# Patient Record
Sex: FEMALE | Race: WHITE | HISPANIC OR LATINO | Employment: OTHER | URBAN - METROPOLITAN AREA
[De-identification: names, ages, dates, MRNs, and addresses within clinical notes are randomized per-mention and may not be internally consistent; named-entity substitution may affect disease eponyms.]

---

## 2017-07-24 ENCOUNTER — CONVERSION ENCOUNTER (OUTPATIENT)
Dept: MAMMOGRAPHY | Facility: CLINIC | Age: 57
End: 2017-07-24

## 2018-04-20 ENCOUNTER — TRANSCRIBE ORDERS (OUTPATIENT)
Dept: ADMINISTRATIVE | Facility: HOSPITAL | Age: 58
End: 2018-04-20

## 2018-04-20 DIAGNOSIS — R06.81 WITNESSED EPISODE OF APNEA: ICD-10-CM

## 2018-04-20 DIAGNOSIS — R06.83 SNORING: Primary | ICD-10-CM

## 2018-04-20 DIAGNOSIS — R40.0 DAYTIME SLEEPINESS: ICD-10-CM

## 2018-05-10 ENCOUNTER — HOSPITAL ENCOUNTER (OUTPATIENT)
Dept: SLEEP CENTER | Facility: CLINIC | Age: 58
Discharge: HOME | End: 2018-05-10
Payer: COMMERCIAL

## 2018-05-10 DIAGNOSIS — R06.81 WITNESSED EPISODE OF APNEA: ICD-10-CM

## 2018-05-10 DIAGNOSIS — R06.83 SNORING: ICD-10-CM

## 2018-05-10 DIAGNOSIS — R40.0 DAYTIME SLEEPINESS: ICD-10-CM

## 2018-05-10 PROCEDURE — 95810 POLYSOM 6/> YRS 4/> PARAM: CPT | Performed by: INTERNAL MEDICINE

## 2018-05-10 PROCEDURE — 95810 POLYSOM 6/> YRS 4/> PARAM: CPT

## 2018-05-11 NOTE — PROGRESS NOTES
Sleep Study Documentation    Pre-Sleep Study       Sleep testing procedure explained to patient:YES    Patient napped prior to study:NO    Caffeine:Dayshift worker after 12PM   Caffeine use:NO    Alcohol:Dayshift workers after 5PM: Alcohol use:NO    Typical day for patient:YES       Study Documentation  Diagnostic   Snore: Moderate  Supplemental O2: no    Minimum SaO2 88  Baseline SaO2 94    EKG abnormalities: no     EEG abnormalities: no    Study Terminated:no      Post-Sleep Study    Medication used at bedtime or during sleep study:YES other prescription medications    Patient reports time it took to fall asleep:30 to 60 minutes    Patient reports waking up during study:1 to 2 times  Patient reports returning to sleep with no difficulty  Patient reports sleeping less than 2 hours without dreaming      Patient reports sleep during study:typical    Patient rated sleepiness: somewhat sleepy or tired

## 2018-05-17 ENCOUNTER — TELEPHONE (OUTPATIENT)
Dept: SLEEP CENTER | Facility: CLINIC | Age: 58
End: 2018-05-17

## 2018-05-17 NOTE — TELEPHONE ENCOUNTER
Spoke with pt she has a folow up sched  offered different appointments, pt declined will keep current appt

## 2018-05-22 LAB — CERULOPLASMIN (HISTORICAL): 37

## 2018-05-23 LAB — COPPER (HISTORICAL): 164 UG/DL

## 2018-05-24 LAB
ABSOL LYMPHOCYTES (HISTORICAL): 1.5 K/UL (ref 0.5–4)
ALBUMIN SERPL BCP-MCNC: 3.8 G/DL (ref 3–5.2)
ALP SERPL-CCNC: 110 U/L (ref 43–122)
ALT SERPL W P-5'-P-CCNC: 47 U/L (ref 9–52)
AMORPHOUS MATERIAL (HISTORICAL): ABNORMAL
ANION GAP SERPL CALCULATED.3IONS-SCNC: 8 MMOL/L (ref 5–14)
AST SERPL W P-5'-P-CCNC: 60 U/L (ref 14–36)
BACTERIA UR QL AUTO: ABNORMAL
BASOPHILS # BLD AUTO: 0.3 K/UL (ref 0–0.1)
BASOPHILS # BLD AUTO: 3 % (ref 0–1)
BILIRUB SERPL-MCNC: 0.4 MG/DL
BILIRUB UR QL STRIP: NEGATIVE MG/DL
BUN SERPL-MCNC: 22 MG/DL (ref 5–25)
CALCIUM SERPL-MCNC: 9.1 MG/DL (ref 8.4–10.2)
CASTS/CASTS TYPE (HISTORICAL): ABNORMAL /LPF
CHLORIDE SERPL-SCNC: 106 MEQ/L (ref 97–108)
CHOLEST SERPL-MCNC: 174 MG/DL
CHOLEST/HDLC SERPL: 2.9 {RATIO}
CLARITY UR: CLEAR
CO2 SERPL-SCNC: 26 MMOL/L (ref 22–30)
COLOR UR: ABNORMAL
COMMENT (HISTORICAL): ABNORMAL
CREATINE, SERUM (HISTORICAL): 0.69 MG/DL (ref 0.6–1.2)
CREATININE, RANDOM URINE (HISTORICAL): 122.3 MG/DL (ref 50–200)
CRYSTAL TYPE (HISTORICAL): ABNORMAL /HPF
DEPRECATED RDW RBC AUTO: 17.4 %
EGFR (HISTORICAL): >60 ML/MIN/1.73 M2
EOSINOPHIL # BLD AUTO: 0.5 K/UL (ref 0–0.4)
EOSINOPHIL NFR BLD AUTO: 5 % (ref 0–6)
EST. AVERAGE GLUCOSE BLD GHB EST-MCNC: 123 MG/DL
GLUCOSE SERPL-MCNC: 105 MG/DL (ref 70–99)
GLUCOSE UR STRIP-MCNC: NEGATIVE MG/DL
HBA1C MFR BLD HPLC: 5.9 %
HCT VFR BLD AUTO: 39.3 % (ref 36–46)
HDLC SERPL-MCNC: 59 MG/DL
HGB BLD-MCNC: 11.8 G/DL (ref 12–16)
HGB UR QL STRIP.AUTO: NEGATIVE
KETONES UR STRIP-MCNC: NEGATIVE MG/DL
LDL/HDL RATIO (HISTORICAL): 1.6
LDLC SERPL CALC-MCNC: 97 MG/DL
LEUKOCYTE ESTERASE UR QL STRIP: ABNORMAL
LYMPHOCYTES NFR BLD AUTO: 16 % (ref 25–45)
MCH RBC QN AUTO: 23.6 PG (ref 26–34)
MCHC RBC AUTO-ENTMCNC: 30.1 % (ref 31–36)
MCV RBC AUTO: 79 FL (ref 80–100)
MICROALBUM.,U,RANDOM (HISTORICAL): <0.6 MG/DL
MICROALBUMIN/CREATININE RATIO (HISTORICAL): NORMAL
MONOCYTES # BLD AUTO: 0.5 K/UL (ref 0.2–0.9)
MONOCYTES NFR BLD AUTO: 6 % (ref 1–10)
MUCOUS THREADS URNS QL MICRO: ABNORMAL
NEUTROPHILS ABS COUNT (HISTORICAL): 6.3 K/UL (ref 1.8–7.8)
NEUTS SEG NFR BLD AUTO: 70 % (ref 45–65)
NITRITE UR QL STRIP: NEGATIVE
NON-SQ EPI CELLS URNS QL MICRO: ABNORMAL
OTHER STN SPEC: ABNORMAL
PH UR STRIP.AUTO: 6 [PH] (ref 4.5–8)
PLATELET # BLD AUTO: 296 K/MCL (ref 150–450)
POTASSIUM SERPL-SCNC: 5.3 MEQ/L (ref 3.6–5)
PROT UR STRIP-MCNC: NEGATIVE MG/DL
RBC # BLD AUTO: 5.01 M/MCL (ref 4–5.2)
RBC #/AREA URNS AUTO: ABNORMAL /HPF
RBC MORPHOLOGY (HISTORICAL): ABNORMAL
SODIUM SERPL-SCNC: 139 MEQ/L (ref 137–147)
SP GR UR STRIP.AUTO: 1.02 (ref 1–1.04)
TOTAL PROTEIN (HISTORICAL): 6.4 G/DL (ref 5.9–8.4)
TRIGL SERPL-MCNC: 90 MG/DL
UROBILINOGEN UR QL STRIP.AUTO: NEGATIVE MG/DL (ref 0–1)
VLDLC SERPL CALC-MCNC: 18 MG/DL (ref 0–40)
WBC # BLD AUTO: 9.1 K/MCL (ref 4.5–11)
WBC #/AREA URNS AUTO: 1 /HPF

## 2018-06-05 ENCOUNTER — OFFICE VISIT (OUTPATIENT)
Dept: SLEEP CENTER | Facility: CLINIC | Age: 58
End: 2018-06-05
Payer: COMMERCIAL

## 2018-06-05 VITALS
HEIGHT: 62 IN | HEART RATE: 60 BPM | SYSTOLIC BLOOD PRESSURE: 124 MMHG | WEIGHT: 231.4 LBS | BODY MASS INDEX: 42.58 KG/M2 | DIASTOLIC BLOOD PRESSURE: 78 MMHG

## 2018-06-05 DIAGNOSIS — G47.33 OSA (OBSTRUCTIVE SLEEP APNEA): Primary | ICD-10-CM

## 2018-06-05 DIAGNOSIS — R06.81 WITNESSED EPISODE OF APNEA: ICD-10-CM

## 2018-06-05 DIAGNOSIS — G47.21 CIRCADIAN RHYTHM SLEEP DISORDER, DELAYED SLEEP PHASE TYPE: ICD-10-CM

## 2018-06-05 DIAGNOSIS — R06.83 SNORING: ICD-10-CM

## 2018-06-05 DIAGNOSIS — R40.0 DAYTIME SLEEPINESS: ICD-10-CM

## 2018-06-05 DIAGNOSIS — G25.81 RESTLESS LEG SYNDROME: ICD-10-CM

## 2018-06-05 PROCEDURE — 99204 OFFICE O/P NEW MOD 45 MIN: CPT | Performed by: NURSE PRACTITIONER

## 2018-06-05 RX ORDER — ESCITALOPRAM OXALATE 20 MG/1
TABLET ORAL
COMMUNITY
End: 2019-02-25

## 2018-06-05 RX ORDER — BUPROPION HYDROCHLORIDE 150 MG/1
150 TABLET ORAL EVERY MORNING
Refills: 0 | COMMUNITY
Start: 2018-05-19 | End: 2019-02-25

## 2018-06-05 RX ORDER — ESCITALOPRAM OXALATE 10 MG/1
TABLET ORAL
COMMUNITY
End: 2018-11-30

## 2018-06-05 RX ORDER — ASPIRIN 81 MG
81 TABLET, DELAYED RELEASE (ENTERIC COATED) ORAL DAILY
Refills: 0 | COMMUNITY
Start: 2018-05-14 | End: 2019-02-25 | Stop reason: SDUPTHER

## 2018-06-05 RX ORDER — PROPRANOLOL HYDROCHLORIDE 40 MG/1
TABLET ORAL
COMMUNITY
End: 2019-02-27 | Stop reason: SDUPTHER

## 2018-06-05 RX ORDER — PANTOPRAZOLE SODIUM 40 MG/1
TABLET, DELAYED RELEASE ORAL
COMMUNITY
End: 2019-02-25 | Stop reason: SDUPTHER

## 2018-06-05 RX ORDER — FLUTICASONE PROPIONATE 50 MCG
SPRAY, SUSPENSION (ML) NASAL
COMMUNITY
End: 2019-02-25

## 2018-06-05 RX ORDER — LANOLIN ALCOHOL/MO/W.PET/CERES
325 CREAM (GRAM) TOPICAL EVERY OTHER DAY
Refills: 0 | COMMUNITY
Start: 2018-05-28 | End: 2019-03-26 | Stop reason: SDUPTHER

## 2018-06-05 RX ORDER — CETIRIZINE HYDROCHLORIDE 10 MG/1
TABLET ORAL
COMMUNITY
End: 2019-02-25

## 2018-06-05 RX ORDER — SENNOSIDES 8.6 MG
CAPSULE ORAL
COMMUNITY
End: 2018-10-11 | Stop reason: SDUPTHER

## 2018-06-05 RX ORDER — ATORVASTATIN CALCIUM 40 MG/1
TABLET, FILM COATED ORAL
Refills: 0 | COMMUNITY
Start: 2018-04-07 | End: 2019-02-25 | Stop reason: SDUPTHER

## 2018-06-05 RX ORDER — PROPRANOLOL HYDROCHLORIDE 60 MG/1
TABLET ORAL
COMMUNITY
End: 2018-10-29 | Stop reason: HOSPADM

## 2018-06-05 RX ORDER — MELATONIN
1000 DAILY
Refills: 0 | COMMUNITY
Start: 2018-05-23 | End: 2018-10-11 | Stop reason: SDUPTHER

## 2018-06-05 NOTE — PATIENT INSTRUCTIONS
1   Complete CPAP titration study   2  Complete lab testing as ordered  3  Schedule appointment for CPAP set up with [facility]  4  Schedule follow-up appointment in 2-3 months after receiving CPAP equipment and beginning treatment  5  Contact the [facility] with any questions or concerns prior to your next visit

## 2018-06-05 NOTE — PROGRESS NOTES
Consultation - [address], 1960, MRN: [id number]    6/5/2018        Reason for Consult / Principal Problem: Follow-up from recent sleep study     Subjective:     HPI: Marcio Chin is a 59y o  year old female  She presents today as referred by her neurologist   She has been seeing Neurology regarding hand tremors  She also has daytime sleepiness, which she attributes to medications that she takes for anxiety and depression  A diagnostic sleep study was completed and noted below  Review of Systems      Genitourinary need to urinate more than twice a night   Cardiology ankle/leg swelling   Gastrointestinal none   Neurology need to move extremities and balance problems   Constitutional none   Integumentary none   Psychiatry anxiety and depression   Musculoskeletal joint pain, muscle aches, back pain, legs twitching/jerking, sciatica and leg cramps   Pulmonary snoring   ENT ringing in ears   Endocrine excessive thirst and frequent urination   Hematological blood donor     Employment:  Homemaker and disabled for depression    Sleep Schedule:       Bedtime:  10:00 p m , however she admits that sometimes she is not tired and she will get up and do things such as watching television or reading a book and will then go to bed around 2:00 a m  Latency:  30 min, if she is unable to fall asleep she will get up and do other things until she is tired      Wakeup time:  8:00 a m  Awakenings:       Frequency:  1-2 times per night      Causes: For urination      Duration:  She will generally fall back to sleep relatively easily    Daytime Sleepiness / Inappropriate Sleep:       Most severe:  1-2 p m  Naps :  She will occasionally take a nap at that time if she has had a busy morning      Time:  1-2 p m        Duration:  60-90 minutes       Inappropriate drowsiness / sleep:  She will fall asleep when sedentary in the afternoon such as while watching TV or reading    Snoring:  Her  tells her she snores loudly    Apnea:  She denies any report of witnessed apnea    Change in Weight:  30 lb weight gain over the past year    Restless Leg Syndrome:  She does exhibit intermittent clinical symptoms consistent with this diagnosis including having a difficult time with her legs feeling like they can rest, which is worsened in the evening and as she lays down to sleep, especially if she is busy throughout the day    Other Complaints:  She denies any sleep walking, sleep talking, sleep paralysis or hallucinations surrounding sleep     Social History:      Caffeine:  Occasional caffeinated soda approximately 2 times per week      Tobacco:   reports that she has been smoking  She has never used smokeless tobacco        Alcohol:   reports that she drinks alcohol  Rare use of alcohol such as holidays      Drugs:   reports that she does not use drugs  The review of systems and following portions of the patient's history were reviewed and updated as appropriate: allergies, current medications, past family history, past medical history, past social history, past surgical history and problem list       Objective:       Vitals:    06/05/18 0900   BP: 124/78   Pulse: 60   Weight: 105 kg (231 lb 6 4 oz)   Height: 5' 2" (1 575 m)     Body mass index is 42 32 kg/m²  Neck Circumference: 33 (cm)  [place] Sleepiness Scale:  Total score: 5      Current Outpatient Prescriptions:     acetaminophen (MAPAP ARTHRITIS PAIN) 650 mg CR tablet, Mapap Arthritis Pain 650 mg tablet,extended release, Disp: , Rfl:     ASPIR-LOW 81 MG EC tablet, Take 81 mg by mouth daily, Disp: , Rfl: 0    atorvastatin (LIPITOR) 40 mg tablet, , Disp: , Rfl: 0    buPROPion (WELLBUTRIN XL) 150 mg 24 hr tablet, Take 150 mg by mouth every morning, Disp: , Rfl: 0    cetirizine (ZyrTEC) 10 mg tablet, cetirizine 10 mg tablet, Disp: , Rfl:     cholecalciferol (VITAMIN D3) 1,000 units tablet, Take 1,000 Units by mouth daily, Disp: , Rfl: 0    escitalopram (LEXAPRO) 10 mg tablet, escitalopram 10 mg tablet, Disp: , Rfl:     escitalopram (LEXAPRO) 20 mg tablet, escitalopram 20 mg tablet, Disp: , Rfl:     ferrous sulfate 325 (65 FE) MG EC tablet, , Disp: , Rfl: 0    fluticasone (FLONASE) 50 mcg/act nasal spray, fluticasone 50 mcg/actuation nasal spray,suspension, Disp: , Rfl:     hydrocortisone 2 5 % cream, , Disp: , Rfl:     pantoprazole (PROTONIX) 40 mg tablet, pantoprazole 40 mg tablet,delayed release, Disp: , Rfl:     propranolol (INDERAL) 40 mg tablet, In morning, Disp: , Rfl:     propranolol (INDERAL) 60 mg tablet, At night, Disp: , Rfl:     RA VITAMIN C 500 MG tablet, , Disp: , Rfl: 0    Physical Exam  General Appearance:   Alert, cooperative, no distress, appears stated age, morbidly obese     Head:   Normocephalic, without obvious abnormality, atraumatic     Eyes:   PERRL, conjunctiva/corneas clear, EOM's intact          Nose:  Nares normal, septum midline, mucosa normal, no drainage or sinus tenderness           Throat:  Lips, teeth and gums normal; tongue normal size and  shape and midline in position; mucosa moist and redundant bilaterally, uvula minimally visible, tonsils not visualized, Mallampati class 4       Neck:  Supple, symmetrical, trachea midline, no adenopathy; Thyroid: No enlargement, tenderness or nodules; no carotid bruit or JVD     Lungs:      Clear to auscultation bilaterally, respirations unlabored     Heart:   Regular rate and rhythm, S1 and S2 normal, no murmur, rub or gallop       Extremities:  Extremities normal, atraumatic, no cyanosis or edema     Pulses:  2+ and symmetric all extremities     Skin:  Skin color, texture, turgor normal, no rashes or lesions       Neurologic:  CNII-XII intact  Normal strength, sensation throughout,mild tremor of hands noted bilaterally  Sleep Study Results: This allnight polysomnogram demonstrated moderate obstructive sleep apnea   Recommend CPAP titration study  Patient will follow-up for review of the sleep study results  AHI was 13 8 with periodic limb index of 17 0      ASSESSMENT / PLAN     1  YUE (obstructive sleep apnea)  CPAP Study   2  Snoring  Sleep Consult - N/P Only   3  Witnessed episode of apnea  Sleep Consult - N/P Only   4  Daytime sleepiness  Sleep Consult - N/P Only   5  Circadian rhythm sleep disorder, delayed sleep phase type     6  Restless leg syndrome  Magnesium    Ferritin    Calcium    Sedimentation rate, automated    TIBC    Transferrin    CBC and differential         Counseling / Coordination of Care  Total clinic time spent today 45 minutes  Greater than 50% of total time was spent with the patient and / or family counseling and / or coordination of care  A description of the counseling / coordination of care:     diagnostic results, instructions for management, risk factor reductions, prognosis, patient and family education, impressions and risks and benefits of treatment options    I reviewed the recent test results with the patient  We talked about the abnormal findings, including those consistent with Obstructive Sleep Apnea  We then discussed how the these are categorized as mild, moderate or severe  We also covered the medical comorbidities associated with untreated Obstructive Sleep Apnea including, hypertension, cardiac arrythmia, myocardial infarction and stroke  I explained how the risk of these conditions increases with the severity of the test results  I also spoke in some detail about the most common treatment options including weight loss, nasal PAP, mandibular advancement devices and uvulopalatopharyngoplasty (UPPP)  I answered all questions posed to me and gave my opinion regarding the best options for treatment based on the patient and their level of disease  In this case she chose to begin treatment using PAP treatment      The patient will complete a CPAP titration study and schedule an appointment with [address] for CPAP set up  She will return in 4 to 12 weeks for a review of compliance data collected since they began to use their equipment  We will discuss this data and talk about any problems that may prevent successful treatment of Obstructive Sleep Apnea  Changes will be made in treatment parameters or interface devices in order to improve her ability to continue using PAP to maintain upper airway patency during sleep  Lab testing has been ordered regarding her symptoms of restless legs syndrome  We will follow up regarding these symptoms when she returns for her compliance visit in 2-3 months  The following instructions have been given to the patient today:    Patient Instructions   1  Complete CPAP titration study   2  Complete lab testing as ordered  3  Schedule appointment for CPAP set up with [facility]  4  Schedule follow-up appointment in 2-3 months after receiving CPAP equipment and beginning treatment  5  Contact the [facility] with any questions or concerns prior to your next visit              Danielle Zhu, [address] [facility]

## 2018-06-13 ENCOUNTER — HOSPITAL ENCOUNTER (OUTPATIENT)
Dept: SLEEP CENTER | Facility: CLINIC | Age: 58
Discharge: HOME | End: 2018-06-13
Payer: COMMERCIAL

## 2018-06-13 DIAGNOSIS — G47.33 OSA (OBSTRUCTIVE SLEEP APNEA): ICD-10-CM

## 2018-06-13 PROCEDURE — 95811 POLYSOM 6/>YRS CPAP 4/> PARM: CPT

## 2018-06-13 PROCEDURE — 95811 POLYSOM 6/>YRS CPAP 4/> PARM: CPT | Performed by: PSYCHIATRY & NEUROLOGY

## 2018-06-14 DIAGNOSIS — G47.33 OBSTRUCTIVE SLEEP APNEA ON CPAP: Primary | ICD-10-CM

## 2018-06-14 DIAGNOSIS — Z99.89 OBSTRUCTIVE SLEEP APNEA ON CPAP: Primary | ICD-10-CM

## 2018-06-14 NOTE — PROGRESS NOTES
Sleep Study Documentation    Pre-Sleep Study       Sleep testing procedure explained to patient:YES    Patient napped prior to study:YES- less than 30 minutes  Napped after 2PM: yes    Caffeine:Dayshift worker after 12PM   Caffeine use:NO    Alcohol:Dayshift workers after 5PM: Alcohol use:NO    Typical day for patient:YES       Study Documentation  Treatment   Optimal PAP pressure: 7cm  Leak:None  Snore:Eliminated  REM Obtained:yes  Supplemental O2: no    Minimum SaO2 89  Baseline SaO2 95  PAP mask choice medium ResMed Air Fit N20  PAP mask type:nasal  PAP pressure at which snoring was eliminated 7cm  Mode of Therapy:CPAP  ETCO2:No  CPAP changed to BiPAP:No    EKG abnormalities: no     EEG abnormalities: no    Study Terminated:no        Post-Sleep Study    Medication used at bedtime or during sleep study:NO    Patient reports time it took to fall asleep:less than 20 minutes    Patient reports waking up during study:1 to 2 times  Patient reports returning to sleep without difficulty  Patient reports sleeping 6 to 8 hours without dreaming  Patient reports sleep during study:better than usual    Patient rated sleepiness: Not sleepy or tired    PAP treatment:yes: Post PAP treatment patient reports feeling better and  would wear PAP mask at home

## 2018-06-18 ENCOUNTER — TELEPHONE (OUTPATIENT)
Dept: SLEEP CENTER | Facility: CLINIC | Age: 58
End: 2018-06-18

## 2018-06-18 NOTE — TELEPHONE ENCOUNTER
Spoke with patient and rescheduled CPAP set up to 7/5  Rx and data to [address]   She will need to reschedule compliance follow up at set up

## 2018-06-22 LAB
ABSOL LYMPHOCYTES (HISTORICAL): 1.2 K/UL (ref 0.5–4)
CALCIUM SERPL-MCNC: 9 MG/DL (ref 8.4–10.2)
COMMENT (HISTORICAL): ABNORMAL
DEPRECATED RDW RBC AUTO: 17.2 %
EOSINOPHIL # BLD AUTO: 0.5 K/UL (ref 0–0.4)
EOSINOPHIL NFR BLD AUTO: 6 % (ref 0–6)
ERYTHROCYTE SEDIMENTATION RATE (HISTORICAL): 96 MM (ref 1–20)
FERRITIN SERPL-MCNC: 9 NG/ML (ref 11–264)
HCT VFR BLD AUTO: 37.3 % (ref 36–46)
HGB BLD-MCNC: 11.6 G/DL (ref 12–16)
IRON SERPL-MCNC: 29 UG/DL (ref 37–170)
LYMPHOCYTES NFR BLD AUTO: 15 % (ref 25–45)
MAGNESIUM SERPL-MCNC: 2.2 MG/DL (ref 1.6–2.3)
MCH RBC QN AUTO: 24.4 PG (ref 26–34)
MCHC RBC AUTO-ENTMCNC: 31 % (ref 31–36)
MCV RBC AUTO: 79 FL (ref 80–100)
MONOCYTES # BLD AUTO: 0.3 K/UL (ref 0.2–0.9)
MONOCYTES NFR BLD AUTO: 4 % (ref 1–10)
NEUTROPHILS ABS COUNT (HISTORICAL): 5.9 K/UL (ref 1.8–7.8)
NEUTS SEG NFR BLD AUTO: 75 % (ref 45–65)
PLATELET # BLD AUTO: 288 K/MCL (ref 150–450)
RBC # BLD AUTO: 4.73 M/MCL (ref 4–5.2)
RBC MORPHOLOGY (HISTORICAL): ABNORMAL
WBC # BLD AUTO: 7.9 K/MCL (ref 4.5–11)

## 2018-06-23 LAB — TRANSFERRIN (HISTORICAL): 318

## 2018-07-05 ENCOUNTER — TELEPHONE (OUTPATIENT)
Dept: SLEEP CENTER | Facility: CLINIC | Age: 58
End: 2018-07-05

## 2018-08-08 PROBLEM — E66.9 OBESITY: Status: ACTIVE | Noted: 2018-08-08

## 2018-08-08 PROBLEM — E55.9 VITAMIN D DEFICIENCY: Status: ACTIVE | Noted: 2018-08-08

## 2018-08-08 PROBLEM — I10 HYPERTENSION: Status: ACTIVE | Noted: 2018-08-08

## 2018-08-08 PROBLEM — R25.1 TREMOR OF BOTH HANDS: Status: ACTIVE | Noted: 2018-08-08

## 2018-08-08 PROBLEM — F41.9 ANXIETY: Status: ACTIVE | Noted: 2018-08-08

## 2018-08-08 PROBLEM — E78.5 HYPERLIPIDEMIA: Status: ACTIVE | Noted: 2018-08-08

## 2018-08-08 NOTE — ASSESSMENT & PLAN NOTE
No homicidal or suicidal ideations    Continue medications  She follows up with [facility]

## 2018-08-08 NOTE — ASSESSMENT & PLAN NOTE
Counseled patient on the importance of working to achieve weight reduction goal   Discussed benefits of weight loss including prevention or control of comorbidities  Discussed role that balanced diet and daily activity play in weight reduction  Set up small attainable weight loss goals  Involve family, friends, and co-workers for support

## 2018-08-09 ENCOUNTER — OFFICE VISIT (OUTPATIENT)
Dept: FAMILY MEDICINE CLINIC | Facility: CLINIC | Age: 58
End: 2018-08-09
Payer: COMMERCIAL

## 2018-08-09 VITALS
TEMPERATURE: 97 F | OXYGEN SATURATION: 96 % | WEIGHT: 226 LBS | SYSTOLIC BLOOD PRESSURE: 102 MMHG | DIASTOLIC BLOOD PRESSURE: 70 MMHG | HEART RATE: 98 BPM | HEIGHT: 61 IN | BODY MASS INDEX: 42.67 KG/M2 | RESPIRATION RATE: 16 BRPM

## 2018-08-09 DIAGNOSIS — Z12.2 ENCOUNTER FOR SCREENING FOR LUNG CANCER: ICD-10-CM

## 2018-08-09 DIAGNOSIS — Z12.39 SCREENING FOR BREAST CANCER: ICD-10-CM

## 2018-08-09 DIAGNOSIS — L30.4 INTERTRIGO: Primary | ICD-10-CM

## 2018-08-09 PROBLEM — Z72.0 TOBACCO ABUSE: Status: ACTIVE | Noted: 2018-08-09

## 2018-08-09 PROBLEM — D50.9 IRON DEFICIENCY ANEMIA: Status: ACTIVE | Noted: 2018-08-09

## 2018-08-09 PROCEDURE — 99213 OFFICE O/P EST LOW 20 MIN: CPT | Performed by: FAMILY MEDICINE

## 2018-08-09 RX ORDER — NYSTATIN 100000 [USP'U]/G
POWDER TOPICAL 2 TIMES DAILY
Qty: 15 G | Refills: 2 | Status: SHIPPED | OUTPATIENT
Start: 2018-08-09 | End: 2018-10-11 | Stop reason: SDUPTHER

## 2018-08-09 NOTE — ASSESSMENT & PLAN NOTE
She follows up with Neurology  States that she was told it is not Parkinson's disease  Probably essential tremors    It is much improved after she started propranolol

## 2018-08-09 NOTE — ASSESSMENT & PLAN NOTE
Discussed that due to age and risk factors patient is in need of mammogram to screen for breast cancer  Patient verbalized understanding and is willing    Order placed today for mammogram

## 2018-08-09 NOTE — ASSESSMENT & PLAN NOTE
Patient has history of more than 30 years of tobacco abuse    Will obtain CT chest with low dose contrast   She is agreeable

## 2018-08-09 NOTE — ASSESSMENT & PLAN NOTE
Discussed tobacco cessation  Discussed the effects of smoking on cardiovascular system, skin and lungs  Patient verbalized understanding and is ready to quit  Discussed tips for smoking cessation:  Set a quit date, Change environment (avoid tobacco exposure, avoid situations where you previously smoked), dispose of all cigarettes and ashtrays  Involve family, friends, and co-workers for support  Patient verbalized understanding
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
## 2018-08-09 NOTE — ASSESSMENT & PLAN NOTE
Consult about the importance of iron supplement however encouraged to take every other day instead of every day to avoid side effects  Explained that these are the new recommendations    Per her colonoscopy last year was normal

## 2018-08-22 ENCOUNTER — HOSPITAL ENCOUNTER (OUTPATIENT)
Dept: MAMMOGRAPHY | Facility: CLINIC | Age: 58
Discharge: HOME | End: 2018-08-22
Payer: COMMERCIAL

## 2018-08-22 DIAGNOSIS — Z12.39 SCREENING FOR BREAST CANCER: ICD-10-CM

## 2018-08-22 PROCEDURE — 77067 SCR MAMMO BI INCL CAD: CPT

## 2018-08-31 ENCOUNTER — OFFICE VISIT (OUTPATIENT)
Dept: NEUROLOGY | Facility: CLINIC | Age: 58
End: 2018-08-31
Payer: COMMERCIAL

## 2018-08-31 VITALS
HEART RATE: 87 BPM | RESPIRATION RATE: 16 BRPM | DIASTOLIC BLOOD PRESSURE: 83 MMHG | SYSTOLIC BLOOD PRESSURE: 121 MMHG | HEIGHT: 62 IN | WEIGHT: 232.4 LBS | BODY MASS INDEX: 42.76 KG/M2

## 2018-08-31 DIAGNOSIS — Z99.89 OBSTRUCTIVE SLEEP APNEA ON CPAP: Primary | ICD-10-CM

## 2018-08-31 DIAGNOSIS — G25.0 BENIGN ESSENTIAL TREMOR: ICD-10-CM

## 2018-08-31 DIAGNOSIS — G47.33 OBSTRUCTIVE SLEEP APNEA ON CPAP: Primary | ICD-10-CM

## 2018-08-31 PROCEDURE — 99213 OFFICE O/P EST LOW 20 MIN: CPT | Performed by: PHYSICIAN ASSISTANT

## 2018-08-31 NOTE — ASSESSMENT & PLAN NOTE
Doing very well on propranolol 60 mg q a m  and 40 mg q p m  Allyssa Albright She has found much improvement in her tremor since treating her obstructive sleep apnea with CPAP  She desires no changes to her medication regimen  --continue medication as outlined above

## 2018-08-31 NOTE — LETTER
August 31, 2018     MD Katy Villagran [address]  [place] [address] [facility]    Patient: Jenni Barbour   YOB: 1960   Date of Visit: 8/31/2018       Dear Dr Román Fernandez: Thank you for referring Jenni Barbour to me for evaluation  Below are my notes for this consultation  If you have questions, please do not hesitate to call me  I look forward to following your patient along with you  Sincerely,        Richy Ontiveros PA-C        CC: No Recipients  Richy Ontiveros PA-C  8/31/2018  9:37 AM  Sign at close encounter  Patient is here today for a follow up for her tremors    Patient ID: Jenni Barbour is a 59 y o  female  Assessment/Plan:    Benign essential tremor  Doing very well on propranolol 60 mg q a m  and 40 mg q p m  Radha Aguilera She has found much improvement in her tremor since treating her obstructive sleep apnea with CPAP  She desires no changes to her medication regimen  --continue medication as outlined above  Obstructive sleep apnea on CPAP  Overall feels very well since starting her CPAP  Tremor has even improved  She will follow up in 6 months or p r n     Subjective:    HPI  The patient is a right-handed 78-year-old female who presents to the office for ongoing care regarding her benign essential tremor  She is currently taking propranolol 60 mg in the morning and 40 mg in the evening  Previously, she was having difficulty with the medication wearing off in the evening with return of her tremor  We tried her on an extended-release preparation of the propranolol 120 mg  Unfortunately, she developed symptoms of orthostasis on the advanced dose and returned back to her current dosing schedule  Since last visit, she has started CPAP treatment for obstructive sleep apnea  She no longer has wearing off of her propranolol and feels quite controlled  She describes no untoward effects on the medication such as orthostasis      At times, she notes that her legs will "jump", particularly when she is fatigued  She describes no abnormal sensory symptoms in the lower extremities  The twitching is intermittent and not particularly bothersome to her      Past Medical History:   Diagnosis Date    Anemia     Anxiety     Arthritis     Back pain     Cancer ([address] )     Depression     Dyslipidemia     Essential tremor     Excessive daytime sleepiness     GERD (gastroesophageal reflux disease)     History of uterine cancer     Hyperglycemia     Hypertension     Hypovitaminosis D     Iron deficiency anemia     Joint pain     Mood disorder (HCC)     Obesity     Obstructive sleep apnea     Ringing in ears     Snoring     Witnessed episode of apnea      Past Surgical History:   Procedure Laterality Date    APPENDECTOMY      HYSTERECTOMY      KNEE SURGERY      Meniscus tear    TONSILLECTOMY      TUBAL LIGATION       Social History     Social History    Marital status: Single     Spouse name: N/A    Number of children: N/A    Years of education: N/A     Social History Main Topics    Smoking status: Current Every Day Smoker    Smokeless tobacco: Never Used    Alcohol use Yes      Comment: very seldom    Drug use: No    Sexual activity: Not Asked     Other Topics Concern    None     Social History Narrative    None     Family History   Problem Relation Age of Onset    Diabetes Mother     Asthma Mother     Hypertension Mother     Heart disease Mother      No Known Allergies    Current Outpatient Prescriptions:     acetaminophen (MAPAP ARTHRITIS PAIN) 650 mg CR tablet, Mapap Arthritis Pain 650 mg tablet,extended release, Disp: , Rfl:     ASPIR-LOW 81 MG EC tablet, Take 81 mg by mouth daily, Disp: , Rfl: 0    atorvastatin (LIPITOR) 40 mg tablet, , Disp: , Rfl: 0    buPROPion (WELLBUTRIN XL) 150 mg 24 hr tablet, Take 150 mg by mouth every morning, Disp: , Rfl: 0    cetirizine (ZyrTEC) 10 mg tablet, cetirizine 10 mg tablet, Disp: , Rfl:    cholecalciferol (VITAMIN D3) 1,000 units tablet, Take 1,000 Units by mouth daily, Disp: , Rfl: 0    escitalopram (LEXAPRO) 10 mg tablet, escitalopram 10 mg tablet, Disp: , Rfl:     escitalopram (LEXAPRO) 20 mg tablet, escitalopram 20 mg tablet, Disp: , Rfl:     ferrous sulfate 325 (65 FE) MG EC tablet, Take 325 mg by mouth every other day  , Disp: , Rfl: 0    fluticasone (FLONASE) 50 mcg/act nasal spray, fluticasone 50 mcg/actuation nasal spray,suspension, Disp: , Rfl:     nystatin (MYCOSTATIN) powder, Apply topically 2 (two) times a day, Disp: 15 g, Rfl: 2    pantoprazole (PROTONIX) 40 mg tablet, pantoprazole 40 mg tablet,delayed release, Disp: , Rfl:     propranolol (INDERAL) 40 mg tablet, In morning, Disp: , Rfl:     propranolol (INDERAL) 60 mg tablet, At night, Disp: , Rfl:     RA VITAMIN C 500 MG tablet, Take 500 mg by mouth daily  , Disp: , Rfl: 0    Objective:    Blood pressure 121/83, pulse 87, resp  rate 16, height 5' 2" (1 575 m), weight 105 kg (232 lb 6 4 oz)  Physical Exam  General:  Patient is well-developed, obese, and in no acute distress  HEENT:  Head normocephalic  Eyes anicteric  Cardiovascular:  With regular rhythm  Lungs:  Clear to auscultation  Abdomen:  Soft, nontender, with bowel sounds present  Extremities:  With no significant edema  Neurological Exam  The patient is alert and very pleasantly interactive  No obvious symbolic language difficulty or dysarthria and fully oriented  Orthopedic appearing gait but otherwise unremarkable  Cranial Nerves:   I: Olfactory sense intact bilaterally  II: Visual fields full to confrontation  Pupils equal, round, reactive to light with normal accomodation  III,IV,VI: Extraocular muscles EOMI, no nystagmus  V: Masseter and pterygoid strength  Sensation in the V1 through V3 distributions intact to pinprick and light touch bilaterally  VII:   Subtle left lower facial asymmetry noted     VIII: Audition intact to finger rub bilaterally  IX/X: Uvula midline  Soft palate elevation symmetric  XI: Trapezius and SCM strength 5/5 bilaterally  XII: Tongue midline with no atrophy or fasciculations with appropriate movement  Romberg was negative in-patient was accurate with finger-to-nose maneuvers bilaterally  She had excellent general and facial animation  There was no rest tremor evident  There was a very, very mild high-frequency, low-amplitude tremor of the hands when in sustention, appearing much improved since last visit  Tone was normal in the bilateral wrists  No obvious lateralized extremity weakness  Muscle stretch reflexes: 1 throughout the upper extremities bilaterally, 2 at the bilateral knees and ankles  Toe responses were downgoing bilaterally  ROS:    Review of Systems   Constitutional: Negative  Negative for appetite change and fever  HENT: Positive for tinnitus  Negative for hearing loss, trouble swallowing and voice change  Eyes: Negative  Negative for photophobia and pain  Respiratory: Negative  Negative for shortness of breath  Cardiovascular: Negative  Negative for palpitations  Gastrointestinal: Positive for constipation  Negative for nausea and vomiting  Endocrine: Negative  Negative for cold intolerance and heat intolerance  Genitourinary: Positive for frequency and urgency  Negative for dysuria  Musculoskeletal: Positive for back pain, gait problem and joint swelling (shoulder and knees)  Negative for myalgias and neck pain  Skin: Negative  Negative for rash  Allergic/Immunologic: Negative  Neurological: Positive for tremors  Negative for dizziness, seizures, syncope, facial asymmetry, speech difficulty, weakness, light-headedness, numbness and headaches  Hematological: Bruises/bleeds easily  Psychiatric/Behavioral: Positive for sleep disturbance  Negative for confusion and hallucinations  The patient is nervous/anxious          I personally reviewed the ROS that was entered by the medical assistant

## 2018-08-31 NOTE — PROGRESS NOTES
Patient is here today for a follow up for her tremors    Patient ID: Pito Cancer is a 59 y o  female  Assessment/Plan:    Benign essential tremor  Doing very well on propranolol 60 mg q a m  and 40 mg q p m  Seamus Navarro She has found much improvement in her tremor since treating her obstructive sleep apnea with CPAP  She desires no changes to her medication regimen  --continue medication as outlined above  Obstructive sleep apnea on CPAP  Overall feels very well since starting her CPAP  Tremor has even improved  She will follow up in 6 months or p r n     Subjective:    HPI  The patient is a right-handed 53-year-old female who presents to the office for ongoing care regarding her benign essential tremor  She is currently taking propranolol 60 mg in the morning and 40 mg in the evening  Previously, she was having difficulty with the medication wearing off in the evening with return of her tremor  We tried her on an extended-release preparation of the propranolol 120 mg  Unfortunately, she developed symptoms of orthostasis on the advanced dose and returned back to her current dosing schedule  Since last visit, she has started CPAP treatment for obstructive sleep apnea  She no longer has wearing off of her propranolol and feels quite controlled  She describes no untoward effects on the medication such as orthostasis  At times, she notes that her legs will "jump", particularly when she is fatigued  She describes no abnormal sensory symptoms in the lower extremities  The twitching is intermittent and not particularly bothersome to her      Past Medical History:   Diagnosis Date    Anemia     Anxiety     Arthritis     Back pain     [address] ([facility] [address]     Depression     Dyslipidemia     Essential tremor     Excessive daytime sleepiness     GERD (gastroesophageal reflux disease)     History of uterine cancer     Hyperglycemia     Hypertension     Hypovitaminosis D     Iron deficiency anemia  Joint pain     Mood disorder [address] [address]     Obesity     Obstructive sleep apnea     Ringing in ears     Snoring     Witnessed episode of apnea      Past Surgical History:   Procedure Laterality Date    APPENDECTOMY      HYSTERECTOMY      KNEE SURGERY      Meniscus tear    TONSILLECTOMY      TUBAL LIGATION       Social History     Social History    Marital status: Single     Spouse name: N/A    Number of children: N/A    Years of education: N/A     Social History Main Topics    Smoking status: Current Every Day Smoker    Smokeless tobacco: Never Used    Alcohol use Yes      Comment: very seldom    Drug use: No    Sexual activity: Not Asked     Other Topics Concern    None     Social History Narrative    None     Family History   Problem Relation Age of Onset    Diabetes Mother     Asthma Mother     Hypertension Mother     Heart disease Mother      No Known Allergies    Current Outpatient Prescriptions:     acetaminophen (MAPAP ARTHRITIS PAIN) 650 mg CR tablet, Mapap Arthritis Pain 650 mg tablet,extended release, Disp: , Rfl:     ASPIR-LOW 81 MG EC tablet, Take 81 mg by mouth daily, Disp: , Rfl: 0    atorvastatin (LIPITOR) 40 mg tablet, , Disp: , Rfl: 0    buPROPion (WELLBUTRIN XL) 150 mg 24 hr tablet, Take 150 mg by mouth every morning, Disp: , Rfl: 0    cetirizine (ZyrTEC) 10 mg tablet, cetirizine 10 mg tablet, Disp: , Rfl:     cholecalciferol (VITAMIN D3) 1,000 units tablet, Take 1,000 Units by mouth daily, Disp: , Rfl: 0    escitalopram (LEXAPRO) 10 mg tablet, escitalopram 10 mg tablet, Disp: , Rfl:     escitalopram (LEXAPRO) 20 mg tablet, escitalopram 20 mg tablet, Disp: , Rfl:     ferrous sulfate 325 (65 FE) MG EC tablet, Take 325 mg by mouth every other day  , Disp: , Rfl: 0    fluticasone (FLONASE) 50 mcg/act nasal spray, fluticasone 50 mcg/actuation nasal spray,suspension, Disp: , Rfl:     nystatin (MYCOSTATIN) powder, Apply topically 2 (two) times a day, Disp: 15 g, Rfl: 2    pantoprazole (PROTONIX) 40 mg tablet, pantoprazole 40 mg tablet,delayed release, Disp: , Rfl:     propranolol (INDERAL) 40 mg tablet, In morning, Disp: , Rfl:     propranolol (INDERAL) 60 mg tablet, At night, Disp: , Rfl:     RA VITAMIN C 500 MG tablet, Take 500 mg by mouth daily  , Disp: , Rfl: 0    Objective:    Blood pressure 121/83, pulse 87, resp  rate 16, height 5' 2" (1 575 m), weight 105 kg (232 lb 6 4 oz)  Physical Exam  General:  Patient is well-developed, obese, and in no acute distress  HEENT:  Head normocephalic  Eyes anicteric  Cardiovascular:  With regular rhythm  Lungs:  Clear to auscultation  Abdomen:  Soft, nontender, with bowel sounds present  Extremities:  With no significant edema  Neurological Exam  The patient is alert and very pleasantly interactive  No obvious symbolic language difficulty or dysarthria and fully oriented  Orthopedic appearing gait but otherwise unremarkable  Cranial Nerves:   I: Olfactory sense intact bilaterally  II: Visual fields full to confrontation  Pupils equal, round, reactive to light with normal accomodation  III,IV,VI: Extraocular muscles EOMI, no nystagmus  V: Masseter and pterygoid strength  Sensation in the V1 through V3 distributions intact to pinprick and light touch bilaterally  VII:   Subtle left lower facial asymmetry noted  VIII: Audition intact to finger rub bilaterally  IX/X: Uvula midline  Soft palate elevation symmetric  XI: Trapezius and SCM strength 5/5 bilaterally  XII: Tongue midline with no atrophy or fasciculations with appropriate movement  Romberg was negative in-patient was accurate with finger-to-nose maneuvers bilaterally  She had excellent general and facial animation  There was no rest tremor evident  There was a very, very mild high-frequency, low-amplitude tremor of the hands when in sustention, appearing much improved since last visit  Tone was normal in the bilateral wrists      No obvious lateralized extremity weakness  Muscle stretch reflexes: 1 throughout the upper extremities bilaterally, 2 at the bilateral knees and ankles  Toe responses were downgoing bilaterally  ROS:    Review of Systems   Constitutional: Negative  Negative for appetite change and fever  HENT: Positive for tinnitus  Negative for hearing loss, trouble swallowing and voice change  Eyes: Negative  Negative for photophobia and pain  Respiratory: Negative  Negative for shortness of breath  Cardiovascular: Negative  Negative for palpitations  Gastrointestinal: Positive for constipation  Negative for nausea and vomiting  Endocrine: Negative  Negative for cold intolerance and heat intolerance  Genitourinary: Positive for frequency and urgency  Negative for dysuria  Musculoskeletal: Positive for back pain, gait problem and joint swelling (shoulder and knees)  Negative for myalgias and neck pain  Skin: Negative  Negative for rash  Allergic/Immunologic: Negative  Neurological: Positive for tremors  Negative for dizziness, seizures, syncope, facial asymmetry, speech difficulty, weakness, light-headedness, numbness and headaches  Hematological: Bruises/bleeds easily  Psychiatric/Behavioral: Positive for sleep disturbance  Negative for confusion and hallucinations  The patient is nervous/anxious  I personally reviewed the ROS that was entered by the medical assistant

## 2018-09-10 ENCOUNTER — OFFICE VISIT (OUTPATIENT)
Dept: SLEEP CENTER | Facility: CLINIC | Age: 58
End: 2018-09-10
Payer: COMMERCIAL

## 2018-09-10 VITALS
SYSTOLIC BLOOD PRESSURE: 122 MMHG | HEIGHT: 62 IN | HEART RATE: 70 BPM | DIASTOLIC BLOOD PRESSURE: 68 MMHG | WEIGHT: 234.6 LBS | BODY MASS INDEX: 43.17 KG/M2

## 2018-09-10 DIAGNOSIS — G47.21 CIRCADIAN RHYTHM SLEEP DISORDER, DELAYED SLEEP PHASE TYPE: ICD-10-CM

## 2018-09-10 DIAGNOSIS — Z99.89 OBSTRUCTIVE SLEEP APNEA ON CPAP: Primary | ICD-10-CM

## 2018-09-10 DIAGNOSIS — G47.33 OBSTRUCTIVE SLEEP APNEA ON CPAP: Primary | ICD-10-CM

## 2018-09-10 DIAGNOSIS — R79.0 LOW SERUM FERRITIN LEVEL: ICD-10-CM

## 2018-09-10 DIAGNOSIS — G25.81 RESTLESS LEG SYNDROME: ICD-10-CM

## 2018-09-10 PROCEDURE — 99213 OFFICE O/P EST LOW 20 MIN: CPT | Performed by: NURSE PRACTITIONER

## 2018-09-10 NOTE — PROGRESS NOTES
Progress Note - [address] [facility] 59 y o  female   RZQ:[id number], MRN: [id number]  9/10/2018          Follow Up Evaluation / Problem:     Obstructive Sleep Apnea    HPI: Lynnda Fabry is a 59y o  year old female  She presents for follow up of obstructive sleep apnea  She completed a diagnostic sleep study in May for complaints of snoring and daytime sleepiness, which indicated moderate obstructive sleep apnea and periodic limb movement disorder  She was titrated with COAO in June and has been using CPAP set at 7cm of water pressure  She reports that she has been using it almost every night and feels more refreshed upon awakening since using it  Her restless legs bother her on occasion, but the feeling only lasts for a brief time and is infrequent      Review of Systems      Genitourinary none   Cardiology none   Gastrointestinal none   Neurology awaken with headache   Constitutional none   Integumentary none   Psychiatry anxiety and depression   Musculoskeletal back pain and legs twitching/jerking   Pulmonary none   ENT none   Endocrine none   Hematological none       Current Outpatient Prescriptions:     acetaminophen (MAPAP ARTHRITIS PAIN) 650 mg CR tablet, Mapap Arthritis Pain 650 mg tablet,extended release, Disp: , Rfl:     ASPIR-LOW 81 MG EC tablet, Take 81 mg by mouth daily, Disp: , Rfl: 0    atorvastatin (LIPITOR) 40 mg tablet, , Disp: , Rfl: 0    buPROPion (WELLBUTRIN XL) 150 mg 24 hr tablet, Take 150 mg by mouth every morning, Disp: , Rfl: 0    cetirizine (ZyrTEC) 10 mg tablet, cetirizine 10 mg tablet, Disp: , Rfl:     cholecalciferol (VITAMIN D3) 1,000 units tablet, Take 1,000 Units by mouth daily, Disp: , Rfl: 0    escitalopram (LEXAPRO) 10 mg tablet, escitalopram 10 mg tablet, Disp: , Rfl:     escitalopram (LEXAPRO) 20 mg tablet, escitalopram 20 mg tablet, Disp: , Rfl:     ferrous sulfate 325 (65 FE) MG EC tablet, Take 325 mg by mouth every other day  , Disp: , Rfl: 0    fluticasone (FLONASE) 50 mcg/act nasal spray, fluticasone 50 mcg/actuation nasal spray,suspension, Disp: , Rfl:     nystatin (MYCOSTATIN) powder, Apply topically 2 (two) times a day, Disp: 15 g, Rfl: 2    pantoprazole (PROTONIX) 40 mg tablet, pantoprazole 40 mg tablet,delayed release, Disp: , Rfl:     propranolol (INDERAL) 40 mg tablet, In morning, Disp: , Rfl:     propranolol (INDERAL) 60 mg tablet, At night, Disp: , Rfl:     RA VITAMIN C 500 MG tablet, Take 500 mg by mouth daily  , Disp: , Rfl: 0    [place] Sleepiness Scale  Sitting and reading: Slight chance of dozing  Watching TV: Moderate chance of dozing  Sitting, inactive in a public place (e g  a theatre or a meeting): Slight chance of dozing  As a passenger in a car for an hour without a break: Would never doze  Lying down to rest in the afternoon when circumstances permit: High chance of dozing  Sitting and talking to someone: Would never doze  Sitting quietly after a lunch without alcohol: Would never doze  In a car, while stopped for a few minutes in traffic: Would never doze  Total score: 7              Vitals:    09/10/18 1400   BP: 122/68   Pulse: 70   Weight: 106 kg (234 lb 9 6 oz)   Height: 5' 2" (1 575 m)       Body mass index is 42 91 kg/m²  EPWORTH SLEEPINESS SCORE  Total score: 7      Past History Since Last Sleep Center Visit:   Since her last visit, she has started to use CPAP  She reports that she does not always sleep for 4 hours during the night  On some days, she awakens at 4am and does not feel like she can continue to sleep  She gets up and goes into the living room to avoid waking her   On these days, she does not get a full 4 hours of sleep  She goes to bed between midnight and 1am due to a desire to watch movies  She relates a feeling like her whole body is getting an electric shock, which lasts seconds and is intermittent    This shock sensation occurs randomly during the day and can occur up to 3 times in one day  She can't correlate it with any activity  She does not experience any symptoms while sleeping or when lying down to go to sleep  She also experiences restless leg symptoms, which she feels have improved since using CPAP  She is taking iron and vitamin C every other day for the past 4-5 months for iron deficiency anemia  Her ferritin level was obtained on 6/22/18 and is only 9  The review of systems and following portions of the patient's history were reviewed and updated as appropriate: allergies, current medications, past family history, past medical history, past social history, past surgical history, and problem list         OBJECTIVE    PAP Pressure: Nasal CPAP set to deliver 7 cm of water pressure  DME Provider: [facility] [employer]    Physical Exam:     General Appearance:   Alert, cooperative, no distress, appears stated age, morbidly obese     Head:   Normocephalic, without obvious abnormality, atraumatic     Eyes:   PERRL, conjunctiva/corneas clear, EOM's intact          Nose:  Nares normal, septum midline, no drainage or sinus tenderness           Throat:  Lips and gums normal; tongue normal size and  shape and midline mucosa moist and redundant bilaterally, uvula minimally visible, tonsils not visualized, Mallampati class 4       Neck:  Supple, symmetrical, trachea midline, no adenopathy; Thyroid: No enlargement, tenderness or nodules; no carotid bruit or JVD     Lungs:      Clear to auscultation bilaterally, respirations unlabored     Heart:   Regular rate and rhythm, S1 and S2 normal, no murmur, rub or gallop       Extremities:  Extremities normal, atraumatic, no cyanosis or edema     Pulses:  2+ and symmetric all extremities     Skin:  Skin color, texture, turgor normal, no rashes or lesions       Neurologic:  CNII-XII intact  Normal strength, sensation throughout       ASSESSMENT / PLAN    1  Obstructive sleep apnea on CPAP  PAP DME Resupply/Reorder   2  Low serum ferritin level  Ferritin   3  Restless leg syndrome     4  Circadian rhythm sleep disorder, delayed sleep phase type             Counseling / Coordination of Care  Total clinic time spent today 20 minutes  Greater than 50% of total time was spent with the patient and / or family counseling and / or coordination of care  A description of the counseling / coordination of care:     Impressions, Diagnostic results, Prognosis, Instructions for management, Risks and benefits of treatment, Patient and family education, Risk factor reductions and Importance of compliance with treatment    Today I reviewed the patient's compliance data  she has been able to use the equipment 80% of all days recorded  Average usage was 4 or more hours 66 7% of all days recorded  The estimated AHI is 3 3 abnormal breathing events per hour  Response to treatment has been good  She was advised that she needs to use her CPAP equipment every day and for at least 4 hours per day  Over the past month, she has not met the required compliance requirements  We discussed trying to go to bed earlier to maximize use  She may also consider staying in bed in the morning to continue using it, which may allow her to return to sleep  She will work on compliance  Supplies have been ordered for the next 12 months  She will continue to clean her equipment daily  We discussed her restless leg syndrome  She will increase her iron and vitamin C intake to daily  She will repeat her ferritin level in 3 months  If still remains low, iron infusions or referral to hematology should be considered  We discussed the sensation of electric shock and while it does not occur related to sleep it was recommended that she follow up with her PCP or neurology  She will continue using this equipment at the settings noted above for the next 6 months  At that timeshe will then return for a routine follow-up evaluation   I have asked him to contact the [facility] if he encounters any difficulties prior to that time  The following instructions have been given to the patient today:    Patient Instructions   1  Continue use of CPAP equipment nightly - ALL NIGHT when you are sleeping  2  Continue to clean your equipment, as discussed, change supplies  3 Take iron tablets with vitamin C daily  May add docusate sodium (Colace) as a stool softener for constipation  This is over the counter  4   Repeat ferritin level in 3 months  5   Contact the [facility] with any questions or concerns prior to your next visit, as needed  6    Schedule visit for follow-up in 6 months        Tristan Justin, [address] [facility]

## 2018-09-10 NOTE — PATIENT INSTRUCTIONS
1   Continue use of CPAP equipment nightly - ALL NIGHT when you are sleeping  2  Continue to clean your equipment, as discussed, change supplies  3 Take iron tablets with vitamin C daily  May add docusate sodium (Colace) as a stool softener for constipation  This is over the counter  4   Repeat ferritin level in 3 months  5   Contact the [facility] with any questions or concerns prior to your next visit, as needed  6    Schedule visit for follow-up in 6 months

## 2018-09-11 ENCOUNTER — TRANSCRIBE ORDERS (OUTPATIENT)
Dept: ADMINISTRATIVE | Facility: HOSPITAL | Age: 58
End: 2018-09-11

## 2018-09-11 ENCOUNTER — APPOINTMENT (OUTPATIENT)
Dept: LAB | Facility: HOSPITAL | Age: 58
End: 2018-09-11
Payer: COMMERCIAL

## 2018-09-11 DIAGNOSIS — R79.0 LOW SERUM FERRITIN LEVEL: ICD-10-CM

## 2018-09-11 LAB — FERRITIN SERPL-MCNC: 9 NG/ML (ref 8–388)

## 2018-09-11 PROCEDURE — 82728 ASSAY OF FERRITIN: CPT

## 2018-09-11 PROCEDURE — 36415 COLL VENOUS BLD VENIPUNCTURE: CPT

## 2018-10-11 DIAGNOSIS — L30.4 INTERTRIGO: ICD-10-CM

## 2018-10-11 DIAGNOSIS — R52 PAIN: ICD-10-CM

## 2018-10-11 DIAGNOSIS — E55.9 VITAMIN D DEFICIENCY: Primary | ICD-10-CM

## 2018-10-12 RX ORDER — SENNOSIDES 8.6 MG
650 CAPSULE ORAL EVERY 8 HOURS PRN
Qty: 30 TABLET | Refills: 0 | Status: SHIPPED | OUTPATIENT
Start: 2018-10-12 | End: 2019-03-25 | Stop reason: ALTCHOICE

## 2018-10-12 RX ORDER — MELATONIN
1000 DAILY
Qty: 30 TABLET | Refills: 1 | Status: SHIPPED | OUTPATIENT
Start: 2018-10-12 | End: 2018-12-07 | Stop reason: SDUPTHER

## 2018-10-12 RX ORDER — NYSTATIN 100000 [USP'U]/G
POWDER TOPICAL 2 TIMES DAILY
Qty: 15 G | Refills: 0 | Status: SHIPPED | OUTPATIENT
Start: 2018-10-12 | End: 2019-02-25 | Stop reason: SDUPTHER

## 2018-10-18 ENCOUNTER — ANNUAL EXAM (OUTPATIENT)
Dept: FAMILY MEDICINE CLINIC | Facility: CLINIC | Age: 58
End: 2018-10-18
Payer: COMMERCIAL

## 2018-10-18 VITALS
BODY MASS INDEX: 41.96 KG/M2 | HEART RATE: 92 BPM | DIASTOLIC BLOOD PRESSURE: 92 MMHG | TEMPERATURE: 97.5 F | SYSTOLIC BLOOD PRESSURE: 140 MMHG | RESPIRATION RATE: 18 BRPM | WEIGHT: 228 LBS | HEIGHT: 62 IN | OXYGEN SATURATION: 95 %

## 2018-10-18 DIAGNOSIS — N89.8 VAGINAL DISCHARGE: ICD-10-CM

## 2018-10-18 DIAGNOSIS — Z23 ENCOUNTER FOR IMMUNIZATION: Primary | ICD-10-CM

## 2018-10-18 DIAGNOSIS — Z01.419 ENCOUNTER FOR ANNUAL ROUTINE GYNECOLOGICAL EXAMINATION: ICD-10-CM

## 2018-10-18 PROCEDURE — G0145 SCR C/V CYTO,THINLAYER,RESCR: HCPCS | Performed by: FAMILY MEDICINE

## 2018-10-18 PROCEDURE — 90471 IMMUNIZATION ADMIN: CPT | Performed by: FAMILY MEDICINE

## 2018-10-18 PROCEDURE — 90682 RIV4 VACC RECOMBINANT DNA IM: CPT | Performed by: FAMILY MEDICINE

## 2018-10-18 PROCEDURE — 87480 CANDIDA DNA DIR PROBE: CPT | Performed by: FAMILY MEDICINE

## 2018-10-18 PROCEDURE — 87660 TRICHOMONAS VAGIN DIR PROBE: CPT | Performed by: FAMILY MEDICINE

## 2018-10-18 PROCEDURE — 99396 PREV VISIT EST AGE 40-64: CPT | Performed by: FAMILY MEDICINE

## 2018-10-18 PROCEDURE — 87510 GARDNER VAG DNA DIR PROBE: CPT | Performed by: FAMILY MEDICINE

## 2018-10-18 NOTE — PROGRESS NOTES
Rachel Medeiros 1960 female MRN: [id number]    Annual GYN/PAP Visit      ASSESSMENT & PLAN: Rachel Medeiros is a 59 y o  No obstetric history on file  with abnormal gynecologic exam ( due to surgically absent uterus)     1  Routine well woman exam done today  2  Pap and HPV:  The patient's last pap was 4-5 years ago  Vaginal cuff was obtained today as patient didn't have Cervix (had hysterectomy at age of 20) STI testing was not done today  Current ASCCP Guidelines reviewed  3   The following were reviewed in today's visit: breast self exam, menopause, adequate intake of calcium and vitamin D, exercise and healthy diet  CC:  Annual Gynecologic Examination    HPI: Rachel Medeiros is a 59 y o  No obstetric history on file  who presents for annual gynecologic examination  She has the following concerns:  Malodorous vaginal discharge      Past Medical/Surgical History:     Past Medical History:   Diagnosis Date    Anemia     Anxiety     Arthritis     Back pain     Cancer ([address] )     Depression     Dyslipidemia     Essential tremor     Excessive daytime sleepiness     GERD (gastroesophageal reflux disease)     History of uterine cancer     Hyperglycemia     Hypertension     Hypovitaminosis D     Iron deficiency anemia     Joint pain     Mood disorder (HCC)     Obesity     Obstructive sleep apnea     Ringing in ears     Snoring     Witnessed episode of apnea        Past Surgical History:   Procedure Laterality Date    APPENDECTOMY      HYSTERECTOMY      KNEE SURGERY      Meniscus tear    TONSILLECTOMY      TUBAL LIGATION         Past OB/Gyn History:  OB History     No data available           No LMP recorded  Pt does not have menstrual issues  History of sexually transmitted infection: No   History of abnormal pap smears: Yes No Patient Care Coordination Note on file  Patient had hysterectomy at age of 20 per her 2/2 to endometrial cancer       Patient is currently sexually active  heterosexual     Family History   Problem Relation Age of Onset    Diabetes Mother     Asthma Mother     Hypertension Mother     Heart disease Mother        Social History:  Social History     Social History    Marital status: Single     Spouse name: N/A    Number of children: N/A    Years of education: N/A     Occupational History    Not on file  Social History Main Topics    Smoking status: Current Every Day Smoker    Smokeless tobacco: Never Used    Alcohol use Yes      Comment: very seldom    Drug use: No    Sexual activity: Not on file     Other Topics Concern    Not on file     Social History Narrative    No narrative on file       Presently lives with    Patient is   DV Screening: The patient does feel safe at home       No Known Allergies      Current Outpatient Prescriptions:     acetaminophen (MAPAP ARTHRITIS PAIN) 650 mg CR tablet, Take 1 tablet (650 mg total) by mouth every 8 (eight) hours as needed for mild pain, Disp: 30 tablet, Rfl: 0    ASPIR-LOW 81 MG EC tablet, Take 81 mg by mouth daily, Disp: , Rfl: 0    atorvastatin (LIPITOR) 40 mg tablet, , Disp: , Rfl: 0    buPROPion (WELLBUTRIN XL) 150 mg 24 hr tablet, Take 150 mg by mouth every morning, Disp: , Rfl: 0    cetirizine (ZyrTEC) 10 mg tablet, cetirizine 10 mg tablet, Disp: , Rfl:     cholecalciferol (VITAMIN D3) 1,000 units tablet, Take 1 tablet (1,000 Units total) by mouth daily, Disp: 30 tablet, Rfl: 1    escitalopram (LEXAPRO) 10 mg tablet, escitalopram 10 mg tablet, Disp: , Rfl:     escitalopram (LEXAPRO) 20 mg tablet, escitalopram 20 mg tablet, Disp: , Rfl:     ferrous sulfate 325 (65 FE) MG EC tablet, Take 325 mg by mouth every other day  , Disp: , Rfl: 0    fluticasone (FLONASE) 50 mcg/act nasal spray, fluticasone 50 mcg/actuation nasal spray,suspension, Disp: , Rfl:     nystatin (MYCOSTATIN) powder, Apply topically 2 (two) times a day, Disp: 15 g, Rfl: 0    pantoprazole (PROTONIX) 40 mg tablet, pantoprazole 40 mg tablet,delayed release, Disp: , Rfl:     propranolol (INDERAL) 40 mg tablet, In morning, Disp: , Rfl:     propranolol (INDERAL) 60 mg tablet, At night, Disp: , Rfl:     RA VITAMIN C 500 MG tablet, Take 500 mg by mouth daily  , Disp: , Rfl: 0    Review of Systems  Constitutional: feels well, no tiredness, no recent unintended weight gain or loss  ENT: no nosebleeds or hoarseness  Cardiovascular: no chest pain, no palpitations, no lower extremity edema  Respiratory: no complaints of shortness of shortness of breath  Breasts:no complaints of breast pain, breast lump, or nipple discharge  Gastrointestinal: no complaints of abdominal pain, constipation, nausea, vomiting, or diarrhea or bloody stools  Genitourinary : no complaints of dysuria, incontinence, pelvic pain, no dysmenorrhea, vaginal discharge or abnormal vaginal bleeding and as noted in HPI  Musculoskeletal: no complaints of arthralgia, no myalgia, no joint swelling or stiffness, no limb pain or swelling  Integumentary: no complaints of skin rash or lesion, itching or dry skin  Neurological: no complaints of headache, no dizziness or fainting      Objective          /92 (BP Location: Right arm, Patient Position: Sitting, Cuff Size: Large)   Pulse 92   Temp 97 5 °F (36 4 °C) (Temporal)   Resp 18   Ht 5' 2" (1 575 m)   Wt 103 kg (228 lb)   SpO2 95%   BMI 41 70 kg/m²     General:   appears stated age, cooperative, alert   Neck: normal, supple, no masses   Heart: regular rate and rhythm, no murmur   Lungs: clear to auscultation bilaterally   Breasts: normal appearance, no masses or tenderness   Abdomen: soft, non-tender, without masses or organomegaly   Vulva: normal   Vagina: normal vagina, no discharge, exudate, lesion, or erythema   Urethra: normal   Cervix: Absent     Uterus: uterus absent   Adnexa: surgically absent   Lymphatic palpation of lymph nodes in neck, axilla, groin and/or other locations: no lymphadenopathy or masses noted   Skin normal skin turgor and no rashes     Psychiatric orientation to person, place, and time: normal  mood and affect: normal       Cassie Syed MD  PGY-3, Family Medicine  10/18/18

## 2018-10-20 LAB
CANDIDA RRNA VAG QL PROBE: NEGATIVE
G VAGINALIS RRNA GENITAL QL PROBE: POSITIVE
T VAGINALIS RRNA GENITAL QL PROBE: NEGATIVE

## 2018-10-23 LAB
LAB AP GYN PRIMARY INTERPRETATION: NORMAL
Lab: NORMAL

## 2018-10-28 ENCOUNTER — APPOINTMENT (OUTPATIENT)
Dept: CT IMAGING | Facility: HOSPITAL | Age: 58
End: 2018-10-28
Payer: COMMERCIAL

## 2018-10-28 ENCOUNTER — APPOINTMENT (EMERGENCY)
Dept: RADIOLOGY | Facility: HOSPITAL | Age: 58
End: 2018-10-28
Payer: COMMERCIAL

## 2018-10-28 ENCOUNTER — HOSPITAL ENCOUNTER (OUTPATIENT)
Facility: HOSPITAL | Age: 58
Setting detail: OBSERVATION
Discharge: HOME | End: 2018-10-29
Attending: EMERGENCY MEDICINE | Admitting: FAMILY MEDICINE
Payer: COMMERCIAL

## 2018-10-28 DIAGNOSIS — R07.9 CHEST PAIN: Primary | ICD-10-CM

## 2018-10-28 DIAGNOSIS — G25.0 BENIGN ESSENTIAL TREMOR: ICD-10-CM

## 2018-10-28 DIAGNOSIS — I10 HTN (HYPERTENSION): ICD-10-CM

## 2018-10-28 DIAGNOSIS — R91.1 LUNG NODULE: ICD-10-CM

## 2018-10-28 DIAGNOSIS — E78.5 HYPERLIPIDEMIA: ICD-10-CM

## 2018-10-28 PROBLEM — I47.20 V-TACH: Status: ACTIVE | Noted: 2018-10-28

## 2018-10-28 PROBLEM — I47.2 V-TACH (HCC): Status: ACTIVE | Noted: 2018-10-28

## 2018-10-28 PROBLEM — I47.29 NSVT (NONSUSTAINED VENTRICULAR TACHYCARDIA): Status: ACTIVE | Noted: 2018-10-28

## 2018-10-28 LAB
ALBUMIN SERPL BCP-MCNC: 3.6 G/DL (ref 3–5.2)
ALP SERPL-CCNC: 132 U/L (ref 43–122)
ALT SERPL W P-5'-P-CCNC: 44 U/L (ref 9–52)
ANION GAP SERPL CALCULATED.3IONS-SCNC: 4 MMOL/L (ref 5–14)
ANION GAP SERPL CALCULATED.3IONS-SCNC: 8 MMOL/L (ref 5–14)
AST SERPL W P-5'-P-CCNC: 32 U/L (ref 14–36)
BASOPHILS # BLD AUTO: 0 THOUSANDS/ΜL (ref 0–0.1)
BASOPHILS # BLD AUTO: 0.1 THOUSANDS/ΜL (ref 0–0.1)
BASOPHILS NFR BLD AUTO: 1 % (ref 0–1)
BASOPHILS NFR BLD AUTO: 1 % (ref 0–1)
BILIRUB SERPL-MCNC: 0.3 MG/DL
BILIRUB UR QL STRIP: NEGATIVE
BUN SERPL-MCNC: 16 MG/DL (ref 5–25)
BUN SERPL-MCNC: 17 MG/DL (ref 5–25)
CALCIUM SERPL-MCNC: 8.1 MG/DL (ref 8.4–10.2)
CALCIUM SERPL-MCNC: 8.6 MG/DL (ref 8.4–10.2)
CHLORIDE SERPL-SCNC: 107 MMOL/L (ref 97–108)
CHLORIDE SERPL-SCNC: 110 MMOL/L (ref 97–108)
CLARITY UR: CLEAR
CO2 SERPL-SCNC: 24 MMOL/L (ref 22–30)
CO2 SERPL-SCNC: 25 MMOL/L (ref 22–30)
COLOR UR: YELLOW
CREAT SERPL-MCNC: 0.66 MG/DL (ref 0.6–1.2)
CREAT SERPL-MCNC: 0.76 MG/DL (ref 0.6–1.2)
D-DIMER: 0.7 MG/L
EOSINOPHIL # BLD AUTO: 0.3 THOUSAND/ΜL (ref 0–0.4)
EOSINOPHIL # BLD AUTO: 0.4 THOUSAND/ΜL (ref 0–0.4)
EOSINOPHIL NFR BLD AUTO: 4 % (ref 0–6)
EOSINOPHIL NFR BLD AUTO: 4 % (ref 0–6)
ERYTHROCYTE [DISTWIDTH] IN BLOOD BY AUTOMATED COUNT: 18.3 %
ERYTHROCYTE [DISTWIDTH] IN BLOOD BY AUTOMATED COUNT: 18.5 %
EST. AVERAGE GLUCOSE BLD GHB EST-MCNC: 128 MG/DL
GFR SERPL CREATININE-BSD FRML MDRD: 86 ML/MIN/1.73SQ M
GFR SERPL CREATININE-BSD FRML MDRD: 96 ML/MIN/1.73SQ M
GLUCOSE SERPL-MCNC: 105 MG/DL (ref 70–99)
GLUCOSE SERPL-MCNC: 143 MG/DL (ref 70–99)
GLUCOSE UR STRIP-MCNC: NEGATIVE MG/DL
HBA1C MFR BLD: 6.1 % (ref 4.2–6.3)
HCT VFR BLD AUTO: 32.2 % (ref 36–46)
HCT VFR BLD AUTO: 35.8 % (ref 36–46)
HGB BLD-MCNC: 10.2 G/DL (ref 12–16)
HGB BLD-MCNC: 11.5 G/DL (ref 12–16)
HGB UR QL STRIP.AUTO: NEGATIVE
KETONES UR STRIP-MCNC: NEGATIVE MG/DL
LEUKOCYTE ESTERASE UR QL STRIP: NEGATIVE
LYMPHOCYTES # BLD AUTO: 1.7 THOUSANDS/ΜL (ref 0.5–4)
LYMPHOCYTES # BLD AUTO: 1.7 THOUSANDS/ΜL (ref 0.5–4)
LYMPHOCYTES NFR BLD AUTO: 20 % (ref 20–50)
LYMPHOCYTES NFR BLD AUTO: 22 % (ref 20–50)
MAGNESIUM SERPL-MCNC: 2.1 MG/DL (ref 1.6–2.3)
MAGNESIUM SERPL-MCNC: 2.1 MG/DL (ref 1.6–2.3)
MCH RBC QN AUTO: 25.9 PG (ref 26–34)
MCH RBC QN AUTO: 26.1 PG (ref 26–34)
MCHC RBC AUTO-ENTMCNC: 31.7 G/DL (ref 31–36)
MCHC RBC AUTO-ENTMCNC: 32 G/DL (ref 31–36)
MCV RBC AUTO: 82 FL (ref 80–100)
MCV RBC AUTO: 82 FL (ref 80–100)
MONOCYTES # BLD AUTO: 0.6 THOUSAND/ΜL (ref 0.2–0.9)
MONOCYTES # BLD AUTO: 0.6 THOUSAND/ΜL (ref 0.2–0.9)
MONOCYTES NFR BLD AUTO: 7 % (ref 1–10)
MONOCYTES NFR BLD AUTO: 8 % (ref 1–10)
NEUTROPHILS # BLD AUTO: 5.2 THOUSANDS/ΜL (ref 1.8–7.8)
NEUTROPHILS # BLD AUTO: 5.9 THOUSANDS/ΜL (ref 1.8–7.8)
NEUTS SEG NFR BLD AUTO: 66 % (ref 45–65)
NEUTS SEG NFR BLD AUTO: 68 % (ref 45–65)
NITRITE UR QL STRIP: NEGATIVE
PH UR STRIP.AUTO: 6 [PH] (ref 4.5–8)
PLATELET # BLD AUTO: 228 THOUSANDS/UL (ref 150–450)
PLATELET # BLD AUTO: 231 THOUSANDS/UL (ref 150–450)
PMV BLD AUTO: 9.1 FL (ref 8.9–12.7)
PMV BLD AUTO: 9.3 FL (ref 8.9–12.7)
POTASSIUM SERPL-SCNC: 3.9 MMOL/L (ref 3.6–5)
POTASSIUM SERPL-SCNC: 4 MMOL/L (ref 3.6–5)
PROT SERPL-MCNC: 6.3 G/DL (ref 5.9–8.4)
PROT UR STRIP-MCNC: NEGATIVE MG/DL
RBC # BLD AUTO: 3.95 MILLION/UL (ref 4–5.2)
RBC # BLD AUTO: 4.38 MILLION/UL (ref 4–5.2)
SODIUM SERPL-SCNC: 138 MMOL/L (ref 137–147)
SODIUM SERPL-SCNC: 140 MMOL/L (ref 137–147)
SP GR UR STRIP.AUTO: 1.01 (ref 1–1.04)
TROPONIN I SERPL-MCNC: <0.01 NG/ML (ref 0–0.03)
TSH SERPL DL<=0.05 MIU/L-ACNC: 3.08 UIU/ML (ref 0.47–4.68)
UROBILINOGEN UA: NEGATIVE MG/DL
WBC # BLD AUTO: 7.8 THOUSAND/UL (ref 4.5–11)
WBC # BLD AUTO: 8.7 THOUSAND/UL (ref 4.5–11)

## 2018-10-28 PROCEDURE — 83735 ASSAY OF MAGNESIUM: CPT | Performed by: EMERGENCY MEDICINE

## 2018-10-28 PROCEDURE — 84484 ASSAY OF TROPONIN QUANT: CPT | Performed by: EMERGENCY MEDICINE

## 2018-10-28 PROCEDURE — 83036 HEMOGLOBIN GLYCOSYLATED A1C: CPT | Performed by: FAMILY MEDICINE

## 2018-10-28 PROCEDURE — 71046 X-RAY EXAM CHEST 2 VIEWS: CPT

## 2018-10-28 PROCEDURE — 99236 HOSP IP/OBS SAME DATE HI 85: CPT | Performed by: FAMILY MEDICINE

## 2018-10-28 PROCEDURE — 71275 CT ANGIOGRAPHY CHEST: CPT

## 2018-10-28 PROCEDURE — 83735 ASSAY OF MAGNESIUM: CPT | Performed by: FAMILY MEDICINE

## 2018-10-28 PROCEDURE — 36415 COLL VENOUS BLD VENIPUNCTURE: CPT | Performed by: EMERGENCY MEDICINE

## 2018-10-28 PROCEDURE — 80053 COMPREHEN METABOLIC PANEL: CPT | Performed by: EMERGENCY MEDICINE

## 2018-10-28 PROCEDURE — 85379 FIBRIN DEGRADATION QUANT: CPT | Performed by: EMERGENCY MEDICINE

## 2018-10-28 PROCEDURE — 99285 EMERGENCY DEPT VISIT HI MDM: CPT

## 2018-10-28 PROCEDURE — 80048 BASIC METABOLIC PNL TOTAL CA: CPT | Performed by: FAMILY MEDICINE

## 2018-10-28 PROCEDURE — 86803 HEPATITIS C AB TEST: CPT | Performed by: FAMILY MEDICINE

## 2018-10-28 PROCEDURE — 85025 COMPLETE CBC W/AUTO DIFF WBC: CPT | Performed by: FAMILY MEDICINE

## 2018-10-28 PROCEDURE — 74177 CT ABD & PELVIS W/CONTRAST: CPT

## 2018-10-28 PROCEDURE — 85025 COMPLETE CBC W/AUTO DIFF WBC: CPT | Performed by: EMERGENCY MEDICINE

## 2018-10-28 PROCEDURE — 84484 ASSAY OF TROPONIN QUANT: CPT | Performed by: FAMILY MEDICINE

## 2018-10-28 PROCEDURE — 96374 THER/PROPH/DIAG INJ IV PUSH: CPT

## 2018-10-28 PROCEDURE — 96361 HYDRATE IV INFUSION ADD-ON: CPT

## 2018-10-28 PROCEDURE — 93005 ELECTROCARDIOGRAM TRACING: CPT

## 2018-10-28 PROCEDURE — 84443 ASSAY THYROID STIM HORMONE: CPT | Performed by: FAMILY MEDICINE

## 2018-10-28 RX ORDER — FERROUS SULFATE 325(65) MG
325 TABLET ORAL DAILY
Status: DISCONTINUED | OUTPATIENT
Start: 2018-10-28 | End: 2018-10-29 | Stop reason: HOSPADM

## 2018-10-28 RX ORDER — ACETAMINOPHEN 325 MG/1
650 TABLET ORAL 2 TIMES DAILY
Status: DISCONTINUED | OUTPATIENT
Start: 2018-10-28 | End: 2018-10-29 | Stop reason: HOSPADM

## 2018-10-28 RX ORDER — ASPIRIN 81 MG/1
324 TABLET, CHEWABLE ORAL ONCE
Status: COMPLETED | OUTPATIENT
Start: 2018-10-28 | End: 2018-10-28

## 2018-10-28 RX ORDER — BUPROPION HYDROCHLORIDE 150 MG/1
150 TABLET ORAL EVERY MORNING
Status: DISCONTINUED | OUTPATIENT
Start: 2018-10-28 | End: 2018-10-29 | Stop reason: HOSPADM

## 2018-10-28 RX ORDER — ASPIRIN 81 MG/1
TABLET, CHEWABLE ORAL
Status: COMPLETED
Start: 2018-10-28 | End: 2018-10-28

## 2018-10-28 RX ORDER — ASPIRIN 81 MG/1
81 TABLET ORAL DAILY
Status: DISCONTINUED | OUTPATIENT
Start: 2018-10-28 | End: 2018-10-29 | Stop reason: HOSPADM

## 2018-10-28 RX ORDER — PROPRANOLOL HYDROCHLORIDE 20 MG/1
40 TABLET ORAL
Status: DISCONTINUED | OUTPATIENT
Start: 2018-10-28 | End: 2018-10-29 | Stop reason: HOSPADM

## 2018-10-28 RX ORDER — FERROUS SULFATE 325(65) MG
325 TABLET ORAL 3 TIMES WEEKLY
Status: DISCONTINUED | OUTPATIENT
Start: 2018-10-29 | End: 2018-10-28

## 2018-10-28 RX ORDER — ATORVASTATIN CALCIUM 40 MG/1
40 TABLET, FILM COATED ORAL
Status: DISCONTINUED | OUTPATIENT
Start: 2018-10-28 | End: 2018-10-29 | Stop reason: HOSPADM

## 2018-10-28 RX ORDER — ESCITALOPRAM OXALATE 10 MG/1
10 TABLET ORAL DAILY
Status: DISCONTINUED | OUTPATIENT
Start: 2018-10-28 | End: 2018-10-29 | Stop reason: HOSPADM

## 2018-10-28 RX ORDER — CEPHALEXIN 500 MG/1
500 CAPSULE ORAL EVERY 6 HOURS SCHEDULED
COMMUNITY
End: 2018-11-30

## 2018-10-28 RX ORDER — IBUPROFEN 600 MG/1
TABLET ORAL
Status: COMPLETED
Start: 2018-10-28 | End: 2018-10-28

## 2018-10-28 RX ORDER — LORATADINE 10 MG/1
10 TABLET ORAL DAILY
Status: DISCONTINUED | OUTPATIENT
Start: 2018-10-28 | End: 2018-10-29 | Stop reason: HOSPADM

## 2018-10-28 RX ORDER — MELATONIN
1000 DAILY
Status: DISCONTINUED | OUTPATIENT
Start: 2018-10-28 | End: 2018-10-29 | Stop reason: HOSPADM

## 2018-10-28 RX ORDER — FENTANYL CITRATE 50 UG/ML
50 INJECTION, SOLUTION INTRAMUSCULAR; INTRAVENOUS ONCE
Status: COMPLETED | OUTPATIENT
Start: 2018-10-28 | End: 2018-10-28

## 2018-10-28 RX ORDER — CEPHALEXIN 500 MG/1
500 CAPSULE ORAL EVERY 6 HOURS SCHEDULED
Status: DISCONTINUED | OUTPATIENT
Start: 2018-10-28 | End: 2018-10-29 | Stop reason: HOSPADM

## 2018-10-28 RX ORDER — ASCORBIC ACID 500 MG
500 TABLET ORAL DAILY
Status: DISCONTINUED | OUTPATIENT
Start: 2018-10-28 | End: 2018-10-29 | Stop reason: HOSPADM

## 2018-10-28 RX ORDER — PROPRANOLOL HYDROCHLORIDE 20 MG/1
60 TABLET ORAL EVERY MORNING
Status: DISCONTINUED | OUTPATIENT
Start: 2018-10-28 | End: 2018-10-29 | Stop reason: HOSPADM

## 2018-10-28 RX ORDER — PANTOPRAZOLE SODIUM 40 MG/1
40 TABLET, DELAYED RELEASE ORAL
Status: DISCONTINUED | OUTPATIENT
Start: 2018-10-28 | End: 2018-10-29 | Stop reason: HOSPADM

## 2018-10-28 RX ORDER — IBUPROFEN 600 MG/1
600 TABLET ORAL EVERY 6 HOURS PRN
Status: DISCONTINUED | OUTPATIENT
Start: 2018-10-28 | End: 2018-10-29 | Stop reason: HOSPADM

## 2018-10-28 RX ORDER — FENTANYL CITRATE 50 UG/ML
INJECTION, SOLUTION INTRAMUSCULAR; INTRAVENOUS
Status: COMPLETED
Start: 2018-10-28 | End: 2018-10-28

## 2018-10-28 RX ADMIN — SODIUM CHLORIDE 1000 ML: 9 INJECTION, SOLUTION INTRAVENOUS at 00:58

## 2018-10-28 RX ADMIN — ASPIRIN 324 MG: 81 TABLET, CHEWABLE ORAL at 00:54

## 2018-10-28 RX ADMIN — ASPIRIN 81 MG: 81 TABLET, COATED ORAL at 08:57

## 2018-10-28 RX ADMIN — ATORVASTATIN CALCIUM 40 MG: 40 TABLET, FILM COATED ORAL at 17:30

## 2018-10-28 RX ADMIN — IOHEXOL 85 ML: 350 INJECTION, SOLUTION INTRAVENOUS at 03:03

## 2018-10-28 RX ADMIN — ESCITALOPRAM OXALATE 10 MG: 10 TABLET ORAL at 08:58

## 2018-10-28 RX ADMIN — IBUPROFEN 600 MG: 600 TABLET ORAL at 03:03

## 2018-10-28 RX ADMIN — ESCITALOPRAM OXALATE 10 MG: 10 TABLET ORAL at 08:57

## 2018-10-28 RX ADMIN — LORATADINE 10 MG: 10 TABLET ORAL at 08:57

## 2018-10-28 RX ADMIN — FENTANYL CITRATE 50 MCG: 50 INJECTION INTRAMUSCULAR; INTRAVENOUS at 00:55

## 2018-10-28 RX ADMIN — FENTANYL CITRATE 50 MCG: 50 INJECTION, SOLUTION INTRAMUSCULAR; INTRAVENOUS at 00:55

## 2018-10-28 RX ADMIN — ASPIRIN 81 MG 324 MG: 81 TABLET ORAL at 00:54

## 2018-10-28 RX ADMIN — CEPHALEXIN 500 MG: 500 CAPSULE ORAL at 11:47

## 2018-10-28 RX ADMIN — CEPHALEXIN 500 MG: 500 CAPSULE ORAL at 23:53

## 2018-10-28 RX ADMIN — ACETAMINOPHEN 650 MG: 325 TABLET ORAL at 17:30

## 2018-10-28 RX ADMIN — PROPRANOLOL HYDROCHLORIDE 60 MG: 20 TABLET ORAL at 08:58

## 2018-10-28 RX ADMIN — CEPHALEXIN 500 MG: 500 CAPSULE ORAL at 06:07

## 2018-10-28 RX ADMIN — CEPHALEXIN 500 MG: 500 CAPSULE ORAL at 17:30

## 2018-10-28 RX ADMIN — PANTOPRAZOLE SODIUM 40 MG: 40 TABLET, DELAYED RELEASE ORAL at 06:08

## 2018-10-28 RX ADMIN — ACETAMINOPHEN 650 MG: 325 TABLET ORAL at 08:57

## 2018-10-28 RX ADMIN — FERROUS SULFATE TAB 325 MG (65 MG ELEMENTAL FE) 325 MG: 325 (65 FE) TAB at 11:47

## 2018-10-28 RX ADMIN — PROPRANOLOL HYDROCHLORIDE 40 MG: 20 TABLET ORAL at 21:13

## 2018-10-28 RX ADMIN — OXYCODONE HYDROCHLORIDE AND ACETAMINOPHEN 500 MG: 500 TABLET ORAL at 08:57

## 2018-10-28 RX ADMIN — BUPROPION HYDROCHLORIDE 150 MG: 150 TABLET, EXTENDED RELEASE ORAL at 08:58

## 2018-10-28 RX ADMIN — VITAMIN D, TAB 1000IU (100/BT) 1000 UNITS: 25 TAB at 08:57

## 2018-10-28 NOTE — NURSING NOTE
Pt resting comfortable in bed  Offers no complaints  SR on monitor  Will continue to monitor  Call bell in reach

## 2018-10-28 NOTE — PLAN OF CARE
Problem: PAIN - ADULT  Goal: Verbalizes/displays adequate comfort level or baseline comfort level  Interventions:  - Encourage patient to monitor pain and request assistance  - Assess pain using appropriate pain scale  - Administer analgesics based on type and severity of pain and evaluate response  - Implement non-pharmacological measures as appropriate and evaluate response  - Consider cultural and social influences on pain and pain management  - Notify physician/advanced practitioner if interventions unsuccessful or patient reports new pain  Outcome: Progressing      Problem: SAFETY ADULT  Goal: Maintain or return to baseline ADL function  INTERVENTIONS:  -  Assess patient's ability to carry out ADLs; assess patient's baseline for ADL function and identify physical deficits which impact ability to perform ADLs (bathing, care of mouth/teeth, toileting, grooming, dressing, etc )  - Assess/evaluate cause of self-care deficits   - Assess range of motion  - Assess patient's mobility; develop plan if impaired  - Assess patient's need for assistive devices and provide as appropriate  - Encourage maximum independence but intervene and supervise when necessary  ¯ Involve family in performance of ADLs  ¯ Assess for home care needs following discharge   ¯ Request OT consult to assist with ADL evaluation and planning for discharge  ¯ Provide patient education as appropriate  Outcome: Progressing    Goal: Maintain or return mobility status to optimal level  INTERVENTIONS:  - Assess patient's baseline mobility status (ambulation, transfers, stairs, etc )    - Identify cognitive and physical deficits and behaviors that affect mobility  - Identify mobility aids required to assist with transfers and/or ambulation (gait belt, sit-to-stand, lift, walker, cane, etc )  - [place] fall precautions as indicated by assessment  - Record patient progress and toleration of activity level on Mobility SBAR; progress patient to next Phase/Stage  - Instruct patient to call for assistance with activity based on assessment  - Request Rehabilitation consult to assist with strengthening/weightbearing, etc   Outcome: Progressing      Problem: DISCHARGE PLANNING  Goal: Discharge to home or other facility with appropriate resources  INTERVENTIONS:  - Identify barriers to discharge w/patient and caregiver  - Arrange for needed discharge resources and transportation as appropriate  - Identify discharge learning needs (meds, wound care, etc )  - Arrange for interpretive services to assist at discharge as needed  - Refer to Case Management Department for coordinating discharge planning if the patient needs post-hospital services based on physician/advanced practitioner order or complex needs related to functional status, cognitive ability, or social support system  Outcome: Progressing      Problem: Knowledge Deficit  Goal: Patient/family/caregiver demonstrates understanding of disease process, treatment plan, medications, and discharge instructions  Complete learning assessment and assess knowledge base    Interventions:  - Provide teaching at level of understanding  - Provide teaching via preferred learning methods  Outcome: Progressing

## 2018-10-28 NOTE — NURSING NOTE
Patient ambulating in the best  Denies chest pain or shortness of breath  No changes to note from prior assessment  Remains SR on the monitor

## 2018-10-28 NOTE — ED PROVIDER NOTES
History  Chief Complaint   Patient presents with    Chest Pain     Chest pain starting about 30 minutes ago, while just laying down  Patient is 49-year-old female with a history of hypertension, hyperlipidemia, anemia, obstructive sleep apnea coming in today with chest pain  She states she was going to bed when she is self a sudden felt short of breath and chest discomfort  She has never had pain like this before  She felt well all day  She describes as a pressure heaviness in the central upper chest associated with shortness of breath  She had no palpitations, syncope, diaphoresis  She had no radiation of pain into her jaw, back, neck or upper extremity  She called EMS and did not take anything for it  Patient has no recent travel and no sick contacts          History provided by:  Patient and EMS personnel   used: No    Chest Pain   Pain location:  Substernal area  Pain quality: aching, dull and pressure    Pain radiates to:  Does not radiate  Pain radiates to the back: no    Pain severity:  Moderate  Onset quality:  Sudden  Timing:  Constant  Chronicity:  New  Context: at rest    Relieved by:  None tried  Worsened by:  Nothing tried  Ineffective treatments:  None tried  Associated symptoms: shortness of breath    Associated symptoms: no abdominal pain, no AICD problem, no altered mental status, no anorexia, no anxiety, no back pain, no claudication, no cough, no diaphoresis, no dizziness, no dysphagia, no fatigue, no fever, no headache, no heartburn, no lower extremity edema, no nausea, no near-syncope, no numbness, no orthopnea, no palpitations, no PND, no syncope, not vomiting and no weakness    Risk factors: high cholesterol, hypertension and obesity    Risk factors: no aortic disease, no birth control, no coronary artery disease, no diabetes mellitus, no Chepe-Danlos syndrome, no immobilization, no Marfan's syndrome, no prior DVT/PE, no smoking and no surgery        Prior to Admission Medications   Prescriptions Last Dose Informant Patient Reported? Taking?    ASPIR-LOW 81 MG EC tablet 10/27/2018 at 6:30  Yes Yes   Sig: Take 81 mg by mouth daily   RA VITAMIN C 500 MG tablet 10/27/2018 at 6:30  Yes Yes   Sig: Take 500 mg by mouth daily     acetaminophen (MAPAP ARTHRITIS PAIN) 650 mg CR tablet 10/27/2018 at 6:30  No Yes   Sig: Take 1 tablet (650 mg total) by mouth every 8 (eight) hours as needed for mild pain   atorvastatin (LIPITOR) 40 mg tablet 10/27/2018 at 6:30  Yes Yes   buPROPion (WELLBUTRIN XL) 150 mg 24 hr tablet 10/27/2018 at 6:30  Yes Yes   Sig: Take 150 mg by mouth every morning   cephalexin (KEFLEX) 500 mg capsule 10/27/2018 at 14:30  Yes Yes   Sig: Take 500 mg by mouth every 6 (six) hours   cetirizine (ZyrTEC) 10 mg tablet 10/27/2018 at 6:30  Yes Yes   Sig: cetirizine 10 mg tablet   cholecalciferol (VITAMIN D3) 1,000 units tablet 10/27/2018 at 6:30  No Yes   Sig: Take 1 tablet (1,000 Units total) by mouth daily   escitalopram (LEXAPRO) 10 mg tablet 10/27/2018 at 6:30  Yes Yes   Sig: escitalopram 10 mg tablet   escitalopram (LEXAPRO) 20 mg tablet Past Week at Unknown time  Yes Yes   Sig: escitalopram 20 mg tablet   ferrous sulfate 325 (65 FE) MG EC tablet 10/27/2018 at 6:30  Yes Yes   Sig: Take 325 mg by mouth every other day     fluticasone (FLONASE) 50 mcg/act nasal spray Unknown at Unknown time  Yes No   Sig: fluticasone 50 mcg/actuation nasal spray,suspension   nystatin (MYCOSTATIN) powder Unknown at Unknown time  No No   Sig: Apply topically 2 (two) times a day   pantoprazole (PROTONIX) 40 mg tablet 10/27/2018 at 6:30  Yes Yes   Sig: pantoprazole 40 mg tablet,delayed release   propranolol (INDERAL) 40 mg tablet Past Week at Unknown time  Yes Yes   Sig: In morning   propranolol (INDERAL) 60 mg tablet 10/27/2018 at 6:30  Yes Yes   Sig: At night      Facility-Administered Medications: None       Past Medical History:   Diagnosis Date    Anemia     Anxiety     Arthritis  Back pain     Cancer (HCC)     Depression     Diabetes mellitus (HCC)     Pre- Diabetic    Dyslipidemia     Essential tremor     Excessive daytime sleepiness     GERD (gastroesophageal reflux disease)     History of uterine cancer     Hyperglycemia     Hypertension     Hypovitaminosis D     Iron deficiency anemia     Joint pain     Mood disorder (HCC)     Obesity     Obstructive sleep apnea     Ringing in ears     Snoring     Witnessed episode of apnea        Past Surgical History:   Procedure Laterality Date    APPENDECTOMY      HYSTERECTOMY      KNEE SURGERY      Meniscus tear    TONSILLECTOMY      TUBAL LIGATION         Family History   Problem Relation Age of Onset    Diabetes Mother     Asthma Mother     Hypertension Mother     Heart disease Mother      I have reviewed and agree with the history as documented  Social History   Substance Use Topics    Smoking status: Current Every Day Smoker     Packs/day: 0 50    Smokeless tobacco: Never Used    Alcohol use Yes      Comment: very seldom        Review of Systems   Constitutional: Negative for diaphoresis, fatigue and fever  HENT: Negative for ear pain, sore throat and trouble swallowing  Eyes: Negative for visual disturbance  Respiratory: Positive for shortness of breath  Negative for cough and chest tightness  Cardiovascular: Positive for chest pain  Negative for palpitations, orthopnea, claudication, syncope, PND and near-syncope  Gastrointestinal: Negative for abdominal pain, anorexia, heartburn, nausea and vomiting  Genitourinary: Negative for difficulty urinating and dysuria  Musculoskeletal: Negative for back pain and neck pain  Skin: Negative for rash  Neurological: Negative for dizziness, weakness, numbness and headaches  Psychiatric/Behavioral: Negative for confusion  All other systems reviewed and are negative        Physical Exam  Physical Exam   Constitutional: She is oriented to person, place, and time  She appears well-developed and well-nourished  No distress  HENT:   Head: Normocephalic and atraumatic  Mouth/Throat: Oropharynx is clear and moist    Eyes: Pupils are equal, round, and reactive to light  Conjunctivae and EOM are normal    Neck: Normal range of motion  Neck supple  Cardiovascular: Normal rate, regular rhythm, normal heart sounds and intact distal pulses  No murmur heard  Pulmonary/Chest: Effort normal and breath sounds normal  No stridor  No respiratory distress  Abdominal: Soft  Bowel sounds are normal  She exhibits no distension  There is no tenderness  Musculoskeletal: Normal range of motion  She exhibits no edema  Neurological: She is alert and oriented to person, place, and time  No cranial nerve deficit  Skin: Skin is warm  Capillary refill takes less than 2 seconds  She is not diaphoretic  Nursing note and vitals reviewed        Vital Signs  ED Triage Vitals [10/28/18 0039]   Temperature Pulse Respirations Blood Pressure SpO2   (!) 97 °F (36 1 °C) 63 18 153/66 100 %      Temp Source Heart Rate Source Patient Position - Orthostatic VS BP Location FiO2 (%)   Tympanic Monitor Lying Left arm --      Pain Score       5           Vitals:    10/28/18 0039 10/28/18 0301   BP: 153/66 131/77   Pulse: 63 74   Patient Position - Orthostatic VS: Lying Lying       Visual Acuity      ED Medications  Medications   acetaminophen (TYLENOL) tablet 650 mg (not administered)   aspirin (ECOTRIN LOW STRENGTH) EC tablet 81 mg (not administered)   atorvastatin (LIPITOR) tablet 40 mg (not administered)   buPROPion (WELLBUTRIN XL) 24 hr tablet 150 mg (not administered)   loratadine (CLARITIN) tablet 10 mg (not administered)   cholecalciferol (VITAMIN D3) tablet 1,000 Units (not administered)   escitalopram (LEXAPRO) tablet 10 mg (not administered)   escitalopram (LEXAPRO) tablet 10 mg (not administered)   ferrous sulfate tablet 325 mg (not administered)   pantoprazole (PROTONIX) EC tablet 40 mg (not administered)   propranolol (INDERAL) tablet 40 mg (not administered)   propranolol (INDERAL) tablet 60 mg (not administered)   ascorbic acid (VITAMIN C) tablet 500 mg (not administered)   enoxaparin (LOVENOX) subcutaneous injection 40 mg (not administered)   ibuprofen (MOTRIN) tablet 600 mg (600 mg Oral Given 10/28/18 0303)   cephalexin (KEFLEX) capsule 500 mg (not administered)   sodium chloride 0 9 % bolus 1,000 mL (0 mL Intravenous Stopped 10/28/18 0301)   aspirin chewable tablet 324 mg (324 mg Oral Given 10/28/18 0054)   fentanyl citrate (PF) 100 MCG/2ML 50 mcg (50 mcg Intravenous Given 10/28/18 0055)   iohexol (OMNIPAQUE) 350 MG/ML injection (MULTI-DOSE) 100 mL (85 mL Intravenous Given 10/28/18 0303)       Diagnostic Studies  Results Reviewed     Procedure Component Value Units Date/Time    Troponin I [[id number]]     Lab Status:  No result Specimen:  Blood     UA w Reflex to Microscopic [[id number]]  (Normal) Collected:  10/28/18 0155    Lab Status:  Final result Specimen:  Urine from Urine, Clean Catch Updated:  10/28/18 0202     Color, UA Yellow     Clarity, UA Clear     Specific Gravity, UA 1 015     pH, UA 6 0     Leukocytes, UA Negative     Nitrite, UA Negative     Protein, UA Negative mg/dl      Glucose, UA Negative mg/dl      Ketones, UA Negative mg/dl      Bilirubin, UA Negative     Blood, UA Negative     UROBILINOGEN UA Negative mg/dL     D-Dimer [[id number]]  (Abnormal) Collected:  10/28/18 0052    Lab Status:  Final result Specimen:  Blood from Arm, Right Updated:  10/28/18 0141     D-DIMER 0 70 (H) mg/L     Narrative:       D-DIMER:      Troponin I [[id number]]  (Normal) Collected:  10/28/18 0053    Lab Status:  Final result Specimen:  Blood from Arm, Right Updated:  10/28/18 0126     Troponin I <0 01 ng/mL     Magnesium [[id number]]  (Normal) Collected:  10/28/18 0052    Lab Status:  Final result Specimen:  Blood from Arm, Right Updated:  10/28/18 0115     Magnesium 2 1 mg/dL Comprehensive metabolic panel [[id number]]  (Abnormal) Collected:  10/28/18 0052    Lab Status:  Final result Specimen:  Blood from Arm, Right Updated:  10/28/18 0115     Sodium 140 mmol/L      Potassium 3 9 mmol/L      Chloride 107 mmol/L      CO2 25 mmol/L      ANION GAP 8 mmol/L      BUN 17 mg/dL      Creatinine 0 76 mg/dL      Glucose 143 (H) mg/dL      Calcium 8 6 mg/dL      AST 32 U/L      ALT 44 U/L      Alkaline Phosphatase 132 (H) U/L      Total Protein 6 3 g/dL      Albumin 3 6 g/dL      Total Bilirubin 0 30 mg/dL      eGFR 86 ml/min/1 73sq m     Narrative:         National Kidney Disease Education Program recommendations are as follows:  GFR calculation is accurate only with a steady state creatinine  Chronic Kidney disease less than 60 ml/min/1 73 sq  meters  Kidney failure less than 15 ml/min/1 73 sq  meters  CBC and differential [[id number]]  (Abnormal) Collected:  10/28/18 0053    Lab Status:  Final result Specimen:  Blood from Arm, Right Updated:  10/28/18 0105     WBC 8 70 Thousand/uL      RBC 4 38 Million/uL      Hemoglobin 11 5 (L) g/dL      Hematocrit 35 8 (L) %      MCV 82 fL      MCH 26 1 pg      MCHC 32 0 g/dL      RDW 18 3 (H) %      MPV 9 1 fL      Platelets 697 Thousands/uL      Neutrophils Relative 68 (H) %      Lymphocytes Relative 20 %      Monocytes Relative 7 %      Eosinophils Relative 4 %      Basophils Relative 1 %      Neutrophils Absolute 5 90 Thousands/µL      Lymphocytes Absolute 1 70 Thousands/µL      Monocytes Absolute 0 60 Thousand/µL      Eosinophils Absolute 0 40 Thousand/µL      Basophils Absolute 0 10 Thousands/µL                  CT pe study w abdomen pelvis w contrast   Final Result by Roma Barber MD (10/28 0338)      6 mm left upper lobe nodule similar in appearance to the prior exam   Lungs are otherwise clear  No pulmonary embolism or aortic dissection  No effusion, airspace disease, or pneumothorax         No acute pathology visualized on CT of the abdomen and pelvis with IV without oral contrast                Workstation performed: [id number]         XR chest 2 views    (Results Pending)              Procedures  Procedures       Phone Contacts  ED Phone Contact    ED Course         HEART Risk Score      Most Recent Value   History  1 Filed at: 10/28/2018 0133   ECG  1 Filed at: 10/28/2018 0133   Age  1 Filed at: 10/28/2018 0133   Risk Factors  2 Filed at: 10/28/2018 0133   Troponin  0 Filed at: 10/28/2018 0133   Heart Score Risk Calculator   History  1 Filed at: 10/28/2018 0133   ECG  1 Filed at: 10/28/2018 0133   Age  1 Filed at: 10/28/2018 0133   Risk Factors  2 Filed at: 10/28/2018 0133   Troponin  0 Filed at: 10/28/2018 0133   HEART Score  5 Filed at: 10/28/2018 0133   HEART Score  5 Filed at: 10/28/2018 0133            PERC Rule for PE      Most Recent Value   PERC Rule for PE   Age >=50  1 Filed at: 10/28/2018 0049   HR >=100  0 Filed at: 10/28/2018 0049   O2 Sat on room air < 95%  0 Filed at: 10/28/2018 0049   History of PE or DVT  0 Filed at: 10/28/2018 0049   Recent trauma or surgery  0 Filed at: 10/28/2018 0049   Hemoptysis  0 Filed at: 10/28/2018 0049   Exogenous estrogen  0 Filed at: 10/28/2018 0049   Unilateral leg swelling  0 Filed at: 10/28/2018 0049   PERC Rule for PE Results  1 Filed at: 10/28/2018 8413                Wells' Criteria for PE      Most Recent Value   Wells' Criteria for PE   Clinical signs and symptoms of DVT  0 Filed at: 10/28/2018 3374   PE is primary diagnosis or equally likely  0 Filed at: 10/28/2018 0049   HR >100  0 Filed at: 10/28/2018 0049   Immobilization at least 3 days or Surgery in the previous 4 weeks  0 Filed at: 10/28/2018 0049   Previous, objectively diagnosed PE or DVT  0 Filed at: 10/28/2018 0049   Hemoptysis  0 Filed at: 10/28/2018 0049   Malignancy with treatment within 6 months or palliative  0 Filed at: 10/28/2018 0049   Wells' Criteria Total  0 Filed at: 10/28/2018 8101            [facility]  Number of Diagnoses or Management Options  Chest pain:   HTN (hypertension):   Hyperlipidemia:   Diagnosis management comments: Patient 26-year-old female coming in today with an  On exam patient is in no apparent distress  Will obtain cardiac workup including a D-dimer  EKG INTERPRETATION at 12:49 a m  RHYTHM:  Normal sinus rhythm at 67 beats per minute  AXIS:   Normal axis  INTERVALS:   W[place] interval measured at 160 milliseconds  QRS COMPLEX:   QRS measured at 70 millisecond  ST SEGMENT:   Nonspecific ST segment changes  Artifact present  QT INTERVAL:   QTC measured at 405 milliseconds  COMPARED WITH PRIOR   Nora Hardin Interpretation by Krupa Byrne DO    Different diagnosis : ACS, NSTEMI, pleurisry, pneumothorax, costochondritis, pneumonia, GERD, anxiety, hepatitis, pancreatitis, aortic dissection, pericarditis, myocarditis, pericardial effusion, asthma exacerbation, COPD exacerbation, herpes zoster, peritoneal rupture, esophageal spasm  1:43 AM  Patient with positive D-dimer  She is agreeable for CT  Heart score is 5 in discussed with her regarding heart score and need for admission/observation  She is agreeable  She does follow with Family Practice  Family Practice resident page    Discussed with [address] [facility] resident  We had a detailed discussion of the patient's condition and case,  including need for admission  Accepts to his/her service  Bed request/bridging orders placed  Portions of the record may have been created with voice recognition software  Occasional wrong word or "sound a like" substitutions may have occurred due to the inherent limitations of voice recognition software  Read the chart carefully and recognize, using context, where substitutions have occurred             Amount and/or Complexity of Data Reviewed  Clinical lab tests: ordered and reviewed  Tests in the radiology section of CPT®: reviewed and ordered  Tests in the medicine section of CPT®: ordered and reviewed  Independent visualization of images, tracings, or specimens: yes (On my read cardiac silhouette stable  No pneumothoraces  No focal consolidation)      CritCare Time    Disposition  Final diagnoses:   Chest pain   HTN (hypertension)   Hyperlipidemia   Lung nodule     Time reflects when diagnosis was documented in both MDM as applicable and the Disposition within this note     Time User Action Codes Description Comment    10/28/2018  1:44 AM BendMatt brionesman L Add [R07 9] Chest pain     10/28/2018  1:44 AM BendLucy brioneser Dustman L Add [I10] HTN (hypertension)     10/28/2018  1:44 AM Bendock, Gutierrez Dustman L Add [E78 5] Hyperlipidemia     10/28/2018  3:47 AM Bendock, Gutierrez Dustman L Add [R91 1] Lung nodule       ED Disposition     ED Disposition Condition Comment    Admit  Case was discussed with FP and the patient's admission status was agreed to be Admission Status: observation status to the service of Dr Flash Pelaez           Follow-up Information    None         Current Discharge Medication List      CONTINUE these medications which have NOT CHANGED    Details   acetaminophen (MAPAP ARTHRITIS PAIN) 650 mg CR tablet Take 1 tablet (650 mg total) by mouth every 8 (eight) hours as needed for mild pain  Qty: 30 tablet, Refills: 0    Associated Diagnoses: Pain      ASPIR-LOW 81 MG EC tablet Take 81 mg by mouth daily  Refills: 0      atorvastatin (LIPITOR) 40 mg tablet Refills: 0      buPROPion (WELLBUTRIN XL) 150 mg 24 hr tablet Take 150 mg by mouth every morning  Refills: 0      cephalexin (KEFLEX) 500 mg capsule Take 500 mg by mouth every 6 (six) hours      cetirizine (ZyrTEC) 10 mg tablet cetirizine 10 mg tablet      cholecalciferol (VITAMIN D3) 1,000 units tablet Take 1 tablet (1,000 Units total) by mouth daily  Qty: 30 tablet, Refills: 1    Associated Diagnoses: Vitamin D deficiency      !! escitalopram (LEXAPRO) 10 mg tablet escitalopram 10 mg tablet      !! escitalopram (LEXAPRO) 20 mg tablet escitalopram 20 mg tablet      ferrous sulfate 325 (65 FE) MG EC tablet Take 325 mg by mouth every other day    Refills: 0      pantoprazole (PROTONIX) 40 mg tablet pantoprazole 40 mg tablet,delayed release      !! propranolol (INDERAL) 40 mg tablet In morning      !! propranolol (INDERAL) 60 mg tablet At night      RA VITAMIN C 500 MG tablet Take 500 mg by mouth daily    Refills: 0      fluticasone (FLONASE) 50 mcg/act nasal spray fluticasone 50 mcg/actuation nasal spray,suspension      nystatin (MYCOSTATIN) powder Apply topically 2 (two) times a day  Qty: 15 g, Refills: 0    Associated Diagnoses: Intertrigo       !! - Potential duplicate medications found  Please discuss with provider  No discharge procedures on file      ED Provider  Electronically Signed by           Dara Man DO  10/28/18 4603

## 2018-10-28 NOTE — NURSING NOTE
Pt arrived on the floor at 0320  AAO x 4  Very pleasant and cooperative  Pt ambulated to room with steady gait  Connected to tele, SR  Continues to c/o 2/10 CP  Denies N/V  No acute distress at this time  Pt instructed to call for assistance, verbalizes understanding

## 2018-10-28 NOTE — ASSESSMENT & PLAN NOTE
Visualized in ED monitor and patient asymptomatic during the episodes  Not present on initial EKG  Placed on 24 hour telemetry

## 2018-10-28 NOTE — H&P
History and Physical - David 6    Patient Information: Lisy Maddox 59 y o  female MRN: [id number]  Unit/Bed#: 5T -01 Encounter: [id number]  Admitting Physician: Shakir Daigle MD  PCP: Deven Tavares MD  Date of Admission:  10/28/18    Assessment and Plan    * Chest pain   Assessment & Plan    Greatly improved  Most likely  musculosckeletal origin due to its reproducible nature  R/O ACS  HEART Score of 4; moderate risk 12-16 6% for ACS: age 59, HTN, Hypercholesterolemia, smoking, nonspecific repolarization change  Initial Troponin <0 01  EKG: Normal sinus rhythm with pulse of 66  Will admit to observation  Obtain two more Troponins [id number] apart  CBC, CMP, Chest X-ray,  TSH  2L NS administered while in ED  No need for further IV hydration  Placed on Cardiac diet  Pain control with Motrin 600mg PRN  NSVT (nonsustained ventricular tachycardia) (HCC)   Assessment & Plan    Visualized in ED monitor and patient asymptomatic during the episodes  Not present on initial EKG  Placed on 24 hour telemetry  Iron deficiency anemia   Assessment & Plan    Most recent Hg of 11 5   Will continue PO Iron supplementation and Vit C  Tobacco abuse   Assessment & Plan    Counseled on smoking cessation and offered nicotine patch  Currently not interested in quitting  Refused nicotine patch including the high dose  Hyperlipidemia   Assessment & Plan    Continue Atorvastatin  Obesity   Assessment & Plan    Counseled on diet and exercise  Patient already making dietery changes  Will be placed on cardiac diet while at hospital      [address]    Continue Escitalopram           VTE Prophylaxis: Enoxaparin (Lovenox)  Code Status: Level 1 - Full Code  Anticipated Length of Stay:  Patient will be admitted on an Observation basis with an anticipated length of stay of  Less than 2 midnights       Justification for Hospital Stay: Rule out Acute Coronary syndrome  Total Time for Visit, including Counseling / Coordination of Care: 30 mins  Greater than 50% of this total time spent on direct patient counseling and coordination of care  Chief Complaint:     Chief Complaint   Patient presents with    Chest Pain     Chest pain starting about 30 minutes ago, while just laying down  History of Present Illness:    Neel Ugarte is a 59 y o  female who presents with chest pain  She first experienced shortness of breath while laying in bed  Dyspnea persisted and she decided to go the the [facility]  SOB turned into sharp chest pain, called EMS who brought her here  Onset was two hours ago, pain is midline and substernal with some left side radiation  Described it like a squeezing/ pressure in her chest  Initially 6/10 intensity  Denied pain radiation to her arms or back  Denied abdominal pain  She took her normal Aspirin 81 mg and Propranolol 60 mg that morning, but did not take her evening Propranolol 40 mg and there was specific reason for this  She reports a similar episode of chest pain about 30 years ago  No specific diagnosis was rendered at that time  She received Aspirin 81 mg and Fentanyl in the ED  This greatly improved her pain to 2/10  Review of Systems:  Review of Systems   Constitutional: Negative  HENT: Negative  Eyes: Negative  Respiratory: Negative  Cardiovascular: Positive for chest pain and leg swelling  Negative for palpitations  Gastrointestinal: Positive for constipation  Negative for anal bleeding and blood in stool  Endocrine: Negative  Genitourinary: Negative  Musculoskeletal: Positive for arthralgias  Negative for neck pain and neck stiffness  Skin: Negative  Allergic/Immunologic: Negative  Neurological: Negative  Hematological: Negative for adenopathy  Psychiatric/Behavioral: Negative          Past Medical and Surgical History:   Past Medical History: Diagnosis Date    Anemia     Anxiety     Arthritis     Back pain     Cancer ([facility] )     Depression     Diabetes mellitus ([facility] )     Pre- Diabetic    Dyslipidemia     Essential tremor     Excessive daytime sleepiness     GERD (gastroesophageal reflux disease)     History of uterine cancer     Hyperglycemia     Hypertension     Hypovitaminosis D     Iron deficiency anemia     Joint pain     Mood disorder (HCC)     Obesity     Obstructive sleep apnea     Ringing in ears     Snoring     Witnessed episode of apnea      Past Surgical History:   Procedure Laterality Date    APPENDECTOMY      HYSTERECTOMY      KNEE SURGERY      Meniscus tear    TONSILLECTOMY      TUBAL LIGATION       Meds/Allergies: Allergies: No Known Allergies  Prior to Admission Medications   Prescriptions Last Dose Informant Patient Reported? Taking?    ASPIR-LOW 81 MG EC tablet 10/27/2018 at 6:30  Yes Yes   Sig: Take 81 mg by mouth daily   RA VITAMIN C 500 MG tablet 10/27/2018 at 6:30  Yes Yes   Sig: Take 500 mg by mouth daily     acetaminophen (MAPAP ARTHRITIS PAIN) 650 mg CR tablet 10/27/2018 at 6:30  No Yes   Sig: Take 1 tablet (650 mg total) by mouth every 8 (eight) hours as needed for mild pain   atorvastatin (LIPITOR) 40 mg tablet 10/27/2018 at 6:30  Yes Yes   buPROPion (WELLBUTRIN XL) 150 mg 24 hr tablet 10/27/2018 at 6:30  Yes Yes   Sig: Take 150 mg by mouth every morning   cephalexin (KEFLEX) 500 mg capsule 10/27/2018 at 14:30  Yes Yes   Sig: Take 500 mg by mouth every 6 (six) hours   cetirizine (ZyrTEC) 10 mg tablet 10/27/2018 at 6:30  Yes Yes   Sig: cetirizine 10 mg tablet   cholecalciferol (VITAMIN D3) 1,000 units tablet 10/27/2018 at 6:30  No Yes   Sig: Take 1 tablet (1,000 Units total) by mouth daily   escitalopram (LEXAPRO) 10 mg tablet 10/27/2018 at 6:30  Yes Yes   Sig: escitalopram 10 mg tablet   escitalopram (LEXAPRO) 20 mg tablet Past Week at Unknown time  Yes Yes   Sig: escitalopram 20 mg tablet   ferrous sulfate 325 (65 FE) MG EC tablet 10/27/2018 at 6:30  Yes Yes   Sig: Take 325 mg by mouth every other day     fluticasone (FLONASE) 50 mcg/act nasal spray Unknown at Unknown time  Yes No   Sig: fluticasone 50 mcg/actuation nasal spray,suspension   nystatin (MYCOSTATIN) powder Unknown at Unknown time  No No   Sig: Apply topically 2 (two) times a day   pantoprazole (PROTONIX) 40 mg tablet 10/27/2018 at 6:30  Yes Yes   Sig: pantoprazole 40 mg tablet,delayed release   propranolol (INDERAL) 40 mg tablet Past Week at Unknown time  Yes Yes   Sig: In morning   propranolol (INDERAL) 60 mg tablet 10/27/2018 at 6:30  Yes Yes   Sig: At night      Facility-Administered Medications: None     Social History:     Social History     Social History    Marital status: Single     Spouse name: N/A    Number of children: N/A    Years of education: N/A     Occupational History    Not on file  Social History Main Topics    Smoking status: Current Every Day Smoker     Packs/day: 0 50    Smokeless tobacco: Never Used    Alcohol use Yes      Comment: very seldom    Drug use: No    Sexual activity: Not on file     Other Topics Concern    Not on file     Social History Narrative    No narrative on file     Patient Pre-hospital Living Situation: Home  Patient Pre-hospital Level of Mobility: ambulates without assistance  Patient Pre-hospital Diet Restrictions: none    Family History:  Family History   Problem Relation Age of Onset    Diabetes Mother     Asthma Mother     Hypertension Mother     Heart disease Mother      Physical Exam:   Vitals:   Blood Pressure: 131/77 (10/28/18 0301)  Pulse: 74 (10/28/18 0301)  Temperature: (!) 97 °F (36 1 °C) (10/28/18 0039)  Temp Source: Tympanic (10/28/18 0039)  Respirations: 18 (10/28/18 0301)  Weight - Scale: 110 kg (241 lb 10 oz) (10/28/18 0039)  SpO2: 100 % (10/28/18 0301)    Physical Exam   Constitutional: She is oriented to person, place, and time   She appears well-developed and well-nourished  No distress  Morbidly obese   HENT:   Head: Normocephalic and atraumatic  Nose: Nose normal    Mouth/Throat: Oropharynx is clear and moist  No oropharyngeal exudate  Eyes: Pupils are equal, round, and reactive to light  Conjunctivae are normal  Right eye exhibits no discharge  Left eye exhibits no discharge  No scleral icterus  Neck: Normal range of motion  Neck supple  No JVD present  No tracheal deviation present  No thyromegaly present  Cardiovascular: Normal rate, regular rhythm and normal heart sounds  Exam reveals no gallop and no friction rub  No murmur heard  Pulmonary/Chest: Effort normal  No stridor  No respiratory distress  She has wheezes (mild expiratory wheezes)  She has no rales  She exhibits no tenderness  Abdominal: Soft  Bowel sounds are normal  She exhibits no distension and no mass  There is no tenderness  There is no rebound and no guarding  Musculoskeletal: Normal range of motion  She exhibits no edema, tenderness or deformity  Lymphadenopathy:     She has no cervical adenopathy  Neurological: She is alert and oriented to person, place, and time  No cranial nerve deficit  Coordination normal    Skin: Skin is warm and dry  Lesion noted  No rash noted  She is not diaphoretic  There is erythema  No pallor  Erythema and crusted fluid of the left great toe   Psychiatric: She has a normal mood and affect  Her behavior is normal  Thought content normal        Lab Results: I have personally reviewed pertinent reports        Results from last 7 days  Lab Units 10/28/18  0053   WBC Thousand/uL 8 70   HEMOGLOBIN g/dL 11 5*   HEMATOCRIT % 35 8*   PLATELETS Thousands/uL 231   NEUTROS PCT % 68*   LYMPHS PCT % 20   MONOS PCT % 7   EOS PCT % 4       Results from last 7 days  Lab Units 10/28/18  0052   SODIUM mmol/L 140   POTASSIUM mmol/L 3 9   CHLORIDE mmol/L 107   CO2 mmol/L 25   BUN mg/dL 17   CREATININE mg/dL 0 76   CALCIUM mg/dL 8 6   ALK PHOS U/L 132*   ALT U/L 44   AST U/L 32   EGFR ml/min/1 73sq m 86   MAGNESIUM mg/dL 2 1           Results from last 7 days  Lab Units 10/28/18  0053   TROPONIN I ng/mL <0 01           Results from last 7 days  Lab Units 10/28/18  0052   D DIMER mg/L 0 70*            Results from last 7 days  Lab Units 10/28/18  0155   COLOR UA  Yellow   CLARITY UA  Clear   SPEC GRAV UA  1 015   PH UA  6 0   LEUKOCYTES UA  Negative   NITRITE UA  Negative   PROTEIN UA mg/dl Negative   GLUCOSE UA mg/dl Negative   KETONES UA mg/dl Negative   BILIRUBIN UA  Negative   BLOOD UA  Negative             Imaging: I have personally reviewed pertinent reports  CXR: Pending    CT PE study: 6  mm  left  upper  lobe  nodule  similar  in  appearance  to  the  prior  exam     Lungs  are  otherwise  clear  No  pulmonary  embolism  or  aortic  dissection     No  effusion,  airspace  disease,  or  pneumothorax   No  acute  pathology  visualized  on  CT  of  the  abdomen  and  pelvis  with  IV  without  oral  contrast       EKG, Pathology, and Other Studies Reviewed on Admission:   EKG: Normal sinus rhythm with HR 66bpm    Result Date: 10/28/18  Impression:  Normal EKG    [employer] Records Reviewed: Yes    Entire H&P was discussed with Dr Dima Dover who agreed to what is noted above    Eduin Reina MD  10/28/18  3:57 AM

## 2018-10-28 NOTE — ASSESSMENT & PLAN NOTE
Counseled on diet and exercise  Patient already making dietery changes     Will be placed on cardiac diet while at hospital

## 2018-10-28 NOTE — ASSESSMENT & PLAN NOTE
Greatly improved  Most likely  musculosckeletal origin due to its reproducible nature  R/O ACS  HEART Score of 4; moderate risk 12-16 6% for ACS: age 59, HTN, Hypercholesterolemia, smoking, nonspecific repolarization change  Initial Troponin <0 01  EKG: Normal sinus rhythm with pulse of 66  Will admit to observation  Obtain two more Troponins [id number] apart  CBC, CMP, Chest X-ray,  TSH  2L NS administered while in ED  No need for further IV hydration  Placed on Cardiac diet  Pain control with Motrin 600mg PRN

## 2018-10-28 NOTE — UTILIZATION REVIEW
Initial Clinical Review    Admission: Date/Time/Statement:  OBS  Order  10/28  @  0201     Orders Placed This Encounter   Procedures    Place in Observation (expected length of stay for this patient is less than two midnights)     Standing Status:   Standing     Number of Occurrences:   1     Order Specific Question:   Admitting Physician     Answer:   Kate Aly [[id number]]     Order Specific Question:   Level of Care     Answer:   Med Surg [16]         ED: Date/Time/Mode of Arrival:   ED Arrival Information     Expected Arrival [address] of Arrival Escorted By Service Admission Type    - 10/28/2018 00:37 Urgent Ambulance [facility] General Medicine Urgent    Arrival Complaint    Chest Pain          Chief Complaint:   Chief Complaint   Patient presents with    Chest Pain     Chest pain starting about 30 minutes ago, while just laying down  History of Illness: Percy Adams is a 59 y o  female who presents with chest pain  She first experienced shortness of breath while laying in bed  Dyspnea persisted and she decided to go the the [facility]  SOB turned into sharp chest pain, called EMS who brought her here  Onset was two hours ago, pain is midline and substernal with some left side radiation  Described it like a squeezing/ pressure in her chest  Initially 6/10 intensity  Denied pain radiation to her arms or back  Denied abdominal pain  She took her normal Aspirin 81 mg and Propranolol 60 mg that morning, but did not take her evening Propranolol 40 mg and there was specific reason for this  She reports a similar episode of chest pain about 30 years ago  No specific diagnosis was rendered at that time  She received Aspirin 81 mg and Fentanyl in the ED   This greatly improved her pain to 2/10               ED Vital Signs:   ED Triage Vitals [10/28/18 0039]   Temperature Pulse Respirations Blood Pressure SpO2   (!) 97 °F (36 1 °C) 63 18 153/66 100 %      Temp Source Heart Rate Source Patient Position - Orthostatic VS BP Location FiO2 (%)   Tympanic Monitor Lying Left arm --      Pain Score       5        Wt Readings from Last 1 Encounters:   10/28/18 107 kg (235 lb 14 3 oz)       Vital Signs (abnormal):   above    Abnormal Labs/Diagnostic Test Results:    D  Dimer   0 70  H/H  11 5/35 8  Ct abd/pelvis:    6 mm left upper lobe nodule similar in appearance to the prior exam   Lungs are otherwise clear  No pulmonary embolism or aortic dissection   No effusion, airspace disease, or pneumothorax  No acute pathology visualized on CT of the abdomen and pelvis with IV without oral contrast     ED Treatment:   Medication Administration from 10/28/2018 0037 to 10/28/2018 0320       Date/Time Order Dose Route Action Action by Comments     10/28/2018 0301 sodium chloride 0 9 % bolus 1,000 mL 0 mL Intravenous Stopped Sari Rides, RN      10/28/2018 0058 sodium chloride 0 9 % bolus 1,000 mL 1,000 mL [facility]venous [facility]      10/28/2018 4778 aspirin chewable tablet 324 mg 324 mg Oral Given Sari Rides, RN      10/28/2018 0055 fentanyl citrate (PF) 100 MCG/2ML 50 mcg 50 mcg Intravenous Given Sari Rides, RN      10/28/2018 0303 ibuprofen (MOTRIN) tablet 600 mg 600 mg Oral Given Sari Rides, RN      10/28/2018 0303 iohexol (OMNIPAQUE) 350 MG/ML injection (MULTI-DOSE) 100 mL 85 mL Intravenous Given Bashir Rank           Past Medical/Surgical History:    Active Ambulatory Problems     Diagnosis Date Noted    Obstructive sleep apnea on CPAP     Circadian rhythm sleep disorder, delayed sleep phase type 06/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    Snoring 06/05/2018    Restless leg syndrome 06/05/2018    Allergic rhinitis 12/17/2013    Depression 07/01/2014    Low back pain 02/19/2015    Benign essential tremor 08/08/2018    Anxiety 08/08/2018    Obesity 08/08/2018    Vitamin D deficiency 08/08/2018    Hyperlipidemia 08/08/2018    Tobacco abuse 08/09/2018    Intertrigo 08/09/2018    Encounter for screening for lung cancer 08/09/2018    Screening for breast cancer 08/09/2018    Iron deficiency anemia 08/09/2018    Low serum ferritin level 09/10/2018    S Encounter for immunization 10/18/2018     Resolved Ambulatory Problems     Diagnosis Date Noted    No Resolved Ambulatory Problems     Past Medical History:   Diagnosis Date    Anemia     Anxiety     Arthritis     Back pain     Cancer ([address]     Depression     Diabetes mellitus (HCC)     Dyslipidemia     Essential tremor     Excessive daytime sleepiness     GERD (gastroesophageal reflux disease)     History of uterine cancer     Hyperglycemia     Hypertension     Hypovitaminosis D     Iron deficiency anemia     Joint pain     Mood disorder (HCC)     Obesity     Obstructive sleep apnea     Ringing in ears     Snoring     Witnessed episode of apnea        Admitting Diagnosis: Hyperlipidemia [E78 5]  Chest pain [R07 9]  HTN (hypertension) [I10]    Age/Sex: 59 y o  female    Assessment/Plan:   Chest pain   Assessment & Plan     Greatly improved  Most likely  musculosckeletal origin due to its reproducible nature  R/O ACS  HEART Score of 4; moderate risk 12-16 6% for ACS: age 59, HTN, Hypercholesterolemia, smoking, nonspecific repolarization change  Initial Troponin <0 01  EKG: Normal sinus rhythm with pulse of 66  Will admit to observation  Obtain two more Troponins [id number] apart  CBC, CMP, Chest X-ray,  TSH  2L NS administered while in ED  No need for further IV hydration  Placed on Cardiac diet  Pain control with Motrin 600mg PRN          NSVT (nonsustained ventricular tachycardia) (HCC)   Assessment & Plan     Visualized in ED monitor and patient asymptomatic during the episodes  Not present on initial EKG    Placed on 24 hour telemetry        Iron deficiency anemia   Assessment & Plan     Most recent Hg of 11 5   Will continue PO Iron supplementation and Vit C        Tobacco abuse   Assessment & Plan     Counseled on smoking cessation and offered nicotine patch  Currently not interested in quitting  Refused nicotine patch including the high dose        Hyperlipidemia   Assessment & Plan     Continue Atorvastatin        Obesity   Assessment & Plan     Counseled on diet and exercise  Patient already making dietery changes  Will be placed on cardiac diet while at hospital      [address]     Continue Escitalopram          Anticipated Length of Stay:  Patient will be admitted on an Observation basis with an anticipated length of stay of  Less than 2 midnights  Admission Orders:   OBS  Order  10/28  @  0201  Scheduled Meds:   Current Facility-Administered Medications:  acetaminophen 650 mg Oral BID Nitin Rucker MD   ascorbic acid 500 mg Oral Daily Nitin Rucker MD   aspirin 81 mg Oral Daily Nitin Rucker MD   atorvastatin 40 mg Oral Daily With Parish Lopez MD   buPROPion 150 mg Oral QAM Nitin Rucker MD   cephalexin 500 mg Oral Q6H [address] Nitin Rucker MD   cholecalciferol 1,000 Units Oral Daily Nitin Rucker MD   enoxaparin 40 mg Subcutaneous Daily Nitin Rucker MD   escitalopram 10 mg Oral Daily Nitin Rucker MD   escitalopram 10 mg Oral Daily Nitin Rucker MD   ferrous sulfate 325 mg Oral Daily Jj Vila MD   ibuprofen 600 mg Oral Q6H PRN Nitin Rucker MD   loratadine 10 mg Oral Daily Nitin Rucker MD   pantoprazole 40 mg Oral Early Morning Nitin Rucker MD   propranolol 40 mg Oral HS Nitin Rucker MD   propranolol 60 mg Oral QAM Nitin Rucker MD     Continuous Infusions:    PRN Meds: ibuprofen     Tele  Cardiac  Diet  Serial troponin    Thank you,  [address]  Utilization Review Department  Phone: [phone]; Fax [phone]  ATTENTION: Please call with any questions or concerns to [phone]  and carefully follow the prompts so that you are directed to the right person  Send all requests for admission clinical reviews, approved or denied determinations and any other requests to fax [phone]   All voicemails are confidential

## 2018-10-28 NOTE — ASSESSMENT & PLAN NOTE
Counseled on smoking cessation and offered nicotine patch  Currently not interested in quitting  Refused nicotine patch including the high dose

## 2018-10-28 NOTE — DISCHARGE SUMMARY
Discharge Summary - David 6    Patient Information: Sherman Pineda 59 y o  female MRN: [id number]  Unit/Bed#: [facility] 510-01 Encounter: [id number]    Discharging Physician / Practitioner: Alberto Ortiz MD  PCP: Evangelina Bal MD  Admission Date:   Admission Orders     Ordered        10/28/18 0201  Place in Observation (expected length of stay for this patient is less than two midnights)  Once             Discharge Date: 10/29/18    Reason for Admission: Chest pain & Shortness of Breath    Discharge Diagnoses:     Principal Problem (Resolved):    Chest pain  Active Problems:    Anxiety    Obesity    Hyperlipidemia    Tobacco abuse    Iron deficiency anemia    NSVT (nonsustained ventricular tachycardia) ([facility] )      Consultations During Hospital Stay:  · None    Procedures Performed:   · None    Significant Findings / Test Results:   · None    Incidental Findings:   Left upper Lung nodule, 6 cm in diamter    Test Results Pending at Discharge (will require follow up): · None      Outpatient Tests Requested:  Treadmill stress test    Complications:  None    Hospital Course:     Sherman Pineda is a 59 y o  female patient who originally presented to the hospital on 10/28/2018 due to shortness of breath and chest pain  Admitted to telemetry, had an extensive cardiac workup troponins trended negative, EKG revealed no abnormalities and ACS was ruled out  Throughout the course of her stay she continued to improve, chest pain was likely MSK in origin as it was reproducible to palpation and relieved with Ibuprofen  Pt's HR was trending in the 50s and 40s, which was attributed to her Propanolol treatment for hr essential tremor  We decreased her dosing from 60 mg + 40 mg QD, to 40 mg BID and advised follow up with Neurology   Pt was instructed to follow up with her PCP as directed, with recommendation to completed treadmill stress test      Left upper lung nodule, approx 6 cm in diameter was found on CTA chest  Was noted that there has been no change in appearance to prior exam  Recommended that pt follow up in 1yr w/ CT  Condition at Discharge: stable     Discharge Day Visit / Exam:     Vitals: Blood Pressure: 113/62 (10/29/18 0709)  Pulse: (!) 53 (10/29/18 0709)  Temperature: (!) 97 2 °F (36 2 °C) (10/29/18 0709)  Temp Source: Temporal (10/29/18 0709)  Respirations: 18 (10/29/18 0709)  Height: 5' 2" (157 5 cm) (10/28/18 0328)  Weight - Scale: 107 kg (235 lb 14 3 oz) (10/28/18 0328)  SpO2: 95 % (10/29/18 0709)  Exam:   Physical Exam   Constitutional: She appears well-developed and well-nourished  No distress  Cardiovascular: Normal rate, regular rhythm and normal heart sounds  Pulmonary/Chest: Effort normal and breath sounds normal    Musculoskeletal: She exhibits no edema  Skin: Skin is warm and dry  She is not diaphoretic  Discussion with Family: Need for outpatient follow up with [facility] for stress test & contact Neurology office to notify change in medication  Discharge instructions/Information to patient and family:   See after visit summary for information provided to patient and family  Discharge Medications:   Acetaminophen 650 mg Oral Every 8 hours PRN       Ascorbic Acid 500 mg Oral Daily       Aspirin 81 mg Oral Daily       Atorvastatin Calcium 40 mg No dose, route, or frequency recorded         BuPROPion HCl 150 mg Oral Every morning       Cephalexin 500 mg Oral Every 6 hours scheduled       Cetirizine HCl 10 mg cetirizine 10 mg tablet      Cholecalciferol 1,000 Units Oral Daily      Escitalopram Oxalate          10 mg Tabs, escitalopram 10 mg tablet       20 mg Tabs, escitalopram 20 mg tablet       Ferrous Sulfate 325 mg Oral Every other day       Fluticasone Propionate 50 mcg/act fluticasone 50 mcg/actuation nasal spray,suspension      Nystatin 552368 UNIT/GM Topical 2 times daily       Pantoprazole Sodium 40 mg pantoprazole 40 mg tablet,delayed release Propranolol HCl          40 mg Tabs, In morning      40 mg Oral Every 12 hours scheduled       Provisions for Follow-Up Care:  See after visit summary for information related to follow-up care and any pertinent home health orders  Disposition:     Home    For Discharges to [facility]:   · Not Applicable to this Patient - Not Applicable to this Patient    Planned Readmission: No     Discharge Statement:  I spent 40 minutes discharging the patient  This time was spent on the day of discharge  I had direct contact with the patient on the day of discharge  Greater than 50% of the total time was spent examining patient, answering all patient questions, arranging and discussing plan of care with patient as well as directly providing post-discharge instructions  Additional time then spent on discharge activities  Discharge Medications:  See after visit summary for reconciled discharge medications provided to patient and family        ** Please Note: This note has been constructed using a voice recognition system **    Jesse Flores MD  10/29/18  1:21 PM

## 2018-10-29 VITALS
OXYGEN SATURATION: 95 % | TEMPERATURE: 97.2 F | BODY MASS INDEX: 43.41 KG/M2 | HEART RATE: 53 BPM | RESPIRATION RATE: 18 BRPM | SYSTOLIC BLOOD PRESSURE: 113 MMHG | WEIGHT: 235.89 LBS | DIASTOLIC BLOOD PRESSURE: 62 MMHG | HEIGHT: 62 IN

## 2018-10-29 PROBLEM — R07.9 CHEST PAIN: Status: RESOLVED | Noted: 2018-10-28 | Resolved: 2018-10-29

## 2018-10-29 LAB — HCV AB SER QL: NORMAL

## 2018-10-29 PROCEDURE — 93005 ELECTROCARDIOGRAM TRACING: CPT

## 2018-10-29 RX ORDER — PROPRANOLOL HYDROCHLORIDE 40 MG/1
40 TABLET ORAL EVERY 12 HOURS SCHEDULED
Qty: 60 TABLET | Refills: 0 | Status: SHIPPED | OUTPATIENT
Start: 2018-10-29 | End: 2019-02-27 | Stop reason: SDUPTHER

## 2018-10-29 RX ADMIN — VITAMIN D, TAB 1000IU (100/BT) 1000 UNITS: 25 TAB at 08:32

## 2018-10-29 RX ADMIN — OXYCODONE HYDROCHLORIDE AND ACETAMINOPHEN 500 MG: 500 TABLET ORAL at 08:32

## 2018-10-29 RX ADMIN — PANTOPRAZOLE SODIUM 40 MG: 40 TABLET, DELAYED RELEASE ORAL at 05:44

## 2018-10-29 RX ADMIN — ACETAMINOPHEN 650 MG: 325 TABLET ORAL at 08:32

## 2018-10-29 RX ADMIN — BUPROPION HYDROCHLORIDE 150 MG: 150 TABLET, EXTENDED RELEASE ORAL at 08:33

## 2018-10-29 RX ADMIN — FERROUS SULFATE TAB 325 MG (65 MG ELEMENTAL FE) 325 MG: 325 (65 FE) TAB at 08:32

## 2018-10-29 RX ADMIN — LORATADINE 10 MG: 10 TABLET ORAL at 08:32

## 2018-10-29 RX ADMIN — ASPIRIN 81 MG: 81 TABLET, COATED ORAL at 08:32

## 2018-10-29 RX ADMIN — ESCITALOPRAM OXALATE 10 MG: 10 TABLET ORAL at 08:32

## 2018-10-29 RX ADMIN — CEPHALEXIN 500 MG: 500 CAPSULE ORAL at 05:38

## 2018-10-29 NOTE — NURSING NOTE
Pt resting in bed  Denies CP  Assessment remains unchanged  Will continue to monitor  Call bell in reach

## 2018-10-29 NOTE — NURSING NOTE
Patient discharged  AVS explained to patient, understanding stated, Patient aware of prescription to  at her pharmacy  IV removed  Belongings accounted for  Patient ambulated out with nursing staff

## 2018-10-29 NOTE — SOCIAL WORK
Patient has been medically cleared for discharge home this morning  Observation Notification signed with original placed in scan bin  Spouse present to provide transportation home  No other needs known at this time

## 2018-10-29 NOTE — DISCHARGE INSTRUCTIONS
Cigarette Smoking and Your Health, Ambulatory Care   GENERAL INFORMATION:   What are the risks to my health if I smoke? Chemicals in tobacco are addictive and damage every cell in your body  All tobacco products are dangerous to you and to nonsmokers who breathe your secondhand smoke  Even if you are a light smoker or a social smoker, you have an increased risk for cancer, heart disease, and lung disease  If you are pregnant or have diabetes, smoking increases your risk for complications  What are the benefits to my health if I stop smoking? · Lower risk of cancer, heart disease, blood clots, heart attack, and stroke    · Lower risk of diabetes complications, such as kidney, artery, or eye disease, and nerve damage that can result in amputations    · Lower risk of lung infections and diseases, such as pneumonia, asthma, chronic bronchitis, and emphysema           · Increased benefits of chemotherapy if you already have cancer, and decreased risk for cancer returning after treatment    · Improved circulation, allowing more oxygen to be delivered to your body    · Improved ability to heal and to fight infections  What are the benefits to the health of others if I stop smoking? · Lower risk of lung cancer and heart disease in nonsmokers    · Lower risk of miscarriage, early delivery, low birth weight, and stillbirth    · Lower risk of SIDS, obesity, developmental delay, ear infections, colds, pneumonia, bronchitis, asthma, and ADHD in your child  Where can I find more information and support to stop smoking? It is never too late to stop smoking  Ask your healthcare provider for more information if you need help  · [email or web address]  [place]  Phone: [phone]  Web Address: [employer]  Follow up with your healthcare provider as directed:  Write down your questions so you remember to ask them during your visits  CARE AGREEMENT:   You have the right to help plan your care   Learn about your health condition and how it may be treated  Discuss treatment options with your caregivers to decide what care you want to receive  You always have the right to refuse treatment  The above information is an  only  It is not intended as medical advice for individual conditions or treatments  Talk to your doctor, nurse or pharmacist before following any medical regimen to see if it is safe and effective for you  © 2014 [address] is for End User's use only and may not be sold, redistributed or otherwise used for commercial purposes  All illustrations and images included in CareNotes® are the copyrighted property of [employer]  or [facility]  Chest Pain   WHAT YOU NEED TO KNOW:   Chest pain can be caused by a range of conditions, from not serious to life-threatening  Chest pain can be a symptom of a digestive problem, such as acid reflux or a stomach ulcer  An anxiety attack or a strong emotion, such as anger, can also cause chest pain  Infection, inflammation, or a fracture in the bones or cartilage in your chest can cause pain or discomfort  Sometimes chest pain or pressure is caused by poor blood flow to your heart (angina)  Chest pain may also be caused by life-threatening conditions such as a heart attack or blood clot in your lungs  DISCHARGE INSTRUCTIONS:   Call 911 if:   · You have any of the following signs of a heart attack:      ¨ Squeezing, pressure, or pain in your chest that lasts longer than 5 minutes or returns    ¨ Discomfort or pain in your back, neck, jaw, stomach, or arm     ¨ Trouble breathing    ¨ Nausea or vomiting    ¨ Lightheadedness or a sudden cold sweat, especially with chest pain or trouble breathing    Seek care immediately if:   · You have chest discomfort that gets worse, even with medicine  · You cough or vomit blood  · Your bowel movements are black or bloody  · You cannot stop vomiting, or it hurts to swallow    Contact your healthcare provider if:   · You have questions or concerns about your condition or care  Medicines:   · Medicines  may be given to treat the cause of your chest pain  Examples include pain medicine, anxiety medicine, or medicines to increase blood flow to your heart  · Do not take certain medicines without asking your healthcare provider first   These include NSAIDs, herbal or vitamin supplements, or hormones (estrogen or progestin)  · Take your medicine as directed  Contact your healthcare provider if you think your medicine is not helping or if you have side effects  Tell him or her if you are allergic to any medicine  Keep a list of the medicines, vitamins, and herbs you take  Include the amounts, and when and why you take them  Bring the list or the pill bottles to follow-up visits  Carry your medicine list with you in case of an emergency  Follow up with your healthcare provider within 72 hours, or as directed: You may need to return for more tests to find the cause of your chest pain  You may be referred to a specialist, such as a cardiologist or gastroenterologist  Write down your questions so you remember to ask them during your visits  Healthy living tips: The following are general healthy guidelines  If your chest pain is caused by a heart problem, your healthcare provider will give you specific guidelines to follow  · Do not smoke  Nicotine and other chemicals in cigarettes and cigars can cause lung and heart damage  Ask your healthcare provider for information if you currently smoke and need help to quit  E-cigarettes or smokeless tobacco still contain nicotine  Talk to your healthcare provider before you use these products  · Eat a variety of healthy, low-fat foods  Healthy foods include fruits, vegetables, whole-grain breads, low-fat dairy products, beans, lean meats, and fish  Ask for more information about a heart healthy diet  · Ask about activity    Your healthcare provider will tell you which activities to limit or avoid  Ask when you can drive, return to work, and have sex  Ask about the best exercise plan for you  · Maintain a healthy weight  Ask your healthcare provider how much you should weigh  Ask him or her to help you create a weight loss plan if you are overweight  © 2017 [address] Information is for End User's use only and may not be sold, redistributed or otherwise used for commercial purposes  All illustrations and images included in CareNotes® are the copyrighted property of [employer]  or [employer] Angel[employer]  The above information is an  only  It is not intended as medical advice for individual conditions or treatments  Talk to your doctor, nurse or pharmacist before following any medical regimen to see if it is safe and effective for you

## 2018-10-29 NOTE — NURSING NOTE
Patient awake, alert and oriented to PPT  VSS  Denies chest pain or shortness of breath  Has been ambulating frequently in the halls  Lungs CTA  Abd soft, NT with +BS, denies N/V or diarrhea  No edema, pulses palpable  SB-SR on the monitor  Bed in lowest position, call bell within reach

## 2018-10-29 NOTE — NURSING NOTE
Pt sleeping ,easily aroused to verbal stimuli  VS stable  SR on monitor  Denies chest pain/SOB  Skin dry warm to touch  No new changes from previous assessment  Will continue to monitor   Call bell in reach

## 2018-10-29 NOTE — UTILIZATION REVIEW
Continued Stay Review    Date: 10/29/19    Vital Signs: /62 (BP Location: Right arm)   Pulse (!) 53   Temp (!) 97 2 °F (36 2 °C) (Temporal)   Resp 18   Ht 5' 2" (1 575 m)   Wt 107 kg (235 lb 14 3 oz)   LMP  (LMP Unknown)   SpO2 95%   BMI 43 15 kg/m²     Medications:   Scheduled Meds:   Current Facility-Administered Medications:  acetaminophen 650 mg Oral BID   ascorbic acid 500 mg Oral Daily   aspirin 81 mg Oral Daily   atorvastatin 40 mg Oral Daily With Dinner   buPROPion 150 mg Oral QAM   cephalexin 500 mg Oral Q6H ADELA   cholecalciferol 1,000 Units Oral Daily   enoxaparin 40 mg Subcutaneous Daily   escitalopram 10 mg Oral Daily   escitalopram 10 mg Oral Daily   ferrous sulfate 325 mg Oral Daily   ibuprofen 600 mg Oral Q6H PRN   loratadine 10 mg Oral Daily   pantoprazole 40 mg Oral Early Morning   propranolol 40 mg Oral HS   propranolol 60 mg Oral QAM       Abnormal Labs/Diagnostic Results:     H&H = 10 2/32 2    Age/Sex: 59 y o  female     Assessment/Plan: 10/29/18 @ [facility]    Discharge Plan: TBD          Thank you,  [address]  Utilization Review Department  Phone: [phone]; Fax [phone]  ATTENTION: Please call with any questions or concerns to [phone]  and carefully follow the prompts so that you are directed to the right person  Send all requests for admission clinical reviews, approved or denied determinations and any other requests to fax [phone]   All voicemails are confidential    '

## 2018-10-30 LAB
ATRIAL RATE: 54 BPM
ATRIAL RATE: 67 BPM
P AXIS: 60 DEGREES
P AXIS: 66 DEGREES
PR INTERVAL: 168 MS
PR INTERVAL: 198 MS
QRS AXIS: 20 DEGREES
QRS AXIS: 25 DEGREES
QRSD INTERVAL: 70 MS
QRSD INTERVAL: 76 MS
QT INTERVAL: 384 MS
QT INTERVAL: 414 MS
QTC INTERVAL: 392 MS
QTC INTERVAL: 405 MS
T WAVE AXIS: 13 DEGREES
T WAVE AXIS: 20 DEGREES
VENTRICULAR RATE: 54 BPM
VENTRICULAR RATE: 67 BPM

## 2018-10-30 PROCEDURE — 93010 ELECTROCARDIOGRAM REPORT: CPT | Performed by: INTERNAL MEDICINE

## 2018-10-31 ENCOUNTER — TELEPHONE (OUTPATIENT)
Dept: NEUROLOGY | Facility: CLINIC | Age: 58
End: 2018-10-31

## 2018-10-31 NOTE — TELEPHONE ENCOUNTER
PETR, Patient was admitted for chest pain on [place] morning to [facility] and discharged on Monday morning  While in the hospital they changed her propanolol due to a low heart rate       She is now taking propanolol 40 mg in the am and 40 mg in the pm instead of the way you prescribed which was 60 mg in the am and 40 in the pm

## 2018-11-27 ENCOUNTER — OFFICE VISIT (OUTPATIENT)
Dept: FAMILY MEDICINE CLINIC | Facility: CLINIC | Age: 58
End: 2018-11-27
Payer: COMMERCIAL

## 2018-11-27 VITALS
TEMPERATURE: 96.8 F | RESPIRATION RATE: 17 BRPM | OXYGEN SATURATION: 99 % | WEIGHT: 236 LBS | HEART RATE: 68 BPM | BODY MASS INDEX: 43.16 KG/M2 | DIASTOLIC BLOOD PRESSURE: 88 MMHG | SYSTOLIC BLOOD PRESSURE: 122 MMHG

## 2018-11-27 DIAGNOSIS — R07.89 OTHER CHEST PAIN: Primary | ICD-10-CM

## 2018-11-27 PROCEDURE — 99213 OFFICE O/P EST LOW 20 MIN: CPT | Performed by: PHYSICIAN ASSISTANT

## 2018-11-27 NOTE — PROGRESS NOTES
Assessment/Plan:    Other chest pain  - patient has not had anymore episodes of chest pain or SOB since hospital discharge   - recommendation was for patient to have an outpatient stress test done, which has been ordered at this appointment; nuclear stress test ordered due to patient's inability to walk distances and incline without pain in her legs        Diagnoses and all orders for this visit:    Other chest pain  -     NM myocardial perfusion spect (stress and/or rest); Future          Subjective: Patient was at [facility] from 10/28- 10/29 with complaints of chest pain and shortness of breath  She was admitted for observation  Cardiac workup, including troponins was negative and EKG was normal  ACS ruled out  Propanolol decreased due to bradycardia  Patient advised to have an outpatient stress test, which is what she is coming in for today  No further episodes of chest pain or shortness of breath  No dizziness or lightheadedness  Patient does not feel that she can participate in an exercise stress test because of pain in knees when walking long distances or inclines  Patient ID: João Arana is a 59 y o  female  HPI    The following portions of the patient's history were reviewed and updated as appropriate: She  has a past medical history of Anemia; Anxiety; Arthritis; Back pain; Cancer ([address] ); Depression; Diabetes mellitus ([address] ); Dyslipidemia; Essential tremor; Excessive daytime sleepiness; GERD (gastroesophageal reflux disease); History of uterine cancer; Hyperglycemia; Hypertension; Hypovitaminosis D; Iron deficiency anemia; Joint pain; Mood disorder ([address] ); Obesity; Obstructive sleep apnea; Ringing in ears; Snoring; and Witnessed episode of apnea    Patient Active Problem List    Diagnosis Date Noted    Other chest pain 10/28/2018    NSVT (nonsustained ventricular tachycardia) ([address] ) 10/28/2018    S Encounter for immunization 10/18/2018    Low serum ferritin level 09/10/2018    Tobacco abuse 08/09/2018  Intertrigo 08/09/2018    Encounter for screening for lung cancer 08/09/2018    Screening for breast cancer 08/09/2018    Iron deficiency anemia 08/09/2018    Benign essential tremor 08/08/2018    Anxiety 08/08/2018    Obesity 08/08/2018    Vitamin D deficiency 08/08/2018    Hyperlipidemia 08/08/2018    Circadian rhythm sleep disorder, delayed sleep phase type 06/05/2018    Snoring 06/05/2018    Restless leg syndrome 06/05/2018    Obstructive sleep apnea on CPAP     Low back pain 02/19/2015    Depression 07/01/2014    Allergic rhinitis 12/17/2013     She  has a past surgical history that includes Appendectomy; Tonsillectomy; Tubal ligation; Knee surgery; and Hysterectomy  Her family history includes Asthma in her mother; Diabetes in her mother; Heart disease in her mother; Hypertension in her mother  She  reports that she has been smoking  She has been smoking about 0 50 packs per day  She has quit using smokeless tobacco  She reports that she drinks alcohol  She reports that she does not use drugs    Current Outpatient Prescriptions   Medication Sig Dispense Refill    acetaminophen (MAPAP ARTHRITIS PAIN) 650 mg CR tablet Take 1 tablet (650 mg total) by mouth every 8 (eight) hours as needed for mild pain 30 tablet 0    ASPIR-LOW 81 MG EC tablet Take 81 mg by mouth daily  0    atorvastatin (LIPITOR) 40 mg tablet   0    buPROPion (WELLBUTRIN XL) 150 mg 24 hr tablet Take 150 mg by mouth every morning  0    cephalexin (KEFLEX) 500 mg capsule Take 500 mg by mouth every 6 (six) hours      cetirizine (ZyrTEC) 10 mg tablet cetirizine 10 mg tablet      cholecalciferol (VITAMIN D3) 1,000 units tablet Take 1 tablet (1,000 Units total) by mouth daily 30 tablet 1    escitalopram (LEXAPRO) 10 mg tablet escitalopram 10 mg tablet      escitalopram (LEXAPRO) 20 mg tablet escitalopram 20 mg tablet      ferrous sulfate 325 (65 FE) MG EC tablet Take 325 mg by mouth every other day    0    fluticasone (FLONASE) 50 mcg/act nasal spray fluticasone 50 mcg/actuation nasal spray,suspension      nystatin (MYCOSTATIN) powder Apply topically 2 (two) times a day 15 g 0    pantoprazole (PROTONIX) 40 mg tablet pantoprazole 40 mg tablet,delayed release      propranolol (INDERAL) 40 mg tablet In morning      propranolol (INDERAL) 40 mg tablet Take 1 tablet (40 mg total) by mouth every 12 (twelve) hours 60 tablet 0    RA VITAMIN C 500 MG tablet Take 500 mg by mouth daily    0     No current facility-administered medications for this visit  Current Outpatient Prescriptions on File Prior to Visit   Medication Sig    acetaminophen (MAPAP ARTHRITIS PAIN) 650 mg CR tablet Take 1 tablet (650 mg total) by mouth every 8 (eight) hours as needed for mild pain    ASPIR-LOW 81 MG EC tablet Take 81 mg by mouth daily    atorvastatin (LIPITOR) 40 mg tablet     buPROPion (WELLBUTRIN XL) 150 mg 24 hr tablet Take 150 mg by mouth every morning    cephalexin (KEFLEX) 500 mg capsule Take 500 mg by mouth every 6 (six) hours    cetirizine (ZyrTEC) 10 mg tablet cetirizine 10 mg tablet    cholecalciferol (VITAMIN D3) 1,000 units tablet Take 1 tablet (1,000 Units total) by mouth daily    escitalopram (LEXAPRO) 10 mg tablet escitalopram 10 mg tablet    escitalopram (LEXAPRO) 20 mg tablet escitalopram 20 mg tablet    ferrous sulfate 325 (65 FE) MG EC tablet Take 325 mg by mouth every other day      fluticasone (FLONASE) 50 mcg/act nasal spray fluticasone 50 mcg/actuation nasal spray,suspension    nystatin (MYCOSTATIN) powder Apply topically 2 (two) times a day    pantoprazole (PROTONIX) 40 mg tablet pantoprazole 40 mg tablet,delayed release    propranolol (INDERAL) 40 mg tablet In morning    propranolol (INDERAL) 40 mg tablet Take 1 tablet (40 mg total) by mouth every 12 (twelve) hours    RA VITAMIN C 500 MG tablet Take 500 mg by mouth daily       No current facility-administered medications on file prior to visit        She is allergic to aspirin       Review of Systems   Constitutional: Negative  Respiratory: Negative for cough, chest tightness, shortness of breath and wheezing  Cardiovascular: Negative for chest pain, palpitations and leg swelling  Musculoskeletal: Positive for arthralgias  Skin: Negative for color change and pallor  Neurological: Negative  Objective:      /88 (BP Location: Left arm, Patient Position: Sitting, Cuff Size: Adult)   Pulse 68   Temp (!) 96 8 °F (36 °C) (Temporal)   Resp 17   Wt 107 kg (236 lb)   LMP  (LMP Unknown)   SpO2 99%   BMI 43 16 kg/m²          Physical Exam   Constitutional: She is oriented to person, place, and time  She appears well-developed and well-nourished  No distress  Cardiovascular: Normal rate, regular rhythm and normal heart sounds  Pulmonary/Chest: Effort normal and breath sounds normal  No respiratory distress  Neurological: She is alert and oriented to person, place, and time  Skin: Skin is warm and dry  She is not diaphoretic

## 2018-11-29 NOTE — PROGRESS NOTES
Assessment/Plan:    Anxiety  She is on 25 mg of Lexapro  Follows up with Psychiatry  No homicidal or suicidal ideation    Benign essential tremor  Currently on propranolol 40 mg twice a day  She follows up with Neurology and has a follow-up appointment with them in February  Iron deficiency anemia  Ferritin was very low  States that she had a colonoscopy last year which was  Was given Hemoccult kit  If positive will need a colonoscopy    Other chest pain  Awaiting stress test       Diagnoses and all orders for this visit:    Benign essential tremor    Anxiety    Low serum ferritin level    Other chest pain    Other iron deficiency anemia          Subjective:      Patient ID: João Arana is a 59 y o  female  HPI   Patient is here for follow-up on chronic conditions  She denies any chest pain or shortness of breath today  However she states that sometimes she gets that with activity  Stress test was ordered on her previous visit and she is waiting to be called about  She denies any blood in her stool or black stools  Thinks that she eats well and consume vegetables rich in iron    The following portions of the patient's history were reviewed and updated as appropriate: allergies, current medications, past family history, past medical history, past social history, past surgical history and problem list     Review of Systems   Constitutional: Negative for chills, fatigue and fever  HENT: Negative for sinus pressure and sore throat  Eyes: Negative for photophobia, pain and visual disturbance  Respiratory: Positive for shortness of breath  Negative for cough and chest tightness  Cardiovascular: Negative for chest pain and leg swelling  Gastrointestinal: Negative for abdominal pain, constipation and diarrhea  Genitourinary: Negative for dysuria, frequency and urgency  Musculoskeletal: Positive for arthralgias  Negative for back pain  Skin: Negative for rash     Neurological: Positive for tremors  Negative for dizziness, seizures, syncope and headaches  Hematological: Negative for adenopathy  Objective:      /80 (BP Location: Right arm, Patient Position: Sitting, Cuff Size: Large)   Pulse 79   Temp (!) 96 9 °F (36 1 °C) (Temporal)   Resp 18   Ht 5' 2" (1 575 m)   Wt 105 kg (231 lb)   SpO2 97%   BMI 42 25 kg/m²          Physical Exam   Constitutional: She is oriented to person, place, and time  She appears well-developed and well-nourished  No distress  HENT:   Head: Normocephalic and atraumatic  Mouth/Throat: Oropharynx is clear and moist  No oropharyngeal exudate  Eyes: Pupils are equal, round, and reactive to light  Conjunctivae and EOM are normal  Right eye exhibits no discharge  Left eye exhibits no discharge  Neck: Normal range of motion  Neck supple  No JVD present  No thyromegaly present  Cardiovascular: Normal rate, regular rhythm and normal heart sounds  No murmur heard  Pulmonary/Chest: Effort normal  No respiratory distress  She has decreased breath sounds  She has no wheezes  She has no rales  Abdominal: Soft  Bowel sounds are normal  She exhibits no distension  There is no tenderness  Musculoskeletal: Normal range of motion  She exhibits no edema or deformity  Neurological: She is alert and oriented to person, place, and time  Skin: Skin is warm

## 2018-11-30 ENCOUNTER — OFFICE VISIT (OUTPATIENT)
Dept: FAMILY MEDICINE CLINIC | Facility: CLINIC | Age: 58
End: 2018-11-30
Payer: COMMERCIAL

## 2018-11-30 VITALS
HEIGHT: 62 IN | HEART RATE: 79 BPM | SYSTOLIC BLOOD PRESSURE: 140 MMHG | DIASTOLIC BLOOD PRESSURE: 80 MMHG | BODY MASS INDEX: 42.51 KG/M2 | WEIGHT: 231 LBS | OXYGEN SATURATION: 97 % | TEMPERATURE: 96.9 F | RESPIRATION RATE: 18 BRPM

## 2018-11-30 DIAGNOSIS — R79.0 LOW SERUM FERRITIN LEVEL: ICD-10-CM

## 2018-11-30 DIAGNOSIS — D50.8 OTHER IRON DEFICIENCY ANEMIA: ICD-10-CM

## 2018-11-30 DIAGNOSIS — R07.89 OTHER CHEST PAIN: ICD-10-CM

## 2018-11-30 DIAGNOSIS — F41.9 ANXIETY: ICD-10-CM

## 2018-11-30 DIAGNOSIS — G25.0 BENIGN ESSENTIAL TREMOR: Primary | ICD-10-CM

## 2018-11-30 PROCEDURE — 99213 OFFICE O/P EST LOW 20 MIN: CPT | Performed by: FAMILY MEDICINE

## 2018-11-30 PROCEDURE — 3008F BODY MASS INDEX DOCD: CPT | Performed by: FAMILY MEDICINE

## 2018-11-30 NOTE — ASSESSMENT & PLAN NOTE
Currently on propranolol 40 mg twice a day  She follows up with Neurology and has a follow-up appointment with them in February

## 2018-11-30 NOTE — ASSESSMENT & PLAN NOTE
Ferritin was very low  States that she had a colonoscopy last year which was  Was given Hemoccult kit    If positive will need a colonoscopy

## 2018-12-07 DIAGNOSIS — E55.9 VITAMIN D DEFICIENCY: ICD-10-CM

## 2018-12-07 RX ORDER — MELATONIN
1000 DAILY
Qty: 30 TABLET | Refills: 5 | Status: SHIPPED | OUTPATIENT
Start: 2018-12-07 | End: 2019-02-25 | Stop reason: SDUPTHER

## 2018-12-21 DIAGNOSIS — D50.8 OTHER IRON DEFICIENCY ANEMIA: Primary | ICD-10-CM

## 2018-12-21 LAB — SL AMB POCT FECES OCC BLD: POSITIVE

## 2018-12-21 PROCEDURE — 82270 OCCULT BLOOD FECES: CPT | Performed by: FAMILY MEDICINE

## 2019-01-02 ENCOUNTER — APPOINTMENT (EMERGENCY)
Dept: RADIOLOGY | Facility: HOSPITAL | Age: 59
End: 2019-01-02
Payer: COMMERCIAL

## 2019-01-02 ENCOUNTER — HOSPITAL ENCOUNTER (EMERGENCY)
Facility: HOSPITAL | Age: 59
Discharge: HOME | End: 2019-01-02
Attending: EMERGENCY MEDICINE | Admitting: EMERGENCY MEDICINE
Payer: COMMERCIAL

## 2019-01-02 VITALS
TEMPERATURE: 96.1 F | HEART RATE: 66 BPM | SYSTOLIC BLOOD PRESSURE: 163 MMHG | RESPIRATION RATE: 24 BRPM | WEIGHT: 241.38 LBS | BODY MASS INDEX: 44.15 KG/M2 | DIASTOLIC BLOOD PRESSURE: 82 MMHG | OXYGEN SATURATION: 98 %

## 2019-01-02 DIAGNOSIS — S39.011A STRAIN OF ABDOMINAL WALL, INITIAL ENCOUNTER: ICD-10-CM

## 2019-01-02 DIAGNOSIS — R05.9 COUGH: Primary | ICD-10-CM

## 2019-01-02 LAB
FLUAV + FLUBV RNA ISLT NAA+PROBE: NOT DETECTED
FLUAV + FLUBV RNA ISLT NAA+PROBE: NOT DETECTED

## 2019-01-02 PROCEDURE — 99284 EMERGENCY DEPT VISIT MOD MDM: CPT

## 2019-01-02 PROCEDURE — 71046 X-RAY EXAM CHEST 2 VIEWS: CPT

## 2019-01-02 PROCEDURE — 87502 INFLUENZA DNA AMP PROBE: CPT | Performed by: EMERGENCY MEDICINE

## 2019-01-02 RX ORDER — PREDNISONE 20 MG/1
40 TABLET ORAL
COMMUNITY
Start: 2018-12-31 | End: 2019-01-04

## 2019-01-02 RX ORDER — METHOCARBAMOL 500 MG/1
500 TABLET, FILM COATED ORAL 2 TIMES DAILY
Qty: 20 TABLET | Refills: 0 | Status: SHIPPED | OUTPATIENT
Start: 2019-01-02 | End: 2019-02-25

## 2019-01-02 RX ORDER — BENZONATATE 100 MG/1
100 CAPSULE ORAL ONCE
Status: COMPLETED | OUTPATIENT
Start: 2019-01-02 | End: 2019-01-02

## 2019-01-02 RX ORDER — DEXTROMETHORPHAN HYDROBROMIDE AND PROMETHAZINE HYDROCHLORIDE 15; 6.25 MG/5ML; MG/5ML
5 SYRUP ORAL 4 TIMES DAILY PRN
Qty: 118 ML | Refills: 0 | Status: SHIPPED | OUTPATIENT
Start: 2019-01-02 | End: 2019-02-25

## 2019-01-02 RX ORDER — METHOCARBAMOL 500 MG/1
500 TABLET, FILM COATED ORAL ONCE
Status: COMPLETED | OUTPATIENT
Start: 2019-01-02 | End: 2019-01-02

## 2019-01-02 RX ADMIN — BENZONATATE 100 MG: 100 CAPSULE ORAL at 14:54

## 2019-01-02 RX ADMIN — METHOCARBAMOL TABLETS 500 MG: 500 TABLET, COATED ORAL at 15:20

## 2019-01-02 NOTE — ED NOTES
Pt  States that she was seen for same c/o on Thursday at [facility] ER - placed on prednisone and albuterol inhaler -  Denies any improvement -      Talia Flannery RN  01/02/19 6564

## 2019-01-02 NOTE — DISCHARGE INSTRUCTIONS
Acute Cough   WHAT YOU NEED TO KNOW:   An acute cough can last up to 3 weeks  Common causes of an acute cough include a cold, allergies, or a lung infection  DISCHARGE INSTRUCTIONS:   Return to the emergency department if:   · You have trouble breathing or feel short of breath  · You cough up blood, or you see blood in your mucus  · You faint or feel weak or dizzy  · You have chest pain when you cough or take a deep breath  · You have new wheezing  Contact your healthcare provider if:   · You have a fever  · Your cough lasts longer than 4 weeks  · Your symptoms do not improve with treatment  · You have questions or concerns about your condition or care  Medicines:   · Medicines  may be needed to stop the cough, decrease swelling in your airways, or help open your airways  Medicine may also be given to help you cough up mucus  Ask your healthcare provider what over-the-counter medicines you can take  If you have an infection caused by bacteria, you may need antibiotics  · Take your medicine as directed  Contact your healthcare provider if you think your medicine is not helping or if you have side effects  Tell him or her if you are allergic to any medicine  Keep a list of the medicines, vitamins, and herbs you take  Include the amounts, and when and why you take them  Bring the list or the pill bottles to follow-up visits  Carry your medicine list with you in case of an emergency  Manage your symptoms:   · Do not smoke and stay away from others who smoke  Nicotine and other chemicals in cigarettes and cigars can cause lung damage and make your cough worse  Ask your healthcare provider for information if you currently smoke and need help to quit  E-cigarettes or smokeless tobacco still contain nicotine  Talk to your healthcare provider before you use these products  · Drink extra liquids as directed  Liquids will help thin and loosen mucus so you can cough it up   Liquids will also help prevent dehydration  Examples of good liquids to drink include water, fruit juice, and broth  Do not drink liquids that contain caffeine  Caffeine can increase your risk for dehydration  Ask your healthcare provider how much liquid to drink each day  · Rest as directed  Do not do activities that make your cough worse, such as exercise  · Use a humidifier or vaporizer  Use a cool mist humidifier or a vaporizer to increase air moisture in your home  This may make it easier for you to breathe and help decrease your cough  · Eat 2 to 5 mL of honey 2 times each day  Honey can help thin mucus and decrease your cough  · Use cough drops or lozenges  These can help decrease throat irritation and your cough  Follow up with your healthcare provider as directed:  Write down your questions so you remember to ask them during your visits  © 2017 [employer] Information is for End User's use only and may not be sold, redistributed or otherwise used for commercial purposes  All illustrations and images included in CareNotes® are the copyrighted property of [employer]  or [employer]  The above information is an  only  It is not intended as medical advice for individual conditions or treatments  Talk to your doctor, nurse or pharmacist before following any medical regimen to see if it is safe and effective for you  Cold Symptoms, Ambulatory Care   GENERAL INFORMATION:   Cold symptoms  include sneezing, dry throat, a stuffy nose, headache, watery eyes, and a cough  Your cough may be dry, or you may cough up mucus  You may also have muscle aches, joint pain, and tiredness  Rarely, you may have a fever  Cold symptoms occur from inflammation in your upper respiratory system caused by a virus  Most colds go away without treatment    Seek immediate care for the following symptoms:   · A heartbeat that is much faster than usual for you     · A swollen neck that is sore to the touch     · Increased tiredness and weakness    · Pinpoint or larger reddish-purple dots on your skin     · Poor or no appetite  Treatment for cold symptoms  may include NSAIDS to decrease muscle aches and fever  Do not give NSAID medicines to children under 10months of age without direction from your child's doctor  Cold medicines may also be given to decrease coughing, nasal stuffiness, sneezing, and a runny nose  Do not give cold medicines to children under 11years of age without direction from your child's doctor  Manage your cold symptoms with the following:   · Drink liquids  to help thin and loosen thick mucus so you can cough it up  Liquids will also keep you hydrated  Ask your healthcare provider which liquids are best for you and how much to drink each day  · Do not smoke  because it may worsen your symptoms and increase the length of time you feel sick  Talk with your healthcare provider if you need help to stop smoking  Prevent the spread of germs  by washing your hands often  You can spread your cold germs to others for at least 3 days after your symptoms start  Do not share items, such as eating utensils  Cover your nose and mouth when you cough or sneeze using the crook of your elbow instead of your hands  Throw used tissues in the garbage  Follow up with your healthcare provider as directed:  Write down your questions so you remember to ask them during your visits  CARE AGREEMENT:   You have the right to help plan your care  Learn about your health condition and how it may be treated  Discuss treatment options with your caregivers to decide what care you want to receive  You always have the right to refuse treatment  The above information is an  only  It is not intended as medical advice for individual conditions or treatments  Talk to your doctor, nurse or pharmacist before following any medical regimen to see if it is safe and effective for you    © 2014 [employer] is for End User's use only and may not be sold, redistributed or otherwise used for commercial purposes  All illustrations and images included in CareNotes® are the copyrighted property of A [employer] A M [employer]  or [employer]

## 2019-01-02 NOTE — ED TRIAGE NOTES
Reports since Thursday she has been coughing - nonproductive  Pt was seen at the ER on Saturday and was told it was an URI - sent home with robitussin, inhaler and prednisone  States she is taking everything but its not helping  Doesn't feel any better   Reports abd pain when she coughs

## 2019-01-02 NOTE — ED PROVIDER NOTES
History  Chief Complaint   Patient presents with    Cough    Abdominal Pain       History provided by:  Patient  DELIA   Presenting symptoms: congestion and cough    Presenting symptoms: no fever, no rhinorrhea and no sore throat    Severity:  Moderate  Onset quality:  Gradual  Timing:  Constant  Progression:  Worsening  Chronicity:  New  Relieved by:  Nothing  Worsened by:  Nothing  Ineffective treatments:  None tried  Associated symptoms: no headaches, no myalgias and no wheezing        Prior to Admission Medications   Prescriptions Last Dose Informant Patient Reported? Taking?    ASPIR-LOW 81 MG EC tablet   Yes Yes   Sig: Take 81 mg by mouth daily   RA VITAMIN C 500 MG tablet   Yes Yes   Sig: Take 500 mg by mouth daily     acetaminophen (MAPAP ARTHRITIS PAIN) 650 mg CR tablet   No Yes   Sig: Take 1 tablet (650 mg total) by mouth every 8 (eight) hours as needed for mild pain   atorvastatin (LIPITOR) 40 mg tablet   Yes Yes   buPROPion (WELLBUTRIN XL) 150 mg 24 hr tablet   Yes Yes   Sig: Take 150 mg by mouth every morning   cetirizine (ZyrTEC) 10 mg tablet   Yes Yes   Sig: cetirizine 10 mg tablet   cholecalciferol (VITAMIN D3) 1,000 units tablet   No Yes   Sig: Take 1 tablet (1,000 Units total) by mouth daily   escitalopram (LEXAPRO) 20 mg tablet   Yes Yes   Sig: escitalopram 20 mg tablet   ferrous sulfate 325 (65 FE) MG EC tablet   Yes Yes   Sig: Take 325 mg by mouth every other day     fluticasone (FLONASE) 50 mcg/act nasal spray   Yes No   Sig: fluticasone 50 mcg/actuation nasal spray,suspension   nystatin (MYCOSTATIN) powder   No No   Sig: Apply topically 2 (two) times a day   pantoprazole (PROTONIX) 40 mg tablet   Yes Yes   Sig: pantoprazole 40 mg tablet,delayed release   predniSONE 20 mg tablet   Yes Yes   Sig: Take 40 mg by mouth   propranolol (INDERAL) 40 mg tablet   Yes No   Sig: In morning   propranolol (INDERAL) 40 mg tablet   No Yes   Sig: Take 1 tablet (40 mg total) by mouth every 12 (twelve) hours Facility-Administered Medications: None       Past Medical History:   Diagnosis Date    Anemia     Anxiety     Arthritis     Back pain     Cancer ([facility] [address] )     Depression     Diabetes mellitus (HCC)     Pre- Diabetic    Dyslipidemia     Essential tremor     Excessive daytime sleepiness     GERD (gastroesophageal reflux disease)     History of uterine cancer     Hyperglycemia     Hypertension     Hypovitaminosis D     Iron deficiency anemia     Joint pain     Mood disorder (HCC)     Obesity     Obstructive sleep apnea     Ringing in ears     Snoring     Witnessed episode of apnea        Past Surgical History:   Procedure Laterality Date    APPENDECTOMY      HYSTERECTOMY      KNEE SURGERY      Meniscus tear    TONSILLECTOMY      TUBAL LIGATION         Family History   Problem Relation Age of Onset    Diabetes Mother     Asthma Mother     Hypertension Mother     Heart disease Mother      I have reviewed and agree with the history as documented  Social History   Substance Use Topics    Smoking status: Current Every Day Smoker     Packs/day: 0 50    Smokeless tobacco: Former User    Alcohol use Yes      Comment: very seldom        Review of Systems   Constitutional: Negative for chills and fever  HENT: Positive for congestion  Negative for rhinorrhea, sore throat and trouble swallowing  Eyes: Negative for pain  Respiratory: Positive for cough  Negative for shortness of breath, wheezing and stridor  Cardiovascular: Negative for chest pain and leg swelling  Gastrointestinal: Negative for abdominal pain, diarrhea and nausea  Endocrine: Negative for polyuria  Genitourinary: Negative for dysuria, flank pain and urgency  Musculoskeletal: Negative for joint swelling, myalgias and neck stiffness  Skin: Negative for rash  Allergic/Immunologic: Negative for immunocompromised state  Neurological: Negative for dizziness, syncope, weakness, numbness and headaches  Psychiatric/Behavioral: Negative for confusion and suicidal ideas  All other systems reviewed and are negative  Physical Exam  Physical Exam   Constitutional: She is oriented to person, place, and time  She appears well-developed and well-nourished  HENT:   Head: Normocephalic and atraumatic  Nose: Rhinorrhea present  Eyes: Pupils are equal, round, and reactive to light  EOM are normal    Neck: Normal range of motion  Neck supple  Cardiovascular: Normal rate and regular rhythm  Exam reveals no friction rub  No murmur heard  Pulmonary/Chest: Breath sounds normal  No respiratory distress  She has no wheezes  She has no rales  Abdominal: Soft  Bowel sounds are normal  She exhibits no distension  There is no tenderness  Musculoskeletal: Normal range of motion  She exhibits no edema or tenderness  Neurological: She is alert and oriented to person, place, and time  Skin: Skin is warm  No rash noted  Psychiatric: She has a normal mood and affect  Nursing note and vitals reviewed        Vital Signs  ED Triage Vitals [01/02/19 1434]   Temperature Pulse Respirations Blood Pressure SpO2   (!) 96 1 °F (35 6 °C) 66 (!) 24 163/82 98 %      Temp Source Heart Rate Source Patient Position - Orthostatic VS BP Location FiO2 (%)   Tympanic Monitor Sitting Left arm --      Pain Score       7           Vitals:    01/02/19 1434   BP: 163/82   Pulse: 66   Patient Position - Orthostatic VS: Sitting       Visual Acuity      ED Medications  Medications   benzonatate (TESSALON PERLES) capsule 100 mg (100 mg Oral Given 1/2/19 1454)   methocarbamol (ROBAXIN) tablet 500 mg (500 mg Oral Given 1/2/19 1520)       Diagnostic Studies  Results Reviewed     Procedure Component Value Units Date/Time    Rapid Flu-Viral RNA amplification [facility] HEART ONLY) [[id number]]  (Normal) Collected:  01/02/19 1451    Lab Status:  Final result Specimen:  Nares from Nose Updated:  01/02/19 1525     INFLUENZA A AMPLIFIED RNA Not Detected INFLUENZA B AMPLIFIED Not Detected                 XR chest 2 views   Final Result by Beth Barnhart MD (01/02 4449)      No acute cardiopulmonary disease  Workstation performed: [id number]                    Procedures  Procedures       Phone Contacts  ED Phone Contact    ED Course                               MDM  Number of Diagnoses or Management Options  Cough: new and requires workup  Strain of abdominal wall, initial encounter: new and requires workup  Diagnosis management comments: Pt re-examined and evaluated after testing and treatment  Spoke with the patient and feeling improved and sxs have resolved  Will discharge home with close f/u with pcp and instructed to return to the ED if sxs worsen or continue  Pt agrees with the plan for discharge and feels comfortable to go home with proper f/u  Advised to return for worsening or additional problems  Diagnostic tests were reviewed and questions answered  Diagnosis, care plan and treatment options were discussed  The patient understand instructions and will follow up as directed  Amount and/or Complexity of Data Reviewed  Clinical lab tests: ordered and reviewed  Tests in the radiology section of CPT®: ordered and reviewed  Review and summarize past medical records: yes  Independent visualization of images, tracings, or specimens: yes      CritCare Time    Disposition  Final diagnoses:   Cough   Strain of abdominal wall, initial encounter     Time reflects when diagnosis was documented in both MDM as applicable and the Disposition within this note     Time User Action Codes Description Comment    1/2/2019  3:28 PM Buzz Cage Add [R05] Cough     1/2/2019  3:28 PM Rosalina Dupree Add [S39 011A] Strain of abdominal wall, initial encounter       ED Disposition     ED Disposition Condition Comment    Discharge  Lisy Maddox discharge to home/self care      Condition at discharge: Stable        Follow-up Information    None Discharge Medication List as of 1/2/2019  3:29 PM      START taking these medications    Details   methocarbamol (ROBAXIN) 500 mg tablet Take 1 tablet (500 mg total) by mouth 2 (two) times a day, Starting Wed 1/2/2019, Print      promethazine-dextromethorphan (PHENERGAN-DM) 6 25-15 mg/5 mL oral syrup Take 5 mL by mouth 4 (four) times a day as needed for cough, Starting Wed 1/2/2019, Print         CONTINUE these medications which have NOT CHANGED    Details   acetaminophen (MAPAP ARTHRITIS PAIN) 650 mg CR tablet Take 1 tablet (650 mg total) by mouth every 8 (eight) hours as needed for mild pain, Starting Fri 10/12/2018, Normal      ASPIR-LOW 81 MG EC tablet Take 81 mg by mouth daily, Starting Mon 5/14/2018, Historical Med      atorvastatin (LIPITOR) 40 mg tablet Starting Sat 4/7/2018, Historical Med      buPROPion (WELLBUTRIN XL) 150 mg 24 hr tablet Take 150 mg by mouth every morning, Starting Sat 5/19/2018, Historical Med      cetirizine (ZyrTEC) 10 mg tablet cetirizine 10 mg tablet, Historical Med      cholecalciferol (VITAMIN D3) 1,000 units tablet Take 1 tablet (1,000 Units total) by mouth daily, Starting Fri 12/7/2018, Normal      escitalopram (LEXAPRO) 20 mg tablet escitalopram 20 mg tablet, Historical Med      ferrous sulfate 325 (65 FE) MG EC tablet Take 325 mg by mouth every other day  , Starting Mon 5/28/2018, Historical Med      fluticasone (FLONASE) 50 mcg/act nasal spray fluticasone 50 mcg/actuation nasal spray,suspension, Historical Med      nystatin (MYCOSTATIN) powder Apply topically 2 (two) times a day, Starting Fri 10/12/2018, Normal      pantoprazole (PROTONIX) 40 mg tablet pantoprazole 40 mg tablet,delayed release, Historical Med      predniSONE 20 mg tablet Take 40 mg by mouth, Starting Mon 12/31/2018, Until Fri 1/4/2019, Historical Med      !! propranolol (INDERAL) 40 mg tablet In morning, Historical Med      !! propranolol (INDERAL) 40 mg tablet Take 1 tablet (40 mg total) by mouth every 12 (twelve) hours, Starting Mon 10/29/2018, Normal      RA VITAMIN C 500 MG tablet Take 500 mg by mouth daily  , Starting Mon 5/28/2018, Historical Med       !! - Potential duplicate medications found  Please discuss with provider  No discharge procedures on file      ED Provider  Electronically Signed by           Maty Diamond DO  01/03/19 1504

## 2019-01-10 ENCOUNTER — TELEPHONE (OUTPATIENT)
Dept: FAMILY MEDICINE CLINIC | Facility: CLINIC | Age: 59
End: 2019-01-10

## 2019-01-10 NOTE — TELEPHONE ENCOUNTER
Faby Martin states her stress test was cancelled before due to no prior authorization  Faby Martin states she was reschedule to 01/18/2019 at [address] [place] [address]  Please let me know or pt know if the prior authorization has been approved

## 2019-01-11 ENCOUNTER — TELEPHONE (OUTPATIENT)
Dept: FAMILY MEDICINE CLINIC | Facility: CLINIC | Age: 59
End: 2019-01-11

## 2019-01-11 NOTE — TELEPHONE ENCOUNTER
Denied completely because: The information received does not support the requirements of  [place]s [facility] radiology medical necessity guidelines  A physician  reviewer made this determination based on a review of the submitted information (you have  chest pain)   Unless otherwise indicated, (doctors notes telling us things about this problem  such as how long you have had it; how it is getting worse; what are your risks for a heart  problem; what your exam (pertinent physical examination) by your doctor shows; what other  heart tests (with walking exercise) and heart pictures have been done; and what has been  done to help you and a copy of your heart tracing (EKG (Electrocardiogram))) should be  provided prior to an approval

## 2019-01-11 NOTE — TELEPHONE ENCOUNTER
I do not remember that I ordered a stress test for her  I will address next visit if needed   Thanks

## 2019-01-14 ENCOUNTER — HOSPITAL ENCOUNTER (EMERGENCY)
Facility: HOSPITAL | Age: 59
Discharge: HOME | End: 2019-01-14
Attending: EMERGENCY MEDICINE
Payer: COMMERCIAL

## 2019-01-14 ENCOUNTER — APPOINTMENT (EMERGENCY)
Dept: RADIOLOGY | Facility: HOSPITAL | Age: 59
End: 2019-01-14
Payer: COMMERCIAL

## 2019-01-14 VITALS
BODY MASS INDEX: 43.24 KG/M2 | HEART RATE: 87 BPM | RESPIRATION RATE: 16 BRPM | HEIGHT: 62 IN | DIASTOLIC BLOOD PRESSURE: 78 MMHG | SYSTOLIC BLOOD PRESSURE: 148 MMHG | OXYGEN SATURATION: 99 % | TEMPERATURE: 98 F | WEIGHT: 235 LBS

## 2019-01-14 DIAGNOSIS — M94.0 COSTOCHONDRITIS: Primary | ICD-10-CM

## 2019-01-14 LAB
ALBUMIN SERPL BCP-MCNC: 4.1 G/DL (ref 3–5.2)
ALP SERPL-CCNC: 117 U/L (ref 43–122)
ALT SERPL W P-5'-P-CCNC: 26 U/L (ref 9–52)
ANION GAP SERPL CALCULATED.3IONS-SCNC: 5 MMOL/L (ref 5–14)
AST SERPL W P-5'-P-CCNC: 20 U/L (ref 14–36)
BASOPHILS # BLD AUTO: 0.1 THOUSANDS/ΜL (ref 0–0.1)
BASOPHILS NFR BLD AUTO: 1 % (ref 0–1)
BILIRUB SERPL-MCNC: 0.5 MG/DL
BUN SERPL-MCNC: 16 MG/DL (ref 5–25)
CALCIUM SERPL-MCNC: 9.6 MG/DL (ref 8.4–10.2)
CHLORIDE SERPL-SCNC: 104 MMOL/L (ref 97–108)
CO2 SERPL-SCNC: 29 MMOL/L (ref 22–30)
CREAT SERPL-MCNC: 0.81 MG/DL (ref 0.6–1.2)
EOSINOPHIL # BLD AUTO: 0.2 THOUSAND/ΜL (ref 0–0.4)
EOSINOPHIL NFR BLD AUTO: 3 % (ref 0–6)
ERYTHROCYTE [DISTWIDTH] IN BLOOD BY AUTOMATED COUNT: 15.9 %
GFR SERPL CREATININE-BSD FRML MDRD: 79 ML/MIN/1.73SQ M
GLUCOSE SERPL-MCNC: 129 MG/DL (ref 70–99)
HCT VFR BLD AUTO: 42.1 % (ref 36–46)
HGB BLD-MCNC: 13.1 G/DL (ref 12–16)
LIPASE SERPL-CCNC: 45 U/L (ref 23–300)
LYMPHOCYTES # BLD AUTO: 1.1 THOUSANDS/ΜL (ref 0.5–4)
LYMPHOCYTES NFR BLD AUTO: 12 % (ref 25–45)
MCH RBC QN AUTO: 25.5 PG (ref 26–34)
MCHC RBC AUTO-ENTMCNC: 31.1 G/DL (ref 31–36)
MCV RBC AUTO: 82 FL (ref 80–100)
MONOCYTES # BLD AUTO: 0.6 THOUSAND/ΜL (ref 0.2–0.9)
MONOCYTES NFR BLD AUTO: 7 % (ref 1–10)
NEUTROPHILS # BLD AUTO: 7.3 THOUSANDS/ΜL (ref 1.8–7.8)
NEUTS SEG NFR BLD AUTO: 78 % (ref 45–65)
PLATELET # BLD AUTO: 380 THOUSANDS/UL (ref 150–450)
PMV BLD AUTO: 8.5 FL (ref 8.9–12.7)
POTASSIUM SERPL-SCNC: 4.6 MMOL/L (ref 3.6–5)
PROT SERPL-MCNC: 7.7 G/DL (ref 5.9–8.4)
RBC # BLD AUTO: 5.14 MILLION/UL (ref 4–5.2)
SODIUM SERPL-SCNC: 138 MMOL/L (ref 137–147)
WBC # BLD AUTO: 9.4 THOUSAND/UL (ref 4.5–11)

## 2019-01-14 PROCEDURE — 80053 COMPREHEN METABOLIC PANEL: CPT | Performed by: PHYSICIAN ASSISTANT

## 2019-01-14 PROCEDURE — 36415 COLL VENOUS BLD VENIPUNCTURE: CPT | Performed by: PHYSICIAN ASSISTANT

## 2019-01-14 PROCEDURE — 99283 EMERGENCY DEPT VISIT LOW MDM: CPT

## 2019-01-14 PROCEDURE — 83690 ASSAY OF LIPASE: CPT | Performed by: PHYSICIAN ASSISTANT

## 2019-01-14 PROCEDURE — 71046 X-RAY EXAM CHEST 2 VIEWS: CPT

## 2019-01-14 PROCEDURE — 85025 COMPLETE CBC W/AUTO DIFF WBC: CPT | Performed by: PHYSICIAN ASSISTANT

## 2019-01-14 RX ORDER — LIDOCAINE 50 MG/G
1 PATCH TOPICAL DAILY
Qty: 30 PATCH | Refills: 0 | Status: SHIPPED | OUTPATIENT
Start: 2019-01-14 | End: 2019-02-25

## 2019-01-14 RX ORDER — LIDOCAINE 50 MG/G
1 PATCH TOPICAL ONCE
Status: DISCONTINUED | OUTPATIENT
Start: 2019-01-14 | End: 2019-01-14 | Stop reason: HOSPADM

## 2019-01-14 RX ADMIN — LIDOCAINE 1 PATCH: 50 PATCH TOPICAL at 10:32

## 2019-01-14 NOTE — ED PROVIDER NOTES
History  Chief Complaint   Patient presents with    Rib Pain     I started with pain to my right side ribs  I cannot recall and injury   Maybe when I coughed it started  I have no other complaint  68-year-old female with past medical history of diabetes, hypertension, dyslipidemia, GERD, who presents to emergency department for right-sided rib pain that started 4 days prior  Patient reports that she was recently diagnosed with a URI, at which time she had severe coughing which has since improved  Patient continues to have a mild residual cough, but states that the right rib pain has gradually gotten worse for the past 4 days  States that the 5/10 aching pain is worse with movement and palpation and deep breath  Nothing makes the pain better  She has attempted to relieve the pain with use of Tylenol with minimal relief attained  Denies recent trauma  She denies associated fevers, chills, nasal congestion, rhinorrhea, sore throat, chest pain, shortness of breath  Denies nausea, vomiting, diarrhea  Denies urinary pain/frequency/urgency  History provided by:  Patient   used: No        Prior to Admission Medications   Prescriptions Last Dose Informant Patient Reported? Taking?    ASPIR-LOW 81 MG EC tablet   Yes No   Sig: Take 81 mg by mouth daily   RA VITAMIN C 500 MG tablet   Yes No   Sig: Take 500 mg by mouth daily     acetaminophen (MAPAP ARTHRITIS PAIN) 650 mg CR tablet   No No   Sig: Take 1 tablet (650 mg total) by mouth every 8 (eight) hours as needed for mild pain   atorvastatin (LIPITOR) 40 mg tablet   Yes No   buPROPion (WELLBUTRIN XL) 150 mg 24 hr tablet   Yes No   Sig: Take 150 mg by mouth every morning   cetirizine (ZyrTEC) 10 mg tablet   Yes No   Sig: cetirizine 10 mg tablet   cholecalciferol (VITAMIN D3) 1,000 units tablet   No No   Sig: Take 1 tablet (1,000 Units total) by mouth daily   escitalopram (LEXAPRO) 20 mg tablet   Yes No   Sig: escitalopram 20 mg tablet ferrous sulfate 325 (65 FE) MG EC tablet   Yes No   Sig: Take 325 mg by mouth every other day     fluticasone (FLONASE) 50 mcg/act nasal spray   Yes No   Sig: fluticasone 50 mcg/actuation nasal spray,suspension   methocarbamol (ROBAXIN) 500 mg tablet   No No   Sig: Take 1 tablet (500 mg total) by mouth 2 (two) times a day   nystatin (MYCOSTATIN) powder   No No   Sig: Apply topically 2 (two) times a day   pantoprazole (PROTONIX) 40 mg tablet   Yes No   Sig: pantoprazole 40 mg tablet,delayed release   promethazine-dextromethorphan (PHENERGAN-DM) 6 25-15 mg/5 mL oral syrup   No No   Sig: Take 5 mL by mouth 4 (four) times a day as needed for cough   propranolol (INDERAL) 40 mg tablet   Yes No   Sig: In morning   propranolol (INDERAL) 40 mg tablet   No No   Sig: Take 1 tablet (40 mg total) by mouth every 12 (twelve) hours      Facility-Administered Medications: None       Past Medical History:   Diagnosis Date    Anemia     Anxiety     Arthritis     Back pain     Cancer (HCC)     Depression     Diabetes mellitus (HCC)     Pre- Diabetic    Dyslipidemia     Essential tremor     Excessive daytime sleepiness     GERD (gastroesophageal reflux disease)     History of uterine cancer     Hyperglycemia     Hypertension     Hypovitaminosis D     Iron deficiency anemia     Joint pain     Mood disorder (HCC)     Obesity     Obstructive sleep apnea     Ringing in ears     Snoring     Witnessed episode of apnea        Past Surgical History:   Procedure Laterality Date    APPENDECTOMY      HYSTERECTOMY      KNEE SURGERY      Meniscus tear    TONSILLECTOMY      TUBAL LIGATION         Family History   Problem Relation Age of Onset    Diabetes Mother     Asthma Mother     Hypertension Mother     Heart disease Mother      I have reviewed and agree with the history as documented      Social History   Substance Use Topics    Smoking status: Current Every Day Smoker     Packs/day: 0 50    Smokeless tobacco: Former User    Alcohol use Yes      Comment: very seldom        Review of Systems   Constitutional: Negative for chills and fever  HENT: Negative  Eyes: Negative  Respiratory: Positive for cough  Negative for apnea, choking, chest tightness, shortness of breath, wheezing and stridor  Cardiovascular: Negative for chest pain, palpitations and leg swelling  Gastrointestinal: Negative for abdominal pain, constipation, diarrhea, nausea and vomiting  Genitourinary: Negative for dysuria, flank pain, frequency, hematuria and urgency  Musculoskeletal: Negative for arthralgias, back pain, gait problem, joint swelling, myalgias, neck pain and neck stiffness  Skin: Negative for color change, pallor, rash and wound  Neurological: Negative for dizziness, tremors, seizures, syncope, facial asymmetry, speech difficulty, weakness, light-headedness, numbness and headaches  Psychiatric/Behavioral: Negative  Physical Exam  Physical Exam   Constitutional: She is oriented to person, place, and time  She appears well-developed and well-nourished  No distress  HENT:   Head: Normocephalic and atraumatic  Eyes: Pupils are equal, round, and reactive to light  Conjunctivae and EOM are normal    Neck: Normal range of motion  Neck supple  Cardiovascular: Normal rate, regular rhythm and intact distal pulses  Pulmonary/Chest: Effort normal and breath sounds normal  She has no wheezes  She has no rales  She exhibits tenderness ( mild to moderate pain with palpation to the right lateral and anterior ribs  )  Abdominal: Soft  Bowel sounds are normal  She exhibits no distension  There is tenderness (Mild epigastric pain with palpation  Questionable right upper quadrant pain with palpation  )  There is no rebound and no guarding  Musculoskeletal: Normal range of motion  She exhibits no edema or tenderness  Neurological: She is alert and oriented to person, place, and time   No cranial nerve deficit or sensory deficit  She exhibits normal muscle tone  Coordination normal    Skin: Skin is warm and dry  Capillary refill takes less than 2 seconds  No rash noted  She is not diaphoretic  Psychiatric: She has a normal mood and affect  Her behavior is normal    Nursing note and vitals reviewed  Vital Signs  ED Triage Vitals [01/14/19 0946]   Temperature Pulse Respirations Blood Pressure SpO2   98 °F (36 7 °C) 96 20 143/78 98 %      Temp Source Heart Rate Source Patient Position - Orthostatic VS BP Location FiO2 (%)   Tympanic Monitor Sitting Left arm --      Pain Score       5           Vitals:    01/14/19 0946 01/14/19 1132   BP: 143/78 148/78   Pulse: 96 87   Patient Position - Orthostatic VS: Sitting Sitting       Visual Acuity      ED Medications  Medications - No data to display    Diagnostic Studies  Results Reviewed     Procedure Component Value Units Date/Time    Comprehensive metabolic panel [[id number]]  (Abnormal) Collected:  01/14/19 1043    Lab Status:  Final result Specimen:  Blood from Arm, Right Updated:  01/14/19 1058     Sodium 138 mmol/L      Potassium 4 6 mmol/L      Chloride 104 mmol/L      CO2 29 mmol/L      ANION GAP 5 mmol/L      BUN 16 mg/dL      Creatinine 0 81 mg/dL      Glucose 129 (H) mg/dL      Calcium 9 6 mg/dL      AST 20 U/L      ALT 26 U/L      Alkaline Phosphatase 117 U/L      Total Protein 7 7 g/dL      Albumin 4 1 g/dL      Total Bilirubin 0 50 mg/dL      eGFR 79 ml/min/1 73sq m     Narrative:         National Kidney Disease Education Program recommendations are as follows:  GFR calculation is accurate only with a steady state creatinine  Chronic Kidney disease less than 60 ml/min/1 73 sq  meters  Kidney failure less than 15 ml/min/1 73 sq  meters      Lipase [[id number]]  (Normal) Collected:  01/14/19 1043    Lab Status:  Final result Specimen:  Blood from Arm, Right Updated:  01/14/19 1058     Lipase 45 u/L     CBC and differential [[id number]]  (Abnormal) Collected:  01/14/19 1043    Lab Status:  Final result Specimen:  Blood from Arm, Right Updated:  01/14/19 1052     WBC 9 40 Thousand/uL      RBC 5 14 Million/uL      Hemoglobin 13 1 g/dL      Hematocrit 42 1 %      MCV 82 fL      MCH 25 5 (L) pg      MCHC 31 1 g/dL      RDW 15 9 (H) %      MPV 8 5 (L) fL      Platelets 766 Thousands/uL      Neutrophils Relative 78 (H) %      Lymphocytes Relative 12 (L) %      Monocytes Relative 7 %      Eosinophils Relative 3 %      Basophils Relative 1 %      Neutrophils Absolute 7 30 Thousands/µL      Lymphocytes Absolute 1 10 Thousands/µL      Monocytes Absolute 0 60 Thousand/µL      Eosinophils Absolute 0 20 Thousand/µL      Basophils Absolute 0 10 Thousands/µL                  XR chest 2 views   ED Interpretation by Eduarda Alexander PA-C (01/14 1101)   No acute cardiopulmonary disease  No pleural effusion  Final Result by Holli Corley MD (01/14 1204)      No acute cardiopulmonary disease  Workstation performed: [id number]                    Procedures  Procedures       Phone Contacts  ED Phone Contact    ED Course  ED Course as of Jan 14 1602   Mon Jan 14, 2019   1106 Patient feels improved following lidocaine patch  Mobility is improved  MDM  Number of Diagnoses or Management Options  Costochondritis:   Diagnosis management comments: Differential Diagnosis includes but is not limited to:  Pneumonia, pleural effusion, costochondritis, musculoskeletal pain, cholecystitis, cholelithiasis, biliary colic, pancreatitis  Labs are generally unremarkable  LFTs and Bilirubins are WNL - unlikely cholecystitis  Chest x-ray shows no acute cardiopulmonary process  No clear fractures  Lidocaine patch as patient states that pain is not improved with Tylenol, and she has GI intolerance to the NSAIDs  Pain is improved with lidocaine patch  Patient likely has costochondritis, likely secondary to profuse coughing from previous URI  Continue with APAP   Follow up with PMD  Amount and/or Complexity of Data Reviewed  Clinical lab tests: ordered and reviewed  Tests in the radiology section of CPT®: ordered and reviewed      CritCare Time    Disposition  Final diagnoses:   Costochondritis     Time reflects when diagnosis was documented in both MDM as applicable and the Disposition within this note     Time User Action Codes Description Comment    1/14/2019 11:07 AM Sendy Senior Add [M94 0] Costochondritis       ED Disposition     ED Disposition Condition Comment    Discharge  Carmen Suarez discharge to home/self care      Condition at discharge: Good        Follow-up Information     Follow up With Specialties Details Why Contact Info Additional [address]  As needed, If symptoms worsen [address] [id number]  [facility] [id number]  [address] [place] [address], [phone]          Discharge Medication List as of 1/14/2019 11:08 AM      START taking these medications    Details   lidocaine (LIDODERM) 5 % Apply 1 patch topically daily Remove & Discard patch within 12 hours or as directed by MD, Starting Mon 1/14/2019, Print         CONTINUE these medications which have NOT CHANGED    Details   acetaminophen (MAPAP ARTHRITIS PAIN) 650 mg CR tablet Take 1 tablet (650 mg total) by mouth every 8 (eight) hours as needed for mild pain, Starting Fri 10/12/2018, Normal      ASPIR-LOW 81 MG EC tablet Take 81 mg by mouth daily, Starting Mon 5/14/2018, Historical Med      atorvastatin (LIPITOR) 40 mg tablet Starting Sat 4/7/2018, Historical Med      buPROPion (WELLBUTRIN XL) 150 mg 24 hr tablet Take 150 mg by mouth every morning, Starting Sat 5/19/2018, Historical Med      cetirizine (ZyrTEC) 10 mg tablet cetirizine 10 mg tablet, Historical Med      cholecalciferol (VITAMIN D3) 1,000 units tablet Take 1 tablet (1,000 Units total) by mouth daily, Starting Fri 12/7/2018, Normal      escitalopram (LEXAPRO) 20 mg tablet escitalopram 20 mg tablet, Historical Med      ferrous sulfate 325 (65 FE) MG EC tablet Take 325 mg by mouth every other day  , Starting Mon 5/28/2018, Historical Med      fluticasone (FLONASE) 50 mcg/act nasal spray fluticasone 50 mcg/actuation nasal spray,suspension, Historical Med      methocarbamol (ROBAXIN) 500 mg tablet Take 1 tablet (500 mg total) by mouth 2 (two) times a day, Starting Wed 1/2/2019, Print      nystatin (MYCOSTATIN) powder Apply topically 2 (two) times a day, Starting Fri 10/12/2018, Normal      pantoprazole (PROTONIX) 40 mg tablet pantoprazole 40 mg tablet,delayed release, Historical Med      promethazine-dextromethorphan (PHENERGAN-DM) 6 25-15 mg/5 mL oral syrup Take 5 mL by mouth 4 (four) times a day as needed for cough, Starting Wed 1/2/2019, Print      !! propranolol (INDERAL) 40 mg tablet In morning, Historical Med      !! propranolol (INDERAL) 40 mg tablet Take 1 tablet (40 mg total) by mouth every 12 (twelve) hours, Starting Mon 10/29/2018, Normal      RA VITAMIN C 500 MG tablet Take 500 mg by mouth daily  , Starting Mon 5/28/2018, Historical Med       !! - Potential duplicate medications found  Please discuss with provider  No discharge procedures on file      ED Provider  Electronically Signed by           Jonathan Ryan PA-C  01/14/19 8653

## 2019-01-14 NOTE — ED NOTES
PT  With pain to rt   Rib area since Thursday - denies any injury - did have a bad cough last week - last took tylenol yesterday      Mara Mcpherson RN  01/14/19 5586

## 2019-01-14 NOTE — DISCHARGE INSTRUCTIONS
Costochondritis, Ambulatory Care   GENERAL INFORMATION:   Costochondritis  is a condition that causes pain in the cartilage that connects your ribs to your sternum (breastbone)  Cartilage is the tough, bendable tissue that protects your bones  Common symptoms include the following:   · Sharp or dull, aching pain that may come and go or that gets worse over time    · Pain when you touch your chest    · Pain that spreads to your back, abdomen, or down your arm    · Pain that gets worse when you move, breathe deeply, or push or lift an object    · Trouble sleeping or doing your usual activities because of the pain  Seek immediate care for the following symptoms:   · Fever    · The painful areas of your chest look swollen and red, and feel warm to the touch    · Pain prevents you from sleeping  Treatment for costochondritis  may not be needed  Costochondritis pain may go away without treatment, usually within a year  Treatment may include any of the following:  · Acetaminophen  decreases pain  Acetaminophen is available without a doctor's order  Ask how much to take and how often to take it  Follow directions  Acetaminophen can cause liver damage if not taken correctly  · NSAIDs  help decrease swelling and pain or fever  This medicine is available with or without a doctor's order  NSAIDs can cause stomach bleeding or kidney problems in certain people  If you take blood thinner medicine, always ask if NSAIDs are safe for you  Always read the medicine label and follow directions  Do not give these medicines to children under 10months of age without direction from your child's doctor  Manage your symptoms:   · Rest as needed  Avoid painful movements and activities  Do not carry objects, such as a purse or backpack, if this causes pain  Avoid activities such as weightlifting until your pain decreases or goes away  Ask your healthcare provider which activities are best for you to do while you recover      · Apply heat to help decrease pain  Apply heat on the area for 20 to 30 minutes every 2 hours for as many days as directed  · Apply ice to help decrease swelling and pain  Ice may also help prevent tissue damage  Use an ice pack, or put crushed ice in a plastic bag  Cover it with a towel and place it on the painful area for 15 to 20 minutes every hour or as directed  · Do gentle stretching exercises   a doorway and put your hands on the door frame at the level of your ears or shoulders  Take 1 step forward and gently stretch your chest  Try this with your hands higher up on the doorway  Follow up with your healthcare provider as directed:  Write down your questions so you remember to ask them during your visits  CARE AGREEMENT:   You have the right to help plan your care  Learn about your health condition and how it may be treated  Discuss treatment options with your caregivers to decide what care you want to receive  You always have the right to refuse treatment  The above information is an  only  It is not intended as medical advice for individual conditions or treatments  Talk to your doctor, nurse or pharmacist before following any medical regimen to see if it is safe and effective for you  © 2014 [address] is for End User's use only and may not be sold, redistributed or otherwise used for commercial purposes  All illustrations and images included in CareNotes® are the copyrighted property of [employer]  or [employer] Angel[employer]

## 2019-02-25 ENCOUNTER — OFFICE VISIT (OUTPATIENT)
Dept: FAMILY MEDICINE CLINIC | Facility: CLINIC | Age: 59
End: 2019-02-25

## 2019-02-25 VITALS
DIASTOLIC BLOOD PRESSURE: 80 MMHG | OXYGEN SATURATION: 99 % | WEIGHT: 236 LBS | HEIGHT: 62 IN | BODY MASS INDEX: 43.43 KG/M2 | TEMPERATURE: 96.9 F | RESPIRATION RATE: 18 BRPM | SYSTOLIC BLOOD PRESSURE: 140 MMHG | HEART RATE: 69 BPM

## 2019-02-25 DIAGNOSIS — J30.1 ALLERGIC RHINITIS DUE TO POLLEN, UNSPECIFIED SEASONALITY: ICD-10-CM

## 2019-02-25 DIAGNOSIS — L30.4 INTERTRIGO: ICD-10-CM

## 2019-02-25 DIAGNOSIS — Z12.2 ENCOUNTER FOR SCREENING FOR LUNG CANCER: ICD-10-CM

## 2019-02-25 DIAGNOSIS — Z72.0 TOBACCO ABUSE: Primary | ICD-10-CM

## 2019-02-25 DIAGNOSIS — E55.9 VITAMIN D DEFICIENCY: ICD-10-CM

## 2019-02-25 DIAGNOSIS — K21.9 GASTROESOPHAGEAL REFLUX DISEASE WITHOUT ESOPHAGITIS: ICD-10-CM

## 2019-02-25 DIAGNOSIS — R10.11 RIGHT UPPER QUADRANT PAIN: ICD-10-CM

## 2019-02-25 DIAGNOSIS — E78.5 HYPERLIPIDEMIA, UNSPECIFIED HYPERLIPIDEMIA TYPE: ICD-10-CM

## 2019-02-25 PROBLEM — J44.9 COPD (CHRONIC OBSTRUCTIVE PULMONARY DISEASE) (HCC): Status: ACTIVE | Noted: 2019-02-25

## 2019-02-25 PROCEDURE — 99214 OFFICE O/P EST MOD 30 MIN: CPT | Performed by: FAMILY MEDICINE

## 2019-02-25 RX ORDER — PANTOPRAZOLE SODIUM 40 MG/1
40 TABLET, DELAYED RELEASE ORAL DAILY
Qty: 30 TABLET | Refills: 2 | Status: SHIPPED | OUTPATIENT
Start: 2019-02-25 | End: 2019-05-19 | Stop reason: SDUPTHER

## 2019-02-25 RX ORDER — NYSTATIN 100000 [USP'U]/G
POWDER TOPICAL 2 TIMES DAILY
Qty: 60 G | Refills: 0 | Status: SHIPPED | OUTPATIENT
Start: 2019-02-25 | End: 2019-12-16

## 2019-02-25 RX ORDER — ASPIRIN 81 MG
81 TABLET, DELAYED RELEASE (ENTERIC COATED) ORAL DAILY
Qty: 30 TABLET | Refills: 2 | Status: SHIPPED | OUTPATIENT
Start: 2019-02-25 | End: 2019-05-21 | Stop reason: SDUPTHER

## 2019-02-25 RX ORDER — MELATONIN
1000 DAILY
Qty: 30 TABLET | Refills: 5 | Status: SHIPPED | OUTPATIENT
Start: 2019-02-25 | End: 2019-08-11 | Stop reason: SDUPTHER

## 2019-02-25 RX ORDER — ATORVASTATIN CALCIUM 40 MG/1
40 TABLET, FILM COATED ORAL DAILY
Qty: 30 TABLET | Refills: 2 | Status: SHIPPED | OUTPATIENT
Start: 2019-02-25 | End: 2019-05-19 | Stop reason: SDUPTHER

## 2019-02-25 NOTE — PATIENT INSTRUCTIONS
Basic diet recommendation     - Drinks  o Okay to drink - Water, flavored seltzer water, beverages like coffee, tea, green tea, herbal tea without any sugar  o Cut down on - Regular soda, diet soda, diet drinks and 100% fruit juice    - Sweetener  o Can use stevia, truvia, monk fruit  o Avoid sugar, other artificial sweeteners, honey or agave     - Fruits  o Apples, pears, berries - consume in moderation  Avoid juices, instead consume whole fruit    - Eat 3 balanced meals  Limit snacks to 1-2 times daily     - Foods to cut down on  o Bread, pasta, corn, cereal  o Starchy vegetables like potatoes, yams, taro  o Peas, lentils, beans ( except green beans and snow peas)    - Safe snacks  o 12 unsalted nuts and 6 rice crackers OR  o 1 apple with 1-2 spoons of peanut butter OR  o 100 calorie nuts and 1 small box of raisins ( kids size) OR  o 6oz plain or vanilla [place] yogurt and ½ cup berries OR  o ½ tuna sandwich OR  o 12 edamame pods OR  o 1 pear and 1-2 slices low fat cheese  o Raw vegetable sticks ( 2 carrot sticks, 2 celery sticks, raw broccoli, pepper, cucumber, or tomato)    - Exercise     CURRENT [employer] TIPS ON EXERCISE:  IF YOU HAVE CONDITIONS THAT PREVENT AEROBIC EXERCISE:  WALK 30 MINUTES AT LEAST 5 DAYS PER WEEK; MAY BREAK IT UP INTO 10-  15 MINUTE SESSIONS  GET SOME RESISTANCE EXERCISES USING THE MAJOR MUSCLE GROUPS 2-3 TIMES PER WEEK   IF YOU ARE PHYSICALLY ABLE:  TRY TO GET 10,000 STEPS 3 TIMES PER WEEK  PLUS  GO "ONLINE" TO CHECK [employer] HEART RATE FOR YOUR AGE AND DO AEROBIC EXERCISES (JOG/STATIONARY BIKE/ELLIPTICAL) FOR 20-30 MINUTES 2-3 TIMES PER WEEK

## 2019-02-25 NOTE — ASSESSMENT & PLAN NOTE
Flonase is not helping  She thinks now she is having a postnasal drip as well  Will continue Zyrtec and will replace Flonase with Rhinocort    Encouraged patient to call back if it is not covered by her insurance I will place with a different spray

## 2019-02-25 NOTE — PROGRESS NOTES
Assessment/Plan:    COPD (chronic obstructive pulmonary disease) ([facility])  Symptoms are currently controlled at this time  Avoid exposure to tobacco smoke, polluted air and other known COPD triggers  Monitor albuterol use to report back at follow up  Patient aware that increased albuterol use indicates poor control and may need further medication adjustment  GERD (gastroesophageal reflux disease)  Reviewed the causes of heartburn  Discussed importance of diet and lifestyle modifications to control GERD symptoms  Avoid things which worsen heartburn (ex:  caffeine, tomato based products, spicy foods, tobacco, alcohol, obesity, tight fitting clothing )  Discussed importance of eating small, frequent meals instead of large meals  Elevate head of the bed and do not lay down 2-3 hours following a meal       Benign essential tremor  She follows up with Neurology currently on 40 mg of propranolol b i d  Encounter for screening for lung cancer  Patient has history of more than 30 years of tobacco abuse  Will obtain CT chest with low dose contrast   She is agreeable  She also had a CT scan in October which showed 6 mm nodule in the upper right lobe    Right upper quadrant abdominal pain  Will obtain ultrasound of the liver to rule out gallbladder stones  Allergic rhinitis  Flonase is not helping  She thinks now she is having a postnasal drip as well  Will continue Zyrtec and will replace Flonase with Rhinocort  Encouraged patient to call back if it is not covered by her insurance I will place with a different spray    Obesity  Counseled patient on the importance of working to achieve weight reduction goal   Discussed benefits of weight loss including prevention or control of comorbidities  Discussed role that balanced diet and daily activity play in weight reduction  Set up small attainable weight loss goals  Involve family, friends, and co-workers for support            Diagnoses and all orders for this visit:    Tobacco abuse  -     Discontinue: nicotine (NICOTROL) 10 MG inhaler; Inhale 1 puff as needed for smoking cessation  -     nicotine (NICOTROL) 10 MG inhaler; Inhale 1 puff as needed for smoking cessation    Allergic rhinitis due to pollen, unspecified seasonality  -     budesonide (RINOCORT AQUA) 32 MCG/ACT nasal spray; 1 spray into each nostril daily    Right upper quadrant pain  -     US liver; Future    Intertrigo  -     nystatin (MYCOSTATIN) powder; Apply topically 2 (two) times a day    Vitamin D deficiency  -     cholecalciferol (VITAMIN D3) 1,000 units tablet; Take 1 tablet (1,000 Units total) by mouth daily    Gastroesophageal reflux disease without esophagitis  -     pantoprazole (PROTONIX) 40 mg tablet; Take 1 tablet (40 mg total) by mouth daily    Hyperlipidemia, unspecified hyperlipidemia type  -     atorvastatin (LIPITOR) 40 mg tablet; Take 1 tablet (40 mg total) by mouth daily  -     ASPIR-LOW 81 MG EC tablet; Take 1 tablet (81 mg total) by mouth daily    Encounter for screening for lung cancer  -     CT lung screening program; Future          Subjective:      Patient ID: Sherman Pineda is a 59 y o  female  HPI   Patient is here for follow-up on chronic conditions  She denies any chest pain or shortness of breath today  She is complaining of right upper quadrant pain, intermittent, been present for month, sometimes with radiation to her right shoulder  She is not sure if there is any relationship between food and pain  She had a URI in October and thought at that time was the source of her pain due to recurrent coughing    However she is still complaining of this pain even though she is not coughing as much    The following portions of the patient's history were reviewed and updated as appropriate: allergies, current medications, past family history, past medical history, past social history, past surgical history and problem list     Review of Systems   Constitutional: Negative for chills, fatigue and fever  HENT: Negative for sinus pressure and sore throat  Eyes: Negative for photophobia, pain and visual disturbance  Respiratory: Negative for cough, chest tightness and shortness of breath  Cardiovascular: Negative for chest pain and leg swelling  Gastrointestinal: Positive for abdominal pain  Negative for constipation, diarrhea, nausea and vomiting  Genitourinary: Negative for dysuria, frequency and urgency  Musculoskeletal: Negative for arthralgias and back pain  Skin: Negative for rash  Neurological: Positive for tremors (But improved)  Negative for dizziness, seizures, syncope and headaches  Hematological: Negative for adenopathy  Objective:      /80 (BP Location: Right arm, Patient Position: Sitting, Cuff Size: Large)   Pulse 69   Temp (!) 96 9 °F (36 1 °C) (Temporal)   Resp 18   Ht 5' 2" (1 575 m)   Wt 107 kg (236 lb)   LMP  (LMP Unknown)   SpO2 99%   BMI 43 16 kg/m²          Physical Exam   Constitutional: She is oriented to person, place, and time  She appears well-developed and well-nourished  No distress  HENT:   Head: Normocephalic and atraumatic  Mouth/Throat: Oropharynx is clear and moist  No oropharyngeal exudate  Enlarged turbinates are noted   Eyes: Pupils are equal, round, and reactive to light  Conjunctivae and EOM are normal  Right eye exhibits no discharge  Left eye exhibits no discharge  Neck: Normal range of motion  Neck supple  No JVD present  No thyromegaly present  Cardiovascular: Normal rate, regular rhythm and normal heart sounds  No murmur heard  Pulmonary/Chest: Effort normal  No respiratory distress  She has decreased breath sounds  She has no wheezes  She has no rales  Abdominal: Soft  Bowel sounds are normal  She exhibits no distension  There is no tenderness  Musculoskeletal: Normal range of motion  She exhibits no edema or deformity     Neurological: She is alert and oriented to person, place, and time    Skin: Skin is warm

## 2019-02-25 NOTE — ASSESSMENT & PLAN NOTE
Patient has history of more than 30 years of tobacco abuse    Will obtain CT chest with low dose contrast   She is agreeable  She also had a CT scan in October which showed 6 mm nodule in the upper right lobe

## 2019-02-25 NOTE — ASSESSMENT & PLAN NOTE
Reviewed the causes of heartburn  Discussed importance of diet and lifestyle modifications to control GERD symptoms  Avoid things which worsen heartburn (ex:  caffeine, tomato based products, spicy foods, tobacco, alcohol, obesity, tight fitting clothing )  Discussed importance of eating small, frequent meals instead of large meals    Elevate head of the bed and do not lay down 2-3 hours following a meal

## 2019-02-26 ENCOUNTER — TELEPHONE (OUTPATIENT)
Dept: FAMILY MEDICINE CLINIC | Facility: CLINIC | Age: 59
End: 2019-02-26

## 2019-02-26 DIAGNOSIS — G25.0 BENIGN ESSENTIAL TREMOR: Primary | ICD-10-CM

## 2019-02-26 NOTE — TELEPHONE ENCOUNTER
Santos Cano from neurology office [facility] called office requesting a physicians order for pt that is scheduled to see them tomorrow 2/27/2019 at 8am    DX [id number]

## 2019-02-27 ENCOUNTER — OFFICE VISIT (OUTPATIENT)
Dept: NEUROLOGY | Facility: CLINIC | Age: 59
End: 2019-02-27
Payer: COMMERCIAL

## 2019-02-27 VITALS
SYSTOLIC BLOOD PRESSURE: 136 MMHG | DIASTOLIC BLOOD PRESSURE: 80 MMHG | HEART RATE: 71 BPM | WEIGHT: 240.6 LBS | RESPIRATION RATE: 17 BRPM | BODY MASS INDEX: 45.42 KG/M2 | HEIGHT: 61 IN

## 2019-02-27 DIAGNOSIS — G25.0 BENIGN ESSENTIAL TREMOR: ICD-10-CM

## 2019-02-27 PROCEDURE — 99213 OFFICE O/P EST LOW 20 MIN: CPT | Performed by: PSYCHIATRY & NEUROLOGY

## 2019-02-27 RX ORDER — AMMONIUM LACTATE 12 G/100G
LOTION TOPICAL EVERY 12 HOURS
COMMUNITY
End: 2020-12-15 | Stop reason: ALTCHOICE

## 2019-02-27 RX ORDER — PROPRANOLOL HYDROCHLORIDE 40 MG/1
40 TABLET ORAL EVERY 12 HOURS SCHEDULED
Qty: 60 TABLET | Refills: 5 | Status: SHIPPED | OUTPATIENT
Start: 2019-02-27 | End: 2020-04-13 | Stop reason: SDUPTHER

## 2019-02-27 RX ORDER — ALBUTEROL SULFATE 2.5 MG/3ML
SOLUTION RESPIRATORY (INHALATION)
COMMUNITY
Start: 2018-12-30 | End: 2019-05-30

## 2019-02-27 NOTE — ASSESSMENT & PLAN NOTE
On a slightly lower schedule of propranolol given past issue use with bradycardia  Despite that reduction, has continued to exhibit what she feels to be excellent control of her tremor  --continue propranolol 40 mg taken twice daily

## 2019-02-27 NOTE — LETTER
February 27, 2019     Sheryle Squire, MD  [address]  [place] [facility]    Patient: Chandana Rodríguez   YOB: 1960   Date of Visit: 2/27/2019       Dear Dr Avalos Boswell: Thank you for referring Chandana Rodríguez to me for evaluation  Below are my notes for this consultation  If you have questions, please do not hesitate to call me  I look forward to following your patient along with you  Sincerely,        Joshua James MD        CC: No Recipients  Joshua James MD  2/27/2019 11:28 AM  Sign at close encounter  Patient is here today for her tremors    Patient ID: Chandana Rodríguez is a 59 y o  female  Assessment/Plan:    Benign essential tremor  On a slightly lower schedule of propranolol given past issue use with bradycardia  Despite that reduction, has continued to exhibit what she feels to be excellent control of her tremor  --continue propranolol 40 mg taken twice daily  She will follow up in 6 months or p r n     Subjective:    HPI  Patient, now 59years of age, continues her ongoing care here given her historical essential tremor  This has been well controlled on propranolol  This past October did have an admission to the hospital and during that admission her propranolol schedule was reduced from 60 mg in the morning and 40 mg in the evening to 40 mg twice daily given issues with bradycardia  With that reduction she has had no further bradycardic issues and, fortunately, has maintained what she feels to be excellent control of her tremor  She did have recent blood work performed  Results reviewed  CBC with hemoglobin 11 8, hematocrit 37 1, white count 8 9 and platelet count 970  CMP with ALT 24, AST 18 and creatinine 0 93      Past Medical History:   Diagnosis Date    Anemia     Anxiety     Arthritis     Back pain     Cancer ([facility] )     Depression     Diabetes mellitus ([facility] )     Pre- Diabetic    Dyslipidemia     Essential tremor     Excessive daytime sleepiness     GERD (gastroesophageal reflux disease)     History of uterine cancer     Hyperglycemia     Hypertension     Hypovitaminosis D     Iron deficiency anemia     Joint pain     Mood disorder (HCC)     Obesity     Obstructive sleep apnea     Ringing in ears     Snoring     Witnessed episode of apnea      Past Surgical History:   Procedure Laterality Date    APPENDECTOMY      HYSTERECTOMY      KNEE SURGERY      Meniscus tear    TONSILLECTOMY      TUBAL LIGATION       Social History     Socioeconomic History    Marital status: Single     Spouse name: None    Number of children: None    Years of education: None    Highest education level: None   Occupational History    None   Social Needs    Financial resource strain: None    Food insecurity:     Worry: None     Inability: None    Transportation needs:     Medical: None     Non-medical: None   Tobacco Use    Smoking status: Current Every Day Smoker     Packs/day: 0 50    Smokeless tobacco: Former User   Substance and Sexual Activity    Alcohol use: Yes     Comment: very seldom    Drug use: No    Sexual activity: None   Lifestyle    Physical activity:     Days per week: None     Minutes per session: None    Stress: None   Relationships    Social connections:     Talks on phone: None     Gets together: None     Attends Yazdanism service: None     Active member of club or organization: None     Attends meetings of clubs or organizations: None     Relationship status: None    Intimate partner violence:     Fear of current or ex partner: None     Emotionally abused: None     Physically abused: None     Forced sexual activity: None   Other Topics Concern    None   Social History Narrative    None     Family History   Problem Relation Age of Onset    Diabetes Mother     Asthma Mother     Hypertension Mother     Heart disease Mother      Allergies   Allergen Reactions    Aspirin GI Intolerance       Current Outpatient Medications:     acetaminophen (MAPAP ARTHRITIS PAIN) 650 mg CR tablet, Take 1 tablet (650 mg total) by mouth every 8 (eight) hours as needed for mild pain, Disp: 30 tablet, Rfl: 0    albuterol (2 5 mg/3 mL) 0 083 % nebulizer solution, albuterol sulfate 2 5 mg/3 mL (0 083 %) solution for nebulization, Disp: , Rfl:     ASPIR-LOW 81 MG EC tablet, Take 1 tablet (81 mg total) by mouth daily, Disp: 30 tablet, Rfl: 2    atorvastatin (LIPITOR) 40 mg tablet, Take 1 tablet (40 mg total) by mouth daily, Disp: 30 tablet, Rfl: 2    budesonide (RINOCORT AQUA) 32 MCG/ACT nasal spray, 1 spray into each nostril daily, Disp: 1 Bottle, Rfl: 2    cholecalciferol (VITAMIN D3) 1,000 units tablet, Take 1 tablet (1,000 Units total) by mouth daily, Disp: 30 tablet, Rfl: 5    ferrous sulfate 325 (65 FE) MG EC tablet, Take 325 mg by mouth every other day  , Disp: , Rfl: 0    nicotine (NICOTROL) 10 MG inhaler, Inhale 1 puff as needed for smoking cessation, Disp: 42 each, Rfl: 2    nystatin (MYCOSTATIN) powder, Apply topically 2 (two) times a day, Disp: 60 g, Rfl: 0    pantoprazole (PROTONIX) 40 mg tablet, Take 1 tablet (40 mg total) by mouth daily, Disp: 30 tablet, Rfl: 2    propranolol (INDERAL) 40 mg tablet, Take 1 tablet (40 mg total) by mouth every 12 (twelve) hours, Disp: 60 tablet, Rfl: 5    RA VITAMIN C 500 MG tablet, Take 500 mg by mouth daily  , Disp: , Rfl: 0    ammonium lactate (AMLACTIN) 12 % lotion, Every 12 hours, Disp: , Rfl:     Objective:    Blood pressure 136/80, pulse 71, resp  rate 17, height 5' 1" (1 549 m), weight 109 kg (240 lb 9 6 oz)  Physical Exam  Lungs clear to auscultation  Rhythm regular  Neurological Exam  Alert  Pleasantly interactive  Fully oriented  No voice tremor  Unremarkable spontaneous gait with good length strides and good bilateral spontaneous arm swing    Cranial nerves 2-12 tested and grossly intact except for a subtle left lower facial asymmetry, unchanged from the past   Accurate with finger-to-nose bilaterally  Full symmetrical strength throughout the 4 extremities with no upper extremity drift  Muscle stretch reflexes bilaterally 1 throughout the upper extremities and bilaterally 1+ at the knees and ankles  Excellent facial and general animation  No rest tremor noted  Rest tone normal   No tone increase or cogwheeling with contralateral distraction maneuver  Only very subtle tremor of the outstretched upper extremities, not enhancing with extended sustention  ROS:    Review of Systems   Constitutional: Negative  Negative for appetite change and fever  HENT: Negative  Negative for hearing loss, tinnitus, trouble swallowing and voice change  Eyes: Negative  Negative for photophobia and pain  Respiratory: Negative  Negative for shortness of breath  Cardiovascular: Negative  Negative for palpitations  Gastrointestinal: Negative  Negative for nausea and vomiting  Endocrine: Negative  Negative for cold intolerance and heat intolerance  Genitourinary: Negative  Negative for dysuria, frequency and urgency  Musculoskeletal: Negative  Negative for myalgias and neck pain  Skin: Negative  Negative for rash  Neurological: Positive for tremors  Negative for dizziness, seizures, syncope, facial asymmetry, speech difficulty, weakness, light-headedness, numbness and headaches  Hematological: Negative  Does not bruise/bleed easily  Psychiatric/Behavioral: Positive for sleep disturbance (at times)  Negative for confusion and hallucinations  I personally reviewed the ROS that was entered by the medical assistant  *Please note this document was created using voice recognition software and may contain sound-alike word errors  *

## 2019-02-27 NOTE — PROGRESS NOTES
Patient is here today for her tremors    Patient ID: Rufino Giles is a 59 y o  female  Assessment/Plan:    Benign essential tremor  On a slightly lower schedule of propranolol given past issue use with bradycardia  Despite that reduction, has continued to exhibit what she feels to be excellent control of her tremor  --continue propranolol 40 mg taken twice daily  She will follow up in 6 months or p r n     Subjective:    HPI  Patient, now 59years of age, continues her ongoing care here given her historical essential tremor  This has been well controlled on propranolol  This past October did have an admission to the hospital and during that admission her propranolol schedule was reduced from 60 mg in the morning and 40 mg in the evening to 40 mg twice daily given issues with bradycardia  With that reduction she has had no further bradycardic issues and, fortunately, has maintained what she feels to be excellent control of her tremor  She did have recent blood work performed  Results reviewed  CBC with hemoglobin 11 8, hematocrit 37 1, white count 8 9 and platelet count 473  CMP with ALT 24, AST 18 and creatinine 0 93      Past Medical History:   Diagnosis Date    Anemia     Anxiety     Arthritis     Back pain     Cancer ([facility] )     Depression     Diabetes mellitus ([facility] )     Pre- Diabetic    Dyslipidemia     Essential tremor     Excessive daytime sleepiness     GERD (gastroesophageal reflux disease)     History of uterine cancer     Hyperglycemia     Hypertension     Hypovitaminosis D     Iron deficiency anemia     Joint pain     Mood disorder (HCC)     Obesity     Obstructive sleep apnea     Ringing in ears     Snoring     Witnessed episode of apnea      Past Surgical History:   Procedure Laterality Date    APPENDECTOMY      HYSTERECTOMY      KNEE SURGERY      Meniscus tear    TONSILLECTOMY      TUBAL LIGATION       Social History     Socioeconomic History    Marital status: Single     Spouse name: None    Number of children: None    Years of education: None    Highest education level: None   Occupational History    None   Social Needs    Financial resource strain: None    Food insecurity:     Worry: None     Inability: None    Transportation needs:     Medical: None     Non-medical: None   Tobacco Use    Smoking status: Current Every Day Smoker     Packs/day: 0 50    Smokeless tobacco: Former User   Substance and Sexual Activity    Alcohol use: Yes     Comment: very seldom    Drug use: No    Sexual activity: None   Lifestyle    Physical activity:     Days per week: None     Minutes per session: None    Stress: None   Relationships    Social connections:     Talks on phone: None     Gets together: None     Attends Voodoo service: None     Active member of club or organization: None     Attends meetings of clubs or organizations: None     Relationship status: None    Intimate partner violence:     Fear of current or ex partner: None     Emotionally abused: None     Physically abused: None     Forced sexual activity: None   Other Topics Concern    None   Social History Narrative    None     Family History   Problem Relation Age of Onset    Diabetes Mother     Asthma Mother     Hypertension Mother     Heart disease Mother      Allergies   Allergen Reactions    Aspirin GI Intolerance       Current Outpatient Medications:     acetaminophen (MAPAP ARTHRITIS PAIN) 650 mg CR tablet, Take 1 tablet (650 mg total) by mouth every 8 (eight) hours as needed for mild pain, Disp: 30 tablet, Rfl: 0    albuterol (2 5 mg/3 mL) 0 083 % nebulizer solution, albuterol sulfate 2 5 mg/3 mL (0 083 %) solution for nebulization, Disp: , Rfl:     ASPIR-LOW 81 MG EC tablet, Take 1 tablet (81 mg total) by mouth daily, Disp: 30 tablet, Rfl: 2    atorvastatin (LIPITOR) 40 mg tablet, Take 1 tablet (40 mg total) by mouth daily, Disp: 30 tablet, Rfl: 2    budesonide (RINOCORT AQUA) 32 MCG/ACT nasal spray, 1 spray into each nostril daily, Disp: 1 Bottle, Rfl: 2    cholecalciferol (VITAMIN D3) 1,000 units tablet, Take 1 tablet (1,000 Units total) by mouth daily, Disp: 30 tablet, Rfl: 5    ferrous sulfate 325 (65 FE) MG EC tablet, Take 325 mg by mouth every other day  , Disp: , Rfl: 0    nicotine (NICOTROL) 10 MG inhaler, Inhale 1 puff as needed for smoking cessation, Disp: 42 each, Rfl: 2    nystatin (MYCOSTATIN) powder, Apply topically 2 (two) times a day, Disp: 60 g, Rfl: 0    pantoprazole (PROTONIX) 40 mg tablet, Take 1 tablet (40 mg total) by mouth daily, Disp: 30 tablet, Rfl: 2    propranolol (INDERAL) 40 mg tablet, Take 1 tablet (40 mg total) by mouth every 12 (twelve) hours, Disp: 60 tablet, Rfl: 5    RA VITAMIN C 500 MG tablet, Take 500 mg by mouth daily  , Disp: , Rfl: 0    ammonium lactate (AMLACTIN) 12 % lotion, Every 12 hours, Disp: , Rfl:     Objective:    Blood pressure 136/80, pulse 71, resp  rate 17, height 5' 1" (1 549 m), weight 109 kg (240 lb 9 6 oz)  Physical Exam  Lungs clear to auscultation  Rhythm regular  Neurological Exam  Alert  Pleasantly interactive  Fully oriented  No voice tremor  Unremarkable spontaneous gait with good length strides and good bilateral spontaneous arm swing  Cranial nerves 2-12 tested and grossly intact except for a subtle left lower facial asymmetry, unchanged from the past   Accurate with finger-to-nose bilaterally  Full symmetrical strength throughout the 4 extremities with no upper extremity drift  Muscle stretch reflexes bilaterally 1 throughout the upper extremities and bilaterally 1+ at the knees and ankles  Excellent facial and general animation  No rest tremor noted  Rest tone normal   No tone increase or cogwheeling with contralateral distraction maneuver  Only very subtle tremor of the outstretched upper extremities, not enhancing with extended sustention  ROS:    Review of Systems   Constitutional: Negative  Negative for appetite change and fever  HENT: Negative  Negative for hearing loss, tinnitus, trouble swallowing and voice change  Eyes: Negative  Negative for photophobia and pain  Respiratory: Negative  Negative for shortness of breath  Cardiovascular: Negative  Negative for palpitations  Gastrointestinal: Negative  Negative for nausea and vomiting  Endocrine: Negative  Negative for cold intolerance and heat intolerance  Genitourinary: Negative  Negative for dysuria, frequency and urgency  Musculoskeletal: Negative  Negative for myalgias and neck pain  Skin: Negative  Negative for rash  Neurological: Positive for tremors  Negative for dizziness, seizures, syncope, facial asymmetry, speech difficulty, weakness, light-headedness, numbness and headaches  Hematological: Negative  Does not bruise/bleed easily  Psychiatric/Behavioral: Positive for sleep disturbance (at times)  Negative for confusion and hallucinations  I personally reviewed the ROS that was entered by the medical assistant  *Please note this document was created using voice recognition software and may contain sound-alike word errors  *

## 2019-03-11 ENCOUNTER — OFFICE VISIT (OUTPATIENT)
Dept: SLEEP CENTER | Facility: CLINIC | Age: 59
End: 2019-03-11
Payer: COMMERCIAL

## 2019-03-11 VITALS
SYSTOLIC BLOOD PRESSURE: 126 MMHG | HEART RATE: 86 BPM | WEIGHT: 237.4 LBS | DIASTOLIC BLOOD PRESSURE: 80 MMHG | HEIGHT: 61 IN | BODY MASS INDEX: 44.82 KG/M2

## 2019-03-11 DIAGNOSIS — G47.21 CIRCADIAN RHYTHM SLEEP DISORDER, DELAYED SLEEP PHASE TYPE: ICD-10-CM

## 2019-03-11 DIAGNOSIS — D50.8 OTHER IRON DEFICIENCY ANEMIA: ICD-10-CM

## 2019-03-11 DIAGNOSIS — Z99.89 OBSTRUCTIVE SLEEP APNEA TREATED WITH CONTINUOUS POSITIVE AIRWAY PRESSURE (CPAP): Primary | ICD-10-CM

## 2019-03-11 DIAGNOSIS — R79.0 LOW SERUM FERRITIN LEVEL: ICD-10-CM

## 2019-03-11 DIAGNOSIS — G25.81 RESTLESS LEG SYNDROME: ICD-10-CM

## 2019-03-11 DIAGNOSIS — G47.33 OBSTRUCTIVE SLEEP APNEA TREATED WITH CONTINUOUS POSITIVE AIRWAY PRESSURE (CPAP): Primary | ICD-10-CM

## 2019-03-11 PROCEDURE — 99214 OFFICE O/P EST MOD 30 MIN: CPT | Performed by: NURSE PRACTITIONER

## 2019-03-11 NOTE — PROGRESS NOTES
Progress Note - [address] [facility] 59 y o  female   DAA:[birth year], MRN: [id number]  3/11/2019          Follow Up Evaluation / Problem:     Obstructive Sleep Apnea  Obesity    HPI: Chano Gandhi is a 59y o  year old female  She presents for a 6 month follow up of obstructive sleep apnea  She completed a diagnostic sleep study in May for complaints of snoring and daytime sleepiness, which indicated moderate obstructive sleep apnea and periodic limb movement disorder  She has been using CPAP set at 7cm of water pressure  She has symptoms of restless legs that are intermittent during the day and evening when she sits down to relax  She has a history of iron deficiency anemia and was taking iron every other day  Her ferritin level was found to be 9 in June  She began taking iron with vitamin C every day  A follow up ferritin level was ordered        Review of Systems      Genitourinary need to urinate more than twice a night   Cardiology none   Gastrointestinal none   Neurology need to move extremities   Constitutional none   Integumentary none   Psychiatry anxiety and depression   Musculoskeletal joint pain and back pain   Pulmonary frequent cough   ENT none   Endocrine frequent urination   Hematological blood donor       Current Outpatient Medications:     acetaminophen (MAPAP ARTHRITIS PAIN) 650 mg CR tablet, Take 1 tablet (650 mg total) by mouth every 8 (eight) hours as needed for mild pain, Disp: 30 tablet, Rfl: 0    albuterol (2 5 mg/3 mL) 0 083 % nebulizer solution, albuterol sulfate 2 5 mg/3 mL (0 083 %) solution for nebulization, Disp: , Rfl:     ammonium lactate (AMLACTIN) 12 % lotion, Every 12 hours, Disp: , Rfl:     ASPIR-LOW 81 MG EC tablet, Take 1 tablet (81 mg total) by mouth daily, Disp: 30 tablet, Rfl: 2    atorvastatin (LIPITOR) 40 mg tablet, Take 1 tablet (40 mg total) by mouth daily, Disp: 30 tablet, Rfl: 2    budesonide (RINOCORT AQUA) 32 MCG/ACT nasal spray, 1 spray into each nostril daily, Disp: 1 Bottle, Rfl: 2    cholecalciferol (VITAMIN D3) 1,000 units tablet, Take 1 tablet (1,000 Units total) by mouth daily, Disp: 30 tablet, Rfl: 5    ferrous sulfate 325 (65 FE) MG EC tablet, Take 325 mg by mouth every other day  , Disp: , Rfl: 0    nicotine (NICOTROL) 10 MG inhaler, Inhale 1 puff as needed for smoking cessation, Disp: 42 each, Rfl: 2    nystatin (MYCOSTATIN) powder, Apply topically 2 (two) times a day, Disp: 60 g, Rfl: 0    pantoprazole (PROTONIX) 40 mg tablet, Take 1 tablet (40 mg total) by mouth daily, Disp: 30 tablet, Rfl: 2    propranolol (INDERAL) 40 mg tablet, Take 1 tablet (40 mg total) by mouth every 12 (twelve) hours, Disp: 60 tablet, Rfl: 5    RA VITAMIN C 500 MG tablet, Take 500 mg by mouth daily  , Disp: , Rfl: 0    [place] Sleepiness Scale  Sitting and reading: High chance of dozing  Watching TV: High chance of dozing  Sitting, inactive in a public place (e g  a theatre or a meeting): Would never doze  As a passenger in a car for an hour without a break: Would never doze  Lying down to rest in the afternoon when circumstances permit: High chance of dozing  Sitting and talking to someone: Would never doze  Sitting quietly after a lunch without alcohol: Would never doze  In a car, while stopped for a few minutes in traffic: Would never doze  Total score: 9              Vitals:    03/11/19 1200   BP: 126/80   Pulse: 86   Weight: 108 kg (237 lb 6 4 oz)   Height: 5' 1" (1 549 m)       Body mass index is 44 86 kg/m²  EPWORTH SLEEPINESS SCORE  Total score: 9      Past History Since Last Sleep Center Visit:   She has been having difficulty using CPAP due to nasal congestion since December  She currently uses a nasal mask and has had a lot of pressure due to congestion  She suffers from chronic allergic rhinitis as well    She has also had bouts of bronchitis, which may be due to congestion, but are also likely due to chronic tobacco use  She states she has been cutting down on cigarette use, but finds this difficult due to her  also smoking in the home  When she uses CPAP, she feels like she sleeps better  She has had a difficult time staying awake to watch TV, due to inappropriate drowsiness, since she is unable to use her CPAP equipment  This further disrupts her sleep pattern by keeping her awake later at night  She has a history of restless leg symptoms that typically occur when she sits down to relax  She denies symptoms when trying to sleep  She feels that night time symptoms have improved with the use of CPAP  Ferritin level was 9 in the past   She did not follow up to have level rechecked after increasing iron with vitamin C to daily use  The review of systems and following portions of the patient's history were reviewed and updated as appropriate: allergies, current medications, past family history, past medical history, past social history, past surgical history, and problem list         OBJECTIVE    PAP Pressure: Nasal CPAP set to deliver 7 cm of water pressure  DME Provider: [employer]    Physical Exam:     General Appearance:   Alert, cooperative, no distress, appears stated age, obese     Head:   Normocephalic, without obvious abnormality, atraumatic     Eyes:   PERRL, conjunctiva/corneas clear, EOM's intact          Nose:  Nares normal, septum midline, no drainage or sinus tenderness           Throat:  Lips, teeth and gums normal; tongue normal size and  shape and midline mucosa moist and redundant bilaterally, uvula minimally visible, tonsils not visualized, Mallampati class 4       Neck:  Supple, symmetrical, trachea midline, no adenopathy;  Thyroid: No enlargement, tenderness or nodules; no carotid bruit or JVD     Lungs:      Clear to auscultation bilaterally, respirations unlabored     Heart:   Regular rate and rhythm, S1 and S2 normal, no murmur, rub or gallop Extremities:  Extremities normal, atraumatic, no cyanosis or edema     Pulses:  2+ and symmetric all extremities     Skin:  Skin color, texture, turgor normal, no rashes or lesions       Neurologic:  No focal deficits noted  Normal strength, sensation throughout       ASSESSMENT / PLAN    1  Obstructive sleep apnea treated with continuous positive airway pressure (CPAP)     2  Restless leg syndrome     3  Low serum ferritin level     4  Other iron deficiency anemia     5  Circadian rhythm sleep disorder, delayed sleep phase type             Counseling / Coordination of Care  Total clinic time spent today 30 minutes  Greater than 50% of total time was spent with the patient and / or family counseling and / or coordination of care  A description of the counseling / coordination of care:     Impressions, Prognosis, Instructions for management, Risks and benefits of treatment, Patient and family education, Risk factor reductions and Importance of compliance with treatment    There is no compliance data available for review at today's visit, due to her non-use with nasal congestion  I have referred her to [employer] to have a fitting for a full face mask, which may be more comfortable during periods of congestion, which may be chronic with allergies  I have also recommended that she further discuss her ongoing symptoms with her PCP  She may need referral to ENT  There is a current order for CPAP supplies for the next 6 months  I have ordered a repeat ferritin level to follow up after the increase in iron with vitamin C, regarding her ferritin deficiency and restless leg symptoms  If still low, she may benefit from referral to hematology for further evaluation of anemia and possible venofer infusions  She will follow up in 6 months for review of compliance data and effectiveness of treatment  The following instructions have been given to the patient today:    Patient Instructions   1    Meet with [employer] regarding full face mask  2   Complete lab testing regarding ferritin level  3   Continue daily iron and vitamin C   4   Continue use of CPAP every night and with naps, when able  5   Follow up in 6 months        Lulú Lal, [address] [facility]

## 2019-03-11 NOTE — PATIENT INSTRUCTIONS
1   Meet with [employer] regarding full face mask  2   Complete lab testing regarding ferritin level  3   Continue daily iron and vitamin C   4   Continue use of CPAP every night and with naps, when able  5   Follow up in 6 months

## 2019-03-13 ENCOUNTER — APPOINTMENT (OUTPATIENT)
Dept: LAB | Facility: HOSPITAL | Age: 59
End: 2019-03-13
Payer: COMMERCIAL

## 2019-03-13 ENCOUNTER — TRANSCRIBE ORDERS (OUTPATIENT)
Dept: ADMINISTRATIVE | Facility: HOSPITAL | Age: 59
End: 2019-03-13

## 2019-03-13 DIAGNOSIS — D50.8 OTHER IRON DEFICIENCY ANEMIA: ICD-10-CM

## 2019-03-13 DIAGNOSIS — G25.81 RESTLESS LEG SYNDROME: ICD-10-CM

## 2019-03-13 DIAGNOSIS — R79.0 LOW SERUM FERRITIN LEVEL: ICD-10-CM

## 2019-03-13 LAB
BASOPHILS # BLD AUTO: 0.1 THOUSANDS/ΜL (ref 0–0.1)
BASOPHILS NFR BLD AUTO: 1 % (ref 0–1)
CALCIUM SERPL-MCNC: 9.6 MG/DL (ref 8.4–10.2)
EOSINOPHIL # BLD AUTO: 0.2 THOUSAND/ΜL (ref 0–0.4)
EOSINOPHIL NFR BLD AUTO: 2 % (ref 0–6)
ERYTHROCYTE [DISTWIDTH] IN BLOOD BY AUTOMATED COUNT: 17.6 %
ERYTHROCYTE [SEDIMENTATION RATE] IN BLOOD: 58 MM/HOUR (ref 1–20)
FERRITIN SERPL-MCNC: 7 NG/ML (ref 8–388)
HCT VFR BLD AUTO: 41.2 % (ref 36–46)
HGB BLD-MCNC: 12.5 G/DL (ref 12–16)
LYMPHOCYTES # BLD AUTO: 1.2 THOUSANDS/ΜL (ref 0.5–4)
LYMPHOCYTES NFR BLD AUTO: 13 % (ref 25–45)
MAGNESIUM SERPL-MCNC: 2.3 MG/DL (ref 1.6–2.3)
MCH RBC QN AUTO: 24.9 PG (ref 26–34)
MCHC RBC AUTO-ENTMCNC: 30.3 G/DL (ref 31–36)
MCV RBC AUTO: 82 FL (ref 80–100)
MONOCYTES # BLD AUTO: 0.6 THOUSAND/ΜL (ref 0.2–0.9)
MONOCYTES NFR BLD AUTO: 7 % (ref 1–10)
NEUTROPHILS # BLD AUTO: 7.3 THOUSANDS/ΜL (ref 1.8–7.8)
NEUTS SEG NFR BLD AUTO: 78 % (ref 45–65)
PLATELET # BLD AUTO: 303 THOUSANDS/UL (ref 150–450)
PMV BLD AUTO: 9.5 FL (ref 8.9–12.7)
RBC # BLD AUTO: 5.01 MILLION/UL (ref 4–5.2)
TIBC SERPL-MCNC: 436 UG/DL (ref 250–450)
TRANSFERRIN SERPL-MCNC: 348 MG/DL (ref 200–400)
WBC # BLD AUTO: 9.4 THOUSAND/UL (ref 4.5–11)

## 2019-03-13 PROCEDURE — 85652 RBC SED RATE AUTOMATED: CPT

## 2019-03-13 PROCEDURE — 82728 ASSAY OF FERRITIN: CPT

## 2019-03-13 PROCEDURE — 83735 ASSAY OF MAGNESIUM: CPT

## 2019-03-13 PROCEDURE — 82310 ASSAY OF CALCIUM: CPT

## 2019-03-13 PROCEDURE — 36415 COLL VENOUS BLD VENIPUNCTURE: CPT

## 2019-03-13 PROCEDURE — 83550 IRON BINDING TEST: CPT

## 2019-03-13 PROCEDURE — 85025 COMPLETE CBC W/AUTO DIFF WBC: CPT

## 2019-03-13 PROCEDURE — 84466 ASSAY OF TRANSFERRIN: CPT

## 2019-03-18 ENCOUNTER — TELEPHONE (OUTPATIENT)
Dept: SLEEP CENTER | Facility: CLINIC | Age: 59
End: 2019-03-18

## 2019-03-18 DIAGNOSIS — R79.0 LOW SERUM FERRITIN LEVEL: Primary | ICD-10-CM

## 2019-03-18 DIAGNOSIS — G25.81 RESTLESS LEG SYNDROME: ICD-10-CM

## 2019-03-18 NOTE — TELEPHONE ENCOUNTER
PT scheduled for Venofer infusion 3/25 @ 1 pm @ [id number] [facility] faxed, PT aware

## 2019-03-18 NOTE — TELEPHONE ENCOUNTER
Patient with low ferritin level at 7  Spoke with patient via phone regarding results  This has decreased from 9 over the past 6 months, despite oral ferrous sulfate tablets taken with vitamin C daily  She has developed constipation on some days and does not feel she can increase to twice daily oral iron therapy  She has had anemia in the past as well  She suffers from significant symptoms of restless legs, that occur intermittently throughout the day if she tries to sit down to relax  She had symptoms at night as well, which have improved with the use of CPAP  Patient would like to begin infusion treatment with venofer  Plan for venofer IV infusion of 200mg x 5 doses with repeat ferritin level 3 weeks after treatment

## 2019-03-22 ENCOUNTER — APPOINTMENT (OUTPATIENT)
Dept: LAB | Facility: HOSPITAL | Age: 59
End: 2019-03-22
Payer: COMMERCIAL

## 2019-03-22 DIAGNOSIS — G25.81 RESTLESS LEG SYNDROME: ICD-10-CM

## 2019-03-22 DIAGNOSIS — R79.0 LOW SERUM FERRITIN LEVEL: ICD-10-CM

## 2019-03-22 LAB — FERRITIN SERPL-MCNC: 9 NG/ML (ref 8–388)

## 2019-03-22 PROCEDURE — 82728 ASSAY OF FERRITIN: CPT

## 2019-03-22 PROCEDURE — 36415 COLL VENOUS BLD VENIPUNCTURE: CPT

## 2019-03-22 RX ORDER — SODIUM CHLORIDE 9 MG/ML
20 INJECTION, SOLUTION INTRAVENOUS CONTINUOUS
Status: DISCONTINUED | OUTPATIENT
Start: 2019-03-25 | End: 2019-03-28 | Stop reason: HOSPADM

## 2019-03-25 ENCOUNTER — HOSPITAL ENCOUNTER (OUTPATIENT)
Dept: INFUSION CENTER | Facility: CLINIC | Age: 59
Discharge: HOME | End: 2019-03-25
Payer: COMMERCIAL

## 2019-03-25 VITALS
SYSTOLIC BLOOD PRESSURE: 136 MMHG | DIASTOLIC BLOOD PRESSURE: 60 MMHG | HEART RATE: 50 BPM | RESPIRATION RATE: 18 BRPM | TEMPERATURE: 96.5 F

## 2019-03-25 DIAGNOSIS — G47.33 OBSTRUCTIVE SLEEP APNEA TREATED WITH CONTINUOUS POSITIVE AIRWAY PRESSURE (CPAP): Primary | ICD-10-CM

## 2019-03-25 DIAGNOSIS — Z99.89 OBSTRUCTIVE SLEEP APNEA TREATED WITH CONTINUOUS POSITIVE AIRWAY PRESSURE (CPAP): Primary | ICD-10-CM

## 2019-03-25 PROCEDURE — 96365 THER/PROPH/DIAG IV INF INIT: CPT

## 2019-03-25 RX ORDER — GABAPENTIN 300 MG/1
100 CAPSULE ORAL
COMMUNITY
End: 2019-06-05 | Stop reason: ALTCHOICE

## 2019-03-25 RX ADMIN — SODIUM CHLORIDE 20 ML/HR: 0.9 INJECTION, SOLUTION INTRAVENOUS at 13:03

## 2019-03-25 RX ADMIN — IRON SUCROSE 200 MG: 20 INJECTION, SOLUTION INTRAVENOUS at 13:04

## 2019-03-25 NOTE — PROGRESS NOTES
Pt without complaint, Venofer infusing as ordered, pt tolerating well  5 doses of Venofer have been ordered, pt has made appt for second infusion next week  She will call infusion center to schedule the remaining 3

## 2019-03-25 NOTE — PLAN OF CARE
Problem: Potential for Falls  Goal: Patient will remain free of falls  Description  INTERVENTIONS:  - Assess patient frequently for physical needs  -  Identify cognitive and physical deficits and behaviors that affect risk of falls    -  [place] fall precautions as indicated by assessment   - Educate patient/family on patient safety including physical limitations  - Instruct patient to call for assistance with activity based on assessment  - Modify environment to reduce risk of injury  - Consider OT/PT consult to assist with strengthening/mobility  Outcome: Progressing

## 2019-03-25 NOTE — PROGRESS NOTES
Pt tolerated Venofer infusion without any adverse reaction  Pt monitored for additional 30mins per orders, pt without s/s reaction  Pt left unit in stable condition without question or concern, AVS provided

## 2019-03-26 DIAGNOSIS — D50.1 IRON DEFICIENCY ANEMIA DUE TO SIDEROPENIC DYSPHAGIA: Primary | ICD-10-CM

## 2019-03-26 RX ORDER — LANOLIN ALCOHOL/MO/W.PET/CERES
325 CREAM (GRAM) TOPICAL EVERY OTHER DAY
Qty: 90 TABLET | Refills: 1 | Status: SHIPPED | OUTPATIENT
Start: 2019-03-26 | End: 2019-04-17 | Stop reason: SDUPTHER

## 2019-04-01 RX ORDER — SODIUM CHLORIDE 9 MG/ML
20 INJECTION, SOLUTION INTRAVENOUS CONTINUOUS
Status: DISCONTINUED | OUTPATIENT
Start: 2019-04-02 | End: 2019-04-05 | Stop reason: HOSPADM

## 2019-04-02 ENCOUNTER — HOSPITAL ENCOUNTER (OUTPATIENT)
Dept: INFUSION CENTER | Facility: CLINIC | Age: 59
Discharge: HOME | End: 2019-04-02
Payer: COMMERCIAL

## 2019-04-02 VITALS
RESPIRATION RATE: 18 BRPM | TEMPERATURE: 97.2 F | DIASTOLIC BLOOD PRESSURE: 76 MMHG | HEART RATE: 61 BPM | SYSTOLIC BLOOD PRESSURE: 128 MMHG

## 2019-04-02 PROCEDURE — 96365 THER/PROPH/DIAG IV INF INIT: CPT

## 2019-04-02 RX ADMIN — SODIUM CHLORIDE 20 ML/HR: 9 INJECTION, SOLUTION INTRAVENOUS at 13:00

## 2019-04-02 RX ADMIN — IRON SUCROSE 200 MG: 20 INJECTION, SOLUTION INTRAVENOUS at 13:05

## 2019-04-02 NOTE — PLAN OF CARE
Problem: Potential for Falls  Goal: Patient will remain free of falls  Description  INTERVENTIONS:  - Assess patient frequently for physical needs  -  Identify cognitive and physical deficits and behaviors that affect risk of falls    -  [place] fall precautions as indicated by assessment   - Educate patient/family on patient safety including physical limitations  - Instruct patient to call for assistance with activity based on assessment  - Modify environment to reduce risk of injury  - Consider OT/PT consult to assist with strengthening/mobility  Outcome: Progressing

## 2019-04-09 RX ORDER — SODIUM CHLORIDE 9 MG/ML
20 INJECTION, SOLUTION INTRAVENOUS CONTINUOUS
Status: DISCONTINUED | OUTPATIENT
Start: 2019-04-10 | End: 2019-04-13 | Stop reason: HOSPADM

## 2019-04-10 ENCOUNTER — HOSPITAL ENCOUNTER (OUTPATIENT)
Dept: INFUSION CENTER | Facility: CLINIC | Age: 59
Discharge: HOME | End: 2019-04-10
Payer: COMMERCIAL

## 2019-04-10 VITALS
TEMPERATURE: 97.2 F | SYSTOLIC BLOOD PRESSURE: 111 MMHG | RESPIRATION RATE: 18 BRPM | HEART RATE: 48 BPM | OXYGEN SATURATION: 96 % | DIASTOLIC BLOOD PRESSURE: 62 MMHG

## 2019-04-10 PROCEDURE — 96365 THER/PROPH/DIAG IV INF INIT: CPT

## 2019-04-10 RX ADMIN — IRON SUCROSE 200 MG: 20 INJECTION, SOLUTION INTRAVENOUS at 12:11

## 2019-04-10 RX ADMIN — SODIUM CHLORIDE 20 ML/HR: 0.9 INJECTION, SOLUTION INTRAVENOUS at 12:10

## 2019-04-10 NOTE — PROGRESS NOTES
Patient tolerated her venofer infusion well without adverse reaction  She remained at the infusion center for 30 minutes of observation

## 2019-04-10 NOTE — PLAN OF CARE
Problem: Potential for Falls  Goal: Patient will remain free of falls  Description  INTERVENTIONS:  - Assess patient frequently for physical needs  -  Identify cognitive and physical deficits and behaviors that affect risk of falls    -  [place] fall precautions as indicated by assessment   - Educate patient/family on patient safety including physical limitations  - Instruct patient to call for assistance with activity based on assessment  - Modify environment to reduce risk of injury  - Consider OT/PT consult to assist with strengthening/mobility  Outcome: Progressing

## 2019-04-16 RX ORDER — SODIUM CHLORIDE 9 MG/ML
20 INJECTION, SOLUTION INTRAVENOUS CONTINUOUS
Status: DISCONTINUED | OUTPATIENT
Start: 2019-04-17 | End: 2019-04-20 | Stop reason: HOSPADM

## 2019-04-17 ENCOUNTER — HOSPITAL ENCOUNTER (OUTPATIENT)
Dept: INFUSION CENTER | Facility: CLINIC | Age: 59
Discharge: HOME | End: 2019-04-17

## 2019-04-17 DIAGNOSIS — D50.1 IRON DEFICIENCY ANEMIA DUE TO SIDEROPENIC DYSPHAGIA: ICD-10-CM

## 2019-04-17 RX ORDER — LANOLIN ALCOHOL/MO/W.PET/CERES
325 CREAM (GRAM) TOPICAL EVERY OTHER DAY
Qty: 90 TABLET | Refills: 1 | Status: SHIPPED | OUTPATIENT
Start: 2019-04-17 | End: 2019-08-26 | Stop reason: SDUPTHER

## 2019-04-22 ENCOUNTER — TELEPHONE (OUTPATIENT)
Dept: SLEEP CENTER | Facility: CLINIC | Age: 59
End: 2019-04-22

## 2019-05-10 ENCOUNTER — TELEPHONE (OUTPATIENT)
Dept: NEUROLOGY | Facility: CLINIC | Age: 59
End: 2019-05-10

## 2019-05-19 DIAGNOSIS — K21.9 GASTROESOPHAGEAL REFLUX DISEASE WITHOUT ESOPHAGITIS: ICD-10-CM

## 2019-05-19 DIAGNOSIS — E78.5 HYPERLIPIDEMIA, UNSPECIFIED HYPERLIPIDEMIA TYPE: ICD-10-CM

## 2019-05-20 RX ORDER — ATORVASTATIN CALCIUM 40 MG/1
TABLET, FILM COATED ORAL
Qty: 30 TABLET | Refills: 2 | Status: SHIPPED | OUTPATIENT
Start: 2019-05-20 | End: 2019-12-06 | Stop reason: SDUPTHER

## 2019-05-20 RX ORDER — PANTOPRAZOLE SODIUM 40 MG/1
TABLET, DELAYED RELEASE ORAL
Qty: 30 TABLET | Refills: 2 | Status: SHIPPED | OUTPATIENT
Start: 2019-05-20 | End: 2019-09-17 | Stop reason: SINTOL

## 2019-05-21 DIAGNOSIS — E78.5 HYPERLIPIDEMIA, UNSPECIFIED HYPERLIPIDEMIA TYPE: ICD-10-CM

## 2019-05-28 RX ORDER — ASPIRIN 81 MG/1
TABLET, COATED ORAL
Qty: 30 TABLET | Refills: 2 | Status: SHIPPED | OUTPATIENT
Start: 2019-05-28 | End: 2019-06-20 | Stop reason: SDUPTHER

## 2019-05-30 ENCOUNTER — TELEPHONE (OUTPATIENT)
Dept: FAMILY MEDICINE CLINIC | Facility: CLINIC | Age: 59
End: 2019-05-30

## 2019-05-30 ENCOUNTER — APPOINTMENT (OUTPATIENT)
Dept: LAB | Facility: CLINIC | Age: 59
End: 2019-05-30
Payer: COMMERCIAL

## 2019-05-30 ENCOUNTER — OFFICE VISIT (OUTPATIENT)
Dept: FAMILY MEDICINE CLINIC | Facility: CLINIC | Age: 59
End: 2019-05-30

## 2019-05-30 VITALS
RESPIRATION RATE: 18 BRPM | BODY MASS INDEX: 44.02 KG/M2 | TEMPERATURE: 96.7 F | OXYGEN SATURATION: 98 % | DIASTOLIC BLOOD PRESSURE: 78 MMHG | SYSTOLIC BLOOD PRESSURE: 122 MMHG | WEIGHT: 233 LBS | HEART RATE: 63 BPM

## 2019-05-30 DIAGNOSIS — R60.9 EDEMA, UNSPECIFIED TYPE: ICD-10-CM

## 2019-05-30 DIAGNOSIS — J45.909 UNCOMPLICATED ASTHMA, UNSPECIFIED ASTHMA SEVERITY, UNSPECIFIED WHETHER PERSISTENT: ICD-10-CM

## 2019-05-30 DIAGNOSIS — R60.9 EDEMA, UNSPECIFIED TYPE: Primary | ICD-10-CM

## 2019-05-30 DIAGNOSIS — J30.1 ALLERGIC RHINITIS DUE TO POLLEN, UNSPECIFIED SEASONALITY: ICD-10-CM

## 2019-05-30 DIAGNOSIS — M19.90 ARTHRITIS: ICD-10-CM

## 2019-05-30 LAB
ANION GAP SERPL CALCULATED.3IONS-SCNC: 6 MMOL/L (ref 4–13)
BUN SERPL-MCNC: 17 MG/DL (ref 5–25)
CALCIUM SERPL-MCNC: 8.7 MG/DL (ref 8.3–10.1)
CHLORIDE SERPL-SCNC: 107 MMOL/L (ref 100–108)
CO2 SERPL-SCNC: 25 MMOL/L (ref 21–32)
CREAT SERPL-MCNC: 0.77 MG/DL (ref 0.6–1.3)
GFR SERPL CREATININE-BSD FRML MDRD: 84 ML/MIN/1.73SQ M
GLUCOSE P FAST SERPL-MCNC: 90 MG/DL (ref 65–99)
MAGNESIUM SERPL-MCNC: 2.4 MG/DL (ref 1.6–2.6)
POTASSIUM SERPL-SCNC: 4.3 MMOL/L (ref 3.5–5.3)
SODIUM SERPL-SCNC: 138 MMOL/L (ref 136–145)

## 2019-05-30 PROCEDURE — 36415 COLL VENOUS BLD VENIPUNCTURE: CPT

## 2019-05-30 PROCEDURE — 80048 BASIC METABOLIC PNL TOTAL CA: CPT

## 2019-05-30 PROCEDURE — 83735 ASSAY OF MAGNESIUM: CPT

## 2019-05-30 PROCEDURE — 99213 OFFICE O/P EST LOW 20 MIN: CPT | Performed by: FAMILY MEDICINE

## 2019-05-30 RX ORDER — FLUTICASONE PROPIONATE 50 MCG
1 SPRAY, SUSPENSION (ML) NASAL DAILY
Qty: 1 BOTTLE | Refills: 2 | Status: SHIPPED | OUTPATIENT
Start: 2019-05-30 | End: 2019-06-05 | Stop reason: ALTCHOICE

## 2019-05-30 RX ORDER — ALBUTEROL SULFATE 90 UG/1
2 AEROSOL, METERED RESPIRATORY (INHALATION) EVERY 6 HOURS PRN
Qty: 18 G | Refills: 1 | Status: SHIPPED | OUTPATIENT
Start: 2019-05-30 | End: 2020-12-15 | Stop reason: SDUPTHER

## 2019-05-30 RX ORDER — FUROSEMIDE 20 MG/1
20 TABLET ORAL EVERY OTHER DAY
Qty: 15 TABLET | Refills: 1 | Status: SHIPPED | OUTPATIENT
Start: 2019-05-30 | End: 2019-09-05

## 2019-05-30 RX ORDER — AZELASTINE 1 MG/ML
1 SPRAY, METERED NASAL 2 TIMES DAILY
Qty: 1 BOTTLE | Refills: 2 | Status: SHIPPED | OUTPATIENT
Start: 2019-05-30 | End: 2019-07-31

## 2019-05-30 RX ORDER — LANOLIN ALCOHOL/MO/W.PET/CERES
1 CREAM (GRAM) TOPICAL 3 TIMES DAILY
Qty: 90 TABLET | Refills: 1 | Status: SHIPPED | OUTPATIENT
Start: 2019-05-30 | End: 2019-06-05 | Stop reason: ALTCHOICE

## 2019-06-04 ENCOUNTER — TELEPHONE (OUTPATIENT)
Dept: SLEEP CENTER | Facility: CLINIC | Age: 59
End: 2019-06-04

## 2019-06-04 DIAGNOSIS — R79.0 LOW SERUM FERRITIN LEVEL: Primary | ICD-10-CM

## 2019-06-04 DIAGNOSIS — D50.8 OTHER IRON DEFICIENCY ANEMIA: ICD-10-CM

## 2019-06-04 RX ORDER — SODIUM CHLORIDE 9 MG/ML
20 INJECTION, SOLUTION INTRAVENOUS ONCE
Status: CANCELLED | OUTPATIENT
Start: 2019-06-05

## 2019-06-05 ENCOUNTER — HOSPITAL ENCOUNTER (OUTPATIENT)
Dept: INFUSION CENTER | Facility: CLINIC | Age: 59
Discharge: HOME | End: 2019-06-05
Payer: COMMERCIAL

## 2019-06-05 VITALS
RESPIRATION RATE: 16 BRPM | SYSTOLIC BLOOD PRESSURE: 106 MMHG | TEMPERATURE: 96.6 F | DIASTOLIC BLOOD PRESSURE: 72 MMHG | HEART RATE: 52 BPM

## 2019-06-05 DIAGNOSIS — R79.0 LOW SERUM FERRITIN LEVEL: Primary | ICD-10-CM

## 2019-06-05 DIAGNOSIS — D50.8 OTHER IRON DEFICIENCY ANEMIA: ICD-10-CM

## 2019-06-05 DIAGNOSIS — G25.81 RESTLESS LEG SYNDROME: ICD-10-CM

## 2019-06-05 PROCEDURE — 96365 THER/PROPH/DIAG IV INF INIT: CPT

## 2019-06-05 RX ORDER — SODIUM CHLORIDE 9 MG/ML
20 INJECTION, SOLUTION INTRAVENOUS ONCE
Status: COMPLETED | OUTPATIENT
Start: 2019-06-05 | End: 2019-06-05

## 2019-06-05 RX ORDER — SODIUM CHLORIDE 9 MG/ML
20 INJECTION, SOLUTION INTRAVENOUS ONCE
Status: CANCELLED | OUTPATIENT
Start: 2019-06-12

## 2019-06-05 RX ADMIN — IRON SUCROSE 200 MG: 20 INJECTION, SOLUTION INTRAVENOUS at 12:11

## 2019-06-05 RX ADMIN — SODIUM CHLORIDE 20 ML/HR: 0.9 INJECTION, SOLUTION INTRAVENOUS at 12:11

## 2019-06-05 NOTE — PROGRESS NOTES
Patient arrived on unit for Venofer  Tolerated Venofer treatment  Remained 30 minutes for observation  No discomforts/issues  Aware of next appointment  AVS given to patient

## 2019-06-12 ENCOUNTER — HOSPITAL ENCOUNTER (OUTPATIENT)
Dept: INFUSION CENTER | Facility: CLINIC | Age: 59
Discharge: HOME | End: 2019-06-12
Payer: COMMERCIAL

## 2019-06-12 VITALS
RESPIRATION RATE: 18 BRPM | HEART RATE: 64 BPM | DIASTOLIC BLOOD PRESSURE: 83 MMHG | TEMPERATURE: 97.5 F | SYSTOLIC BLOOD PRESSURE: 122 MMHG

## 2019-06-12 DIAGNOSIS — D50.8 OTHER IRON DEFICIENCY ANEMIA: ICD-10-CM

## 2019-06-12 DIAGNOSIS — G25.81 RESTLESS LEG SYNDROME: Primary | ICD-10-CM

## 2019-06-12 DIAGNOSIS — R79.0 LOW SERUM FERRITIN LEVEL: ICD-10-CM

## 2019-06-12 PROCEDURE — 96365 THER/PROPH/DIAG IV INF INIT: CPT

## 2019-06-12 RX ORDER — SODIUM CHLORIDE 9 MG/ML
20 INJECTION, SOLUTION INTRAVENOUS ONCE
Status: COMPLETED | OUTPATIENT
Start: 2019-06-12 | End: 2019-06-12

## 2019-06-12 RX ORDER — SODIUM CHLORIDE 9 MG/ML
20 INJECTION, SOLUTION INTRAVENOUS ONCE
Status: CANCELLED | OUTPATIENT
Start: 2019-06-12

## 2019-06-12 RX ADMIN — SODIUM CHLORIDE 20 ML/HR: 0.9 INJECTION, SOLUTION INTRAVENOUS at 12:16

## 2019-06-12 RX ADMIN — IRON SUCROSE 200 MG: 20 INJECTION, SOLUTION INTRAVENOUS at 12:16

## 2019-06-12 NOTE — PROGRESS NOTES
Patient tolerated her venofer infusion well without adverse reaction  She was reminded not to donate blood until she talked to her doctor  Patient voiced understanding

## 2019-06-12 NOTE — PLAN OF CARE
Problem: Potential for Falls  Goal: Patient will remain free of falls  Description  INTERVENTIONS:  - Assess patient frequently for physical needs  -  Identify cognitive and physical deficits and behaviors that affect risk of falls    -  [place] fall precautions as indicated by assessment   - Educate patient/family on patient safety including physical limitations  - Instruct patient to call for assistance with activity based on assessment  - Modify environment to reduce risk of injury  - Consider OT/PT consult to assist with strengthening/mobility  Outcome: Progressing

## 2019-06-20 ENCOUNTER — OFFICE VISIT (OUTPATIENT)
Dept: FAMILY MEDICINE CLINIC | Facility: CLINIC | Age: 59
End: 2019-06-20

## 2019-06-20 VITALS
BODY MASS INDEX: 44.4 KG/M2 | SYSTOLIC BLOOD PRESSURE: 126 MMHG | HEART RATE: 66 BPM | DIASTOLIC BLOOD PRESSURE: 84 MMHG | WEIGHT: 235 LBS | OXYGEN SATURATION: 98 % | TEMPERATURE: 96.7 F

## 2019-06-20 DIAGNOSIS — E78.5 HYPERLIPIDEMIA, UNSPECIFIED HYPERLIPIDEMIA TYPE: ICD-10-CM

## 2019-06-20 DIAGNOSIS — R21 RASH: Primary | ICD-10-CM

## 2019-06-20 PROCEDURE — 99213 OFFICE O/P EST LOW 20 MIN: CPT | Performed by: FAMILY MEDICINE

## 2019-06-20 RX ORDER — TRIAMCINOLONE ACETONIDE 1 MG/G
CREAM TOPICAL 2 TIMES DAILY
Qty: 30 G | Refills: 0 | Status: SHIPPED | OUTPATIENT
Start: 2019-06-20 | End: 2019-07-31

## 2019-06-20 RX ORDER — DIPHENHYDRAMINE HCL 25 MG
25 TABLET ORAL EVERY 6 HOURS PRN
Qty: 30 TABLET | Refills: 0 | Status: SHIPPED | OUTPATIENT
Start: 2019-06-20 | End: 2020-09-28 | Stop reason: ALTCHOICE

## 2019-06-20 RX ORDER — ASPIRIN 81 MG/1
81 TABLET, COATED ORAL DAILY
Qty: 30 TABLET | Refills: 2 | Status: SHIPPED | OUTPATIENT
Start: 2019-06-20 | End: 2020-09-09 | Stop reason: SDUPTHER

## 2019-07-05 ENCOUNTER — OFFICE VISIT (OUTPATIENT)
Dept: FAMILY MEDICINE CLINIC | Facility: CLINIC | Age: 59
End: 2019-07-05

## 2019-07-05 VITALS
RESPIRATION RATE: 16 BRPM | WEIGHT: 238 LBS | HEART RATE: 77 BPM | SYSTOLIC BLOOD PRESSURE: 122 MMHG | BODY MASS INDEX: 44.97 KG/M2 | DIASTOLIC BLOOD PRESSURE: 80 MMHG | TEMPERATURE: 96.5 F | OXYGEN SATURATION: 96 %

## 2019-07-05 DIAGNOSIS — L85.3 DRY SKIN: Primary | ICD-10-CM

## 2019-07-05 DIAGNOSIS — M19.90 ARTHRITIS: ICD-10-CM

## 2019-07-05 PROCEDURE — 99213 OFFICE O/P EST LOW 20 MIN: CPT | Performed by: FAMILY MEDICINE

## 2019-07-05 RX ORDER — DIPHENHYDRAMINE HYDROCHLORIDE 25 MG/1
CAPSULE ORAL
Refills: 0 | COMMUNITY
Start: 2019-06-20 | End: 2019-07-31

## 2019-07-05 RX ORDER — SENNOSIDES 8.6 MG
650 CAPSULE ORAL EVERY 8 HOURS PRN
Qty: 30 TABLET | Refills: 2 | Status: SHIPPED | OUTPATIENT
Start: 2019-07-05 | End: 2020-09-09 | Stop reason: SDUPTHER

## 2019-07-05 RX ORDER — CLOTRIMAZOLE AND BETAMETHASONE DIPROPIONATE 10; .64 MG/G; MG/G
CREAM TOPICAL 2 TIMES DAILY
Qty: 30 G | Refills: 1 | Status: SHIPPED | OUTPATIENT
Start: 2019-07-05 | End: 2020-09-28 | Stop reason: ALTCHOICE

## 2019-07-05 RX ORDER — SENNOSIDES 8.6 MG
650 CAPSULE ORAL EVERY 8 HOURS PRN
COMMUNITY
End: 2019-07-05 | Stop reason: SDUPTHER

## 2019-07-05 NOTE — PROGRESS NOTES
Assessment/Plan:    Dry skin  - Will prescribe trial of Lotrisone to apply to the feet twice a day  - Encouraged patient to keep feet moisturized and if possible avoid wearing socks and tight shoes        Diagnoses and all orders for this visit:    Dry skin  -     clotrimazole-betamethasone (LOTRISONE) 1-0 05 % cream; Apply topically 2 (two) times a day    Arthritis  -     acetaminophen (TYLENOL) 650 mg CR tablet; Take 1 tablet (650 mg total) by mouth every 8 (eight) hours as needed for mild pain    Other orders  -     Cancel: Ambulatory referral to Gastroenterology; Future  -     BANOPHEN 25 MG capsule; take 1 capsule by mouth every 6 hours if needed for ITCHING  -     Discontinue: acetaminophen (TYLENOL) 650 mg CR tablet; Take 650 mg by mouth every 8 (eight) hours as needed          Subjective:      Patient ID: Val Maradiaga is a 62 y o  female  HPI    Val Maradiaga is a 62year old female who presents today with a chief complaint of dry skin on her feet Patient states that she has been experiencing this problem for the past month and was prescribed a steroid cream by her former PCP and amlactin by podiatry  Patient states that the amlactin lotion did not help and although she noticed some improvement with the steroid cream it caused her feet itch  Patient denies any loss of sensation or paresthesias but complains of intermittent pain  Review of Systems   Constitutional: Negative for activity change, appetite change, chills and fever  HENT: Negative for congestion, ear pain, rhinorrhea, sinus pain and sore throat  Eyes: Negative for pain and visual disturbance  Respiratory: Negative for cough, chest tightness, shortness of breath and wheezing  Cardiovascular: Negative for chest pain, palpitations and leg swelling  Gastrointestinal: Negative for abdominal pain, constipation, diarrhea, nausea and vomiting  Endocrine: Negative for cold intolerance, heat intolerance and polyuria  Genitourinary: Negative for difficulty urinating, dysuria and pelvic pain  Musculoskeletal: Negative for arthralgias, back pain, gait problem and myalgias  Skin: Negative for color change, pallor, rash and wound  Dry skin    Neurological: Negative for dizziness, weakness, light-headedness, numbness and headaches  Hematological: Negative for adenopathy  Does not bruise/bleed easily  Psychiatric/Behavioral: Negative for agitation, behavioral problems, confusion, decreased concentration and dysphoric mood  The patient is not nervous/anxious  Objective:      /80 (BP Location: Left arm, Patient Position: Sitting, Cuff Size: Standard)   Pulse 77   Temp (!) 96 5 °F (35 8 °C) (Temporal)   Resp 16   Wt 108 kg (238 lb)   LMP  (LMP Unknown)   SpO2 96%   BMI 44 97 kg/m²          Physical Exam   Constitutional: She is oriented to person, place, and time  She appears well-developed and well-nourished  No distress  HENT:   Head: Normocephalic and atraumatic  Nose: Nose normal    Mouth/Throat: Oropharynx is clear and moist    Eyes: Pupils are equal, round, and reactive to light  Conjunctivae and EOM are normal  Right eye exhibits no discharge  Left eye exhibits no discharge  No scleral icterus  Neck: Normal range of motion  Neck supple  Cardiovascular: Normal rate, normal heart sounds and intact distal pulses  Pulmonary/Chest: Effort normal and breath sounds normal  No respiratory distress  Abdominal: Soft  Bowel sounds are normal  She exhibits no distension  There is no tenderness  Musculoskeletal: Normal range of motion  She exhibits no edema, tenderness or deformity  Feet:   Right Foot:   Skin Integrity: Positive for dry skin  Left Foot:   Skin Integrity: Positive for dry skin  Neurological: She is alert and oriented to person, place, and time  She has normal reflexes  Skin: Skin is dry  No rash noted  She is not diaphoretic  No erythema  No pallor     See picture below Psychiatric: She has a normal mood and affect   Her behavior is normal

## 2019-07-05 NOTE — ASSESSMENT & PLAN NOTE
- Will prescribe trial of Lotrisone to apply to the feet twice a day  - Encouraged patient to keep feet moisturized and if possible avoid wearing socks and tight shoes

## 2019-07-08 ENCOUNTER — HOSPITAL ENCOUNTER (OUTPATIENT)
Dept: NON INVASIVE DIAGNOSTICS | Facility: HOSPITAL | Age: 59
Discharge: HOME | End: 2019-07-08
Payer: COMMERCIAL

## 2019-07-08 DIAGNOSIS — R60.9 EDEMA, UNSPECIFIED TYPE: ICD-10-CM

## 2019-07-08 PROCEDURE — 93306 TTE W/DOPPLER COMPLETE: CPT | Performed by: INTERNAL MEDICINE

## 2019-07-08 PROCEDURE — 93306 TTE W/DOPPLER COMPLETE: CPT

## 2019-07-09 ENCOUNTER — TELEPHONE (OUTPATIENT)
Dept: SLEEP CENTER | Facility: CLINIC | Age: 59
End: 2019-07-09

## 2019-07-09 DIAGNOSIS — D50.8 OTHER IRON DEFICIENCY ANEMIA: ICD-10-CM

## 2019-07-09 DIAGNOSIS — R79.0 LOW SERUM FERRITIN LEVEL: Primary | ICD-10-CM

## 2019-07-09 NOTE — TELEPHONE ENCOUNTER
Pt wants to know if it is ok for her to donate blood  She knows Destini Bacon is on vacation and states she will wait for Destini Bacon to return to get an answer  Constanza Lundberg

## 2019-07-15 NOTE — TELEPHONE ENCOUNTER
Spoke with patient via phone regarding question about donating blood  Ferritin level was noted to be 7 back in March  Patient completed venofer treatment  Follow up ferritin level ordered (patient accidentally completed the follow up testing almost immediately after last level in March)  She is currently also taking oral iron tablets every other day  Will follow up regarding maintenance iron treatment after ferritin results are reviewed  I advised her to hold off on donating blood with her iron metabolism issue

## 2019-07-17 ENCOUNTER — APPOINTMENT (OUTPATIENT)
Dept: LAB | Facility: HOSPITAL | Age: 59
End: 2019-07-17
Payer: COMMERCIAL

## 2019-07-17 DIAGNOSIS — D50.8 OTHER IRON DEFICIENCY ANEMIA: ICD-10-CM

## 2019-07-17 DIAGNOSIS — R79.0 LOW SERUM FERRITIN LEVEL: ICD-10-CM

## 2019-07-17 LAB — FERRITIN SERPL-MCNC: 182 NG/ML (ref 8–388)

## 2019-07-17 PROCEDURE — 82728 ASSAY OF FERRITIN: CPT

## 2019-07-17 PROCEDURE — 36415 COLL VENOUS BLD VENIPUNCTURE: CPT

## 2019-07-25 ENCOUNTER — APPOINTMENT (EMERGENCY)
Dept: RADIOLOGY | Facility: HOSPITAL | Age: 59
End: 2019-07-25
Payer: COMMERCIAL

## 2019-07-25 ENCOUNTER — HOSPITAL ENCOUNTER (EMERGENCY)
Facility: HOSPITAL | Age: 59
Discharge: HOME | End: 2019-07-25
Payer: COMMERCIAL

## 2019-07-25 VITALS
HEART RATE: 53 BPM | BODY MASS INDEX: 45.69 KG/M2 | WEIGHT: 242 LBS | SYSTOLIC BLOOD PRESSURE: 167 MMHG | TEMPERATURE: 96 F | DIASTOLIC BLOOD PRESSURE: 58 MMHG | OXYGEN SATURATION: 97 % | HEIGHT: 61 IN | RESPIRATION RATE: 16 BRPM

## 2019-07-25 DIAGNOSIS — M79.672 ARCH PAIN OF LEFT FOOT: Primary | ICD-10-CM

## 2019-07-25 DIAGNOSIS — M79.673 FOOT PAIN: ICD-10-CM

## 2019-07-25 PROCEDURE — 73630 X-RAY EXAM OF FOOT: CPT

## 2019-07-25 PROCEDURE — 99282 EMERGENCY DEPT VISIT SF MDM: CPT | Performed by: EMERGENCY MEDICINE

## 2019-07-25 PROCEDURE — 99283 EMERGENCY DEPT VISIT LOW MDM: CPT

## 2019-07-25 NOTE — ED NOTES
Mild acute swelling noted to left foot, medial dorsal aspect       Sangeeta Bearden RN  07/25/19 2318

## 2019-07-25 NOTE — ED RE-EVALUATION NOTE
Patient cardiac dressed and ready to be discharged  She came with a cane, wearing slippers  We will provide an Ace wrap    She does have good follow-up with Podiatry at the [facility] [place], [place], DO  07/25/19 9506

## 2019-07-25 NOTE — ED PROVIDER NOTES
History  Chief Complaint   Patient presents with    Ankle Pain     "I started Tuesday with left ankle pain, swelling on Wednesday, denies any trauma, last took 650mg Tylenol at 0001 hours "     This is a pleasant 13-year-old female who presents with left mid foot pain  She states that it began Tuesday afternoon and has persisted  She denies any ankle pain  Patient states that she wears an orthotic prescribed by her podiatrist for arch support  She reports that she has been unable to wear the orthotic lately  She states that she had some swelling in the foot and the sneaker did not fit  She has been wearing non supportive slippers  She denies any toe pain  She denies redness or pain to the touch  She reports no knee pain, or ankle pain  She reports no recent injuries  She does not have a history of gout  Denies fever, chills, nausea or vomiting  Prior to Admission Medications   Prescriptions Last Dose Informant Patient Reported? Taking?    ASPIRIN LOW DOSE 81 MG EC tablet   No No   Sig: Take 1 tablet (81 mg total) by mouth daily   BANOPHEN 25 MG capsule   Yes No   Sig: take 1 capsule by mouth every 6 hours if needed for ITCHING   RA VITAMIN C 500 MG tablet   No No   Sig: Take 1 tablet (500 mg total) by mouth daily   acetaminophen (TYLENOL) 650 mg CR tablet   No No   Sig: Take 1 tablet (650 mg total) by mouth every 8 (eight) hours as needed for mild pain   albuterol (VENTOLIN HFA) 90 mcg/act inhaler   No No   Sig: Inhale 2 puffs every 6 (six) hours as needed for wheezing   ammonium lactate (AMLACTIN) 12 % lotion   Yes No   Sig: Every 12 hours   atorvastatin (LIPITOR) 40 mg tablet   No No   Sig: take 1 tablet by mouth once daily   azelastine (ASTELIN) 0 1 % nasal spray   No No   Si spray into each nostril 2 (two) times a day Use in each nostril as directed   cholecalciferol (VITAMIN D3) 1,000 units tablet   No No   Sig: Take 1 tablet (1,000 Units total) by mouth daily clotrimazole-betamethasone (LOTRISONE) 1-0 05 % cream   No No   Sig: Apply topically 2 (two) times a day   diphenhydrAMINE (BENADRYL) 25 mg tablet   No No   Sig: Take 1 tablet (25 mg total) by mouth every 6 (six) hours as needed for itching   ferrous sulfate 325 (65 FE) MG EC tablet   No No   Sig: Take 1 tablet (325 mg total) by mouth every other day   furosemide (LASIX) 20 mg tablet   No No   Sig: Take 1 tablet (20 mg total) by mouth every other day   iron sucrose (VENOFER) 20 mg/mL   No No   Sig: Infuse 10ml (200mg) in 100ml of 0 9%saline over 1 hr   Call for any signs of hypersensitivity  Observe for 30 minutes after infusion  D/C when stable  Plan repeat weekly for a total of 5 doses     Patient not taking: Reported on 6/20/2019   nicotine (NICOTROL) 10 MG inhaler   No No   Sig: Inhale 1 puff as needed for smoking cessation   Patient not taking: Reported on 3/25/2019   nystatin (MYCOSTATIN) powder   No No   Sig: Apply topically 2 (two) times a day   pantoprazole (PROTONIX) 40 mg tablet   No No   Sig: take 1 tablet by mouth once daily   propranolol (INDERAL) 40 mg tablet   No No   Sig: Take 1 tablet (40 mg total) by mouth every 12 (twelve) hours   triamcinolone (KENALOG) 0 1 % cream   No No   Sig: Apply topically 2 (two) times a day X 7-10 days      Facility-Administered Medications: None       Past Medical History:   Diagnosis Date    Anemia     Anxiety     Arthritis     Back pain     Cancer (HCC)     Depression     Diabetes mellitus ([facility] [address] )     Pre- Diabetic    Dyslipidemia     Essential tremor     Excessive daytime sleepiness     GERD (gastroesophageal reflux disease)     History of uterine cancer     Hyperglycemia     Hypertension     Hypovitaminosis D     Iron deficiency anemia     Joint pain     Mood disorder (HCC)     Obesity     Obstructive sleep apnea     Ringing in ears     Snoring     Witnessed episode of apnea        Past Surgical History:   Procedure Laterality Date    APPENDECTOMY      HYSTERECTOMY      KNEE SURGERY      Meniscus tear    TONSILLECTOMY      TUBAL LIGATION         Family History   Problem Relation Age of Onset    Diabetes Mother     Asthma Mother     Hypertension Mother     Heart disease Mother      I have reviewed and agree with the history as documented  Social History     Tobacco Use    Smoking status: Current Every Day Smoker     Packs/day: 0 50    Smokeless tobacco: Former User   Substance Use Topics    Alcohol use: Yes     Comment: very seldom    Drug use: No        Review of Systems   Constitutional: Negative  Negative for activity change, appetite change and chills  HENT: Negative  Eyes: Negative  Respiratory: Negative  Cardiovascular: Negative  Gastrointestinal: Negative  Endocrine: Negative  Genitourinary: Negative  Musculoskeletal: Positive for joint swelling and myalgias  Skin: Negative  Allergic/Immunologic: Negative  Neurological: Negative  Hematological: Negative  Psychiatric/Behavioral: Negative  Physical Exam  Physical Exam   Constitutional: She is oriented to person, place, and time  Vital signs are normal  She appears well-developed  HENT:   Head: Normocephalic and atraumatic  Eyes: Pupils are equal, round, and reactive to light  EOM and lids are normal    Neck: Normal range of motion  Normal carotid pulses present  Carotid bruit is not present  Cardiovascular: Normal rate, regular rhythm, normal heart sounds and intact distal pulses  Exam reveals no gallop and no friction rub  No murmur heard  Pulmonary/Chest: Effort normal and breath sounds normal  No respiratory distress  Abdominal: Soft  Bowel sounds are normal    Musculoskeletal: She exhibits edema and tenderness  Left foot: There is decreased range of motion and deformity  Feet:   Left Foot:   Skin Integrity: Positive for callus and dry skin  Negative for ulcer, blister, skin breakdown or warmth     Neurological: She is alert and oriented to person, place, and time  Skin: Skin is warm, dry and intact  She is not diaphoretic  No pallor  Psychiatric: She has a normal mood and affect  Her behavior is normal  Judgment and thought content normal  Her mood appears not anxious  Her affect is not blunt  Vitals reviewed  Vital Signs  ED Triage Vitals [07/25/19 0342]   Temperature Pulse Respirations Blood Pressure SpO2   (!) 96 °F (35 6 °C) (!) 53 16 167/58 97 %      Temp Source Heart Rate Source Patient Position - Orthostatic VS BP Location FiO2 (%)   Tympanic Monitor Sitting Left arm --      Pain Score       4           Vitals:    07/25/19 0342   BP: 167/58   Pulse: (!) 53   Patient Position - Orthostatic VS: Sitting         Visual Acuity      ED Medications  Medications - No data to display    Diagnostic Studies  Results Reviewed     None                 XR foot 3+ views LEFT    (Results Pending)              Procedures  Procedures       ED Course                               MDM  Number of Diagnoses or Management Options  Diagnosis management comments: Patient has been noncompliant with her orthotic for arch support  She states that she has been walking around and slippers, which over no support  Patient denies any skin warmth or ankle pain  She reports no knee pain  Reports no calf tenderness or shortness of breath  Xray of foot negative      Disposition  Final diagnoses:   None     ED Disposition     None      Follow-up Information    None         Patient's Medications   Discharge Prescriptions    No medications on file     No discharge procedures on file      ED Provider  Electronically Signed by           Aby Whipple DO  07/25/19 9999

## 2019-07-25 NOTE — DISCHARGE INSTRUCTIONS
Please follow-up with your podiatrist by calling to make an appointment today     Make sure that you where you are fitted orthotic foot supports  Return to the emergency department with any change in or worsening of symptoms

## 2019-07-31 ENCOUNTER — TELEPHONE (OUTPATIENT)
Dept: FAMILY MEDICINE CLINIC | Facility: CLINIC | Age: 59
End: 2019-07-31

## 2019-07-31 ENCOUNTER — OFFICE VISIT (OUTPATIENT)
Dept: FAMILY MEDICINE CLINIC | Facility: CLINIC | Age: 59
End: 2019-07-31

## 2019-07-31 VITALS
DIASTOLIC BLOOD PRESSURE: 82 MMHG | OXYGEN SATURATION: 97 % | SYSTOLIC BLOOD PRESSURE: 126 MMHG | TEMPERATURE: 96.1 F | WEIGHT: 235.6 LBS | BODY MASS INDEX: 44.52 KG/M2 | HEART RATE: 62 BPM | RESPIRATION RATE: 16 BRPM

## 2019-07-31 DIAGNOSIS — R60.0 LOCALIZED EDEMA: ICD-10-CM

## 2019-07-31 DIAGNOSIS — E66.01 CLASS 3 SEVERE OBESITY DUE TO EXCESS CALORIES WITHOUT SERIOUS COMORBIDITY WITH BODY MASS INDEX (BMI) OF 40.0 TO 44.9 IN ADULT (HCC): ICD-10-CM

## 2019-07-31 DIAGNOSIS — Z99.89 OBSTRUCTIVE SLEEP APNEA TREATED WITH CONTINUOUS POSITIVE AIRWAY PRESSURE (CPAP): ICD-10-CM

## 2019-07-31 DIAGNOSIS — G89.29 CHRONIC MIDLINE LOW BACK PAIN WITHOUT SCIATICA: ICD-10-CM

## 2019-07-31 DIAGNOSIS — F33.1 MODERATE EPISODE OF RECURRENT MAJOR DEPRESSIVE DISORDER (HCC): Primary | ICD-10-CM

## 2019-07-31 DIAGNOSIS — G47.33 OBSTRUCTIVE SLEEP APNEA TREATED WITH CONTINUOUS POSITIVE AIRWAY PRESSURE (CPAP): ICD-10-CM

## 2019-07-31 DIAGNOSIS — M54.50 CHRONIC MIDLINE LOW BACK PAIN WITHOUT SCIATICA: ICD-10-CM

## 2019-07-31 DIAGNOSIS — Z72.0 TOBACCO ABUSE: ICD-10-CM

## 2019-07-31 DIAGNOSIS — Z12.39 SCREENING FOR BREAST CANCER: ICD-10-CM

## 2019-07-31 PROBLEM — M19.90 ARTHRITIS: Status: RESOLVED | Noted: 2019-05-30 | Resolved: 2019-07-31

## 2019-07-31 PROBLEM — L85.3 DRY SKIN: Status: RESOLVED | Noted: 2019-07-05 | Resolved: 2019-07-31

## 2019-07-31 PROBLEM — R06.83 SNORING: Status: RESOLVED | Noted: 2018-06-05 | Resolved: 2019-07-31

## 2019-07-31 PROBLEM — R10.11 RIGHT UPPER QUADRANT ABDOMINAL PAIN: Status: RESOLVED | Noted: 2019-02-25 | Resolved: 2019-07-31

## 2019-07-31 PROBLEM — G47.21 CIRCADIAN RHYTHM SLEEP DISORDER, DELAYED SLEEP PHASE TYPE: Status: RESOLVED | Noted: 2018-06-05 | Resolved: 2019-07-31

## 2019-07-31 PROBLEM — I47.29 NSVT (NONSUSTAINED VENTRICULAR TACHYCARDIA): Status: RESOLVED | Noted: 2018-10-28 | Resolved: 2019-07-31

## 2019-07-31 PROBLEM — I47.2 NSVT (NONSUSTAINED VENTRICULAR TACHYCARDIA) (HCC): Status: RESOLVED | Noted: 2018-10-28 | Resolved: 2019-07-31

## 2019-07-31 PROBLEM — L30.4 INTERTRIGO: Status: RESOLVED | Noted: 2018-08-09 | Resolved: 2019-07-31

## 2019-07-31 PROBLEM — R07.89 OTHER CHEST PAIN: Status: RESOLVED | Noted: 2018-10-28 | Resolved: 2019-07-31

## 2019-07-31 PROBLEM — Z23 ENCOUNTER FOR IMMUNIZATION: Status: RESOLVED | Noted: 2018-10-18 | Resolved: 2019-07-31

## 2019-07-31 PROBLEM — R21 RASH: Status: RESOLVED | Noted: 2019-06-20 | Resolved: 2019-07-31

## 2019-07-31 PROCEDURE — 99213 OFFICE O/P EST LOW 20 MIN: CPT | Performed by: FAMILY MEDICINE

## 2019-07-31 RX ORDER — METHYLPREDNISOLONE 4 MG/1
TABLET ORAL
Refills: 0 | COMMUNITY
Start: 2019-07-26 | End: 2019-09-05

## 2019-07-31 NOTE — ASSESSMENT & PLAN NOTE
Follows with Dr Moris Blood (pain specialist)  Receives nerve blocks and reports they are affective

## 2019-07-31 NOTE — ASSESSMENT & PLAN NOTE
Trace pitting edema to B/L LE up to shins  Currently on Lasix 20mg every other day with modest relief  Echo results discussed with patient- WNL  Advised to stop with lasix - can use if worsening edema  Advised to elevate legs when she is resting  Consider use of compression stockings

## 2019-07-31 NOTE — ASSESSMENT & PLAN NOTE
Currently not using CPAP 100% of time as she has a runny nose that has been refractory to treatment with multiple intranasal sprays (patient unable to recall names)  She does have a follow-up appointment with her sleep specialist in few weeks

## 2019-07-31 NOTE — ASSESSMENT & PLAN NOTE
Follows with Dr Freddy Sandoval at [facility]  Currently denies any SI/HI  Also reports good relationship with therapist Oscar Cowart  She currently is not taking any meds as her depression is well controlled with CBT  She also has a therapy dog

## 2019-07-31 NOTE — PROGRESS NOTES
Assessment/Plan:    Depression  Follows with Dr Clare Hassan at [facility]  Currently denies any SI/HI  Also reports good relationship with therapist Herber Villagomez  She currently is not taking any meds as her depression is well controlled with CBT  She also has a therapy dog  Low back pain  Follows with Dr Deric Butt (pain specialist)  Receives nerve blocks and reports they are affective  Tobacco abuse  Currently smokes 1/2 ppd daily  Reports it is difficult to quit as  smokes with her  Currently not ready to quit  Obstructive sleep apnea treated with continuous positive airway pressure (CPAP)  Currently not using CPAP 100% of time as she has a runny nose that has been refractory to treatment with multiple intranasal sprays (patient unable to recall names)  She does have a follow-up appointment with her sleep specialist in few weeks  Edema  Trace pitting edema to B/L LE up to shins  Currently on Lasix 20mg every other day with modest relief  Echo results discussed with patient- WNL  Advised to stop with lasix - can use if worsening edema  Advised to elevate legs when she is resting  Consider use of compression stockings  Diagnoses and all orders for this visit:    Moderate episode of recurrent major depressive disorder (HCC)    Chronic midline low back pain without sciatica    Tobacco abuse    Class 3 severe obesity due to excess calories without serious comorbidity with body mass index (BMI) of 40 0 to 44 9 in adult ([address] )  -     HEMOGLOBIN A1C W/ EAG ESTIMATION; Future  -     Lipid Panel with Direct LDL reflex; Future  -     TSH, 3rd generation with Free T4 reflex; Future    Screening for breast cancer  -     Mammo screening bilateral w cad;  Future    Obstructive sleep apnea treated with continuous positive airway pressure (CPAP)    Localized edema    Other orders  -     methylPREDNISolone 4 MG tablet therapy pack; use as directed for 6 days          Subjective:      Patient ID: Bashir Vitale is a 62 y o  female  Patient is a 55-year-old female with past medical history significant for COPD, HLD, tobacco abuse, morbid obesity, YUE and LE edema presents for follow-up  She was seen by for PCP on 05/30/2019 for concerns of lower extremity edema and echocardiogram was ordered to rule out CHF  Echo results noted below:  Normal left ventricular systolic function, EF 14%  Normal left ventricular cavity size  Normal left ventricular wall thickness  Normal left ventricular wall motion without regional wall motion abnormalities  Normal left ventricular  diastolic function  RIGHT VENTRICLE: The size was normal  Systolic function was normal  Wall thickness was normal    Denies personal history of MI or heart disease  Denies history of strokes  Denies Fhx of heart disease or MI at young age  Lab work from 07/17/2019 reviewed with patient ferritin of 182    05/30/[id number]      The following portions of the patient's history were reviewed and updated as appropriate: allergies, current medications, past family history, past medical history, past social history, past surgical history and problem list     Review of Systems   Constitutional: Negative for chills and fever  Respiratory: Negative for cough and shortness of breath  Cardiovascular: Negative for chest pain and palpitations  Endocrine: Negative for polyphagia and polyuria  Musculoskeletal: Negative for back pain  Neurological: Negative for light-headedness, numbness and headaches  Objective:      /82 (BP Location: Left arm, Patient Position: Sitting, Cuff Size: Large)   Pulse 62   Temp (!) 96 1 °F (35 6 °C) (Temporal)   Resp 16   Wt 107 kg (235 lb 9 6 oz)   LMP  (LMP Unknown)   SpO2 97%   BMI 44 52 kg/m²          Physical Exam   Constitutional: She appears well-developed and well-nourished  HENT:   Head: Normocephalic and atraumatic  Eyes: EOM are normal    Neck: Normal range of motion   Neck supple  Cardiovascular: Normal rate and normal heart sounds  Pulmonary/Chest: Effort normal and breath sounds normal  No respiratory distress  Abdominal: Soft  Bowel sounds are normal  She exhibits no distension  Musculoskeletal:   Trace pitting edema up to shins  Neurological: She is alert  Skin: Skin is warm and dry  Psychiatric: She has a normal mood and affect

## 2019-07-31 NOTE — TELEPHONE ENCOUNTER
Pt given all info    mammo apt([phone]) is on 8/26/2019 at [facility], [address], [place]

## 2019-07-31 NOTE — ASSESSMENT & PLAN NOTE
Currently smokes 1/2 ppd daily  Reports it is difficult to quit as  smokes with her  Currently not ready to quit

## 2019-08-02 DIAGNOSIS — M19.90 ARTHRITIS: ICD-10-CM

## 2019-08-03 RX ORDER — ACETAMINOPHEN ER 650 MG TABLET,EXTENDED RELEASE 650 MG
TABLET, EXTENDED RELEASE ORAL
Qty: 30 TABLET | Refills: 2 | OUTPATIENT
Start: 2019-08-03

## 2019-08-08 ENCOUNTER — APPOINTMENT (OUTPATIENT)
Dept: LAB | Facility: HOSPITAL | Age: 59
End: 2019-08-08
Payer: COMMERCIAL

## 2019-08-08 DIAGNOSIS — E66.01 CLASS 3 SEVERE OBESITY DUE TO EXCESS CALORIES WITHOUT SERIOUS COMORBIDITY WITH BODY MASS INDEX (BMI) OF 40.0 TO 44.9 IN ADULT (HCC): ICD-10-CM

## 2019-08-08 LAB
CHOLEST SERPL-MCNC: 180 MG/DL
EST. AVERAGE GLUCOSE BLD GHB EST-MCNC: 131 MG/DL
HBA1C MFR BLD: 6.2 % (ref 4.2–6.3)
HDLC SERPL-MCNC: 44 MG/DL (ref 40–59)
LDLC SERPL CALC-MCNC: 113 MG/DL
TRIGL SERPL-MCNC: 114 MG/DL
TSH SERPL DL<=0.05 MIU/L-ACNC: 1.85 UIU/ML (ref 0.47–4.68)

## 2019-08-08 PROCEDURE — 80061 LIPID PANEL: CPT

## 2019-08-08 PROCEDURE — 84443 ASSAY THYROID STIM HORMONE: CPT

## 2019-08-08 PROCEDURE — 83036 HEMOGLOBIN GLYCOSYLATED A1C: CPT

## 2019-08-08 PROCEDURE — 36415 COLL VENOUS BLD VENIPUNCTURE: CPT

## 2019-08-11 DIAGNOSIS — E55.9 VITAMIN D DEFICIENCY: ICD-10-CM

## 2019-08-12 RX ORDER — MELATONIN
Qty: 30 TABLET | Refills: 5 | Status: SHIPPED | OUTPATIENT
Start: 2019-08-12 | End: 2020-12-15 | Stop reason: SDUPTHER

## 2019-08-26 ENCOUNTER — HOSPITAL ENCOUNTER (OUTPATIENT)
Dept: MAMMOGRAPHY | Facility: CLINIC | Age: 59
Discharge: HOME | End: 2019-08-26
Payer: COMMERCIAL

## 2019-08-26 ENCOUNTER — TELEPHONE (OUTPATIENT)
Dept: FAMILY MEDICINE CLINIC | Facility: CLINIC | Age: 59
End: 2019-08-26

## 2019-08-26 VITALS — WEIGHT: 235 LBS | HEIGHT: 61 IN | BODY MASS INDEX: 44.37 KG/M2

## 2019-08-26 DIAGNOSIS — F33.1 MAJOR DEPRESSIVE DISORDER, RECURRENT EPISODE, MODERATE (HCC): Primary | ICD-10-CM

## 2019-08-26 DIAGNOSIS — Z12.39 SCREENING FOR BREAST CANCER: ICD-10-CM

## 2019-08-26 DIAGNOSIS — D50.1 IRON DEFICIENCY ANEMIA DUE TO SIDEROPENIC DYSPHAGIA: ICD-10-CM

## 2019-08-26 PROCEDURE — 77067 SCR MAMMO BI INCL CAD: CPT

## 2019-08-26 RX ORDER — LANOLIN ALCOHOL/MO/W.PET/CERES
325 CREAM (GRAM) TOPICAL EVERY OTHER DAY
Qty: 90 TABLET | Refills: 1 | Status: SHIPPED | OUTPATIENT
Start: 2019-08-26 | End: 2020-02-13 | Stop reason: SDUPTHER

## 2019-09-03 NOTE — PROGRESS NOTES
Assessment/Plan:    Iron deficiency anemia  Differential diagnosis includes for dietary intake as she is postmenopausal now  Advised to stop with iron supplements as hemoglobin is in normal range from blood work in March, 2019  Patient stated that she was placed on iron supplements by her sleep doctor  She does have a follow-up appointment with them at the end of the month and she will discuss that with sleep specialist     Tobacco abuse  Discussed against harmful affects of smoking with patient including various cancers  Offered nicotine replacement however she is not ready to quit yet  Advised patient that she is at high risk for developing cancer because of her history of smoking  Will reassess at next visit  Screening for colon cancer  Discussed with patient that the USPSTF recommends screening for colorectal cancer starting at age 48 years and continuing until age 76 years  Patient has agreed to undergo testing at this time  All questions were answered  Per GI recommendations, new referral placed for the colonoscopy  Diagnoses and all orders for this visit:    Colon cancer screening  -     Ambulatory referral to Gastroenterology; Future    Screening for colon cancer    Iron deficiency anemia due to chronic blood loss    Tobacco abuse    Other orders  -     Cancel: Ambulatory referral to Gastroenterology; Future  -     Discontinue: predniSONE 10 mg tablet; take 1 tablet by mouth once daily with meals for 10 days          Subjective:      Patient ID: Gem Ivey is a 62 y o  female  Patient is a 75-year-old female with past medical history significant for COPD, HLD, tobacco abuse, morbid obesity, YUE (on CPAP), depression and low back pain who returns for follow-up  Health Maintenance:  -Cervical cancer screening:   Completed in October, 2018-WNL  -Breast cancer screening:   Completed in August, 2019-WNL  -Colon cancer screening: completed in March, 2016 - 2 polyps were removed  GI recommend for repeat in 3 years  Will make new referral today  - tobacco abuse:  Currently smokes half a pack per day with   Not ready to quit  -Denies alcohol, or drug abuse      Blood work from August, 2019 reviewed with patient in depth  Normal TSH, A1c of 6 2-stable from A1c of 6 1 in October, 2018  Iron deficiency anemia  She has been on ferrous sulfate 325 mg along with vitamin-C every other day  Blood work from March, 2019 reviewed with patient hemoglobin of 12 5, hematocrit 41  The following portions of the patient's history were reviewed and updated as appropriate: allergies, current medications, past family history, past medical history, past surgical history and problem list     Review of Systems   Constitutional: Negative for chills and fever  Respiratory: Negative for cough and shortness of breath  Cardiovascular: Negative for chest pain and palpitations  Endocrine: Negative for polyphagia and polyuria  Musculoskeletal: Negative for back pain  Neurological: Negative for light-headedness, numbness and headaches  Objective:      /76 (BP Location: Right arm, Patient Position: Sitting, Cuff Size: Large)   Pulse (!) 107   Temp (!) 97 °F (36 1 °C) (Temporal)   Resp 16   Wt 107 kg (235 lb)   LMP  (LMP Unknown)   SpO2 95%   BMI 44 40 kg/m²          Physical Exam   Constitutional: She appears well-developed and well-nourished  HENT:   Head: Normocephalic and atraumatic  Eyes: EOM are normal    Neck: Normal range of motion  Neck supple  Cardiovascular: Normal rate and normal heart sounds  Pulmonary/Chest: Effort normal and breath sounds normal  No respiratory distress  Abdominal: Soft  Bowel sounds are normal  She exhibits no distension  Neurological: She is alert  Skin: Skin is warm and dry  Psychiatric: She has a normal mood and affect

## 2019-09-05 ENCOUNTER — OFFICE VISIT (OUTPATIENT)
Dept: FAMILY MEDICINE CLINIC | Facility: CLINIC | Age: 59
End: 2019-09-05

## 2019-09-05 ENCOUNTER — OFFICE VISIT (OUTPATIENT)
Dept: NEUROLOGY | Facility: CLINIC | Age: 59
End: 2019-09-05
Payer: COMMERCIAL

## 2019-09-05 VITALS
RESPIRATION RATE: 16 BRPM | TEMPERATURE: 97 F | DIASTOLIC BLOOD PRESSURE: 76 MMHG | BODY MASS INDEX: 44.4 KG/M2 | SYSTOLIC BLOOD PRESSURE: 110 MMHG | OXYGEN SATURATION: 95 % | WEIGHT: 235 LBS | HEART RATE: 107 BPM

## 2019-09-05 VITALS
HEART RATE: 86 BPM | HEIGHT: 61 IN | DIASTOLIC BLOOD PRESSURE: 79 MMHG | BODY MASS INDEX: 44.52 KG/M2 | WEIGHT: 235.8 LBS | RESPIRATION RATE: 16 BRPM | SYSTOLIC BLOOD PRESSURE: 138 MMHG

## 2019-09-05 DIAGNOSIS — G25.0 BENIGN ESSENTIAL TREMOR: Primary | ICD-10-CM

## 2019-09-05 DIAGNOSIS — F33.1 MAJOR DEPRESSIVE DISORDER, RECURRENT EPISODE, MODERATE (HCC): ICD-10-CM

## 2019-09-05 DIAGNOSIS — D50.0 IRON DEFICIENCY ANEMIA DUE TO CHRONIC BLOOD LOSS: ICD-10-CM

## 2019-09-05 DIAGNOSIS — Z12.11 COLON CANCER SCREENING: Primary | ICD-10-CM

## 2019-09-05 DIAGNOSIS — Z72.0 TOBACCO ABUSE: ICD-10-CM

## 2019-09-05 DIAGNOSIS — Z12.11 SCREENING FOR COLON CANCER: ICD-10-CM

## 2019-09-05 PROCEDURE — 99213 OFFICE O/P EST LOW 20 MIN: CPT | Performed by: FAMILY MEDICINE

## 2019-09-05 PROCEDURE — 99213 OFFICE O/P EST LOW 20 MIN: CPT | Performed by: PSYCHIATRY & NEUROLOGY

## 2019-09-05 RX ORDER — PREDNISONE 10 MG/1
TABLET ORAL
Refills: 0 | COMMUNITY
Start: 2019-08-02 | End: 2019-09-05

## 2019-09-05 RX ORDER — PRIMIDONE 50 MG/1
TABLET ORAL
Qty: 60 TABLET | Refills: 2 | Status: SHIPPED | OUTPATIENT
Start: 2019-09-05 | End: 2020-02-27 | Stop reason: SINTOL

## 2019-09-05 NOTE — ASSESSMENT & PLAN NOTE
Differential diagnosis includes for dietary intake as she is postmenopausal now  Advised to stop with iron supplements as hemoglobin is in normal range from blood work in March, 2019  Patient stated that she was placed on iron supplements by her sleep doctor    She does have a follow-up appointment with them at the end of the month and she will discuss that with sleep specialist

## 2019-09-05 NOTE — ASSESSMENT & PLAN NOTE
Discussed against harmful affects of smoking with patient including various cancers  Offered nicotine replacement however she is not ready to quit yet  Advised patient that she is at high risk for developing cancer because of her history of smoking  Will reassess at next visit

## 2019-09-05 NOTE — PATIENT INSTRUCTIONS
Continue your propranolol 40 mg twice daily unchanged for now  Begin primidone using 50 mg tablets by taking 1/2 tablet at bedtime daily  Look for possible drowsing side effects  Call in 1 week with a status report and to discuss possible further advancement in the primidone  Call before then if you have any questions or concerns

## 2019-09-05 NOTE — PROGRESS NOTES
Patient ID: Maria Victoria Romero is a 62 y o  female  Assessment/Plan:    Benign essential tremor  Unfortunately, in the recent past, she has had some increase in her tremor presence  Although still better than prior to her present treatment, she now intermittently has had exaggerated tremor which she does find at times functionally impairing  Will not increase her propranolol as in the past on higher doses of propranolol she had issues with bradycardia  As result, will look to an adjunct agent for improved tremor control  --for now continue propranolol 40 mg twice daily  --add primidone using 50 mg tablets beginning with 1/2 tablet at bedtime nightly  Advised of potential side effects, including drowsing   --asked to call the office in 1 week with a status report and to discuss possible further slow advancement in the primidone schedule  She will call before then should she have questions or concerns  --will be checking baseline CBC and CMP  She will follow up in 2 months  Subjective:    HPI  Patient, 62years of age, is being followed for her historical essential tremor disorder  When last seen she was doing well on 40 mg of propranolol twice daily  Higher doses of propranolol did result in difficulties with bradycardia  However, in the more recent past she has had difficulties with increasing tremor intermittently creating some difficulties with her function  She is tolerating her current propranolol schedule with no adverse side effects  She has minimal caffeine consumption  Recent blood work includes a normal TSH 1 850      Past Medical History:   Diagnosis Date    Anemia     Anxiety     Arthritis     Back pain     Cancer ([facility]     Depression     Diabetes mellitus ([facility] )     Pre- Diabetic    Dyslipidemia     Essential tremor     Excessive daytime sleepiness     GERD (gastroesophageal reflux disease)     History of uterine cancer     Hyperglycemia     Hypertension     Hypovitaminosis D     Iron deficiency anemia     Joint pain     Mood disorder (HCC)     Obesity     Obstructive sleep apnea     Ringing in ears     Snoring     Uterine cancer (HCC)     age 20    Witnessed episode of apnea      Past Surgical History:   Procedure Laterality Date    APPENDECTOMY      HYSTERECTOMY      age 20   [facility] KNEE SURGERY      Meniscus tear    TONSILLECTOMY      TUBAL LIGATION       Social History     Socioeconomic History    Marital status: Single     Spouse name: None    Number of children: None    Years of education: None    Highest education level: None   Occupational History    None   Social Needs    Financial resource strain: None    Food insecurity:     Worry: None     Inability: None    Transportation needs:     Medical: None     Non-medical: None   Tobacco Use    Smoking status: Current Every Day Smoker     Packs/day: 0 50    Smokeless tobacco: Former User   Substance and Sexual Activity    Alcohol use: Yes     Comment: very seldom    Drug use: No    Sexual activity: None   Lifestyle    Physical activity:     Days per week: None     Minutes per session: None    Stress: None   Relationships    Social connections:     Talks on phone: None     Gets together: None     Attends Gnosticist service: None     Active member of club or organization: None     Attends meetings of clubs or organizations: None     Relationship status: None    Intimate partner violence:     Fear of current or ex partner: None     Emotionally abused: None     Physically abused: None     Forced sexual activity: None   Other Topics Concern    None   Social History Narrative    None     Family History   Problem Relation Age of Onset    Diabetes Mother     Asthma Mother     Hypertension Mother     Heart disease Mother     No Known Problems Sister     No Known Problems Daughter     No Known Problems Maternal Grandmother     No Known Problems Paternal Grandmother     No Known Problems Sister     No Known Problems Sister     No Known Problems Daughter      Allergies   Allergen Reactions    Aspirin GI Intolerance     Can only take baby aspirin        Current Outpatient Medications:     acetaminophen (TYLENOL) 650 mg CR tablet, Take 1 tablet (650 mg total) by mouth every 8 (eight) hours as needed for mild pain, Disp: 30 tablet, Rfl: 2    albuterol (VENTOLIN HFA) 90 mcg/act inhaler, Inhale 2 puffs every 6 (six) hours as needed for wheezing, Disp: 18 g, Rfl: 1    ammonium lactate (AMLACTIN) 12 % lotion, Every 12 hours, Disp: , Rfl:     ASPIRIN LOW DOSE 81 MG EC tablet, Take 1 tablet (81 mg total) by mouth daily, Disp: 30 tablet, Rfl: 2    atorvastatin (LIPITOR) 40 mg tablet, take 1 tablet by mouth once daily, Disp: 30 tablet, Rfl: 2    cholecalciferol (VITAMIN D3) 1,000 units tablet, take 1 tablet by mouth once daily, Disp: 30 tablet, Rfl: 5    ferrous sulfate 325 (65 FE) MG EC tablet, Take 1 tablet (325 mg total) by mouth every other day, Disp: 90 tablet, Rfl: 1    nystatin (MYCOSTATIN) powder, Apply topically 2 (two) times a day, Disp: 60 g, Rfl: 0    pantoprazole (PROTONIX) 40 mg tablet, take 1 tablet by mouth once daily, Disp: 30 tablet, Rfl: 2    propranolol (INDERAL) 40 mg tablet, Take 1 tablet (40 mg total) by mouth every 12 (twelve) hours, Disp: 60 tablet, Rfl: 5    RA VITAMIN C 500 MG tablet, Take 1 tablet (500 mg total) by mouth daily, Disp: 90 tablet, Rfl: 1    clotrimazole-betamethasone (LOTRISONE) 1-0 05 % cream, Apply topically 2 (two) times a day (Patient not taking: Reported on 9/5/2019), Disp: 30 g, Rfl: 1    diphenhydrAMINE (BENADRYL) 25 mg tablet, Take 1 tablet (25 mg total) by mouth every 6 (six) hours as needed for itching (Patient not taking: Reported on 9/5/2019), Disp: 30 tablet, Rfl: 0    primidone (MYSOLINE) 50 mg tablet, By mouth take 1 tablet twice daily or as directed, Disp: 60 tablet, Rfl: 2    Objective:    Blood pressure 138/79, pulse 86, resp   rate 16, height 5' 1" (1 549 m), weight 107 kg (235 lb 12 8 oz)  Physical Exam  Head normocephalic  Eyes nonicteric  No audible anterior neck bruits  Lungs clear to auscultation  Rhythm regular  GI (abdomen) soft nontender  Bowel sounds present  No significant lower extremity edema  Neurological Exam  Alert  Pleasantly interactive  No voice tremor  Arthritic gait, utilizing cane for security  Excellent facial and general animation  Normal rest tone  No tone increase or cogwheeling with contralateral distraction maneuver  No rest tremor observed  Initially with a mild tremor of the outstretched upper extremities which became more apparent, especially on the right with extended sustention and with object holding  ROS:    Review of Systems   Constitutional: Negative  Negative for appetite change and fever  HENT: Negative  Negative for hearing loss, tinnitus, trouble swallowing and voice change  Eyes: Negative  Negative for photophobia and pain  Respiratory: Negative  Negative for shortness of breath  Cardiovascular: Negative  Negative for palpitations  Gastrointestinal: Positive for constipation  Negative for nausea and vomiting  Endocrine: Negative  Negative for cold intolerance and heat intolerance  Genitourinary: Positive for frequency and urgency  Negative for dysuria  Musculoskeletal: Negative  Negative for myalgias and neck pain  Skin: Negative  Negative for rash  Allergic/Immunologic: Negative  Neurological: Positive for tremors  Negative for dizziness, seizures, syncope, facial asymmetry, speech difficulty, weakness, light-headedness, numbness and headaches  Hematological: Negative  Does not bruise/bleed easily  Psychiatric/Behavioral: Positive for sleep disturbance  Negative for confusion and hallucinations  I personally reviewed the ROS that was entered by the medical assistant      *Please note this document was created using voice recognition software and may contain sound-alike word errors  *

## 2019-09-05 NOTE — ASSESSMENT & PLAN NOTE
Unfortunately, in the recent past, she has had some increase in her tremor presence  Although still better than prior to her present treatment, she now intermittently has had exaggerated tremor which she does find at times functionally impairing  Will not increase her propranolol as in the past on higher doses of propranolol she had issues with bradycardia  As result, will look to an adjunct agent for improved tremor control  --for now continue propranolol 40 mg twice daily  --add primidone using 50 mg tablets beginning with 1/2 tablet at bedtime nightly  Advised of potential side effects, including drowsing   --asked to call the office in 1 week with a status report and to discuss possible further slow advancement in the primidone schedule  She will call before then should she have questions or concerns  --will be checking baseline CBC and CMP

## 2019-09-06 ENCOUNTER — APPOINTMENT (OUTPATIENT)
Dept: LAB | Facility: HOSPITAL | Age: 59
End: 2019-09-06
Payer: COMMERCIAL

## 2019-09-06 DIAGNOSIS — G25.0 BENIGN ESSENTIAL TREMOR: ICD-10-CM

## 2019-09-06 LAB
ALBUMIN SERPL BCP-MCNC: 3.7 G/DL (ref 3–5.2)
ALP SERPL-CCNC: 82 U/L (ref 43–122)
ALT SERPL W P-5'-P-CCNC: 29 U/L (ref 9–52)
ANION GAP SERPL CALCULATED.3IONS-SCNC: 5 MMOL/L (ref 5–14)
AST SERPL W P-5'-P-CCNC: 28 U/L (ref 14–36)
BASOPHILS # BLD AUTO: 0 THOUSANDS/ΜL (ref 0–0.1)
BASOPHILS NFR BLD AUTO: 1 % (ref 0–1)
BILIRUB SERPL-MCNC: 0.4 MG/DL
BUN SERPL-MCNC: 20 MG/DL (ref 5–25)
CALCIUM SERPL-MCNC: 9.1 MG/DL (ref 8.4–10.2)
CHLORIDE SERPL-SCNC: 105 MMOL/L (ref 97–108)
CO2 SERPL-SCNC: 30 MMOL/L (ref 22–30)
CREAT SERPL-MCNC: 0.67 MG/DL (ref 0.6–1.2)
EOSINOPHIL # BLD AUTO: 0.1 THOUSAND/ΜL (ref 0–0.4)
EOSINOPHIL NFR BLD AUTO: 1 % (ref 0–6)
ERYTHROCYTE [DISTWIDTH] IN BLOOD BY AUTOMATED COUNT: 14.3 %
GFR SERPL CREATININE-BSD FRML MDRD: 95 ML/MIN/1.73SQ M
GLUCOSE P FAST SERPL-MCNC: 107 MG/DL (ref 70–99)
HCT VFR BLD AUTO: 42.5 % (ref 36–46)
HGB BLD-MCNC: 14.3 G/DL (ref 12–16)
LYMPHOCYTES # BLD AUTO: 1.1 THOUSANDS/ΜL (ref 0.5–4)
LYMPHOCYTES NFR BLD AUTO: 14 % (ref 25–45)
MCH RBC QN AUTO: 31.2 PG (ref 26–34)
MCHC RBC AUTO-ENTMCNC: 33.6 G/DL (ref 31–36)
MCV RBC AUTO: 93 FL (ref 80–100)
MONOCYTES # BLD AUTO: 0.5 THOUSAND/ΜL (ref 0.2–0.9)
MONOCYTES NFR BLD AUTO: 7 % (ref 1–10)
NEUTROPHILS # BLD AUTO: 6.5 THOUSANDS/ΜL (ref 1.8–7.8)
NEUTS SEG NFR BLD AUTO: 78 % (ref 45–65)
PLATELET # BLD AUTO: 292 THOUSANDS/UL (ref 150–450)
PMV BLD AUTO: 9.3 FL (ref 8.9–12.7)
POTASSIUM SERPL-SCNC: 4.5 MMOL/L (ref 3.6–5)
PROT SERPL-MCNC: 6.3 G/DL (ref 5.9–8.4)
RBC # BLD AUTO: 4.57 MILLION/UL (ref 4–5.2)
SODIUM SERPL-SCNC: 140 MMOL/L (ref 137–147)
WBC # BLD AUTO: 8.3 THOUSAND/UL (ref 4.5–11)

## 2019-09-06 PROCEDURE — 85025 COMPLETE CBC W/AUTO DIFF WBC: CPT

## 2019-09-06 PROCEDURE — 36415 COLL VENOUS BLD VENIPUNCTURE: CPT

## 2019-09-06 PROCEDURE — 80053 COMPREHEN METABOLIC PANEL: CPT

## 2019-09-07 DIAGNOSIS — D50.1 IRON DEFICIENCY ANEMIA DUE TO SIDEROPENIC DYSPHAGIA: ICD-10-CM

## 2019-09-09 ENCOUNTER — OFFICE VISIT (OUTPATIENT)
Dept: GASTROENTEROLOGY | Facility: MEDICAL CENTER | Age: 59
End: 2019-09-09
Payer: COMMERCIAL

## 2019-09-09 VITALS
DIASTOLIC BLOOD PRESSURE: 72 MMHG | TEMPERATURE: 96.4 F | HEART RATE: 77 BPM | BODY MASS INDEX: 44.56 KG/M2 | WEIGHT: 236 LBS | HEIGHT: 61 IN | SYSTOLIC BLOOD PRESSURE: 118 MMHG

## 2019-09-09 DIAGNOSIS — Z12.11 COLON CANCER SCREENING: ICD-10-CM

## 2019-09-09 PROCEDURE — 99244 OFF/OP CNSLTJ NEW/EST MOD 40: CPT | Performed by: INTERNAL MEDICINE

## 2019-09-09 NOTE — TELEPHONE ENCOUNTER
Discussed previously with patient  Need to stop without supplements and vitamin-C as anemia has not resolved  Will not refill vitamin-C supplements at this time

## 2019-09-09 NOTE — PATIENT INSTRUCTIONS
Colonoscopy scheduled on 10/8/2019 with Dr Beto Rao at the [facility]  Zoraida Espana instructions given to patient

## 2019-09-09 NOTE — PROGRESS NOTES
[facility] - Outpatient Consultation  Ana Martin 62 y o  female MRN: [id number]  Encounter: [id number]          ASSESSMENT AND PLAN:      1  Colon cancer screening    - Repeat colonoscopy due to hx of colon polyps  - Etiology of iron deficiency anemia is not clear  But recent hgb stable, iron supplement to be discontinued  Denies upper GI symptoms    - Patient was counseled on the benefits and risks of endoscopic intervention including but not limited to bleeding, infection, bowel perforation  Patient also understands colonoscopy is not 100% sensitive to detect polyps  - plan procedure in the hospital due to increased anesthesia risk as patient's BMI more than 40       - polyethylene glycol (GOLYTELY) 4000 mL solution; Take 4,000 mL by mouth once for 1 dose  Dispense: 4000 mL; Refill: 0  - bisacodyl (DULCOLAX) 5 mg EC tablet; Take 2 tablets (10 mg total) by mouth daily as needed for constipation  Dispense: 2 tablet; Refill: 0  - Colonoscopy; Future    ______________________________________________________________________    HPI:    49-year-old female he with past medical history of  Hyperlipidemia referred for evaluation of colon cancer screening  Patient reported she has overall regular formed bowel movement  He she has daily bowel movement  Denies blood in the stool  Her last colonoscopy was 3 years ago with findings of polyps  She was referred by her primary care doctor for repeat colonoscopy       She was diagnosed with iron deficiency anemia a year ago and she has been compliant with iron supplement  Her recent hgb was normal      REVIEW OF SYSTEMS:    CONSTITUTIONAL: Denies any fever, chills, rigors, and weight loss  HEENT: No earache or tinnitus  Denies hearing loss or visual disturbances  CARDIOVASCULAR: No chest pain or palpitations  RESPIRATORY: Denies any cough, hemoptysis, shortness of breath or dyspnea on exertion    GASTROINTESTINAL: As noted in the History of Present Illness  GENITOURINARY: No problems with urination  Denies any hematuria or dysuria  NEUROLOGIC: No dizziness or vertigo, denies headaches  MUSCULOSKELETAL: Denies any muscle or joint pain  SKIN: Denies skin rashes or itching  ENDOCRINE: Denies excessive thirst  Denies intolerance to heat or cold  PSYCHOSOCIAL: Denies depression or anxiety  Denies any recent memory loss         Historical Information   Past Medical History:   Diagnosis Date    Anemia     Anxiety     Arthritis     Back pain     [place] ([place] [address] )     Depression     Diabetes mellitus ([place] [address] )     Pre- Diabetic    Dyslipidemia     Essential tremor     Excessive daytime sleepiness     GERD (gastroesophageal reflux disease)     History of uterine cancer     Hyperglycemia     Hypertension     Hypovitaminosis D     Iron deficiency anemia     Joint pain     Mood disorder (HCC)     Obesity     Obstructive sleep apnea     Ringing in ears     Snoring     Uterine cancer ([place] [address] )     age 20    Witnessed episode of apnea      Past Surgical History:   Procedure Laterality Date    APPENDECTOMY      HYSTERECTOMY      age 20   [facility] K[facility] SURGERY      Meniscus tear    TONSILLECTOMY      TUBAL LIGATION       Social History   Social History     Substance and Sexual Activity   Alcohol Use Yes    Comment: very seldom     Social History     Substance and Sexual Activity   Drug Use No     Social History     Tobacco Use   Smoking Status Current Every Day Smoker    Packs/day: 0 50   Smokeless Tobacco Former User     Family History   Problem Relation Age of Onset    Diabetes Mother     Asthma Mother     Hypertension Mother     Heart disease Mother     No Known Problems Sister     No Known Problems Daughter     No Known Problems Maternal Grandmother     No Known Problems Paternal Grandmother     No Known Problems Sister     No Known Problems Sister     No Known Problems Daughter        Meds/Allergies       Current Outpatient Medications:     acetaminophen (TYLENOL) 650 mg CR tablet    albuterol (VENTOLIN HFA) 90 mcg/act inhaler    ammonium lactate (AMLACTIN) 12 % lotion    ASPIRIN LOW DOSE 81 MG EC tablet    atorvastatin (LIPITOR) 40 mg tablet    cholecalciferol (VITAMIN D3) 1,000 units tablet    clotrimazole-betamethasone (LOTRISONE) 1-0 05 % cream    diphenhydrAMINE (BENADRYL) 25 mg tablet    ferrous sulfate 325 (65 FE) MG EC tablet    nystatin (MYCOSTATIN) powder    pantoprazole (PROTONIX) 40 mg tablet    primidone (MYSOLINE) 50 mg tablet    propranolol (INDERAL) 40 mg tablet    RA VITAMIN C 500 MG tablet    bisacodyl (DULCOLAX) 5 mg EC tablet    polyethylene glycol (GOLYTELY) 4000 mL solution    Allergies   Allergen Reactions    Aspirin GI Intolerance     Can only take baby aspirin            Objective     Blood pressure 118/72, pulse 77, temperature (!) 96 4 °F (35 8 °C), temperature source Tympanic, height 5' 1" (1 549 m), weight 107 kg (236 lb)  Body mass index is 44 59 kg/m²  PHYSICAL EXAM:      General Appearance:   Alert, cooperative, no distress, morbid obesity   HEENT:   Normocephalic, atraumatic, anicteric      Neck:  Supple, symmetrical, trachea midline   Lungs:   Clear to auscultation bilaterally; no rales, rhonchi or wheezing; respirations unlabored    Heart[de-identified]   Regular rate and rhythm; no murmur, rub, or gallop  Abdomen:   Soft, non-tender, non-distended; normal bowel sounds; no masses, no organomegaly    Genitalia:   Deferred    Rectal:   Deferred    Extremities:  No cyanosis, clubbing or edema    Pulses:  2+ and symmetric    Skin:  No jaundice, rashes, or lesions    Lymph nodes:  No palpable cervical lymphadenopathy        Lab Results:   No visits with results within 1 Day(s) from this visit     Latest known visit with results is:   Appointment on 09/06/2019   Component Date Value    WBC 09/06/2019 8 30     RBC 09/06/2019 4 57     Hemoglobin 09/06/2019 14 3     Hematocrit 09/06/2019 42 5  MCV 09/06/2019 93     MCH 09/06/2019 31 2     MCHC 09/06/2019 33 6     RDW 09/06/2019 14 3     MPV 09/06/2019 9 3     Platelets 86/35/4819 292     Neutrophils Relative 09/06/2019 78*    Lymphocytes Relative 09/06/2019 14*    Monocytes Relative 09/06/2019 7     Eosinophils Relative 09/06/2019 1     Basophils Relative 09/06/2019 1     Neutrophils Absolute 09/06/2019 6 50     Lymphocytes Absolute 09/06/2019 1 10     Monocytes Absolute 09/06/2019 0 50     Eosinophils Absolute 09/06/2019 0 10     Basophils Absolute 09/06/2019 0 00     Sodium 09/06/2019 140     Potassium 09/06/2019 4 5     Chloride 09/06/2019 105     CO2 09/06/2019 30     ANION GAP 09/06/2019 5     BUN 09/06/2019 20     Creatinine 09/06/2019 0 67     Glucose, Fasting 09/06/2019 107*    Calcium 09/06/2019 9 1     AST 09/06/2019 28     ALT 09/06/2019 29     Alkaline Phosphatase 09/06/2019 82     Total Protein 09/06/2019 6 3     Albumin 09/06/2019 3 7     Total Bilirubin 09/06/2019 0 40     eGFR 09/06/2019 95          Radiology Results:   Mammo Screening Bilateral W Cad    Result Date: 8/26/2019  Narrative: DIAGNOSIS: Screening for breast cancer RELEVANT HISTORY: Family Breast Cancer History: No known family history of breast cancer  Family Medical History: No known relevant family medical history  Personal History: No known relevant hormone history  Surgical history includes hysterectomy  No known relevant medical history  COMPARISONS:  Multiple prior exams dating between 03/07/2011 and 08/22/2018  INDICATION: Bashir Vitale is a 62 y o  female presenting for screening mammography  TECHNIQUE: The current study was evaluated with Computer Aided Detection  TISSUE DENSITY: The breasts are almost entirely fatty  The patient is scheduled in a reminder system for screening mammography  8-10% of cancers will be missed on mammography  Management of a palpable abnormality must be based on clinical grounds    Patients will be notified of their results via letter from our facility  Accredited by [employer] of [employer] and FDA  RISK ASSESSMENT: 5 Year [place]-Cuz[place]: 1 24 % 10 Year [place]rer-[place]uz[place]: 2 5 % Lifetime [place]rer-Cuzick: 6 05 % FINDINGS: Bilateral There are no suspicious masses, grouped microcalcifications or areas of architectural distortion  The skin and nipple areolar complex are unremarkable  Benign-appearing calcifications are noted in each breast      Impression: No mammographic evidence of malignancy  ASSESSMENT/BI-RADS CATEGORY: Left: 2 - Benign Right: 2 - Benign Overall: 2 - Benign RECOMMENDATION:      - Routine screening mammogram in 1 year for both breasts   Workstation ID: [id number]

## 2019-09-17 ENCOUNTER — OFFICE VISIT (OUTPATIENT)
Dept: SLEEP CENTER | Facility: CLINIC | Age: 59
End: 2019-09-17
Payer: COMMERCIAL

## 2019-09-17 VITALS
DIASTOLIC BLOOD PRESSURE: 78 MMHG | HEART RATE: 60 BPM | BODY MASS INDEX: 44.07 KG/M2 | HEIGHT: 61 IN | SYSTOLIC BLOOD PRESSURE: 118 MMHG | WEIGHT: 233.4 LBS

## 2019-09-17 DIAGNOSIS — G47.33 OBSTRUCTIVE SLEEP APNEA TREATED WITH CONTINUOUS POSITIVE AIRWAY PRESSURE (CPAP): ICD-10-CM

## 2019-09-17 DIAGNOSIS — G25.81 RESTLESS LEG SYNDROME: ICD-10-CM

## 2019-09-17 DIAGNOSIS — J34.89 NASAL CONGESTION WITH RHINORRHEA: Primary | ICD-10-CM

## 2019-09-17 DIAGNOSIS — R09.81 NASAL CONGESTION WITH RHINORRHEA: Primary | ICD-10-CM

## 2019-09-17 DIAGNOSIS — Z91.14 NONCOMPLIANCE WITH CPAP TREATMENT: ICD-10-CM

## 2019-09-17 DIAGNOSIS — Z99.89 OBSTRUCTIVE SLEEP APNEA TREATED WITH CONTINUOUS POSITIVE AIRWAY PRESSURE (CPAP): ICD-10-CM

## 2019-09-17 DIAGNOSIS — K21.9 GERD WITHOUT ESOPHAGITIS: ICD-10-CM

## 2019-09-17 DIAGNOSIS — E66.01 CLASS 3 SEVERE OBESITY DUE TO EXCESS CALORIES WITHOUT SERIOUS COMORBIDITY WITH BODY MASS INDEX (BMI) OF 40.0 TO 44.9 IN ADULT (HCC): ICD-10-CM

## 2019-09-17 PROCEDURE — 99214 OFFICE O/P EST MOD 30 MIN: CPT | Performed by: NURSE PRACTITIONER

## 2019-09-17 RX ORDER — FAMOTIDINE 40 MG/1
40 TABLET, FILM COATED ORAL DAILY
Qty: 30 TABLET | Refills: 5 | Status: SHIPPED | OUTPATIENT
Start: 2019-09-17 | End: 2020-09-17 | Stop reason: ALTCHOICE

## 2019-09-17 NOTE — PROGRESS NOTES
Progress Note - [address] [facility] 62 y o  female   [facility]:[id number]/[id number], MRN: [id number]  9/17/2019          Follow Up Evaluation / Problem:     Obstructive Sleep Apnea  Restless Legs Syndrome      Thank you for the opportunity of participating in the evaluation and care of this patient in the Sleep Clinic at [facility]  HPI: Gianna Frankel is a 62y o  year old female  The patient presents for follow up of obstructive sleep apnea and restless legs syndrome  She has been using CPAP since 2018 to treat moderate obstructive sleep apnea  She has had difficulty using the equipment due to nasal congestion  She originally used a nasal mask, however, with the congestion, she switched to a full face mask, which made some improvement  She continues to have difficulty and has not been using the CPAP equipment at all, due to build up of nasal secretions running into the mask  She was treated with flonase and astelin nasal spray, which has not improved symptoms  She was treated with venofer therapy for low ferritin level of 9, with improvement of symptoms of restless legs        Review of Systems      Genitourinary need to urinate more than twice a night   Cardiology ankle/leg swelling   Gastrointestinal none   Neurology need to move extremities   Constitutional none   Integumentary none   Psychiatry none   Musculoskeletal joint pain, back pain and legs twitching/jerking   Pulmonary shortness of breath with activity   ENT none   Endocrine excessive thirst and frequent urination   Hematological blood donor       Current Outpatient Medications:     acetaminophen (TYLENOL) 650 mg CR tablet, Take 1 tablet (650 mg total) by mouth every 8 (eight) hours as needed for mild pain, Disp: 30 tablet, Rfl: 2    albuterol (VENTOLIN HFA) 90 mcg/act inhaler, Inhale 2 puffs every 6 (six) hours as needed for wheezing, Disp: 18 g, Rfl: 1   ammonium lactate (AMLACTIN) 12 % lotion, Every 12 hours, Disp: , Rfl:     ASPIRIN LOW DOSE 81 MG EC tablet, Take 1 tablet (81 mg total) by mouth daily, Disp: 30 tablet, Rfl: 2    atorvastatin (LIPITOR) 40 mg tablet, take 1 tablet by mouth once daily, Disp: 30 tablet, Rfl: 2    bisacodyl (DULCOLAX) 5 mg EC tablet, Take 2 tablets (10 mg total) by mouth daily as needed for constipation, Disp: 2 tablet, Rfl: 0    cholecalciferol (VITAMIN D3) 1,000 units tablet, take 1 tablet by mouth once daily, Disp: 30 tablet, Rfl: 5    clotrimazole-betamethasone (LOTRISONE) 1-0 05 % cream, Apply topically 2 (two) times a day, Disp: 30 g, Rfl: 1    diphenhydrAMINE (BENADRYL) 25 mg tablet, Take 1 tablet (25 mg total) by mouth every 6 (six) hours as needed for itching, Disp: 30 tablet, Rfl: 0    ferrous sulfate 325 (65 FE) MG EC tablet, Take 1 tablet (325 mg total) by mouth every other day, Disp: 90 tablet, Rfl: 1    nystatin (MYCOSTATIN) powder, Apply topically 2 (two) times a day, Disp: 60 g, Rfl: 0    primidone (MYSOLINE) 50 mg tablet, By mouth take 1 tablet twice daily or as directed, Disp: 60 tablet, Rfl: 2    propranolol (INDERAL) 40 mg tablet, Take 1 tablet (40 mg total) by mouth every 12 (twelve) hours, Disp: 60 tablet, Rfl: 5    RA VITAMIN C 500 MG tablet, Take 1 tablet (500 mg total) by mouth daily, Disp: 90 tablet, Rfl: 1    famotidine (PEPCID) 40 MG tablet, Take 1 tablet (40 mg total) by mouth daily, Disp: 30 tablet, Rfl: 5    polyethylene glycol (GOLYTELY) 4000 mL solution, Take 4,000 mL by mouth once for 1 dose, Disp: 4000 mL, Rfl: 0    [place] Sleepiness Scale  Sitting and reading: High chance of dozing  Watching TV: High chance of dozing  Sitting, inactive in a public place (e g  a theatre or a meeting):  Would never doze  As a passenger in a car for an hour without a break: Slight chance of dozing  Lying down to rest in the afternoon when circumstances permit: High chance of dozing  Sitting and talking to someone: Would never doze  Sitting quietly after a lunch without alcohol: Would never doze  In a car, while stopped for a few minutes in traffic: Would never doze  Total score: 10              Vitals:    09/17/19 1300   BP: 118/78   Pulse: 60   Weight: 106 kg (233 lb 6 4 oz)   Height: 5' 1" (1 549 m)       Body mass index is 44 1 kg/m²  Neck Circumference: 15       EPWORTH SLEEPINESS SCORE  Total score: 10      Past History Since Last Sleep Center Visit:   As noted above, she has had to stop use of CPAP due to nasal congestion and runny nose with post-nasal drip  Symptoms occur during the day and at night, but are most bothersome at night with use of CPAP  Her PCP has given her nasal sprays, including flonase and astelin, which she does not feel have helped  She has used benadryl for itching of her feet, which has not improved nasal symptoms  She has some sneezing and itchy throat, however, she does not feel like the symptoms are what she normally experiences with seasonal allergies  She takes pantoprazole daily for GERD symptoms, which she was having at night  GERD symptoms improved with use of pantoprazole  The review of systems and following portions of the patient's history were reviewed and updated as appropriate: allergies, current medications, past family history, past medical history, past social history, past surgical history, and problem list         OBJECTIVE    PAP Pressure: Nasal CPAP set to deliver 7 cm of water pressure - no use since prior to June DME Provider:  [facility]    Physical Exam:     General Appearance:   Alert, cooperative, no distress, appears stated age, morbidly obese     Head:   Normocephalic, without obvious abnormality, atraumatic     Eyes:   PERRL, conjunctiva/corneas clear, EOM's intact          Nose:  Nares normal, septum midline, no drainage or sinus tenderness           Throat:  Lips, teeth and gums normal; tongue normal size and  shape and midline Neck:  Supple, symmetrical, trachea midline, no adenopathy; Thyroid: No enlargement, tenderness or nodules; no carotid bruit or JVD     Lungs:      Clear to auscultation bilaterally, respirations unlabored     Heart:   Regular rate and rhythm, S1 and S2 normal, no murmur, rub or gallop       Extremities:  Extremities normal, atraumatic, no cyanosis or edema       Skin:  Skin color, texture, turgor normal, no rashes or lesions       Neurologic:  No focal deficits noted  Normal strength, sensation throughout       ASSESSMENT / PLAN    1  Nasal congestion with rhinorrhea  famotidine (PEPCID) 40 MG tablet   2  Obstructive sleep apnea treated with continuous positive airway pressure (CPAP)     3  Noncompliance with CPAP treatment     4  Restless leg syndrome     5  Class 3 severe obesity due to excess calories without serious comorbidity with body mass index (BMI) of 40 0 to 44 9 in adult ([facility] )     6  GERD without esophagitis  famotidine (PEPCID) 40 MG tablet           Counseling / Coordination of Care  Total clinic time spent today 30 minutes  Greater than 50% of total time was spent with the patient and / or family counseling and / or coordination of care  A description of the counseling / coordination of care:     Impressions, Diagnostic results, Prognosis, Instructions for management, Risks and benefits of treatment, Patient and family education, Risk factor reductions and Importance of compliance with treatment    Today I reviewed the patient's compliance data  she has not been able to use the equipment at all over the past 30 days recorded, due to nasal congestion and post nasal drip causing secretions to build up in the mask and constant need to remove it to blow her nose or clear her throat  Pantoprazole can have URI side effects  She will stop using this medication and begin the use of Pepcid to treat the GERD symptoms once daily  She will follow up in 2-3 weeks to report if symptoms are improving  If not, we will plan to restart the pantoprazole and refer to an allergist   She may consider use of a wedge pillow and lateral positioning for sleep  We discussed the importance of using the CPAP equipment to treat the YUE  The RLS symptoms have improved since venofer infusions  She will continue using oral iron at least every other day to maintain the level  We discussed avoiding the use of benadryl at night, as it can worsen symptoms of RLS  She will continue to attempt to use the CPAP equipment at the settings noted above for the next 3 months  At that timeshe will then return for a routine follow-up evaluation  I have asked the patient to contact the Sleep [address] [place] [address] if she encounters any difficulties prior to that time  The following instructions have been given to the patient today:    Patient Instructions   1  Avoid using benadryl, if you must use it, avoid use at night, it will worsen restless legs  2   Stop protonix, to see if nasal congestion improves  3  Begin pepcid for GERD symptoms  4   Call with update in 2-3 weeks  If not improving, we will plan to treat as allergies  5  Try to use CPAP as much as possible  6 Schedule follow up visit in 3 months    Nursing Support:  When: Monday through Friday 7A-5PM except holidays  Where: Our direct line is [phone]  If you are having a true emergency please call 911  In the event that the line is busy or it is after hours please leave a voice message and we will return your call  Please speak clearly, leaving your full name, birth date, best number to reach you and the reason for your call  Medication refills: We will need the name of the medication, the dosage, the ordering provider, whether you get a 30 or 90 day refill, and the pharmacy name and address  Medications will be ordered by the provider only  Nurses cannot call in prescriptions  Please allow 7 days for medication refills  Physician requested updates:  If your provider requested that you call with an update after starting medication, please be ready to provide us the medication and dosage, what time you take your medication, the time you attempt to fall asleep, time you fall asleep, when you wake up, and what time you get out of bed  Sleep Study Results: We will contact you with sleep study results and/or next steps after the physician has reviewed your testing          [facility], [address] [place] [address]

## 2019-09-17 NOTE — PATIENT INSTRUCTIONS
1   Avoid using benadryl, if you must use it, avoid use at night, it will worsen restless legs  2   Stop protonix, to see if nasal congestion improves  3  Begin pepcid for GERD symptoms  4   Call with update in 2-3 weeks  If not improving, we will plan to treat as allergies  5  Try to use CPAP as much as possible  6 Schedule follow up visit in 3 months    Nursing Support:  When: Monday through Friday 7A-5PM except holidays  Where: Our direct line is [phone]  If you are having a true emergency please call 911  In the event that the line is busy or it is after hours please leave a voice message and we will return your call  Please speak clearly, leaving your full name, birth date, best number to reach you and the reason for your call  Medication refills: We will need the name of the medication, the dosage, the ordering provider, whether you get a 30 or 90 day refill, and the pharmacy name and address  Medications will be ordered by the provider only  Nurses cannot call in prescriptions  Please allow 7 days for medication refills  Physician requested updates: If your provider requested that you call with an update after starting medication, please be ready to provide us the medication and dosage, what time you take your medication, the time you attempt to fall asleep, time you fall asleep, when you wake up, and what time you get out of bed  Sleep Study Results: We will contact you with sleep study results and/or next steps after the physician has reviewed your testing

## 2019-10-04 RX ORDER — PANTOPRAZOLE SODIUM 40 MG/1
40 TABLET, DELAYED RELEASE ORAL DAILY
COMMUNITY
End: 2020-12-15 | Stop reason: ALTCHOICE

## 2019-10-04 NOTE — PRE-PROCEDURE INSTRUCTIONS
Pre-Surgery Instructions:   Medication Instructions    albuterol (VENTOLIN HFA) 90 mcg/act inhaler Instructed patient per Anesthesia Guidelines   primidone (MYSOLINE) 50 mg tablet Instructed patient per Anesthesia Guidelines   propranolol (INDERAL) 40 mg tablet Instructed patient per Anesthesia Guidelines

## 2019-10-07 ENCOUNTER — ANESTHESIA EVENT (OUTPATIENT)
Dept: GASTROENTEROLOGY | Facility: HOSPITAL | Age: 59
End: 2019-10-07
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
## 2019-10-08 ENCOUNTER — HOSPITAL ENCOUNTER (OUTPATIENT)
Dept: GASTROENTEROLOGY | Facility: HOSPITAL | Age: 59
Setting detail: OUTPATIENT SURGERY
Discharge: HOME | End: 2019-10-08
Attending: INTERNAL MEDICINE | Admitting: INTERNAL MEDICINE
Payer: COMMERCIAL

## 2019-10-08 ENCOUNTER — ANESTHESIA (OUTPATIENT)
Dept: GASTROENTEROLOGY | Facility: HOSPITAL | Age: 59
End: 2019-10-08

## 2019-10-08 VITALS
HEART RATE: 79 BPM | RESPIRATION RATE: 20 BRPM | TEMPERATURE: 97.6 F | DIASTOLIC BLOOD PRESSURE: 76 MMHG | SYSTOLIC BLOOD PRESSURE: 149 MMHG | OXYGEN SATURATION: 74 %

## 2019-10-08 DIAGNOSIS — Z12.11 COLON CANCER SCREENING: ICD-10-CM

## 2019-10-08 PROCEDURE — 88305 TISSUE EXAM BY PATHOLOGIST: CPT | Performed by: PATHOLOGY

## 2019-10-08 PROCEDURE — 45385 COLONOSCOPY W/LESION REMOVAL: CPT | Performed by: INTERNAL MEDICINE

## 2019-10-08 RX ORDER — SODIUM CHLORIDE 9 MG/ML
125 INJECTION, SOLUTION INTRAVENOUS CONTINUOUS
Status: DISCONTINUED | OUTPATIENT
Start: 2019-10-08 | End: 2019-10-12 | Stop reason: HOSPADM

## 2019-10-08 RX ORDER — LIDOCAINE HYDROCHLORIDE 10 MG/ML
INJECTION, SOLUTION INFILTRATION; PERINEURAL AS NEEDED
Status: DISCONTINUED | OUTPATIENT
Start: 2019-10-08 | End: 2019-10-08 | Stop reason: SURG

## 2019-10-08 RX ORDER — PROPOFOL 10 MG/ML
INJECTION, EMULSION INTRAVENOUS AS NEEDED
Status: DISCONTINUED | OUTPATIENT
Start: 2019-10-08 | End: 2019-10-08 | Stop reason: SURG

## 2019-10-08 RX ADMIN — PROPOFOL 50 MG: 10 INJECTION, EMULSION INTRAVENOUS at 13:07

## 2019-10-08 RX ADMIN — PROPOFOL 100 MG: 10 INJECTION, EMULSION INTRAVENOUS at 12:51

## 2019-10-08 RX ADMIN — PROPOFOL 50 MG: 10 INJECTION, EMULSION INTRAVENOUS at 12:54

## 2019-10-08 RX ADMIN — LIDOCAINE HYDROCHLORIDE 50 MG: 10 INJECTION, SOLUTION INFILTRATION; PERINEURAL at 12:51

## 2019-10-08 RX ADMIN — SIMETHICONE 0.3 MG: 20 SUSPENSION/ DROPS ORAL at 12:59

## 2019-10-08 RX ADMIN — SODIUM CHLORIDE: 0.9 INJECTION, SOLUTION INTRAVENOUS at 12:43

## 2019-10-08 RX ADMIN — PROPOFOL 50 MG: 10 INJECTION, EMULSION INTRAVENOUS at 12:52

## 2019-10-08 RX ADMIN — PROPOFOL 50 MG: 10 INJECTION, EMULSION INTRAVENOUS at 12:58

## 2019-10-08 RX ADMIN — PROPOFOL 50 MG: 10 INJECTION, EMULSION INTRAVENOUS at 13:03

## 2019-10-08 RX ADMIN — SODIUM CHLORIDE 125 ML/HR: 0.9 INJECTION, SOLUTION INTRAVENOUS at 10:47

## 2019-10-08 NOTE — H&P
History and Physical - [facility]  Karen Franco 62 y o  female MRN: [id number]                  HPI: Karen Franco is a 62y o  year old female who presents for colon cancer screening      REVIEW OF SYSTEMS: Per the HPI, and otherwise unremarkable      Historical Information   Past Medical History:   Diagnosis Date    Allergic rhinitis     Anemia     Anxiety     Arthritis     Back pain     Cancer (HCC)     Chronic pain disorder     back    COPD (chronic obstructive pulmonary disease) (HCC)     CPAP (continuous positive airway pressure) dependence     Depression     Diabetes mellitus (HCC)     Pre- Diabetic    Dyslipidemia     Essential tremor     Excessive daytime sleepiness     GERD (gastroesophageal reflux disease)     History of uterine cancer     Hyperglycemia     Hyperlipidemia     Hypertension     Hypovitaminosis D     Iron deficiency anemia     Joint pain     Mood disorder (HCC)     Obesity     Obstructive sleep apnea     Ringing in ears     RLS (restless legs syndrome)     Snoring     Uterine cancer ([facility])     age 20   [facility] Vitamin D deficiency     Witnessed episode of apnea      Past Surgical History:   Procedure Laterality Date    APPENDECTOMY      HYSTERECTOMY      age 20   [facility] KNEE ARTHROSCOPY      KNEE SURGERY      Meniscus tear    TONSILLECTOMY      TUBAL LIGATION       Social History   Social History     Substance and Sexual Activity   Alcohol Use Not Currently    Comment: very seldom     Social History     Substance and Sexual Activity   Drug Use Not Currently     Social History     Tobacco Use   Smoking Status Current Every Day Smoker    Packs/day: 0 50   Smokeless Tobacco Former User     Family History   Problem Relation Age of Onset    Diabetes Mother     Asthma Mother     Hypertension Mother     Heart disease Mother     No Known Problems Sister     No Known Problems Daughter     No Known Problems Maternal Grandmother     No Known Problems Paternal Grandmother     No Known Problems Sister     No Known Problems Sister     No Known Problems Daughter        Meds/Allergies       (Not in a hospital admission)    Allergies   Allergen Reactions    Aspirin GI Intolerance     Can only take baby aspirin        Objective     /80   Pulse 76   Temp (!) 96 °F (35 6 °C) (Temporal)   Resp 20   LMP  (LMP Unknown)   SpO2 94%       PHYSICAL EXAM    Gen: NAD  CV: RRR  CHEST: Clear  ABD: soft, NT/ND  EXT: no edema      ASSESSMENT/PLAN:  This is a 62y o  year old female here for colon cancer screening, and she is stable and optimized for her procedure

## 2019-10-08 NOTE — PERIOPERATIVE NURSING NOTE
ivs out  Dr Joanne Singh here to see patient  Wants to go home  Discharged via w/c after discharge instructions given and  verbalizedan understanding of same

## 2019-11-13 ENCOUNTER — TELEPHONE (OUTPATIENT)
Dept: NEUROLOGY | Facility: CLINIC | Age: 59
End: 2019-11-13

## 2019-11-15 ENCOUNTER — OFFICE VISIT (OUTPATIENT)
Dept: NEUROLOGY | Facility: CLINIC | Age: 59
End: 2019-11-15
Payer: COMMERCIAL

## 2019-11-15 VITALS
WEIGHT: 240.2 LBS | HEART RATE: 77 BPM | HEIGHT: 61 IN | DIASTOLIC BLOOD PRESSURE: 60 MMHG | RESPIRATION RATE: 17 BRPM | BODY MASS INDEX: 45.35 KG/M2 | SYSTOLIC BLOOD PRESSURE: 120 MMHG

## 2019-11-15 DIAGNOSIS — G25.0 BENIGN ESSENTIAL TREMOR: Primary | ICD-10-CM

## 2019-11-15 PROCEDURE — 99213 OFFICE O/P EST LOW 20 MIN: CPT | Performed by: PSYCHIATRY & NEUROLOGY

## 2019-11-15 RX ORDER — BIOTIN 1 MG
TABLET ORAL
COMMUNITY
End: 2020-08-11 | Stop reason: SDUPTHER

## 2019-11-15 NOTE — PROGRESS NOTES
Patient ID: Karen Franco is a 62 y o  female  Assessment/Plan:    Benign essential tremor  The addition of low-dose primidone to her propranolol has significantly improved her tremor  However, while only taking half of a 50 mg tablet at bedtime daily, she is experiencing on awakening and through the initial part of the day lightheadedness  This could certainly be from the primidone  Hopefully, by starting at in even lower dose she can achieve improved tremor control and tolerate a slow titration schedule if needed  --change primidone 50 mg tablets to 1/4 tablet daily taken in the early evening   --continue unchanged propranolol 40 mg twice daily  Unable to advance primidone given her problems with bradycardia on higher doses in the past   --asked that she call with a status report in 2-3 weeks  She will follow up in 10-12 weeks  Subjective:    HPI  Patient, age 62 years, returns to reassess the status of her essential tremor with the medication change made at her last appointment  On propranolol 40 mg twice daily she has noted a significant overall improvement but when last seen was not satisfied with the degree of improvement  Higher doses of propranolol resulted in bradycardia in the past   As result, primidone was initiated using 50 mg tablets taking 1/2 tablet at bedtime nightly  With the addition of primidone her tremor has significantly improved, but she relates a problem with lightheadedness which is apparent on awakening in the morning and through the initial part of the day  She continues to limit her caffeine consumption  Blood work in September reviewed  CBC with hemoglobin 14 3, hematocrit 42 5, white count 8 30 and platelet count 474  CMP with creatinine 0 67, AST 28 and ALT 29       Past Medical History:   Diagnosis Date    Allergic rhinitis     Anemia     Anxiety     Arthritis     Back pain     Cancer (HCC)     Chronic pain disorder     back    COPD (chronic obstructive pulmonary disease) (HCC)     CPAP (continuous positive airway pressure) dependence     Depression     Diabetes mellitus (HCC)     Pre- Diabetic    Dyslipidemia     Essential tremor     Excessive daytime sleepiness     GERD (gastroesophageal reflux disease)     History of uterine cancer     Hyperglycemia     Hyperlipidemia     Hypertension     Hypovitaminosis D     Iron deficiency anemia     Joint pain     Mood disorder (HCC)     Obesity     Obstructive sleep apnea     Ringing in ears     RLS (restless legs syndrome)     Snoring     Uterine cancer ([facility])     age 20   Crissy Glass Vitamin D deficiency     Witnessed episode of apnea      Past Surgical History:   Procedure Laterality Date    APPENDECTOMY      HYSTERECTOMY      age 20   Crissyluli Glass KNEE ARTHROSCOPY      KNEE SURGERY      Meniscus tear    TONSILLECTOMY      TUBAL LIGATION       Social History     Socioeconomic History    Marital status: Single     Spouse name: None    Number of children: None    Years of education: None    Highest education level: None   Occupational History    None   Social Needs    Financial resource strain: None    Food insecurity:     Worry: None     Inability: None    Transportation needs:     Medical: None     Non-medical: None   Tobacco Use    Smoking status: Current Every Day Smoker     Packs/day: 0 50    Smokeless tobacco: Former User   Substance and Sexual Activity    Alcohol use: Not Currently     Comment: very seldom    Drug use: Not Currently    Sexual activity: None   Lifestyle    Physical activity:     Days per week: None     Minutes per session: None    Stress: None   Relationships    Social connections:     Talks on phone: None     Gets together: None     Attends Jew service: None     Active member of club or organization: None     Attends meetings of clubs or organizations: None     Relationship status: None    Intimate partner violence:     Fear of current or ex partner: None Emotionally abused: None     Physically abused: None     Forced sexual activity: None   Other Topics Concern    None   Social History Narrative    None     Family History   Problem Relation Age of Onset    Diabetes Mother     Asthma Mother     Hypertension Mother     Heart disease Mother     No Known Problems Sister     No Known Problems Daughter     No Known Problems Maternal Grandmother     No Known Problems Paternal Grandmother     No Known Problems Sister     No Known Problems Sister     No Known Problems Daughter      Allergies   Allergen Reactions    Aspirin GI Intolerance     Can only take baby aspirin        Current Outpatient Medications:     acetaminophen (TYLENOL) 650 mg CR tablet, Take 1 tablet (650 mg total) by mouth every 8 (eight) hours as needed for mild pain, Disp: 30 tablet, Rfl: 2    albuterol (VENTOLIN HFA) 90 mcg/act inhaler, Inhale 2 puffs every 6 (six) hours as needed for wheezing, Disp: 18 g, Rfl: 1    ammonium lactate (AMLACTIN) 12 % lotion, Every 12 hours, Disp: , Rfl:     ASPIRIN LOW DOSE 81 MG EC tablet, Take 1 tablet (81 mg total) by mouth daily, Disp: 30 tablet, Rfl: 2    atorvastatin (LIPITOR) 40 mg tablet, take 1 tablet by mouth once daily, Disp: 30 tablet, Rfl: 2    bisacodyl (DULCOLAX) 5 mg EC tablet, Take 2 tablets (10 mg total) by mouth daily as needed for constipation, Disp: 2 tablet, Rfl: 0    cholecalciferol (VITAMIN D3) 1,000 units tablet, take 1 tablet by mouth once daily, Disp: 30 tablet, Rfl: 5    Cholecalciferol (VITAMIN D3) 25 MCG (1000 UT) CAPS, Vitamin D3 25 mcg (1,000 unit) tablet, Disp: , Rfl:     clotrimazole-betamethasone (LOTRISONE) 1-0 05 % cream, Apply topically 2 (two) times a day, Disp: 30 g, Rfl: 1    diphenhydrAMINE (BENADRYL) 25 mg tablet, Take 1 tablet (25 mg total) by mouth every 6 (six) hours as needed for itching, Disp: 30 tablet, Rfl: 0    famotidine (PEPCID) 40 MG tablet, Take 1 tablet (40 mg total) by mouth daily, Disp: 30 tablet, Rfl: 5    ferrous sulfate 325 (65 FE) MG EC tablet, Take 1 tablet (325 mg total) by mouth every other day, Disp: 90 tablet, Rfl: 1    nystatin (MYCOSTATIN) powder, Apply topically 2 (two) times a day, Disp: 60 g, Rfl: 0    pantoprazole (PROTONIX) 40 mg tablet, Take 40 mg by mouth daily, Disp: , Rfl:     primidone (MYSOLINE) 50 mg tablet, By mouth take 1 tablet twice daily or as directed, Disp: 60 tablet, Rfl: 2    propranolol (INDERAL) 40 mg tablet, Take 1 tablet (40 mg total) by mouth every 12 (twelve) hours, Disp: 60 tablet, Rfl: 5    RA VITAMIN C 500 MG tablet, Take 1 tablet (500 mg total) by mouth daily, Disp: 90 tablet, Rfl: 1    polyethylene glycol (GOLYTELY) 4000 mL solution, Take 4,000 mL by mouth once for 1 dose, Disp: 4000 mL, Rfl: 0    Objective:    Blood pressure 120/60, pulse 77, resp  rate 17, height 5' 1" (1 549 m), weight 109 kg (240 lb 3 2 oz)  Physical Exam  Head normocephalic  Eyes nonicteric  Lungs clear to auscultation  Rhythm regular  GI (abdomen) soft nontender  Bowel sounds present  No significant lower extremity edema  Neurological Exam  Alert  Pleasantly interactive  Fully oriented  No voice tremor  Gait independent with good length strides and good bilateral spontaneous arm swing  Rest tone normal   No tone increase or cogwheeling with contralateral distraction maneuver  No rest tremor evident  No tremor to speak of involving the outstretched upper extremities even with extended sustention  With object holding, very, very subtle tremor did appear involving the right upper extremity  ROS:    Review of Systems   Constitutional: Negative  Negative for appetite change and fever  HENT: Negative  Negative for hearing loss, tinnitus, trouble swallowing and voice change  Eyes: Negative  Negative for photophobia and pain  Respiratory: Negative  Negative for shortness of breath  Cardiovascular: Negative  Negative for palpitations  Gastrointestinal: Positive for constipation  Negative for nausea and vomiting  Endocrine: Negative  Negative for cold intolerance and heat intolerance  Genitourinary: Negative  Negative for dysuria, frequency and urgency  Musculoskeletal: Negative  Negative for myalgias and neck pain  Skin: Negative  Negative for rash  Allergic/Immunologic: Negative  Neurological: Positive for dizziness and light-headedness  Negative for tremors, seizures, syncope, facial asymmetry, speech difficulty, weakness, numbness and headaches  Hematological: Negative  Does not bruise/bleed easily  Psychiatric/Behavioral: Negative  Negative for confusion, hallucinations and sleep disturbance  I personally reviewed the ROS that was entered by the medical assistant  *Please note this document was created using voice recognition software and may contain sound-alike word errors  *

## 2019-11-15 NOTE — PATIENT INSTRUCTIONS
Reduce primidone 50 mg tablets to 1/4 tablet daily and take in the early evening  Continue your unchanged propranolol using 40 mg tablets taking 1 twice daily  Please call the office in 2-3 weeks with a status report

## 2019-11-15 NOTE — LETTER
November 15, 2019     Elle Suazo MD  [address] [place] [address]    Patient: Rachel Harding   YOB: 1960   Date of Visit: 11/15/2019       Dear Dr Javier Walls: Thank you for referring Rachel Harding to me for evaluation  Below are my notes for this consultation  If you have questions, please do not hesitate to call me  I look forward to following your patient along with you  Sincerely,        Alana Gomez MD        CC: No Recipients  Alana Gomez MD  11/15/2019  1:46 PM  Sign at close encounter  Patient ID: Rachel Harding is a 62 y o  female  Assessment/Plan:    Benign essential tremor  The addition of low-dose primidone to her propranolol has significantly improved her tremor  However, while only taking half of a 50 mg tablet at bedtime daily, she is experiencing on awakening and through the initial part of the day lightheadedness  This could certainly be from the primidone  Hopefully, by starting at in even lower dose she can achieve improved tremor control and tolerate a slow titration schedule if needed  --change primidone 50 mg tablets to 1/4 tablet daily taken in the early evening   --continue unchanged propranolol 40 mg twice daily  Unable to advance primidone given her problems with bradycardia on higher doses in the past   --asked that she call with a status report in 2-3 weeks  She will follow up in 10-12 weeks  Subjective:    HPI  Patient, age 62 years, returns to reassess the status of her essential tremor with the medication change made at her last appointment  On propranolol 40 mg twice daily she has noted a significant overall improvement but when last seen was not satisfied with the degree of improvement  Higher doses of propranolol resulted in bradycardia in the past   As result, primidone was initiated using 50 mg tablets taking 1/2 tablet at bedtime nightly    With the addition of primidone her tremor has significantly improved, but she relates a problem with lightheadedness which is apparent on awakening in the morning and through the initial part of the day  She continues to limit her caffeine consumption  Blood work in September reviewed  CBC with hemoglobin 14 3, hematocrit 42 5, white count 8 30 and platelet count 131  CMP with creatinine 0 67, AST 28 and ALT 29       Past Medical History:   Diagnosis Date    Allergic rhinitis     Anemia     Anxiety     Arthritis     Back pain     Cancer (HCC)     Chronic pain disorder     back    [facility]     CPAP (continuous positive airway pressure) dependence     Depression     Diabetes mellitus (HCC)     Pre- Diabetic    Dyslipidemia     Essential tremor     Excessive daytime sleepiness     GERD (gastroesophageal reflux disease)     History of uterine cancer     Hyperglycemia     Hyperlipidemia     Hypertension     Hypovitaminosis D     Iron deficiency anemia     Joint pain     Mood disorder (HCC)     Obesity     Obstructive sleep apnea     Ringing in ears     RLS (restless legs syndrome)     Snoring     Uterine cancer ([facility])     age 20   Jeremías Hughes Vitamin D deficiency     Witnessed episode of apnea      Past Surgical History:   Procedure Laterality Date    APPENDECTOMY      HYSTERECTOMY      age 20   Jeremías Hughes KNEE ARTHROSCOPY      KNEE SURGERY      Meniscus tear    TONSILLECTOMY      TUBAL LIGATION       Social History     Socioeconomic History    Marital status: Single     Spouse name: None    Number of children: None    Years of education: None    Highest education level: None   Occupational History    None   Social Needs    Financial resource strain: None    Food insecurity:     Worry: None     Inability: None    Transportation needs:     Medical: None     Non-medical: None   Tobacco Use    Smoking status: Current Every Day Smoker     Packs/day: 0 50    Smokeless tobacco: Former User   Substance and Sexual Activity    Alcohol use: Not Currently     Comment: very seldom    Drug use: Not Currently    Sexual activity: None   Lifestyle    Physical activity:     Days per week: None     Minutes per session: None    Stress: None   Relationships    Social connections:     Talks on phone: None     Gets together: None     Attends Scientology service: None     Active member of club or organization: None     Attends meetings of clubs or organizations: None     Relationship status: None    Intimate partner violence:     Fear of current or ex partner: None     Emotionally abused: None     Physically abused: None     Forced sexual activity: None   Other Topics Concern    None   Social History Narrative    None     Family History   Problem Relation Age of Onset    Diabetes Mother     Asthma Mother     Hypertension Mother     Heart disease Mother     No Known Problems Sister     No Known Problems Daughter     No Known Problems Maternal Grandmother     No Known Problems Paternal Grandmother     No Known Problems Sister     No Known Problems Sister     No Known Problems Daughter      Allergies   Allergen Reactions    Aspirin GI Intolerance     Can only take baby aspirin        Current Outpatient Medications:     acetaminophen (TYLENOL) 650 mg CR tablet, Take 1 tablet (650 mg total) by mouth every 8 (eight) hours as needed for mild pain, Disp: 30 tablet, Rfl: 2    albuterol (VENTOLIN HFA) 90 mcg/act inhaler, Inhale 2 puffs every 6 (six) hours as needed for wheezing, Disp: 18 g, Rfl: 1    ammonium lactate (AMLACTIN) 12 % lotion, Every 12 hours, Disp: , Rfl:     ASPIRIN LOW DOSE 81 MG EC tablet, Take 1 tablet (81 mg total) by mouth daily, Disp: 30 tablet, Rfl: 2    atorvastatin (LIPITOR) 40 mg tablet, take 1 tablet by mouth once daily, Disp: 30 tablet, Rfl: 2    bisacodyl (DULCOLAX) 5 mg EC tablet, Take 2 tablets (10 mg total) by mouth daily as needed for constipation, Disp: 2 tablet, Rfl: 0    cholecalciferol (VITAMIN D3) 1,000 units tablet, take 1 tablet by mouth once daily, Disp: 30 tablet, Rfl: 5    Cholecalciferol (VITAMIN D3) 25 MCG (1000 UT) CAPS, Vitamin D3 25 mcg (1,000 unit) tablet, Disp: , Rfl:     clotrimazole-betamethasone (LOTRISONE) 1-0 05 % cream, Apply topically 2 (two) times a day, Disp: 30 g, Rfl: 1    diphenhydrAMINE (BENADRYL) 25 mg tablet, Take 1 tablet (25 mg total) by mouth every 6 (six) hours as needed for itching, Disp: 30 tablet, Rfl: 0    famotidine (PEPCID) 40 MG tablet, Take 1 tablet (40 mg total) by mouth daily, Disp: 30 tablet, Rfl: 5    ferrous sulfate 325 (65 FE) MG EC tablet, Take 1 tablet (325 mg total) by mouth every other day, Disp: 90 tablet, Rfl: 1    nystatin (MYCOSTATIN) powder, Apply topically 2 (two) times a day, Disp: 60 g, Rfl: 0    pantoprazole (PROTONIX) 40 mg tablet, Take 40 mg by mouth daily, Disp: , Rfl:     primidone (MYSOLINE) 50 mg tablet, By mouth take 1 tablet twice daily or as directed, Disp: 60 tablet, Rfl: 2    propranolol (INDERAL) 40 mg tablet, Take 1 tablet (40 mg total) by mouth every 12 (twelve) hours, Disp: 60 tablet, Rfl: 5    RA VITAMIN C 500 MG tablet, Take 1 tablet (500 mg total) by mouth daily, Disp: 90 tablet, Rfl: 1    polyethylene glycol (GOLYTELY) 4000 mL solution, Take 4,000 mL by mouth once for 1 dose, Disp: 4000 mL, Rfl: 0    Objective:    Blood pressure 120/60, pulse 77, resp  rate 17, height 5' 1" (1 549 m), weight 109 kg (240 lb 3 2 oz)  Physical Exam  Head normocephalic  Eyes nonicteric  Lungs clear to auscultation  Rhythm regular  GI (abdomen) soft nontender  Bowel sounds present  No significant lower extremity edema  Neurological Exam  Alert  Pleasantly interactive  Fully oriented  No voice tremor  Gait independent with good length strides and good bilateral spontaneous arm swing  Rest tone normal   No tone increase or cogwheeling with contralateral distraction maneuver  No rest tremor evident    No tremor to speak of involving the outstretched upper extremities even with extended sustention  With object holding, very, very subtle tremor did appear involving the right upper extremity  ROS:    Review of Systems   Constitutional: Negative  Negative for appetite change and fever  HENT: Negative  Negative for hearing loss, tinnitus, trouble swallowing and voice change  Eyes: Negative  Negative for photophobia and pain  Respiratory: Negative  Negative for shortness of breath  Cardiovascular: Negative  Negative for palpitations  Gastrointestinal: Positive for constipation  Negative for nausea and vomiting  Endocrine: Negative  Negative for cold intolerance and heat intolerance  Genitourinary: Negative  Negative for dysuria, frequency and urgency  Musculoskeletal: Negative  Negative for myalgias and neck pain  Skin: Negative  Negative for rash  Allergic/Immunologic: Negative  Neurological: Positive for dizziness and light-headedness  Negative for tremors, seizures, syncope, facial asymmetry, speech difficulty, weakness, numbness and headaches  Hematological: Negative  Does not bruise/bleed easily  Psychiatric/Behavioral: Negative  Negative for confusion, hallucinations and sleep disturbance  I personally reviewed the ROS that was entered by the medical assistant  *Please note this document was created using voice recognition software and may contain sound-alike word errors  *

## 2019-11-15 NOTE — ASSESSMENT & PLAN NOTE
The addition of low-dose primidone to her propranolol has significantly improved her tremor  However, while only taking half of a 50 mg tablet at bedtime daily, she is experiencing on awakening and through the initial part of the day lightheadedness  This could certainly be from the primidone  Hopefully, by starting at in even lower dose she can achieve improved tremor control and tolerate a slow titration schedule if needed  --change primidone 50 mg tablets to 1/4 tablet daily taken in the early evening   --continue unchanged propranolol 40 mg twice daily  Unable to advance primidone given her problems with bradycardia on higher doses in the past   --asked that she call with a status report in 2-3 weeks

## 2019-11-21 ENCOUNTER — HOSPITAL ENCOUNTER (OUTPATIENT)
Dept: RADIOLOGY | Facility: HOSPITAL | Age: 59
Discharge: HOME | End: 2019-11-21
Attending: ORTHOPAEDIC SURGERY
Payer: COMMERCIAL

## 2019-11-21 ENCOUNTER — OFFICE VISIT (OUTPATIENT)
Dept: OBGYN CLINIC | Facility: CLINIC | Age: 59
End: 2019-11-21
Payer: COMMERCIAL

## 2019-11-21 VITALS
WEIGHT: 240 LBS | DIASTOLIC BLOOD PRESSURE: 75 MMHG | HEART RATE: 84 BPM | HEIGHT: 61 IN | BODY MASS INDEX: 45.31 KG/M2 | SYSTOLIC BLOOD PRESSURE: 108 MMHG

## 2019-11-21 DIAGNOSIS — M25.561 CHRONIC PAIN OF BOTH KNEES: ICD-10-CM

## 2019-11-21 DIAGNOSIS — M25.562 CHRONIC PAIN OF BOTH KNEES: ICD-10-CM

## 2019-11-21 DIAGNOSIS — M17.0 PRIMARY OSTEOARTHRITIS OF BOTH KNEES: Primary | ICD-10-CM

## 2019-11-21 DIAGNOSIS — E66.01 MORBID OBESITY WITH BMI OF 45.0-49.9, ADULT (HCC): ICD-10-CM

## 2019-11-21 DIAGNOSIS — G89.29 CHRONIC PAIN OF BOTH KNEES: ICD-10-CM

## 2019-11-21 PROCEDURE — 73564 X-RAY EXAM KNEE 4 OR MORE: CPT

## 2019-11-21 PROCEDURE — 99203 OFFICE O/P NEW LOW 30 MIN: CPT | Performed by: ORTHOPAEDIC SURGERY

## 2019-11-21 NOTE — PROGRESS NOTES
Chief Complaint   Patient presents with    Left Knee - Pain    Right Knee - Pain           Assessment:  Bilateral DJD of knees    Plan :  1  I explained the natural history of the  problem to the  patient -you have degenerative arthritis in both knees  There is no surgical intervention necessary now and should improve with nonoperative treatment  The ultimate answer may be total knee joint replacement if the symptoms do not quiet over time  2  Activity modification - avoid hyperflexion like bending, kneeling, squatting,  stairs as  much as humanly possible  Change position frequently to straighten your leg if you are sitting for a long period of time  3  Pain should be the warning guide to your activities - both during and after exercises  Stop doing your sport or activity if you are getting significant pain  4  Ice in large trash bag or bag of frozen peas for 15 min 4x a day, if needed, for pain or swelling  Heat is not indicated  5  Take Advil, Aleve, or Tylenol on an as-needed basis for pain  6  Do the isometric quad and hamstring exercises that you were shown diligently at  home  7  Gliding activities (skiing machine, elliptical, swimming) are allowed, but do not do pounding activities  ( treadmill, aerobics, distance walking )  8  She did not want cortisone injection today as these have not worked for her in the past    HPI:  This is a 68-year-old white female presenting for orthopedic evaluation regarding her bilateral knee pain  She is self-referred  She complains of bilateral knee pain for 15+ years  She denies any obvious injury or trauma  She has medial, lateral and anterior knee pain on the left and circumferential right knee pain  She states that these are constantly swollen  She has pain with steps, squatting and cannot kneel  She also describes a positive movie sign  She notes she [facility]    She also has attempted cortisone injections which did not help  She did attend the course of physical therapy which helped temporarily  She uses a cane as needed for long distances  She also uses ice, heat and Tylenol Arthritis  She has a history of anxiety, cancer, chronic pain disorder, COPD, depression, diabetes, dyslipidemia, GERD, hyperglycemia, hyperlipidemia, mood disorder, among others  These all contribute to the treatment and prognosis of today's complaint      PMHx:         Past Medical History:   Diagnosis Date    Allergic rhinitis     Anemia     Anxiety     Arthritis     Back pain     Cancer [facility]     Chronic pain disorder     back    COPD (chronic obstructive pulmonary disease) ([facility]     CPAP (continuous positive airway pressure) dependence     Depression     Diabetes mellitus ([facility]     Pre- Diabetic    Dyslipidemia     Essential tremor     Excessive daytime sleepiness     GERD (gastroesophageal reflux disease)     History of uterine cancer     Hyperglycemia     Hyperlipidemia     Hypertension     Hypovitaminosis D     Iron deficiency anemia     Joint pain     Mood disorder ([facility]     Obesity     Obstructive sleep apnea     Ringing in ears     RLS (restless legs syndrome)     Snoring     Uterine cancer ([facility])     age 20   [facility] Vitamin D deficiency     Witnessed episode of apnea        Past Surgical History:   Procedure Laterality Date    APPENDECTOMY      HYSTERECTOMY      age 20   [facility] KNEE ARTHROSCOPY      KNEE SURGERY      Meniscus tear    TONSILLECTOMY      TUBAL LIGATION         Family History   Problem Relation Age of Onset    Diabetes Mother     Asthma Mother     Hypertension Mother     Heart disease Mother     No Known Problems Sister     No Known Problems Daughter     No Known Problems Maternal Grandmother     No Known Problems Paternal Grandmother     No Known Problems Sister     No Known Problems Sister     No Known Problems Daughter        Social History     Socioeconomic History    Marital status: Single     Spouse name: Not on file    Number of children: Not on file    Years of education: Not on file    Highest education level: Not on file   Occupational History    Not on file   Social Needs    Financial resource strain: Not on file    Food insecurity:     Worry: Not on file     Inability: Not on file    Transportation needs:     Medical: Not on file     Non-medical: Not on file   Tobacco Use    Smoking status: Current Every Day Smoker     Packs/day: 0 50    Smokeless tobacco: Former User   Substance and Sexual Activity    Alcohol use: Not Currently     Comment: very seldom    Drug use: Not Currently    Sexual activity: Not on file   Lifestyle    Physical activity:     Days per week: Not on file     Minutes per session: Not on file    Stress: Not on file   Relationships    Social connections:     Talks on phone: Not on file     Gets together: Not on file     Attends [employer] service: Not on file     Active member of club or organization: Not on file     Attends meetings of clubs or organizations: Not on file     Relationship status: Not on file    Intimate partner violence:     Fear of current or ex partner: Not on file     Emotionally abused: Not on file     Physically abused: Not on file     Forced sexual activity: Not on file   Other Topics Concern    Not on file   Social History Narrative    Not on file       Current Outpatient Medications   Medication Sig Dispense Refill    acetaminophen (TYLENOL) 650 mg CR tablet Take 1 tablet (650 mg total) by mouth every 8 (eight) hours as needed for mild pain 30 tablet 2    albuterol (VENTOLIN HFA) 90 mcg/act inhaler Inhale 2 puffs every 6 (six) hours as needed for wheezing 18 g 1    ammonium lactate (AMLACTIN) 12 % lotion Every 12 hours      ASPIRIN LOW DOSE 81 MG EC tablet Take 1 tablet (81 mg total) by mouth daily 30 tablet 2    atorvastatin (LIPITOR) 40 mg tablet take 1 tablet by mouth once daily 30 tablet 2  Cholecalciferol (VITAMIN D3) 25 MCG (1000 UT) CAPS Vitamin D3 25 mcg (1,000 unit) tablet      clotrimazole-betamethasone (LOTRISONE) 1-0 05 % cream Apply topically 2 (two) times a day 30 g 1    diphenhydrAMINE (BENADRYL) 25 mg tablet Take 1 tablet (25 mg total) by mouth every 6 (six) hours as needed for itching 30 tablet 0    famotidine (PEPCID) 40 MG tablet Take 1 tablet (40 mg total) by mouth daily 30 tablet 5    ferrous sulfate 325 (65 FE) MG EC tablet Take 1 tablet (325 mg total) by mouth every other day 90 tablet 1    nystatin (MYCOSTATIN) powder Apply topically 2 (two) times a day 60 g 0    pantoprazole (PROTONIX) 40 mg tablet Take 40 mg by mouth daily      primidone (MYSOLINE) 50 mg tablet By mouth take 1 tablet twice daily or as directed 60 tablet 2    propranolol (INDERAL) 40 mg tablet Take 1 tablet (40 mg total) by mouth every 12 (twelve) hours 60 tablet 5    RA VITAMIN C 500 MG tablet Take 1 tablet (500 mg total) by mouth daily 90 tablet 1    bisacodyl (DULCOLAX) 5 mg EC tablet Take 2 tablets (10 mg total) by mouth daily as needed for constipation (Patient not taking: Reported on 11/21/2019) 2 tablet 0    cholecalciferol (VITAMIN D3) 1,000 units tablet take 1 tablet by mouth once daily (Patient not taking: Reported on 11/21/2019) 30 tablet 5    polyethylene glycol (GOLYTELY) 4000 mL solution Take 4,000 mL by mouth once for 1 dose 4000 mL 0     No current facility-administered medications for this visit  Allergies: Aspirin    ROS:  Positive for cough, leg swelling, excessive thirst, rashes and orthopedic complaints noted above  The remaining 7/12 systems on the intake sheet that I reviewed were negative  PE:  /75   Pulse 84   Ht 5' 1" (1 549 m)   Wt 109 kg (240 lb)   LMP  (LMP Unknown)   BMI 45 35 kg/m²   Constitutional: The patient was  oriented to person, place, and time  She was heavy set  In no acute distress  HEENT: Vision intact   Hearing normal  Swallowing normal   Head: Normocephalic  Cardiovascular: Intact distal pulses  Pulse regular  Pulmonary/Chest: Effort normal  No respiratory distress  Neurological: Alert and oriented to person, place, and time  Skin: Skin is warm  Psychiatric: Normal mood and affect  Ortho Exam:  On today's exam of the bilateral knees, she was mildly limping and using a cane  There is no anatomical deformity  There was no obvious swelling, ecchymosis, redness or signs of infection  Skin is warm, dry and well perfused  She was tender to palpation both at the medial and lateral joint lines of both knees  She could extend both knees to 0 and flex to 100 degrees  The bilateral knees are stable to varus and valgus as well as AP stress  The patellae track centrally with more crepitus on the right  Sensation was intact to light touch with brisk capillary refill in all toes  There is no calf tenderness, popliteal adenopathy or cellulitis noted  Studies reviewed:  Weight-bearing knee x-rays were personally reviewed and showed significant collapse of the lateral compartments of both knees on the 15 degree flexion views  There is some sclerosis and minimal spur formation  The patellofemoral joints are preserved    There is no soft tissue calcification seen    Scribe Attestation    I,:   Carito Zamudio MA am acting as a scribe while in the presence of the attending physician :        I,:   Saad Celaya MD personally performed the services described in this documentation    as scribed in my presence :

## 2019-11-21 NOTE — PATIENT INSTRUCTIONS
Plan :  1  I explained the natural history of the  problem to the  patient -you have degenerative arthritis in both knees  There is no surgical intervention necessary now and should improve with nonoperative treatment  The ultimate answer may be total knee joint replacement if the symptoms do not quiet over time  2  Activity modification - avoid hyperflexion like bending, kneeling, squatting,  stairs as  much as humanly possible  Change position frequently to straighten your leg if you are sitting for a long period of time  3  Pain should be the warning guide to your activities - both during and after exercises  Stop doing your sport or activity if you are getting significant pain  4  Ice in large trash bag or bag of frozen peas for 15 min 4x a day, if needed, for pain or swelling  Heat is not indicated  5  Take Advil, Aleve, or Tylenol on an as-needed basis for pain  6  Do the isometric quad and hamstring exercises that you were shown diligently at  home  7  Gliding activities (skiing machine, elliptical, swimming) are allowed, but do not do pounding activities  ( treadmill, aerobics, distance walking )     8  She did not want cortisone injection today as these have not worked for her in the past

## 2019-11-29 DIAGNOSIS — G25.0 BENIGN ESSENTIAL TREMOR: ICD-10-CM

## 2019-11-29 RX ORDER — PRIMIDONE 50 MG/1
TABLET ORAL
Qty: 60 TABLET | Refills: 2 | OUTPATIENT
Start: 2019-11-29

## 2019-11-29 NOTE — TELEPHONE ENCOUNTER
Pt called and advised of all of the below  Pt states that she is taking primidone 50 mg 1/4 tab at bedtime  No need a refill at this time  Pt states that she has a whole bottle left  [facility] pharmacy made aware

## 2019-11-29 NOTE — TELEPHONE ENCOUNTER
Left message for patient to call back regarding refill request from pharmacy for primidone  She is currently taking 50mg 1/4 tablet at bedtime daily  Provider wants to know does she need a refill as she is not taking as directions on bottle state but as above

## 2019-11-29 NOTE — TELEPHONE ENCOUNTER
Please check with patient to see if she really needs a primidone refill  She is on a very, very low-dose at the present time  Thanks  The patient is a 24y Female complaining of

## 2019-12-06 DIAGNOSIS — E78.5 HYPERLIPIDEMIA, UNSPECIFIED HYPERLIPIDEMIA TYPE: ICD-10-CM

## 2019-12-06 RX ORDER — ATORVASTATIN CALCIUM 40 MG/1
40 TABLET, FILM COATED ORAL DAILY
Qty: 30 TABLET | Refills: 2 | Status: SHIPPED | OUTPATIENT
Start: 2019-12-06 | End: 2020-04-13 | Stop reason: SDUPTHER

## 2019-12-11 ENCOUNTER — TRANSCRIBE ORDERS (OUTPATIENT)
Dept: SLEEP CENTER | Facility: CLINIC | Age: 59
End: 2019-12-11

## 2019-12-11 DIAGNOSIS — G47.33 OBSTRUCTIVE SLEEP APNEA (ADULT) (PEDIATRIC): Primary | ICD-10-CM

## 2019-12-11 DIAGNOSIS — G25.81 RESTLESS LEGS SYNDROME: ICD-10-CM

## 2019-12-11 DIAGNOSIS — E66.01 MORBID (SEVERE) OBESITY DUE TO EXCESS CALORIES (HCC): ICD-10-CM

## 2019-12-11 DIAGNOSIS — Z99.89 DEPENDENCE ON OTHER ENABLING MACHINES AND DEVICES: ICD-10-CM

## 2019-12-16 ENCOUNTER — ANNUAL EXAM (OUTPATIENT)
Dept: FAMILY MEDICINE CLINIC | Facility: CLINIC | Age: 59
End: 2019-12-16

## 2019-12-16 VITALS
DIASTOLIC BLOOD PRESSURE: 70 MMHG | HEART RATE: 77 BPM | OXYGEN SATURATION: 98 % | BODY MASS INDEX: 44.93 KG/M2 | HEIGHT: 61 IN | RESPIRATION RATE: 18 BRPM | TEMPERATURE: 97.5 F | WEIGHT: 238 LBS | SYSTOLIC BLOOD PRESSURE: 118 MMHG

## 2019-12-16 DIAGNOSIS — Z01.419 ENCNTR FOR GYN EXAM (GENERAL) (ROUTINE) W/O ABN FINDINGS: Primary | ICD-10-CM

## 2019-12-16 DIAGNOSIS — Z23 FLU VACCINE NEED: ICD-10-CM

## 2019-12-16 DIAGNOSIS — B49 FUNGAL INFECTION: ICD-10-CM

## 2019-12-16 PROBLEM — E55.9 VITAMIN D DEFICIENCY: Status: RESOLVED | Noted: 2018-08-08 | Resolved: 2019-12-16

## 2019-12-16 PROCEDURE — G0124 SCREEN C/V THIN LAYER BY MD: HCPCS | Performed by: FAMILY MEDICINE

## 2019-12-16 PROCEDURE — 90682 RIV4 VACC RECOMBINANT DNA IM: CPT | Performed by: FAMILY MEDICINE

## 2019-12-16 PROCEDURE — G0145 SCR C/V CYTO,THINLAYER,RESCR: HCPCS | Performed by: FAMILY MEDICINE

## 2019-12-16 PROCEDURE — 90471 IMMUNIZATION ADMIN: CPT | Performed by: FAMILY MEDICINE

## 2019-12-16 PROCEDURE — 99396 PREV VISIT EST AGE 40-64: CPT | Performed by: FAMILY MEDICINE

## 2019-12-16 PROCEDURE — 87624 HPV HI-RISK TYP POOLED RSLT: CPT | Performed by: FAMILY MEDICINE

## 2019-12-16 RX ORDER — FLUCONAZOLE 150 MG/1
150 TABLET ORAL ONCE
Qty: 2 TABLET | Refills: 0 | Status: SHIPPED | OUTPATIENT
Start: 2019-12-16 | End: 2019-12-16

## 2019-12-16 NOTE — ASSESSMENT & PLAN NOTE
Secondary to morbid obesity and multiple skin folds  Unfortunately nystatin powder not sufficient to control patient's symptom  Will attempt fluconazole 150 mg PO x 1  If symptoms persist then take another fluconazole 150mg OT next week  Hygiene and keeping the area dry discussed with patient   If no improvement in symptoms consider miconazole cream

## 2019-12-16 NOTE — PROGRESS NOTES
Assessment/Plan:    Fungal infection  Secondary to morbid obesity and multiple skin folds  Unfortunately nystatin powder not sufficient to control patient's symptom  Will attempt fluconazole 150 mg PO x 1  If symptoms persist then take another fluconazole 150mg OT next week  Hygiene and keeping the area dry discussed with patient  If no improvement in symptoms consider miconazole cream      Encntr for gyn exam (general) (routine) w/o abn findings  Discussed with patient that for women aged 27 to 72 years, the USPSTF recommends cervical cancer screening every 5 years with hrHPV testing in combination with cytology (cotesting)  Patient has agreed to undergo testing at this time  All questions were answered  Pap performed from vaginal cuff, patient tolerated well  Subjective:      Patient ID: Geri Aguirre is a 62 y o  female  ANNUAL GYNECOLOGICAL EXAMINATION    Geri Aguirre is a 62 y o  female who presents today for annual GYN exam   Her last pap smear was performed many years ago and result was unsure  She reports is not sure if she has history of abnormal pap smears in her past  She contraceptive method is s/p total hysterectomy secondary to cancer but is not sure what type  Currently sexually active with  of 18 years  She denies vaginal bleeding/discharge/odor  She denies burning/itching to the vagina  She denies family history of breast, ovarian, vaginal or cervical cancer  LMP: postmenopausal since age 20    OB history:     Rash  She is reporting itchy, foul smelling, erythematous rash that is present at the pannus and the lateral aspect of her vagina  She was prescribed nystatin powder by previous PCP with no improvement in symptoms  Review of Systems   Constitutional: Negative for fatigue and unexpected weight change     Genitourinary: Negative for decreased urine volume, menstrual problem, pelvic pain, urgency, vaginal bleeding, vaginal discharge and vaginal pain  Musculoskeletal: Negative for back pain  Skin: Positive for rash  Negative for pallor  Objective:      /70 (BP Location: Left arm, Patient Position: Sitting, Cuff Size: Large)   Pulse 77   Temp 97 5 °F (36 4 °C) (Temporal)   Resp 18   Ht 5' 1" (1 549 m)   Wt 108 kg (238 lb)   LMP  (LMP Unknown)   SpO2 98%   BMI 44 97 kg/m²          Physical Exam   Constitutional: She appears well-developed  Morbidly obese   Pulmonary/Chest: No respiratory distress  Abdominal:   Erythematous beefy satellite lesions noted to the panus and lateral aspect of vagina    Genitourinary: Pelvic exam was performed with patient supine  No labial fusion  There is no rash, tenderness, lesion or injury on the right labia  There is no rash, tenderness, lesion or injury on the left labia  No erythema, tenderness or bleeding in the vagina  No foreign body in the vagina  No signs of injury around the vagina  No vaginal discharge found  Genitourinary Comments:  exam performed with [address]   Neurological: She is alert  Skin: Skin is warm  Capillary refill takes less than 2 seconds  Psychiatric: She has a normal mood and affect

## 2019-12-16 NOTE — ASSESSMENT & PLAN NOTE
Discussed with patient that for women aged 27 to 72 years, the USPSTF recommends cervical cancer screening every 5 years with hrHPV testing in combination with cytology (cotesting)  Patient has agreed to undergo testing at this time  All questions were answered  Pap performed from vaginal cuff, patient tolerated well

## 2019-12-17 LAB
HPV HR 12 DNA CVX QL NAA+PROBE: NEGATIVE
HPV16 DNA CVX QL NAA+PROBE: NEGATIVE
HPV18 DNA CVX QL NAA+PROBE: NEGATIVE

## 2019-12-19 LAB
LAB AP GYN PRIMARY INTERPRETATION: NORMAL
Lab: NORMAL

## 2019-12-26 DIAGNOSIS — D50.1 IRON DEFICIENCY ANEMIA DUE TO SIDEROPENIC DYSPHAGIA: ICD-10-CM

## 2020-02-13 ENCOUNTER — OFFICE VISIT (OUTPATIENT)
Dept: SLEEP CENTER | Facility: CLINIC | Age: 60
End: 2020-02-13
Payer: COMMERCIAL

## 2020-02-13 VITALS
HEART RATE: 72 BPM | SYSTOLIC BLOOD PRESSURE: 130 MMHG | WEIGHT: 237.4 LBS | HEIGHT: 61 IN | BODY MASS INDEX: 44.82 KG/M2 | DIASTOLIC BLOOD PRESSURE: 78 MMHG

## 2020-02-13 DIAGNOSIS — G47.33 OBSTRUCTIVE SLEEP APNEA TREATED WITH CONTINUOUS POSITIVE AIRWAY PRESSURE (CPAP): Primary | ICD-10-CM

## 2020-02-13 DIAGNOSIS — R79.0 LOW SERUM FERRITIN LEVEL: ICD-10-CM

## 2020-02-13 DIAGNOSIS — G25.81 RESTLESS LEG SYNDROME: ICD-10-CM

## 2020-02-13 DIAGNOSIS — D50.1 IRON DEFICIENCY ANEMIA DUE TO SIDEROPENIC DYSPHAGIA: ICD-10-CM

## 2020-02-13 DIAGNOSIS — Z99.89 OBSTRUCTIVE SLEEP APNEA TREATED WITH CONTINUOUS POSITIVE AIRWAY PRESSURE (CPAP): Primary | ICD-10-CM

## 2020-02-13 DIAGNOSIS — E66.01 MORBID OBESITY WITH BODY MASS INDEX (BMI) OF 40.0 TO 44.9 IN ADULT (HCC): ICD-10-CM

## 2020-02-13 PROCEDURE — 3075F SYST BP GE 130 - 139MM HG: CPT | Performed by: NURSE PRACTITIONER

## 2020-02-13 PROCEDURE — 3078F DIAST BP <80 MM HG: CPT | Performed by: NURSE PRACTITIONER

## 2020-02-13 PROCEDURE — 4004F PT TOBACCO SCREEN RCVD TLK: CPT | Performed by: NURSE PRACTITIONER

## 2020-02-13 PROCEDURE — 99214 OFFICE O/P EST MOD 30 MIN: CPT | Performed by: NURSE PRACTITIONER

## 2020-02-13 PROCEDURE — 3008F BODY MASS INDEX DOCD: CPT | Performed by: NURSE PRACTITIONER

## 2020-02-13 RX ORDER — LANOLIN ALCOHOL/MO/W.PET/CERES
325 CREAM (GRAM) TOPICAL EVERY OTHER DAY
Qty: 90 TABLET | Refills: 3 | Status: SHIPPED | OUTPATIENT
Start: 2020-02-13 | End: 2020-09-28 | Stop reason: ALTCHOICE

## 2020-02-13 NOTE — PATIENT INSTRUCTIONS
1   Continue use of CPAP equipment nightly  2  Continue to clean your equipment, as discussed  3  Contact the Sleep [address] [place] [address] with any questions or concerns prior to your next visit, as needed  4  Schedule visit for follow-up in 1 year      Nursing Support:  When: Monday through Friday 7A-5PM except holidays  Where: Our direct line is [phone]  If you are having a true emergency please call 911  In the event that the line is busy or it is after hours please leave a voice message and we will return your call  Please speak clearly, leaving your full name, birth date, best number to reach you and the reason for your call  Medication refills: We will need the name of the medication, the dosage, the ordering provider, whether you get a 30 or 90 day refill, and the pharmacy name and address  Medications will be ordered by the provider only  Nurses cannot call in prescriptions  Please allow 7 days for medication refills  Physician requested updates: If your provider requested that you call with an update after starting medication, please be ready to provide us the medication and dosage, what time you take your medication, the time you attempt to fall asleep, time you fall asleep, when you wake up, and what time you get out of bed  Sleep Study Results: We will contact you with sleep study results and/or next steps after the physician has reviewed your testing

## 2020-02-13 NOTE — PROGRESS NOTES
Progress Note - [address] [facility] 62 y o  female   DZS:[birth year], MRN: [id number]  2/13/2020          Follow Up Evaluation / Problem:  Obstructive Sleep Apnea  Restless Legs Syndrome  Hx of Low serum ferritin level  Morbid Obesity      Thank you for the opportunity of participating in the evaluation and care of this patient in the Sleep Clinic at [facility]  HPI: Nano Terry is a 62y o  year old female  The patient presents for follow up of obstructive sleep apnea and restless legs syndrome  She completed a diagnostic sleep study in April 2018, which identified moderate obstructive sleep apnea  It was followed by a CPAP titration study in June 2018 and she began the use of CPAP in July 2018  She has used CPAP off and on since set up  She also has had symptoms of restless legs syndrome, which were very bothersome in the evening and when trying to go to sleep  She was found to have a very low serum ferritin level of 7  She received infusions of venofer and symptoms improved significantly  She is here to review compliance and effectiveness of treatment        Review of Systems      Genitourinary none   Cardiology ankle/leg swelling   Gastrointestinal none   Neurology need to move extremities   Constitutional none   Integumentary none   Psychiatry depression   Musculoskeletal joint pain, back pain and legs twitching/jerking   Pulmonary shortness of breath with activity, frequent cough and snoring   ENT none   Endocrine none   Hematological none       Current Outpatient Medications:     acetaminophen (TYLENOL) 650 mg CR tablet, Take 1 tablet (650 mg total) by mouth every 8 (eight) hours as needed for mild pain, Disp: 30 tablet, Rfl: 2    albuterol (VENTOLIN HFA) 90 mcg/act inhaler, Inhale 2 puffs every 6 (six) hours as needed for wheezing, Disp: 18 g, Rfl: 1    ammonium lactate (AMLACTIN) 12 % lotion, Every 12 hours, Disp: , Rfl:     ASPIRIN LOW DOSE 81 MG EC tablet, Take 1 tablet (81 mg total) by mouth daily, Disp: 30 tablet, Rfl: 2    atorvastatin (LIPITOR) 40 mg tablet, Take 1 tablet (40 mg total) by mouth daily, Disp: 30 tablet, Rfl: 2    Cholecalciferol (VITAMIN D3) 25 MCG (1000 UT) CAPS, Vitamin D3 25 mcg (1,000 unit) tablet, Disp: , Rfl:     clotrimazole-betamethasone (LOTRISONE) 1-0 05 % cream, Apply topically 2 (two) times a day, Disp: 30 g, Rfl: 1    diphenhydrAMINE (BENADRYL) 25 mg tablet, Take 1 tablet (25 mg total) by mouth every 6 (six) hours as needed for itching, Disp: 30 tablet, Rfl: 0    famotidine (PEPCID) 40 MG tablet, Take 1 tablet (40 mg total) by mouth daily, Disp: 30 tablet, Rfl: 5    ferrous sulfate 325 (65 FE) MG EC tablet, Take 1 tablet (325 mg total) by mouth every other day, Disp: 90 tablet, Rfl: 3    pantoprazole (PROTONIX) 40 mg tablet, Take 40 mg by mouth daily, Disp: , Rfl:     primidone (MYSOLINE) 50 mg tablet, By mouth take 1 tablet twice daily or as directed, Disp: 60 tablet, Rfl: 2    propranolol (INDERAL) 40 mg tablet, Take 1 tablet (40 mg total) by mouth every 12 (twelve) hours, Disp: 60 tablet, Rfl: 5    RA VITAMIN C 500 MG tablet, Take 1 tablet (500 mg total) by mouth daily, Disp: 90 tablet, Rfl: 3    bisacodyl (DULCOLAX) 5 mg EC tablet, Take 2 tablets (10 mg total) by mouth daily as needed for constipation (Patient not taking: Reported on 11/21/2019), Disp: 2 tablet, Rfl: 0    cholecalciferol (VITAMIN D3) 1,000 units tablet, take 1 tablet by mouth once daily (Patient not taking: Reported on 11/21/2019), Disp: 30 tablet, Rfl: 5    polyethylene glycol (GOLYTELY) 4000 mL solution, Take 4,000 mL by mouth once for 1 dose, Disp: 4000 mL, Rfl: 0    [place] Sleepiness Scale  Sitting and reading: Slight chance of dozing  Watching TV: Moderate chance of dozing  Sitting, inactive in a public place (e g  a theatre or a meeting): Slight chance of dozing  As a passenger in a car for an hour without a break: Would never doze  Lying down to rest in the afternoon when circumstances permit: High chance of dozing  Sitting and talking to someone: Would never doze  Sitting quietly after a lunch without alcohol: Would never doze  In a car, while stopped for a few minutes in traffic: Would never doze  Total score: 7              Vitals:    02/13/20 1400   BP: 130/78   Pulse: 72   Weight: 108 kg (237 lb 6 4 oz)   Height: 5' 1" (1 549 m)       Body mass index is 44 86 kg/m²  Neck Circumference: 15       EPWORTH SLEEPINESS SCORE  Total score: 7      Past History Since Last Sleep Center Visit:   She reports that she has been waking up coughing when using her CPAP equipment  She puts the mask on when going to sleep and pressure begins at 4cm, and ramps up to 7cm  Within a few hours, she needs to remove the mask due to coughing  She is able to sleep the remainder of the night without the mask and feels she sleep better  The patient reports that she cleans the equipment appropriately and changes supplies on a regular basis  Symptoms of restless legs have resolved since she completed the venofer infusions  She takes iron and vitamin 3 every 3 days  The review of systems and following portions of the patient's history were reviewed and updated as appropriate: allergies, current medications, past family history, past medical history, past social history, past surgical history, and problem list         OBJECTIVE    PAP Pressure: Nasal CPAP set to deliver 7 cm of water pressure  Pressure change to 4-7cm  Type of mask used: full face  DME Provider:  [employer]    Physical Exam:     General Appearance:   Alert, cooperative, no distress, appears stated age, obese     Head:   Normocephalic, without obvious abnormality, atraumatic     Eyes:   PERRL, conjunctiva/corneas clear, EOM's intact          Nose:  Nares normal, septum midline, no drainage or sinus tenderness           Throat: Lips, teeth and gums normal; tongue normal size and  shape and midline mucosa moist and redundant bilaterally, uvula normal, tonsils absent, Mallampati class 3-4       Neck:  Supple, symmetrical, trachea midline, no adenopathy; Thyroid: No enlargement, tenderness or nodules; no carotid bruit or JVD     Lungs:      Clear to auscultation bilaterally, respirations unlabored     Heart:   Regular rate and rhythm, S1 and S2 normal, no murmur, rub or gallop       Extremities:  Extremities normal, atraumatic, no cyanosis or edema       Skin:  Skin color, texture, turgor normal, no rashes or lesions       Neurologic:  No focal deficits noted, no tremor noted       ASSESSMENT / PLAN    1  Obstructive sleep apnea treated with continuous positive airway pressure (CPAP)  PAP DME Pressure Change    PAP DME Resupply/Reorder   2  Iron deficiency anemia due to sideropenic dysphagia  RA VITAMIN C 500 MG tablet    ferrous sulfate 325 (65 FE) MG EC tablet   3  Restless leg syndrome     4  Morbid obesity with body mass index (BMI) of 40 0 to 44 9 in adult ([facility] )     5  Low serum ferritin level             Counseling / Coordination of Care  Total clinic time spent today 30 minutes  Greater than 50% of total time was spent with the patient and / or family counseling and / or coordination of care  A description of the counseling / coordination of care:     Impressions, Diagnostic results, Prognosis, Instructions for management, Risks and benefits of treatment, Patient and family education, Risk factor reductions and Importance of compliance with treatment    Today I reviewed the patient's compliance data  she has been able to use the equipment 6 7% of all days recorded  Average usage was 4 or more hours 3 3% of all days recorded  The estimated AHI is 4 6 abnormal breathing events per hour    The patient feels they benefit from the use of PAP equipment and would like to continue PAP therapy, however, she is currently having difficulty tolerating it  A pressure change to APAP from 4cm to 7cm has been ordered  She will call if she continues to have difficulty tolerating the use of equipment  Adjustments can be made, if necessary and follow up visit can be scheduled if needed sooner  She will continue using this equipment at the settings noted above for the next 12 months  At that timeshe will then return for a routine follow-up evaluation  I have asked the patient to contact the Sleep [address] [facility] if she encounters any difficulties prior to that time  The following instructions have been given to the patient today:    Patient Instructions   1  Continue use of CPAP equipment nightly  2  Continue to clean your equipment, as discussed  3  Contact the Sleep [address] [facility] with any questions or concerns prior to your next visit, as needed  4  Schedule visit for follow-up in 1 year      Nursing Support:  When: Monday through Friday 7A-5PM except holidays  Where: Our direct line is [phone]  If you are having a true emergency please call 911  In the event that the line is busy or it is after hours please leave a voice message and we will return your call  Please speak clearly, leaving your full name, birth date, best number to reach you and the reason for your call  Medication refills: We will need the name of the medication, the dosage, the ordering provider, whether you get a 30 or 90 day refill, and the pharmacy name and address  Medications will be ordered by the provider only  Nurses cannot call in prescriptions  Please allow 7 days for medication refills  Physician requested updates: If your provider requested that you call with an update after starting medication, please be ready to provide us the medication and dosage, what time you take your medication, the time you attempt to fall asleep, time you fall asleep, when you wake up, and what time you get out of bed  Sleep Study Results:  We will contact you with sleep study results and/or next steps after the physician has reviewed your testing        Cristina Gilbert, [address] [facility]

## 2020-02-14 ENCOUNTER — TELEPHONE (OUTPATIENT)
Dept: SLEEP CENTER | Facility: CLINIC | Age: 60
End: 2020-02-14

## 2020-02-25 ENCOUNTER — OFFICE VISIT (OUTPATIENT)
Dept: OBGYN CLINIC | Facility: CLINIC | Age: 60
End: 2020-02-25
Payer: COMMERCIAL

## 2020-02-25 VITALS — BODY MASS INDEX: 44.75 KG/M2 | HEIGHT: 61 IN | WEIGHT: 237 LBS

## 2020-02-25 DIAGNOSIS — M17.0 PRIMARY OSTEOARTHRITIS OF BOTH KNEES: Primary | ICD-10-CM

## 2020-02-25 PROCEDURE — 4004F PT TOBACCO SCREEN RCVD TLK: CPT | Performed by: ORTHOPAEDIC SURGERY

## 2020-02-25 PROCEDURE — 3008F BODY MASS INDEX DOCD: CPT | Performed by: ORTHOPAEDIC SURGERY

## 2020-02-25 PROCEDURE — 3075F SYST BP GE 130 - 139MM HG: CPT | Performed by: ORTHOPAEDIC SURGERY

## 2020-02-25 PROCEDURE — 99213 OFFICE O/P EST LOW 20 MIN: CPT | Performed by: ORTHOPAEDIC SURGERY

## 2020-02-25 PROCEDURE — 3078F DIAST BP <80 MM HG: CPT | Performed by: ORTHOPAEDIC SURGERY

## 2020-02-25 NOTE — PATIENT INSTRUCTIONS
I am very pleased that she has done very nicely with the conservative treatment program at home  I reiterated the common sense approach of avoidance of hyperflexion or bending activities, ice and Advil when symptomatic, a daily home exercise program   This has worked for her so far and she deserves credit for this  I would like her to live her life and do which she can letting pain be the warning guide to her activity level  I did caution her if her knock-knee deformity worsens, then she should call and come in sooner, rather than later so she does not overly stretch out the ligaments on the inside of her knee  I sent her back to her family physician for routine care and she is certainly welcome to return to see us if she has any further issues

## 2020-02-25 NOTE — PROGRESS NOTES
Chief Complaint   Patient presents with    Left Knee - Follow-up    Right Knee - Follow-up           Assessment:  Bilateral DJD of knees    Plan : I am very pleased that she has done very nicely with the conservative treatment program at home  I reiterated the common sense approach of avoidance of hyperflexion or bending activities, ice and Advil when symptomatic, a daily home exercise program   This has worked for her so far and she deserves credit for this  I would like her to live her life and do which she can letting pain be the warning guide to her activity level  I did caution her if her knock-knee deformity worsens, then she should call and come in sooner, rather than later so she does not overly stretch out the ligaments on the inside of her knee  I sent her back to her family physician for routine care and she is certainly welcome to return to see us if she has any further issues  HPI:  This is a 49-year-old white female presenting for orthopedic evaluation regarding her bilateral knee pain  She is self-referred  She complains of bilateral knee pain for 15+ years  She denies any obvious injury or trauma  She has medial, lateral and anterior knee pain on the left and circumferential right knee pain  She states that these are constantly swollen  She has pain with steps, squatting and cannot kneel  She also describes a positive movie sign  She notes she had a left knee arthroscopy for meniscus 3 years ago at [facility]  She also has attempted cortisone injections which did not help  She did attend the course of physical therapy which helped temporarily  She uses a cane as needed for long distances  She also uses ice, heat and Tylenol Arthritis  She has a history of anxiety, cancer, chronic pain disorder, COPD, depression, diabetes, dyslipidemia, GERD, hyperglycemia, hyperlipidemia, mood disorder, among others    These all contribute to the treatment and prognosis of today's complaint  She returns on 2/25/2020 for follow up  She is happy to report that she feel 100% improved with the simple exercises she was shown  The only time she has pain is with weather changes and this is minimal  She does not offer any new complaints today  The remainder of this patient's past medical history, family history, social history, medicines, and allergies was reviewed in the chart  These include anxiety, cancer, chronic pain disorder, COPD, depression, diabetes, dyslipidemia, GERD, hyperglycemia, hyperlipidemia, mood disorder, among others  Please see HPI for pertinent review of systems  All other systems reviewed are negative  Ortho Exam: Ht 5' 1" (1 549 m)   Wt 108 kg (237 lb)   LMP  (LMP Unknown)   BMI 44 78 kg/m²    On today's exam of the bilateral knees, she was not limping or using any external walking aids  There is no anatomical deformity  There was no swelling, ecchymosis, redness or signs of infection  The skin was warm, dry and well perfused  She was minimally tender to palpation at the medial joint lines of both knees  She could extend both knees to 0 and flex to 100 degrees  The bilateral knees are stable to varus and valgus as well as AP stress  The patellae track centrally with more crepitus on the right  Sensation was intact to light touch with brisk capillary refill in all toes  There is no calf tenderness, popliteal adenopathy or cellulitis noted  Studies reviewed:  Weight-bearing knee x-rays were personally reviewed and showed significant collapse of the lateral compartments of both knees on the 15 degree flexion views  There is some sclerosis and minimal spur formation  The patellofemoral joints are preserved    There is no soft tissue calcification seen    Scribe Attestation    I,:   Guru Mckoy MA am acting as a scribe while in the presence of the attending physician :        I,:   Cesar Boogie MD personally performed the services described in this documentation    as scribed in my presence :

## 2020-02-27 ENCOUNTER — OFFICE VISIT (OUTPATIENT)
Dept: NEUROLOGY | Facility: CLINIC | Age: 60
End: 2020-02-27
Payer: COMMERCIAL

## 2020-02-27 VITALS
HEART RATE: 72 BPM | HEIGHT: 61 IN | WEIGHT: 235.8 LBS | DIASTOLIC BLOOD PRESSURE: 60 MMHG | RESPIRATION RATE: 16 BRPM | SYSTOLIC BLOOD PRESSURE: 143 MMHG | BODY MASS INDEX: 44.52 KG/M2

## 2020-02-27 DIAGNOSIS — G25.0 BENIGN ESSENTIAL TREMOR: Primary | ICD-10-CM

## 2020-02-27 PROCEDURE — 4004F PT TOBACCO SCREEN RCVD TLK: CPT | Performed by: PSYCHIATRY & NEUROLOGY

## 2020-02-27 PROCEDURE — 3077F SYST BP >= 140 MM HG: CPT | Performed by: PSYCHIATRY & NEUROLOGY

## 2020-02-27 PROCEDURE — 99213 OFFICE O/P EST LOW 20 MIN: CPT | Performed by: PSYCHIATRY & NEUROLOGY

## 2020-02-27 PROCEDURE — 3008F BODY MASS INDEX DOCD: CPT | Performed by: PSYCHIATRY & NEUROLOGY

## 2020-02-27 PROCEDURE — 3078F DIAST BP <80 MM HG: CPT | Performed by: PSYCHIATRY & NEUROLOGY

## 2020-02-27 RX ORDER — GABAPENTIN 100 MG/1
CAPSULE ORAL
Qty: 90 CAPSULE | Refills: 2 | Status: SHIPPED | OUTPATIENT
Start: 2020-02-27 | End: 2020-04-28

## 2020-02-27 RX ORDER — FLUCONAZOLE 150 MG/1
TABLET ORAL
Refills: 0 | COMMUNITY
Start: 2019-12-16 | End: 2020-09-17 | Stop reason: ALTCHOICE

## 2020-02-27 NOTE — PATIENT INSTRUCTIONS
Stop the primidone  Continue propranolol 40 mg tablets taking 1 tablet twice daily  Trial on gabapentin (off-label) starting with 100 mg capsules and taking 1 capsule twice daily  Call in 2-3 weeks with a status report and to discuss possible further change in medication  Call before then if you have any questions, concerns or difficulties tolerating the gabapentin

## 2020-02-27 NOTE — LETTER
February 27, 2020     MD Bryan Mendoza [address] [facility]    Patient: Magda Garcia   YOB: 1960   Date of Visit: 2/27/2020       Dear Dr Chitra Guaman: Thank you for referring Magda Garcia to me for evaluation  Below are my notes for this consultation  If you have questions, please do not hesitate to call me  I look forward to following your patient along with you  Sincerely,        Ramirez Campbell MD        CC: No Recipients  Ramirez Campbell MD  2/27/2020  3:21 PM  Incomplete  Patient ID: Magda Garcia is a 62 y o  female  Assessment/Plan:    Benign essential tremor  Unfortunately, unable tolerate even extremely low doses of primidone due to lightheadedness/dizziness  She has noted a significant improvement in her tremor on her propranolol at 40 mg twice daily  However, she is hopeful for further improvement as she continues to note at times troublesome tremor when she is involved in the finer motor activities  --to discontinue her primidone  --unfortunately, unable to advance her propranolol as in the past when higher doses experience bradycardia  She will continue her propranolol unchanged 40 mg twice daily  --as an alternative adjunct, trial on gabapentin (patient aware that is being used here off-label) starting with 100 mg capsules taking 1 capsule twice daily  --asked to call the office in 2-3 weeks with a status update or before then should she have any questions or concerns  She will follow up in 3 months  Subjective:    HPI  Patient, 62years of age, is being followed for her essential tremor  Most recently she has been on propranolol 40 mg twice daily, on which she has noticed significant improvement  However, she has been hopeful for get better improvement as she continues to have tremor that can be somewhat troublesome when she is involved in more fine motor activities    Primidone was added but unfortunately even in extremely low doses, she feels dizzy and has noted no further improvement at that very low-dose  Most recent blood work done in September this past year  CBC with hemoglobin 14 3, hematocrit 42 5, white count 8 30 and platelet count 468  CMP with creatinine 0 67, AST 28 and ALT 29       Past Medical History:   Diagnosis Date    Allergic rhinitis     Anemia     Anxiety     Arthritis     Back pain     Cancer ([facility])     Chronic pain disorder     back    COPD (chronic obstructive pulmonary disease) ([facility])     CPAP (continuous positive airway pressure) dependence     Depression     Diabetes mellitus ([facility])     Pre- Diabetic    Dyslipidemia     Essential tremor     Excessive daytime sleepiness     GERD (gastroesophageal reflux disease)     History of uterine cancer     Hyperglycemia     Hyperlipidemia     Hypertension     Hypovitaminosis D     Iron deficiency anemia     Joint pain     Mood disorder ([facility])     Obesity     Obstructive sleep apnea     Ringing in ears     RLS (restless legs syndrome)     Snoring     Uterine cancer ([facility])     age 20   [facility] Vitamin D deficiency     Witnessed episode of apnea      Past Surgical History:   Procedure Laterality Date    APPENDECTOMY      HYSTERECTOMY      age 20   [facility] KNEE ARTHROSCOPY      KNEE SURGERY      Meniscus tear    TONSILLECTOMY      TUBAL LIGATION       Social History     Socioeconomic History    Marital status: Single     Spouse name: None    Number of children: None    Years of education: None    Highest education level: None   Occupational History    None   Social Needs    Financial resource strain: None    Food insecurity:     Worry: None     Inability: None    Transportation needs:     Medical: None     Non-medical: None   Tobacco Use    Smoking status: Current Every Day Smoker     Packs/day: 0 50    Smokeless tobacco: Former User   Substance and Sexual Activity    Alcohol use: Not Currently     Comment: very seldom    Drug use: Not Currently    Sexual activity: None   Lifestyle    Physical activity:     Days per week: None     Minutes per session: None    Stress: None   Relationships    Social connections:     Talks on phone: None     Gets together: None     Attends Jewish service: None     Active member of club or organization: None     Attends meetings of clubs or organizations: None     Relationship status: None    Intimate partner violence:     Fear of current or ex partner: None     Emotionally abused: None     Physically abused: None     Forced sexual activity: None   Other Topics Concern    None   Social History Narrative    None     Family History   Problem Relation Age of Onset    Diabetes Mother     Asthma Mother     Hypertension Mother     Heart disease Mother     No Known Problems Sister     No Known Problems Daughter     No Known Problems Maternal Grandmother     No Known Problems Paternal Grandmother     No Known Problems Sister     No Known Problems Sister     No Known Problems Daughter      Allergies   Allergen Reactions    Aspirin GI Intolerance     Can only take baby aspirin        Current Outpatient Medications:     acetaminophen (TYLENOL) 650 mg CR tablet, Take 1 tablet (650 mg total) by mouth every 8 (eight) hours as needed for mild pain, Disp: 30 tablet, Rfl: 2    albuterol (VENTOLIN HFA) 90 mcg/act inhaler, Inhale 2 puffs every 6 (six) hours as needed for wheezing, Disp: 18 g, Rfl: 1    ammonium lactate (AMLACTIN) 12 % lotion, Every 12 hours, Disp: , Rfl:     ASPIRIN LOW DOSE 81 MG EC tablet, Take 1 tablet (81 mg total) by mouth daily, Disp: 30 tablet, Rfl: 2    atorvastatin (LIPITOR) 40 mg tablet, Take 1 tablet (40 mg total) by mouth daily, Disp: 30 tablet, Rfl: 2    bisacodyl (DULCOLAX) 5 mg EC tablet, Take 2 tablets (10 mg total) by mouth daily as needed for constipation, Disp: 2 tablet, Rfl: 0    cholecalciferol (VITAMIN D3) 1,000 units tablet, take 1 tablet by mouth once daily, Disp: 30 tablet, Rfl: 5    Cholecalciferol (VITAMIN D3) 25 MCG (1000 UT) CAPS, Vitamin D3 25 mcg (1,000 unit) tablet, Disp: , Rfl:     clotrimazole-betamethasone (LOTRISONE) 1-0 05 % cream, Apply topically 2 (two) times a day, Disp: 30 g, Rfl: 1    diphenhydrAMINE (BENADRYL) 25 mg tablet, Take 1 tablet (25 mg total) by mouth every 6 (six) hours as needed for itching, Disp: 30 tablet, Rfl: 0    famotidine (PEPCID) 40 MG tablet, Take 1 tablet (40 mg total) by mouth daily, Disp: 30 tablet, Rfl: 5    ferrous sulfate 325 (65 FE) MG EC tablet, Take 1 tablet (325 mg total) by mouth every other day, Disp: 90 tablet, Rfl: 3    fluconazole (DIFLUCAN) 150 mg tablet, take 1 tablet by mouth AS A ONE TIME DOSE, Disp: , Rfl: 0    pantoprazole (PROTONIX) 40 mg tablet, Take 40 mg by mouth daily, Disp: , Rfl:     propranolol (INDERAL) 40 mg tablet, Take 1 tablet (40 mg total) by mouth every 12 (twelve) hours, Disp: 60 tablet, Rfl: 5    RA VITAMIN C 500 MG tablet, Take 1 tablet (500 mg total) by mouth daily, Disp: 90 tablet, Rfl: 3    gabapentin (NEURONTIN) 100 mg capsule, By mouth take 1 capsule 3 times daily or as directed, Disp: 90 capsule, Rfl: 2    polyethylene glycol (GOLYTELY) 4000 mL solution, Take 4,000 mL by mouth once for 1 dose, Disp: 4000 mL, Rfl: 0    Objective:    Blood pressure 143/60, pulse 72, resp  rate 16, height 5' 1" (1 549 m), weight 107 kg (235 lb 12 8 oz)  Physical Exam  Head normocephalic  Eyes nonicteric  No audible anterior neck bruits  Lungs clear to auscultation  Rhythm regular  GI (abdomen) soft nontender  Bowel sounds present  Bilateral lower extremity edema noted  Neurological Exam  Alert  Fully oriented and pleasantly interactive  No voice tremor  Unremarkable spontaneous gait with good length strides and good bilateral spontaneous arm swing  Normal rest tone  No tone increase or cogwheeling with contralateral distraction maneuver  No rest tremor observed    Only very minimal tremor of the outstretched upper extremities noted, which became a bit more evident with extended sustention  ROS:    Review of Systems   Constitutional: Negative  Negative for appetite change and fever  HENT: Negative  Negative for hearing loss, tinnitus, trouble swallowing and voice change  Eyes: Negative  Negative for photophobia and pain  Respiratory: Negative  Negative for shortness of breath  Cardiovascular: Negative  Negative for palpitations  Gastrointestinal: Negative  Negative for nausea and vomiting  Endocrine: Negative  Negative for cold intolerance and heat intolerance  Genitourinary: Positive for frequency and urgency  Negative for dysuria  Musculoskeletal: Negative  Negative for myalgias and neck pain  Skin: Negative  Negative for rash  Neurological: Positive for dizziness  Negative for tremors (hands), seizures, syncope, facial asymmetry, speech difficulty, weakness, light-headedness, numbness and headaches  Hematological: Negative  Does not bruise/bleed easily  Psychiatric/Behavioral: Positive for sleep disturbance  Negative for confusion and hallucinations  I personally reviewed the ROS as entered by the medical assistant  *Please note this document was created using voice recognition software and may contain sound-alike word errors  *

## 2020-02-27 NOTE — ASSESSMENT & PLAN NOTE
Unfortunately, unable tolerate even extremely low doses of primidone due to lightheadedness/dizziness  She has noted a significant improvement in her tremor on her propranolol at 40 mg twice daily  However, she is hopeful for further improvement as she continues to note at times troublesome tremor when she is involved in the finer motor activities  --to discontinue her primidone  --unfortunately, unable to advance her propranolol as in the past when higher doses experience bradycardia  She will continue her propranolol unchanged 40 mg twice daily  --as an alternative adjunct, trial on gabapentin (patient aware that is being used here off-label) starting with 100 mg capsules taking 1 capsule twice daily  --asked to call the office in 2-3 weeks with a status update or before then should she have any questions or concerns

## 2020-02-27 NOTE — PROGRESS NOTES
Patient ID: Gail Dejesus is a 62 y o  female  Assessment/Plan:    Benign essential tremor  Unfortunately, unable tolerate even extremely low doses of primidone due to lightheadedness/dizziness  She has noted a significant improvement in her tremor on her propranolol at 40 mg twice daily  However, she is hopeful for further improvement as she continues to note at times troublesome tremor when she is involved in the finer motor activities  --to discontinue her primidone  --unfortunately, unable to advance her propranolol as in the past when higher doses experience bradycardia  She will continue her propranolol unchanged 40 mg twice daily  --as an alternative adjunct, trial on gabapentin (patient aware that is being used here off-label) starting with 100 mg capsules taking 1 capsule twice daily  --asked to call the office in 2-3 weeks with a status update or before then should she have any questions or concerns  She will follow up in 3 months  Subjective:    HPI  Patient, 62years of age, is being followed for her essential tremor  Most recently she has been on propranolol 40 mg twice daily, on which she has noticed significant improvement  However, she has been hopeful for get better improvement as she continues to have tremor that can be somewhat troublesome when she is involved in more fine motor activities  Primidone was added but unfortunately even in extremely low doses, she feels dizzy and has noted no further improvement at that very low-dose  Most recent blood work done in September this past year  CBC with hemoglobin 14 3, hematocrit 42 5, white count 8 30 and platelet count 184  CMP with creatinine 0 67, AST 28 and ALT 29       Past Medical History:   Diagnosis Date    Allergic rhinitis     Anemia     Anxiety     Arthritis     Back pain     Cancer ([facility])     Chronic pain disorder     back    COPD (chronic obstructive pulmonary disease) ([facility])     CPAP (continuous positive airway pressure) dependence     Depression     Diabetes mellitus ([facility])     Pre- Diabetic    Dyslipidemia     Essential tremor     Excessive daytime sleepiness     GERD (gastroesophageal reflux disease)     History of uterine cancer     Hyperglycemia     Hyperlipidemia     Hypertension     Hypovitaminosis D     Iron deficiency anemia     Joint pain     [facility]     Obesity     Obstructive sleep apnea     Ringing in ears     RLS (restless legs syndrome)     Snoring     Uterine cancer ([facility])     age 20   [facility] Vitamin D deficiency     Witnessed episode of apnea      Past Surgical History:   Procedure Laterality Date    APPENDECTOMY      HYSTERECTOMY      age 20   [facility] KNEE ARTHROSCOPY      KNEE SURGERY      Meniscus tear    TONSILLECTOMY      TUBAL LIGATION       Social History     Socioeconomic History    Marital status: Single     Spouse name: None    Number of children: None    Years of education: None    Highest education level: None   Occupational History    None   Social Needs    Financial resource strain: None    Food insecurity:     Worry: None     Inability: None    Transportation needs:     Medical: None     Non-medical: None   Tobacco Use    Smoking status: Current Every Day Smoker     Packs/day: 0 50    Smokeless tobacco: Former User   Substance and Sexual Activity    Alcohol use: Not Currently     Comment: very seldom    Drug use: Not Currently    Sexual activity: None   Lifestyle    Physical activity:     Days per week: None     Minutes per session: None    Stress: None   Relationships    Social connections:     Talks on phone: None     Gets together: None     Attends Roman Catholic service: None     Active member of club or organization: None     Attends meetings of clubs or organizations: None     Relationship status: None    Intimate partner violence:     Fear of current or ex partner: None     Emotionally abused: None     Physically abused: None     Forced sexual activity: None   Other Topics Concern    None   Social History Narrative    None     Family History   Problem Relation Age of Onset    Diabetes Mother     Asthma Mother     Hypertension Mother     Heart disease Mother     No Known Problems Sister     No Known Problems Daughter     No Known Problems Maternal Grandmother     No Known Problems Paternal Grandmother     No Known Problems Sister     No Known Problems Sister     No Known Problems Daughter      Allergies   Allergen Reactions    Aspirin GI Intolerance     Can only take baby aspirin        Current Outpatient Medications:     acetaminophen (TYLENOL) 650 mg CR tablet, Take 1 tablet (650 mg total) by mouth every 8 (eight) hours as needed for mild pain, Disp: 30 tablet, Rfl: 2    albuterol (VENTOLIN HFA) 90 mcg/act inhaler, Inhale 2 puffs every 6 (six) hours as needed for wheezing, Disp: 18 g, Rfl: 1    ammonium lactate (AMLACTIN) 12 % lotion, Every 12 hours, Disp: , Rfl:     ASPIRIN LOW DOSE 81 MG EC tablet, Take 1 tablet (81 mg total) by mouth daily, Disp: 30 tablet, Rfl: 2    atorvastatin (LIPITOR) 40 mg tablet, Take 1 tablet (40 mg total) by mouth daily, Disp: 30 tablet, Rfl: 2    bisacodyl (DULCOLAX) 5 mg EC tablet, Take 2 tablets (10 mg total) by mouth daily as needed for constipation, Disp: 2 tablet, Rfl: 0    cholecalciferol (VITAMIN D3) 1,000 units tablet, take 1 tablet by mouth once daily, Disp: 30 tablet, Rfl: 5    Cholecalciferol (VITAMIN D3) 25 MCG (1000 UT) CAPS, Vitamin D3 25 mcg (1,000 unit) tablet, Disp: , Rfl:     clotrimazole-betamethasone (LOTRISONE) 1-0 05 % cream, Apply topically 2 (two) times a day, Disp: 30 g, Rfl: 1    diphenhydrAMINE (BENADRYL) 25 mg tablet, Take 1 tablet (25 mg total) by mouth every 6 (six) hours as needed for itching, Disp: 30 tablet, Rfl: 0    famotidine (PEPCID) 40 MG tablet, Take 1 tablet (40 mg total) by mouth daily, Disp: 30 tablet, Rfl: 5    ferrous sulfate 325 (65 FE) MG EC tablet, Take 1 tablet (325 mg total) by mouth every other day, Disp: 90 tablet, Rfl: 3    fluconazole (DIFLUCAN) 150 mg tablet, take 1 tablet by mouth AS A ONE TIME DOSE, Disp: , Rfl: 0    pantoprazole (PROTONIX) 40 mg tablet, Take 40 mg by mouth daily, Disp: , Rfl:     propranolol (INDERAL) 40 mg tablet, Take 1 tablet (40 mg total) by mouth every 12 (twelve) hours, Disp: 60 tablet, Rfl: 5    RA VITAMIN C 500 MG tablet, Take 1 tablet (500 mg total) by mouth daily, Disp: 90 tablet, Rfl: 3    gabapentin (NEURONTIN) 100 mg capsule, By mouth take 1 capsule 3 times daily or as directed, Disp: 90 capsule, Rfl: 2    polyethylene glycol (GOLYTELY) 4000 mL solution, Take 4,000 mL by mouth once for 1 dose, Disp: 4000 mL, Rfl: 0    Objective:    Blood pressure 143/60, pulse 72, resp  rate 16, height 5' 1" (1 549 m), weight 107 kg (235 lb 12 8 oz)  Physical Exam  Head normocephalic  Eyes nonicteric  No audible anterior neck bruits  Lungs clear to auscultation  Rhythm regular  GI (abdomen) soft nontender  Bowel sounds present  Bilateral lower extremity edema noted  Neurological Exam  Alert  Fully oriented and pleasantly interactive  No voice tremor  Unremarkable spontaneous gait with good length strides and good bilateral spontaneous arm swing  Normal rest tone  No tone increase or cogwheeling with contralateral distraction maneuver  No rest tremor observed  Only very minimal tremor of the outstretched upper extremities noted, which became a bit more evident with extended sustention  ROS:    Review of Systems   Constitutional: Negative  Negative for appetite change and fever  HENT: Negative  Negative for hearing loss, tinnitus, trouble swallowing and voice change  Eyes: Negative  Negative for photophobia and pain  Respiratory: Negative  Negative for shortness of breath  Cardiovascular: Negative  Negative for palpitations  Gastrointestinal: Negative  Negative for nausea and vomiting  Endocrine: Negative  Negative for cold intolerance and heat intolerance  Genitourinary: Positive for frequency and urgency  Negative for dysuria  Musculoskeletal: Negative  Negative for myalgias and neck pain  Skin: Negative  Negative for rash  Neurological: Positive for dizziness  Negative for tremors (hands), seizures, syncope, facial asymmetry, speech difficulty, weakness, light-headedness, numbness and headaches  Hematological: Negative  Does not bruise/bleed easily  Psychiatric/Behavioral: Positive for sleep disturbance  Negative for confusion and hallucinations  I personally reviewed the ROS as entered by the medical assistant  *Please note this document was created using voice recognition software and may contain sound-alike word errors  *

## 2020-04-13 ENCOUNTER — TELEMEDICINE (OUTPATIENT)
Dept: FAMILY MEDICINE CLINIC | Facility: CLINIC | Age: 60
End: 2020-04-13

## 2020-04-13 DIAGNOSIS — E78.5 HYPERLIPIDEMIA, UNSPECIFIED HYPERLIPIDEMIA TYPE: ICD-10-CM

## 2020-04-13 DIAGNOSIS — B49 FUNGAL INFECTION: Primary | ICD-10-CM

## 2020-04-13 DIAGNOSIS — G25.0 BENIGN ESSENTIAL TREMOR: ICD-10-CM

## 2020-04-13 PROBLEM — Z12.39 SCREENING FOR BREAST CANCER: Status: RESOLVED | Noted: 2018-08-09 | Resolved: 2020-04-13

## 2020-04-13 PROBLEM — Z01.419 ENCNTR FOR GYN EXAM (GENERAL) (ROUTINE) W/O ABN FINDINGS: Status: RESOLVED | Noted: 2019-12-16 | Resolved: 2020-04-13

## 2020-04-13 PROBLEM — Z12.11 SCREENING FOR COLON CANCER: Status: RESOLVED | Noted: 2019-09-05 | Resolved: 2020-04-13

## 2020-04-13 PROBLEM — Z12.2 ENCOUNTER FOR SCREENING FOR LUNG CANCER: Status: RESOLVED | Noted: 2018-08-09 | Resolved: 2020-04-13

## 2020-04-13 PROCEDURE — T1015 CLINIC SERVICE: HCPCS | Performed by: FAMILY MEDICINE

## 2020-04-13 PROCEDURE — G2012 BRIEF CHECK IN BY MD/QHP: HCPCS | Performed by: FAMILY MEDICINE

## 2020-04-13 RX ORDER — PROPRANOLOL HYDROCHLORIDE 40 MG/1
40 TABLET ORAL EVERY 12 HOURS SCHEDULED
Qty: 60 TABLET | Refills: 5 | Status: SHIPPED | OUTPATIENT
Start: 2020-04-13 | End: 2020-09-17 | Stop reason: SDUPTHER

## 2020-04-13 RX ORDER — ATORVASTATIN CALCIUM 40 MG/1
40 TABLET, FILM COATED ORAL DAILY
Qty: 90 TABLET | Refills: 3 | Status: SHIPPED | OUTPATIENT
Start: 2020-04-13 | End: 2020-12-15 | Stop reason: SDUPTHER

## 2020-04-13 RX ORDER — FLUCONAZOLE 150 MG/1
150 TABLET ORAL ONCE
Qty: 2 TABLET | Refills: 0 | Status: SHIPPED | OUTPATIENT
Start: 2020-04-13 | End: 2020-04-13

## 2020-04-28 DIAGNOSIS — G25.0 BENIGN ESSENTIAL TREMOR: ICD-10-CM

## 2020-04-28 RX ORDER — GABAPENTIN 100 MG/1
CAPSULE ORAL
Qty: 90 CAPSULE | Refills: 2 | Status: SHIPPED | OUTPATIENT
Start: 2020-04-28 | End: 2020-05-21

## 2020-05-21 DIAGNOSIS — G25.0 BENIGN ESSENTIAL TREMOR: ICD-10-CM

## 2020-05-21 RX ORDER — GABAPENTIN 100 MG/1
CAPSULE ORAL
Qty: 90 CAPSULE | Refills: 2 | Status: SHIPPED | OUTPATIENT
Start: 2020-05-21 | End: 2020-09-17 | Stop reason: SDUPTHER

## 2020-06-12 ENCOUNTER — OFFICE VISIT (OUTPATIENT)
Dept: NEUROLOGY | Facility: CLINIC | Age: 60
End: 2020-06-12
Payer: COMMERCIAL

## 2020-06-12 VITALS
HEART RATE: 82 BPM | SYSTOLIC BLOOD PRESSURE: 123 MMHG | RESPIRATION RATE: 17 BRPM | WEIGHT: 235.8 LBS | BODY MASS INDEX: 44.52 KG/M2 | DIASTOLIC BLOOD PRESSURE: 59 MMHG | TEMPERATURE: 98 F | HEIGHT: 61 IN

## 2020-06-12 DIAGNOSIS — G25.0 BENIGN ESSENTIAL TREMOR: Primary | ICD-10-CM

## 2020-06-12 PROCEDURE — 3074F SYST BP LT 130 MM HG: CPT | Performed by: PSYCHIATRY & NEUROLOGY

## 2020-06-12 PROCEDURE — 3078F DIAST BP <80 MM HG: CPT | Performed by: PSYCHIATRY & NEUROLOGY

## 2020-06-12 PROCEDURE — 99212 OFFICE O/P EST SF 10 MIN: CPT | Performed by: PSYCHIATRY & NEUROLOGY

## 2020-06-12 PROCEDURE — 3008F BODY MASS INDEX DOCD: CPT | Performed by: PSYCHIATRY & NEUROLOGY

## 2020-06-12 PROCEDURE — 4004F PT TOBACCO SCREEN RCVD TLK: CPT | Performed by: PSYCHIATRY & NEUROLOGY

## 2020-07-02 ENCOUNTER — APPOINTMENT (OUTPATIENT)
Dept: LAB | Facility: HOSPITAL | Age: 60
End: 2020-07-02
Payer: MEDICARE

## 2020-07-02 DIAGNOSIS — G25.0 BENIGN ESSENTIAL TREMOR: ICD-10-CM

## 2020-07-02 LAB
ALBUMIN SERPL BCP-MCNC: 3.6 G/DL (ref 3–5.2)
ALP SERPL-CCNC: 93 U/L (ref 43–122)
ALT SERPL W P-5'-P-CCNC: 34 U/L (ref 9–52)
ANION GAP SERPL CALCULATED.3IONS-SCNC: 2 MMOL/L (ref 5–14)
AST SERPL W P-5'-P-CCNC: 27 U/L (ref 14–36)
BASOPHILS # BLD AUTO: 0.1 THOUSANDS/ΜL (ref 0–0.1)
BASOPHILS NFR BLD AUTO: 1 % (ref 0–1)
BILIRUB SERPL-MCNC: 0.6 MG/DL
BUN SERPL-MCNC: 16 MG/DL (ref 5–25)
CALCIUM SERPL-MCNC: 8.8 MG/DL (ref 8.4–10.2)
CHLORIDE SERPL-SCNC: 106 MMOL/L (ref 97–108)
CO2 SERPL-SCNC: 29 MMOL/L (ref 22–30)
CREAT SERPL-MCNC: 0.62 MG/DL (ref 0.6–1.2)
EOSINOPHIL # BLD AUTO: 0.2 THOUSAND/ΜL (ref 0–0.4)
EOSINOPHIL NFR BLD AUTO: 3 % (ref 0–6)
ERYTHROCYTE [DISTWIDTH] IN BLOOD BY AUTOMATED COUNT: 13.6 %
GFR SERPL CREATININE-BSD FRML MDRD: 97 ML/MIN/1.73SQ M
GLUCOSE P FAST SERPL-MCNC: 132 MG/DL (ref 70–99)
HCT VFR BLD AUTO: 45.6 % (ref 36–46)
HGB BLD-MCNC: 15.3 G/DL (ref 12–16)
LYMPHOCYTES # BLD AUTO: 1.1 THOUSANDS/ΜL (ref 0.5–4)
LYMPHOCYTES NFR BLD AUTO: 13 % (ref 25–45)
MCH RBC QN AUTO: 31 PG (ref 26–34)
MCHC RBC AUTO-ENTMCNC: 33.6 G/DL (ref 31–36)
MCV RBC AUTO: 92 FL (ref 80–100)
MONOCYTES # BLD AUTO: 0.6 THOUSAND/ΜL (ref 0.2–0.9)
MONOCYTES NFR BLD AUTO: 7 % (ref 1–10)
NEUTROPHILS # BLD AUTO: 6.5 THOUSANDS/ΜL (ref 1.8–7.8)
NEUTS SEG NFR BLD AUTO: 76 % (ref 45–65)
PLATELET # BLD AUTO: 265 THOUSANDS/UL (ref 150–450)
PMV BLD AUTO: 9.8 FL (ref 8.9–12.7)
POTASSIUM SERPL-SCNC: 4.6 MMOL/L (ref 3.6–5)
PROT SERPL-MCNC: 6.3 G/DL (ref 5.9–8.4)
RBC # BLD AUTO: 4.94 MILLION/UL (ref 4–5.2)
SODIUM SERPL-SCNC: 137 MMOL/L (ref 137–147)
WBC # BLD AUTO: 8.5 THOUSAND/UL (ref 4.5–11)

## 2020-07-02 PROCEDURE — 85025 COMPLETE CBC W/AUTO DIFF WBC: CPT

## 2020-07-02 PROCEDURE — 80053 COMPREHEN METABOLIC PANEL: CPT

## 2020-07-02 PROCEDURE — 36415 COLL VENOUS BLD VENIPUNCTURE: CPT

## 2020-08-11 DIAGNOSIS — E55.9 VITAMIN D DEFICIENCY: Primary | ICD-10-CM

## 2020-08-17 RX ORDER — BIOTIN 1 MG
1 TABLET ORAL DAILY
Qty: 30 CAPSULE | Refills: 0 | Status: SHIPPED | OUTPATIENT
Start: 2020-08-17 | End: 2020-09-17 | Stop reason: ALTCHOICE

## 2020-09-09 DIAGNOSIS — M19.90 ARTHRITIS: ICD-10-CM

## 2020-09-09 DIAGNOSIS — E78.5 HYPERLIPIDEMIA, UNSPECIFIED HYPERLIPIDEMIA TYPE: ICD-10-CM

## 2020-09-10 RX ORDER — ASPIRIN 81 MG/1
81 TABLET, COATED ORAL DAILY
Qty: 30 TABLET | Refills: 0 | Status: SHIPPED | OUTPATIENT
Start: 2020-09-10 | End: 2020-10-11

## 2020-09-10 RX ORDER — SENNOSIDES 8.6 MG
650 CAPSULE ORAL EVERY 8 HOURS PRN
Qty: 60 TABLET | Refills: 0 | Status: SHIPPED | OUTPATIENT
Start: 2020-09-10 | End: 2020-09-28 | Stop reason: SDUPTHER

## 2020-09-11 ENCOUNTER — TELEPHONE (OUTPATIENT)
Dept: NEUROLOGY | Facility: CLINIC | Age: 60
End: 2020-09-11

## 2020-09-11 NOTE — TELEPHONE ENCOUNTER
L/m on v/m to confirm appt with Dr Joanna Armstrong on 9/17/20 @ 2:35 pm  Requested c/b from patient to confirm appt

## 2020-09-11 NOTE — TELEPHONE ENCOUNTER
Patient called back and confirmed the appt  Advised her that the FD will call  to the date of her appointment and complete her registration and screening questions

## 2020-09-14 ENCOUNTER — TELEPHONE (OUTPATIENT)
Dept: FAMILY MEDICINE CLINIC | Facility: CLINIC | Age: 60
End: 2020-09-14

## 2020-09-14 NOTE — TELEPHONE ENCOUNTER
Neurology called stating pt needs a ambulatory referral appt on 09/17/2020 Dr Margarita Burks Dx: [id number]

## 2020-09-17 ENCOUNTER — TELEPHONE (OUTPATIENT)
Dept: NEUROLOGY | Facility: CLINIC | Age: 60
End: 2020-09-17

## 2020-09-17 ENCOUNTER — OFFICE VISIT (OUTPATIENT)
Dept: NEUROLOGY | Facility: CLINIC | Age: 60
End: 2020-09-17
Payer: MEDICARE

## 2020-09-17 ENCOUNTER — TRANSCRIBE ORDERS (OUTPATIENT)
Dept: FAMILY MEDICINE CLINIC | Facility: CLINIC | Age: 60
End: 2020-09-17

## 2020-09-17 VITALS
WEIGHT: 239 LBS | HEART RATE: 66 BPM | SYSTOLIC BLOOD PRESSURE: 148 MMHG | TEMPERATURE: 98.4 F | BODY MASS INDEX: 45.16 KG/M2 | DIASTOLIC BLOOD PRESSURE: 68 MMHG

## 2020-09-17 DIAGNOSIS — G25.0 BENIGN ESSENTIAL TREMOR: ICD-10-CM

## 2020-09-17 DIAGNOSIS — G25.0 BENIGN ESSENTIAL TREMOR: Primary | ICD-10-CM

## 2020-09-17 PROCEDURE — 99212 OFFICE O/P EST SF 10 MIN: CPT | Performed by: PSYCHIATRY & NEUROLOGY

## 2020-09-17 RX ORDER — GABAPENTIN 100 MG/1
100 CAPSULE ORAL 3 TIMES DAILY
Qty: 90 CAPSULE | Refills: 5 | Status: SHIPPED | OUTPATIENT
Start: 2020-09-17 | End: 2020-12-15 | Stop reason: SDUPTHER

## 2020-09-17 RX ORDER — PROPRANOLOL HYDROCHLORIDE 40 MG/1
40 TABLET ORAL EVERY 12 HOURS SCHEDULED
Qty: 60 TABLET | Refills: 5 | Status: SHIPPED | OUTPATIENT
Start: 2020-09-17 | End: 2020-12-15 | Stop reason: SDUPTHER

## 2020-09-17 NOTE — ASSESSMENT & PLAN NOTE
Patient remains very pleased with her tremor control on combined propranolol 40 mg twice daily in conjunction with the off-label use of gabapentin 100 mg 3 times daily  Offers no symptoms to suggest any adverse side effects  Once again, as a background, unable to tolerate higher doses of propranolol due to bradycardia and intolerant of even low doses of primidone, resulting in the off-label addition of gabapentin  --continue propranolol 40 mg twice daily  --continue gabapentin (off-label) 100 mg 3 times daily

## 2020-09-17 NOTE — PROGRESS NOTES
Patient ID: Virginia Cameron is a 56 y o  female  Assessment/Plan:    Essential tremor  Patient remains very pleased with her tremor control on combined propranolol 40 mg twice daily in conjunction with the off-label use of gabapentin 100 mg 3 times daily  Offers no symptoms to suggest any adverse side effects  Once again, as a background, unable to tolerate higher doses of propranolol due to bradycardia and intolerant of even low doses of primidone, resulting in the off-label addition of gabapentin  --continue propranolol 40 mg twice daily  --continue gabapentin (off-label) 100 mg 3 times daily  She will follow up in 6 months or as needed  Subjective:    HPI  A patient, 56years of age, with additional diagnoses of COPD, hyperlipidemia, hypertension and treated obstructive sleep apnea, returns to reassess the status of her essential tremor  She has done very well as of late on a combination of propranolol 40 mg twice daily combined with the off-label use of gabapentin 100 mg 3 times daily  She is very pleased with her control and at this point in time wishes no changes  In review, patient was unable to tolerate higher doses of propranolol due to bradycardia and was completely intolerant of primidone even at very low doses  Recent blood work reviewed:  -CBC with hemoglobin 15 3, hematocrit 45 6, white count 8 50 and platelet count 390  -CMP with creatinine 0 62, AST 27 and ALT 34       Past Medical History:   Diagnosis Date    Allergic rhinitis     Anemia     Anxiety     Arthritis     Back pain     Cancer (HCC)     Chronic pain disorder     back    COPD (chronic obstructive pulmonary disease) (HCC)     CPAP (continuous positive airway pressure) dependence     Depression     Diabetes mellitus (HCC)     Pre- Diabetic    Dyslipidemia     Essential tremor     Excessive daytime sleepiness     GERD (gastroesophageal reflux disease)     History of uterine cancer     Hyperglycemia     Hyperlipidemia     Hypertension     Hypovitaminosis D     Iron deficiency anemia     Joint pain     Mood disorder (HCC)     Obesity     Obstructive sleep apnea     Ringing in ears     RLS (restless legs syndrome)     Snoring     Uterine cancer (HCC)     age 20   [place] Vitamin D deficiency     Witnessed episode of apnea      Past Surgical History:   Procedure Laterality Date    APPENDECTOMY      HYSTERECTOMY      age 20    KNEE ARTHROSCOPY      KNEE SURGERY      Meniscus tear    TONSILLECTOMY      TUBAL LIGATION       Social History     Socioeconomic History    Marital status: Single     Spouse name: None    Number of children: None    Years of education: None    Highest education level: None   Occupational History    None   Social Needs    Financial resource strain: None    Food insecurity     Worry: None     Inability: None    Transportation needs     Medical: None     Non-medical: None   Tobacco Use    Smoking status: Current Every Day Smoker     Packs/day: 0 50    Smokeless tobacco: Former User   Substance and Sexual Activity    Alcohol use: Not Currently     Comment: very seldom    Drug use: Not Currently    Sexual activity: None   Lifestyle    Physical activity     Days per week: None     Minutes per session: None    Stress: None   Relationships    Social connections     Talks on phone: None     Gets together: None     Attends Taoism service: None     Active member of club or organization: None     Attends meetings of clubs or organizations: None     Relationship status: None    Intimate partner violence     Fear of current or ex partner: None     Emotionally abused: None     Physically abused: None     Forced sexual activity: None   Other Topics Concern    None   Social History Narrative    None     Family History   Problem Relation Age of Onset    Diabetes Mother     Asthma Mother     Hypertension Mother     Heart disease Mother     No Known Problems Sister     No Known Problems Daughter     No Known Problems Maternal Grandmother     No Known Problems Paternal Grandmother     No Known Problems Sister     No Known Problems Sister     No Known Problems Daughter      Allergies   Allergen Reactions    Aspirin GI Intolerance     Can only take baby aspirin        Current Outpatient Medications:     acetaminophen (TYLENOL) 650 mg CR tablet, Take 1 tablet (650 mg total) by mouth every 8 (eight) hours as needed for mild pain, Disp: 60 tablet, Rfl: 0    albuterol (VENTOLIN HFA) 90 mcg/act inhaler, Inhale 2 puffs every 6 (six) hours as needed for wheezing, Disp: 18 g, Rfl: 1    ammonium lactate (AMLACTIN) 12 % lotion, Every 12 hours, Disp: , Rfl:     Aspirin Low Dose 81 MG EC tablet, Take 1 tablet (81 mg total) by mouth daily, Disp: 30 tablet, Rfl: 0    atorvastatin (LIPITOR) 40 mg tablet, Take 1 tablet (40 mg total) by mouth daily, Disp: 90 tablet, Rfl: 3    bisacodyl (DULCOLAX) 5 mg EC tablet, Take 2 tablets (10 mg total) by mouth daily as needed for constipation, Disp: 2 tablet, Rfl: 0    cholecalciferol (VITAMIN D3) 1,000 units tablet, take 1 tablet by mouth once daily, Disp: 30 tablet, Rfl: 5    clotrimazole-betamethasone (LOTRISONE) 1-0 05 % cream, Apply topically 2 (two) times a day, Disp: 30 g, Rfl: 1    diphenhydrAMINE (BENADRYL) 25 mg tablet, Take 1 tablet (25 mg total) by mouth every 6 (six) hours as needed for itching, Disp: 30 tablet, Rfl: 0    ferrous sulfate 325 (65 FE) MG EC tablet, Take 1 tablet (325 mg total) by mouth every other day, Disp: 90 tablet, Rfl: 3    gabapentin (NEURONTIN) 100 mg capsule, Take 1 capsule (100 mg total) by mouth 3 (three) times a day, Disp: 90 capsule, Rfl: 5    pantoprazole (PROTONIX) 40 mg tablet, Take 40 mg by mouth daily, Disp: , Rfl:     propranolol (INDERAL) 40 mg tablet, Take 1 tablet (40 mg total) by mouth every 12 (twelve) hours, Disp: 60 tablet, Rfl: 5    RA VITAMIN C 500 MG tablet, Take 1 tablet (500 mg total) by mouth daily, Disp: 90 tablet, Rfl: 3    polyethylene glycol (GOLYTELY) 4000 mL solution, Take 4,000 mL by mouth once for 1 dose, Disp: 4000 mL, Rfl: 0    Objective:    Blood pressure 148/68, pulse 66, temperature 98 4 °F (36 9 °C), weight 108 kg (239 lb)  Physical Exam  Head normocephalic  Eyes nonicteric  Lungs clear to auscultation  Rhythm regular  GI (abdomen) soft nontender  Bowel sounds present  Moderate amount of blood bilateral pretibial edema noted  Neurological Exam  Alert  Pleasantly interactive  Fully oriented  No voice tremor  Unremarkable spontaneous gait with good length strides and good bilateral spontaneous arm swing  Romberg maneuver performed unremarkably  Cranial nerves 2-12 tested and grossly intact except for a very modest left facial asymmetry, unchanged from prior examinations  Good symmetrical strength throughout the 4 extremities with no upper extremity drift  Accurate with finger-to-nose bilaterally  No significant tremor evident with outstretched upper extremities, even with extended sustention  No rest tremor observed  Tone normal   No tone increase or cogwheeling with contralateral distraction maneuver  ROS:    Review of Systems   Constitutional: Negative  Negative for appetite change and fever  HENT: Negative  Negative for hearing loss, tinnitus, trouble swallowing and voice change  Eyes: Negative  Negative for photophobia and pain  Respiratory: Negative  Negative for shortness of breath  Cardiovascular: Negative  Negative for palpitations  Gastrointestinal: Negative  Negative for nausea and vomiting  Endocrine: Negative  Negative for cold intolerance  Genitourinary: Negative  Negative for dysuria, frequency and urgency  Musculoskeletal: Negative  Negative for myalgias and neck pain  Skin: Negative  Negative for rash  Neurological: Negative    Negative for dizziness, tremors, seizures, syncope, facial asymmetry, speech difficulty, weakness, light-headedness, numbness and headaches  Hematological: Negative  Does not bruise/bleed easily  Psychiatric/Behavioral: Negative  Negative for confusion, hallucinations and sleep disturbance  I personally reviewed the ROS as entered by the medical assistant  *Please note this document was created using voice recognition software and may contain sound-alike word errors  *

## 2020-09-17 NOTE — LETTER
September 17, 2020     Juan Winters MD  [address] [facility]    Patient: Jayshree Mendez   YOB: 1960   Date of Visit: 9/17/2020       Dear Dr Neil Tomlinson: Thank you for referring Jayshree Mendez to me for evaluation  Below are my notes for this consultation  If you have questions, please do not hesitate to call me  I look forward to following your patient along with you  Sincerely,        Sarah Fernández MD        CC: No Recipients  Sraah Fernández MD  9/17/2020  2:04 PM  Sign when Signing Visit  Patient ID: Jayshree Mendez is a 56 y o  female  Assessment/Plan:    Essential tremor  Patient remains very pleased with her tremor control on combined propranolol 40 mg twice daily in conjunction with the off-label use of gabapentin 100 mg 3 times daily  Offers no symptoms to suggest any adverse side effects  Once again, as a background, unable to tolerate higher doses of propranolol due to bradycardia and intolerant of even low doses of primidone, resulting in the off-label addition of gabapentin  --continue propranolol 40 mg twice daily  --continue gabapentin (off-label) 100 mg 3 times daily  She will follow up in 6 months or as needed  Subjective:    HPI  A patient, 56years of age, with additional diagnoses of COPD, hyperlipidemia, hypertension and treated obstructive sleep apnea, returns to reassess the status of her essential tremor  She has done very well as of late on a combination of propranolol 40 mg twice daily combined with the off-label use of gabapentin 100 mg 3 times daily  She is very pleased with her control and at this point in time wishes no changes  In review, patient was unable to tolerate higher doses of propranolol due to bradycardia and was completely intolerant of primidone even at very low doses    Recent blood work reviewed:  -CBC with hemoglobin 15 3, hematocrit 45 6, white count 8 50 and platelet count 265   -CMP with creatinine 0 62, AST 27 and ALT 34       Past Medical History:   Diagnosis Date    Allergic rhinitis     Anemia     Anxiety     Arthritis     Back pain     Cancer (HCC)     Chronic pain disorder     back    COPD (chronic obstructive pulmonary disease) (HCC)     CPAP (continuous positive airway pressure) dependence     Depression     Diabetes mellitus (HCC)     Pre- Diabetic    Dyslipidemia     Essential tremor     Excessive daytime sleepiness     GERD (gastroesophageal reflux disease)     History of uterine cancer     Hyperglycemia     Hyperlipidemia     Hypertension     Hypovitaminosis D     Iron deficiency anemia     Joint pain     Mood disorder (HCC)     Obesity     Obstructive sleep apnea     Ringing in ears     RLS (restless legs syndrome)     Snoring     Uterine cancer ([facility])     age 20   Jaime Caldwell Vitamin D deficiency     Witnessed episode of apnea      Past Surgical History:   Procedure Laterality Date    APPENDECTOMY      HYSTERECTOMY      age 20   Jaime Caldwell KNEE ARTHROSCOPY      KNEE SURGERY      Meniscus tear    TONSILLECTOMY      TUBAL LIGATION       Social History     Socioeconomic History    Marital status: Single     Spouse name: None    Number of children: None    Years of education: None    Highest education level: None   Occupational History    None   Social Needs    Financial resource strain: None    Food insecurity     Worry: None     Inability: None    Transportation needs     Medical: None     Non-medical: None   Tobacco Use    Smoking status: Current Every Day Smoker     Packs/day: 0 50    Smokeless tobacco: Former User   Substance and Sexual Activity    Alcohol use: Not Currently     Comment: very seldom    Drug use: Not Currently    Sexual activity: None   Lifestyle    Physical activity     Days per week: None     Minutes per session: None    Stress: None   Relationships    Social connections     Talks on phone: None     Gets together: None Attends Sikhism service: None     Active member of club or organization: None     Attends meetings of clubs or organizations: None     Relationship status: None    Intimate partner violence     Fear of current or ex partner: None     Emotionally abused: None     Physically abused: None     Forced sexual activity: None   Other Topics Concern    None   Social History Narrative    None     Family History   Problem Relation Age of Onset    Diabetes Mother     Asthma Mother     Hypertension Mother     Heart disease Mother     No Known Problems Sister     No Known Problems Daughter     No Known Problems Maternal Grandmother     No Known Problems Paternal Grandmother     No Known Problems Sister     No Known Problems Sister     No Known Problems Daughter      Allergies   Allergen Reactions    Aspirin GI Intolerance     Can only take baby aspirin        Current Outpatient Medications:     acetaminophen (TYLENOL) 650 mg CR tablet, Take 1 tablet (650 mg total) by mouth every 8 (eight) hours as needed for mild pain, Disp: 60 tablet, Rfl: 0    albuterol (VENTOLIN HFA) 90 mcg/act inhaler, Inhale 2 puffs every 6 (six) hours as needed for wheezing, Disp: 18 g, Rfl: 1    ammonium lactate (AMLACTIN) 12 % lotion, Every 12 hours, Disp: , Rfl:     Aspirin Low Dose 81 MG EC tablet, Take 1 tablet (81 mg total) by mouth daily, Disp: 30 tablet, Rfl: 0    atorvastatin (LIPITOR) 40 mg tablet, Take 1 tablet (40 mg total) by mouth daily, Disp: 90 tablet, Rfl: 3    bisacodyl (DULCOLAX) 5 mg EC tablet, Take 2 tablets (10 mg total) by mouth daily as needed for constipation, Disp: 2 tablet, Rfl: 0    cholecalciferol (VITAMIN D3) 1,000 units tablet, take 1 tablet by mouth once daily, Disp: 30 tablet, Rfl: 5    clotrimazole-betamethasone (LOTRISONE) 1-0 05 % cream, Apply topically 2 (two) times a day, Disp: 30 g, Rfl: 1    diphenhydrAMINE (BENADRYL) 25 mg tablet, Take 1 tablet (25 mg total) by mouth every 6 (six) hours as needed for itching, Disp: 30 tablet, Rfl: 0    ferrous sulfate 325 (65 FE) MG EC tablet, Take 1 tablet (325 mg total) by mouth every other day, Disp: 90 tablet, Rfl: 3    gabapentin (NEURONTIN) 100 mg capsule, Take 1 capsule (100 mg total) by mouth 3 (three) times a day, Disp: 90 capsule, Rfl: 5    pantoprazole (PROTONIX) 40 mg tablet, Take 40 mg by mouth daily, Disp: , Rfl:     propranolol (INDERAL) 40 mg tablet, Take 1 tablet (40 mg total) by mouth every 12 (twelve) hours, Disp: 60 tablet, Rfl: 5    RA VITAMIN C 500 MG tablet, Take 1 tablet (500 mg total) by mouth daily, Disp: 90 tablet, Rfl: 3    polyethylene glycol (GOLYTELY) 4000 mL solution, Take 4,000 mL by mouth once for 1 dose, Disp: 4000 mL, Rfl: 0    Objective:    Blood pressure 148/68, pulse 66, temperature 98 4 °F (36 9 °C), weight 108 kg (239 lb)  Physical Exam  Head normocephalic  Eyes nonicteric  Lungs clear to auscultation  Rhythm regular  GI (abdomen) soft nontender  Bowel sounds present  Moderate amount of blood bilateral pretibial edema noted  Neurological Exam  Alert  Pleasantly interactive  Fully oriented  No voice tremor  Unremarkable spontaneous gait with good length strides and good bilateral spontaneous arm swing  Romberg maneuver performed unremarkably  Cranial nerves 2-12 tested and grossly intact except for a very modest left facial asymmetry, unchanged from prior examinations  Good symmetrical strength throughout the 4 extremities with no upper extremity drift  Accurate with finger-to-nose bilaterally  No significant tremor evident with outstretched upper extremities, even with extended sustention  No rest tremor observed  Tone normal   No tone increase or cogwheeling with contralateral distraction maneuver  ROS:    Review of Systems   Constitutional: Negative  Negative for appetite change and fever  HENT: Negative  Negative for hearing loss, tinnitus, trouble swallowing and voice change      Eyes: Negative  Negative for photophobia and pain  Respiratory: Negative  Negative for shortness of breath  Cardiovascular: Negative  Negative for palpitations  Gastrointestinal: Negative  Negative for nausea and vomiting  Endocrine: Negative  Negative for cold intolerance  Genitourinary: Negative  Negative for dysuria, frequency and urgency  Musculoskeletal: Negative  Negative for myalgias and neck pain  Skin: Negative  Negative for rash  Neurological: Negative  Negative for dizziness, tremors, seizures, syncope, facial asymmetry, speech difficulty, weakness, light-headedness, numbness and headaches  Hematological: Negative  Does not bruise/bleed easily  Psychiatric/Behavioral: Negative  Negative for confusion, hallucinations and sleep disturbance  I personally reviewed the ROS as entered by the medical assistant  *Please note this document was created using voice recognition software and may contain sound-alike word errors  *

## 2020-09-26 DIAGNOSIS — M19.90 ARTHRITIS: ICD-10-CM

## 2020-09-27 PROBLEM — R73.03 PREDIABETES: Status: ACTIVE | Noted: 2020-09-27

## 2020-09-27 PROBLEM — I10 HYPERTENSION: Status: ACTIVE | Noted: 2020-09-27

## 2020-09-28 ENCOUNTER — APPOINTMENT (OUTPATIENT)
Dept: LAB | Facility: CLINIC | Age: 60
End: 2020-09-28
Payer: MEDICARE

## 2020-09-28 ENCOUNTER — OFFICE VISIT (OUTPATIENT)
Dept: FAMILY MEDICINE CLINIC | Facility: CLINIC | Age: 60
End: 2020-09-28

## 2020-09-28 VITALS
DIASTOLIC BLOOD PRESSURE: 78 MMHG | HEART RATE: 85 BPM | OXYGEN SATURATION: 98 % | SYSTOLIC BLOOD PRESSURE: 126 MMHG | WEIGHT: 236 LBS | HEIGHT: 61 IN | TEMPERATURE: 97.5 F | BODY MASS INDEX: 44.56 KG/M2 | RESPIRATION RATE: 18 BRPM

## 2020-09-28 DIAGNOSIS — Z12.31 ENCOUNTER FOR SCREENING MAMMOGRAM FOR BREAST CANCER: ICD-10-CM

## 2020-09-28 DIAGNOSIS — M19.90 ARTHRITIS: ICD-10-CM

## 2020-09-28 DIAGNOSIS — E78.49 OTHER HYPERLIPIDEMIA: ICD-10-CM

## 2020-09-28 DIAGNOSIS — J30.9 ALLERGIC RHINITIS, UNSPECIFIED SEASONALITY, UNSPECIFIED TRIGGER: ICD-10-CM

## 2020-09-28 DIAGNOSIS — Z11.4 ENCOUNTER FOR SCREENING FOR HIV: ICD-10-CM

## 2020-09-28 DIAGNOSIS — Z23 ENCOUNTER FOR IMMUNIZATION: ICD-10-CM

## 2020-09-28 DIAGNOSIS — R73.03 PREDIABETES: ICD-10-CM

## 2020-09-28 DIAGNOSIS — I10 ESSENTIAL HYPERTENSION: Primary | ICD-10-CM

## 2020-09-28 PROBLEM — D50.9 IRON DEFICIENCY ANEMIA: Status: RESOLVED | Noted: 2018-08-09 | Resolved: 2020-09-28

## 2020-09-28 LAB
CHOLEST SERPL-MCNC: 163 MG/DL (ref 50–200)
EST. AVERAGE GLUCOSE BLD GHB EST-MCNC: 120 MG/DL
HBA1C MFR BLD: 5.8 %
HDLC SERPL-MCNC: 51 MG/DL
LDLC SERPL CALC-MCNC: 86 MG/DL (ref 0–100)
NONHDLC SERPL-MCNC: 112 MG/DL
TRIGL SERPL-MCNC: 128 MG/DL

## 2020-09-28 PROCEDURE — 87389 HIV-1 AG W/HIV-1&-2 AB AG IA: CPT

## 2020-09-28 PROCEDURE — 80061 LIPID PANEL: CPT

## 2020-09-28 PROCEDURE — 99213 OFFICE O/P EST LOW 20 MIN: CPT | Performed by: FAMILY MEDICINE

## 2020-09-28 PROCEDURE — 83036 HEMOGLOBIN GLYCOSYLATED A1C: CPT

## 2020-09-28 PROCEDURE — 36415 COLL VENOUS BLD VENIPUNCTURE: CPT

## 2020-09-28 PROCEDURE — 90682 RIV4 VACC RECOMBINANT DNA IM: CPT | Performed by: FAMILY MEDICINE

## 2020-09-28 PROCEDURE — G0008 ADMIN INFLUENZA VIRUS VAC: HCPCS | Performed by: FAMILY MEDICINE

## 2020-09-28 RX ORDER — SENNOSIDES 8.6 MG
650 CAPSULE ORAL EVERY 8 HOURS PRN
Qty: 60 TABLET | Refills: 3 | Status: SHIPPED | OUTPATIENT
Start: 2020-09-28 | End: 2020-12-15 | Stop reason: SDUPTHER

## 2020-09-28 RX ORDER — SENNOSIDES 8.6 MG
CAPSULE ORAL
Qty: 60 TABLET | Refills: 0 | OUTPATIENT
Start: 2020-09-28

## 2020-09-28 RX ORDER — LORATADINE 10 MG/1
10 TABLET ORAL DAILY
Qty: 14 TABLET | Refills: 0 | Status: SHIPPED | OUTPATIENT
Start: 2020-09-28 | End: 2020-12-15 | Stop reason: ALTCHOICE

## 2020-09-28 RX ORDER — FLUTICASONE PROPIONATE 50 MCG
1 SPRAY, SUSPENSION (ML) NASAL DAILY
Qty: 1 BOTTLE | Refills: 3 | Status: SHIPPED | OUTPATIENT
Start: 2020-09-28 | End: 2020-12-15 | Stop reason: SDUPTHER

## 2020-09-28 NOTE — ASSESSMENT & PLAN NOTE
Blood Pressure: 126/78     Blood pressure well controlled on current regimen - Propanolol 40mg BID (taken for essential tremors)  Will continue this regimen  Advised DASH Dietary measures and exercise at least 2 hrs weekly  Routine Labs ordered:  A1c, FLP

## 2020-09-28 NOTE — ASSESSMENT & PLAN NOTE
Last A1c 6 2%    Body mass index is 44 59 kg/m²  Advised diet high in fiber, low in fat and carbohydrates with lower Glycemic Index  Patient encouraged to continue exercising at least 120 min per week of moderate intensity      Labs ordered: FLP, A1c

## 2020-09-28 NOTE — PROGRESS NOTES
Assessment/Plan:    Hypertension  Blood Pressure: 126/78     Blood pressure well controlled on current regimen - Propanolol 40mg BID (taken for essential tremors)  Will continue this regimen  Advised DASH Dietary measures and exercise at least 2 hrs weekly  Routine Labs ordered:  A1c, FLP      Prediabetes  Last A1c 6 2%    Body mass index is 44 59 kg/m²  Advised diet high in fiber, low in fat and carbohydrates with lower Glycemic Index  Patient encouraged to continue exercising at least 120 min per week of moderate intensity  Labs ordered: FLP, A1c      Allergic rhinitis  Patient has indoor pet, drinks milk daily in the morning  Complaints of symptoms for over 10 years  Clear rhinorrhea  Flonase nasal spray 1 spray to each nostril daily (educated on appropriate use of nasal spray )  Claritin 10 mg p o  q h s  Advised to cut down on dairy products and to limit exposure to dog care  Diagnoses and all orders for this visit:    Essential hypertension    Encounter for immunization  -     TDAP VACCINE GREATER THAN OR EQUAL TO 8YO IM  -     influenza vaccine, quadrivalent, recombinant, PF, 0 5 mL, for patients 18 yr+ (FLUBLOK)    Other hyperlipidemia  -     Lipid panel; Future    Prediabetes  -     HEMOGLOBIN A1C W/ EAG ESTIMATION; Future    Allergic rhinitis, unspecified seasonality, unspecified trigger  -     fluticasone (FLONASE) 50 mcg/act nasal spray; 1 spray into each nostril daily  -     loratadine (CLARITIN) 10 mg tablet; Take 1 tablet (10 mg total) by mouth daily    Encounter for screening mammogram for breast cancer  -     Mammo screening bilateral w cad; Future    Encounter for screening for HIV  -     HIV 1/2 Antigen/Antibody (4th Generation) w Reflex [facility]; Future    Arthritis  -     acetaminophen (TYLENOL) 650 mg CR tablet; Take 1 tablet (650 mg total) by mouth every 8 (eight) hours as needed for mild pain          Subjective:      Patient ID: Humberto Olivares is a 56 y o  female        Patient presents today for review of blood pressure control and also complains of rhinorrhea  Patient has minimize salt in her diet and engages in mild exercise while walking the dog and doing housework  She also notes having clear rhinorrhea that is very irritating to her using multiple boxes of tissue per week  She reports no fever or sinus pressure or headaches but admits to having a with postnasal drip sensation  The following portions of the patient's history were reviewed and updated as appropriate: allergies, current medications, past medical history and problem list     Review of Systems   Constitutional: Negative for fever  HENT: Positive for postnasal drip and rhinorrhea  Negative for congestion, ear pain, sinus pressure, sinus pain and sore throat  Respiratory: Positive for cough (  Nonproductive)  Negative for chest tightness, shortness of breath and wheezing  Cardiovascular: Negative for chest pain, palpitations and leg swelling  Gastrointestinal: Negative for abdominal pain  Musculoskeletal: Positive for arthralgias ( controlled with acetaminophen)  Negative for myalgias  Skin: Negative for rash  Neurological: Negative for headaches  Psychiatric/Behavioral: Negative for behavioral problems and dysphoric mood  Objective:      /78 (BP Location: Left arm, Patient Position: Sitting, Cuff Size: Large)   Pulse 85   Temp 97 5 °F (36 4 °C) (Temporal)   Resp 18   Ht 5' 1" (1 549 m)   Wt 107 kg (236 lb)   LMP  (LMP Unknown)   SpO2 98%   BMI 44 59 kg/m²          Physical Exam  Vitals signs reviewed  Constitutional:       General: She is not in acute distress  Appearance: She is well-developed  HENT:      Head: Normocephalic  Right Ear: Tympanic membrane, ear canal and external ear normal       Left Ear: Tympanic membrane, ear canal and external ear normal       Nose: Nose normal  No congestion or rhinorrhea        Mouth/Throat:      Mouth: Mucous membranes are dry       Pharynx: Oropharynx is clear  Eyes:      Conjunctiva/sclera: Conjunctivae normal    Neck:      Musculoskeletal: Normal range of motion  Thyroid: No thyromegaly  Cardiovascular:      Rate and Rhythm: Normal rate and regular rhythm  Pulses: Normal pulses  Heart sounds: Normal heart sounds  No murmur  No friction rub  No gallop  Pulmonary:      Effort: Pulmonary effort is normal  No respiratory distress  Breath sounds: Normal breath sounds  No wheezing, rhonchi or rales  Chest:      Chest wall: No tenderness  Abdominal:      General: Bowel sounds are normal  There is no distension  Palpations: Abdomen is soft  There is no mass  Tenderness: There is no abdominal tenderness  Hernia: No hernia is present  Comments: Obese   Musculoskeletal: Normal range of motion  Skin:     General: Skin is warm and dry  Findings: No rash  Neurological:      General: No focal deficit present  Mental Status: She is alert and oriented to person, place, and time  Cranial Nerves: No cranial nerve deficit  Psychiatric:         Mood and Affect: Mood normal          Behavior: Behavior normal          Thought Content:  Thought content normal          Judgment: Judgment normal

## 2020-09-28 NOTE — ASSESSMENT & PLAN NOTE
Patient has indoor pet, drinks milk daily in the morning  Complaints of symptoms for over 10 years  Clear rhinorrhea  Flonase nasal spray 1 spray to each nostril daily (educated on appropriate use of nasal spray )  Claritin 10 mg p o  q h s  Advised to cut down on dairy products and to limit exposure to dog care

## 2020-09-30 LAB — HIV 1+2 AB+HIV1 P24 AG SERPL QL IA: NORMAL

## 2020-10-06 ENCOUNTER — PATIENT OUTREACH (OUTPATIENT)
Dept: FAMILY MEDICINE CLINIC | Facility: CLINIC | Age: 60
End: 2020-10-06

## 2020-10-06 DIAGNOSIS — E78.5 HYPERLIPIDEMIA, UNSPECIFIED HYPERLIPIDEMIA TYPE: ICD-10-CM

## 2020-10-11 RX ORDER — ASPIRIN 81 MG/1
81 TABLET ORAL DAILY
Qty: 30 TABLET | Refills: 3 | Status: SHIPPED | OUTPATIENT
Start: 2020-10-11 | End: 2020-12-15 | Stop reason: SDUPTHER

## 2020-10-16 ENCOUNTER — TELEPHONE (OUTPATIENT)
Dept: FAMILY MEDICINE CLINIC | Facility: CLINIC | Age: 60
End: 2020-10-16

## 2020-11-26 ENCOUNTER — APPOINTMENT (EMERGENCY)
Dept: RADIOLOGY | Facility: HOSPITAL | Age: 60
End: 2020-11-26
Payer: COMMERCIAL

## 2020-11-26 ENCOUNTER — HOSPITAL ENCOUNTER (EMERGENCY)
Facility: HOSPITAL | Age: 60
Discharge: HOME | End: 2020-11-26
Attending: EMERGENCY MEDICINE
Payer: COMMERCIAL

## 2020-11-26 VITALS
OXYGEN SATURATION: 97 % | RESPIRATION RATE: 16 BRPM | TEMPERATURE: 98.2 F | DIASTOLIC BLOOD PRESSURE: 86 MMHG | SYSTOLIC BLOOD PRESSURE: 152 MMHG | HEART RATE: 72 BPM
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
## 2020-11-26 PROCEDURE — 73564 X-RAY EXAM KNEE 4 OR MORE: CPT

## 2020-11-26 PROCEDURE — 99284 EMERGENCY DEPT VISIT MOD MDM: CPT

## 2020-11-26 PROCEDURE — 90471 IMMUNIZATION ADMIN: CPT

## 2020-11-26 PROCEDURE — 70450 CT HEAD/BRAIN W/O DYE: CPT

## 2020-11-26 PROCEDURE — 90715 TDAP VACCINE 7 YRS/> IM: CPT | Performed by: EMERGENCY MEDICINE

## 2020-11-26 PROCEDURE — 99284 EMERGENCY DEPT VISIT MOD MDM: CPT | Performed by: EMERGENCY MEDICINE

## 2020-11-26 PROCEDURE — G1004 CDSM NDSC: HCPCS

## 2020-11-26 RX ORDER — ACETAMINOPHEN 325 MG/1
975 TABLET ORAL ONCE
Status: COMPLETED | OUTPATIENT
Start: 2020-11-26 | End: 2020-11-26

## 2020-11-26 RX ADMIN — ACETAMINOPHEN 975 MG: 325 TABLET, FILM COATED ORAL at 18:34

## 2020-11-26 RX ADMIN — TETANUS TOXOID, REDUCED DIPHTHERIA TOXOID AND ACELLULAR PERTUSSIS VACCINE, ADSORBED 0.5 ML: 5; 2.5; 8; 8; 2.5 SUSPENSION INTRAMUSCULAR at 18:39

## 2020-12-15 ENCOUNTER — OFFICE VISIT (OUTPATIENT)
Dept: FAMILY MEDICINE CLINIC | Facility: CLINIC | Age: 60
End: 2020-12-15

## 2020-12-15 VITALS
RESPIRATION RATE: 18 BRPM | BODY MASS INDEX: 44.93 KG/M2 | HEIGHT: 61 IN | OXYGEN SATURATION: 98 % | DIASTOLIC BLOOD PRESSURE: 74 MMHG | TEMPERATURE: 97.8 F | HEART RATE: 71 BPM | WEIGHT: 238 LBS | SYSTOLIC BLOOD PRESSURE: 126 MMHG

## 2020-12-15 DIAGNOSIS — J45.909 UNCOMPLICATED ASTHMA, UNSPECIFIED ASTHMA SEVERITY, UNSPECIFIED WHETHER PERSISTENT: ICD-10-CM

## 2020-12-15 DIAGNOSIS — E78.5 HYPERLIPIDEMIA, UNSPECIFIED HYPERLIPIDEMIA TYPE: ICD-10-CM

## 2020-12-15 DIAGNOSIS — L30.4 INTERTRIGO: ICD-10-CM

## 2020-12-15 DIAGNOSIS — F17.210 NICOTINE DEPENDENCE, CIGARETTES, UNCOMPLICATED: ICD-10-CM

## 2020-12-15 DIAGNOSIS — I10 ESSENTIAL HYPERTENSION: Primary | ICD-10-CM

## 2020-12-15 DIAGNOSIS — E55.9 VITAMIN D DEFICIENCY: ICD-10-CM

## 2020-12-15 DIAGNOSIS — G25.0 BENIGN ESSENTIAL TREMOR: ICD-10-CM

## 2020-12-15 DIAGNOSIS — J30.9 ALLERGIC RHINITIS, UNSPECIFIED SEASONALITY, UNSPECIFIED TRIGGER: ICD-10-CM

## 2020-12-15 DIAGNOSIS — Z23 ENCOUNTER FOR IMMUNIZATION: ICD-10-CM

## 2020-12-15 DIAGNOSIS — M19.90 ARTHRITIS: ICD-10-CM

## 2020-12-15 PROBLEM — B49 FUNGAL INFECTION: Status: RESOLVED | Noted: 2019-12-16 | Resolved: 2020-12-15

## 2020-12-15 PROBLEM — R09.81 NASAL CONGESTION WITH RHINORRHEA: Status: RESOLVED | Noted: 2019-09-17 | Resolved: 2020-12-15

## 2020-12-15 PROBLEM — J34.89 NASAL CONGESTION WITH RHINORRHEA: Status: RESOLVED | Noted: 2019-09-17 | Resolved: 2020-12-15

## 2020-12-15 PROCEDURE — 90471 IMMUNIZATION ADMIN: CPT

## 2020-12-15 PROCEDURE — 99214 OFFICE O/P EST MOD 30 MIN: CPT | Performed by: INTERNAL MEDICINE

## 2020-12-15 PROCEDURE — 90707 MMR VACCINE SC: CPT

## 2020-12-15 RX ORDER — FLUTICASONE PROPIONATE 50 MCG
1 SPRAY, SUSPENSION (ML) NASAL DAILY
Qty: 1 BOTTLE | Refills: 3 | Status: SHIPPED | OUTPATIENT
Start: 2020-12-15

## 2020-12-15 RX ORDER — ATORVASTATIN CALCIUM 40 MG/1
40 TABLET, FILM COATED ORAL DAILY
Qty: 90 TABLET | Refills: 3 | Status: SHIPPED | OUTPATIENT
Start: 2020-12-15 | End: 2021-12-20 | Stop reason: SDUPTHER

## 2020-12-15 RX ORDER — KETOCONAZOLE 20 MG/G
CREAM TOPICAL DAILY
Qty: 15 G | Refills: 0 | Status: SHIPPED | OUTPATIENT
Start: 2020-12-15 | End: 2021-04-20 | Stop reason: ALTCHOICE

## 2020-12-15 RX ORDER — ASPIRIN 81 MG/1
81 TABLET ORAL DAILY
Qty: 30 TABLET | Refills: 3 | Status: SHIPPED | OUTPATIENT
Start: 2020-12-15

## 2020-12-15 RX ORDER — GABAPENTIN 100 MG/1
100 CAPSULE ORAL 3 TIMES DAILY
Qty: 90 CAPSULE | Refills: 5 | Status: SHIPPED | OUTPATIENT
Start: 2020-12-15 | End: 2021-09-23 | Stop reason: SDUPTHER

## 2020-12-15 RX ORDER — MELATONIN
1000 DAILY
Qty: 30 TABLET | Refills: 5 | Status: SHIPPED | OUTPATIENT
Start: 2020-12-15

## 2020-12-15 RX ORDER — SENNOSIDES 8.6 MG
650 CAPSULE ORAL EVERY 8 HOURS PRN
Qty: 60 TABLET | Refills: 3 | Status: SHIPPED | OUTPATIENT
Start: 2020-12-15

## 2020-12-15 RX ORDER — ALBUTEROL SULFATE 90 UG/1
2 AEROSOL, METERED RESPIRATORY (INHALATION) EVERY 6 HOURS PRN
Qty: 18 G | Refills: 1 | Status: SHIPPED | OUTPATIENT
Start: 2020-12-15 | End: 2021-12-20 | Stop reason: SDUPTHER

## 2020-12-15 RX ORDER — NYSTATIN 100000 [USP'U]/G
POWDER TOPICAL 3 TIMES DAILY
Qty: 15 G | Refills: 1 | Status: SHIPPED | OUTPATIENT
Start: 2020-12-15 | End: 2021-04-20 | Stop reason: SDUPTHER

## 2020-12-15 RX ORDER — PROPRANOLOL HYDROCHLORIDE 40 MG/1
40 TABLET ORAL EVERY 12 HOURS SCHEDULED
Qty: 180 TABLET | Refills: 5 | Status: SHIPPED | OUTPATIENT
Start: 2020-12-15 | End: 2022-04-04

## 2020-12-16 ENCOUNTER — HOSPITAL ENCOUNTER (OUTPATIENT)
Dept: MAMMOGRAPHY | Facility: CLINIC | Age: 60
Discharge: HOME | End: 2020-12-16
Payer: MEDICARE

## 2020-12-16 VITALS — BODY MASS INDEX: 44.18 KG/M2 | WEIGHT: 234 LBS | HEIGHT: 61 IN

## 2020-12-16 DIAGNOSIS — Z12.31 ENCOUNTER FOR SCREENING MAMMOGRAM FOR BREAST CANCER: ICD-10-CM

## 2020-12-16 PROCEDURE — 77063 BREAST TOMOSYNTHESIS BI: CPT

## 2020-12-16 PROCEDURE — 77067 SCR MAMMO BI INCL CAD: CPT

## 2020-12-18 ENCOUNTER — TELEPHONE (OUTPATIENT)
Dept: LABOR AND DELIVERY | Facility: HOSPITAL | Age: 60
End: 2020-12-18

## 2021-01-14 ENCOUNTER — HOSPITAL ENCOUNTER (OUTPATIENT)
Dept: CT IMAGING | Facility: HOSPITAL | Age: 61
Discharge: HOME | End: 2021-01-14
Payer: MEDICARE

## 2021-01-14 DIAGNOSIS — F17.210 NICOTINE DEPENDENCE, CIGARETTES, UNCOMPLICATED: ICD-10-CM

## 2021-01-14 PROCEDURE — 71271 CT THORAX LUNG CANCER SCR C-: CPT

## 2021-02-15 ENCOUNTER — TELEPHONE (OUTPATIENT)
Dept: FAMILY MEDICINE CLINIC | Facility: CLINIC | Age: 61
End: 2021-02-15

## 2021-02-19 DIAGNOSIS — Z99.89 OBSTRUCTIVE SLEEP APNEA TREATED WITH CONTINUOUS POSITIVE AIRWAY PRESSURE (CPAP): Primary | ICD-10-CM

## 2021-02-19 DIAGNOSIS — G25.81 RESTLESS LEG SYNDROME: ICD-10-CM

## 2021-02-19 DIAGNOSIS — G47.33 OBSTRUCTIVE SLEEP APNEA TREATED WITH CONTINUOUS POSITIVE AIRWAY PRESSURE (CPAP): Primary | ICD-10-CM

## 2021-02-26 ENCOUNTER — TELEMEDICINE (OUTPATIENT)
Dept: SLEEP CENTER | Facility: CLINIC | Age: 61
End: 2021-02-26
Payer: MEDICARE

## 2021-02-26 VITALS — WEIGHT: 234 LBS | BODY MASS INDEX: 44.18 KG/M2 | HEIGHT: 61 IN

## 2021-02-26 DIAGNOSIS — G25.81 RESTLESS LEG SYNDROME: ICD-10-CM

## 2021-02-26 DIAGNOSIS — R79.0 LOW SERUM FERRITIN LEVEL: ICD-10-CM

## 2021-02-26 DIAGNOSIS — G47.33 OBSTRUCTIVE SLEEP APNEA TREATED WITH CONTINUOUS POSITIVE AIRWAY PRESSURE (CPAP): Primary | ICD-10-CM

## 2021-02-26 DIAGNOSIS — Z99.89 OBSTRUCTIVE SLEEP APNEA TREATED WITH CONTINUOUS POSITIVE AIRWAY PRESSURE (CPAP): Primary | ICD-10-CM

## 2021-02-26 DIAGNOSIS — E66.01 MORBID OBESITY WITH BODY MASS INDEX (BMI) OF 40.0 TO 44.9 IN ADULT (HCC): ICD-10-CM

## 2021-02-26 PROCEDURE — 99213 OFFICE O/P EST LOW 20 MIN: CPT | Performed by: NURSE PRACTITIONER

## 2021-02-26 NOTE — PATIENT INSTRUCTIONS
1   Continue use of CPAP equipment nightly  2  Continue to clean your equipment, as discussed  3  Complete lab testing when able  4  Recommend oral iron supplement daily or at least 3 times per week, as tolerated  5   Discuss allergies with your PCP with possible different allergy medication, nasal spray or singulair at bedtime  You may benefit from seeing an allergist   3   Contact the Sleep [address] [place] [address] with any questions or concerns prior to your next visit, as needed  4  Schedule visit for follow-up in 1 year      Nursing Support:  When: Monday through Friday 7A-5PM except holidays  Where: Our direct line is [phone]  If you are having a true emergency please call 911  In the event that the line is busy or it is after hours please leave a voice message and we will return your call  Please speak clearly, leaving your full name, birth date, best number to reach you and the reason for your call  Medication refills: We will need the name of the medication, the dosage, the ordering provider, whether you get a 30 or 90 day refill, and the pharmacy name and address  Medications will be ordered by the provider only  Nurses cannot call in prescriptions  Please allow 7 days for medication refills  Physician requested updates: If your provider requested that you call with an update after starting medication, please be ready to provide us the medication and dosage, what time you take your medication, the time you attempt to fall asleep, time you fall asleep, when you wake up, and what time you get out of bed  Sleep Study Results: We will contact you with sleep study results and/or next steps after the physician has reviewed your testing

## 2021-02-26 NOTE — PROGRESS NOTES
Virtual Regular Visit      Assessment/Plan:    Problem List Items Addressed This Visit     Obstructive sleep apnea treated with continuous positive airway pressure (CPAP) - Primary    Relevant Orders    PAP DME Resupply/Reorder    PAP DME Pressure Change    Restless leg syndrome    Relevant Orders    Iron, TIBC and Ferritin Panel    Low serum ferritin level    Relevant Orders    Iron, TIBC and Ferritin Panel    Morbid obesity with body mass index (BMI) of 40 0 to 44 9 in [facility])               Reason for visit is   Chief Complaint   Patient presents with    Virtual Regular Visit        Encounter provider RENETTA Oscar    Provider located at [address] [place] [address]  [phone]      Recent Visits  No visits were found meeting these conditions  Showing recent visits within past 7 days and meeting all other requirements     Today's Visits  Date Type Provider Dept   02/26/21 [place], CRNP [facility] [place]   Showing today's visits and meeting all other requirements     Future Appointments  No visits were found meeting these conditions  Showing future appointments within next 150 days and meeting all other requirements        The patient was identified by name and date of birth  Nicole Dang was informed that this is a telemedicine visit and that the visit is being conducted through [facility] and patient was informed that this is a secure, HIPAA-compliant platform  She agrees to proceed     My office door was closed  No one else was in the room  She acknowledged consent and understanding of privacy and security of the video platform  The patient has agreed to participate and understands they can discontinue the visit at any time  Patient is aware this is a billable service       Subjective  Nicole Dang is a 56 y o  female presents for a video visit via telemedicine amid the Covid-19 pandemic emergency      The patient presents for follow up of obstructive sleep apnea and restless legs syndrome  She completed a diagnostic sleep study in April 2018, which identified moderate obstructive sleep apnea  AHI was 13 8, worsening to 50 when supine with lowest SpO2 to 88%  It was followed by a CPAP titration study in June 2018 and she began the use of CPAP in July 2018  She has used CPAP off and on since set up  She also has had symptoms of restless legs syndrome, which were very bothersome in the evening and when trying to go to sleep  She was found to have a very low serum ferritin level of 7  She received infusions of venofer and symptoms improved significantly  She presents virtually today, to review compliance and effectiveness of treatment         Past Medical History:   Diagnosis Date    Allergic rhinitis     Anemia     Anxiety     Arthritis     Back pain     Cancer ([facility])     Chronic pain disorder     back    COPD (chronic obstructive pulmonary disease) ([facility])     CPAP (continuous positive airway pressure) dependence     Depression     Diabetes mellitus ([facility])     Pre- Diabetic    Dyslipidemia     Essential tremor     Excessive daytime sleepiness     GERD (gastroesophageal reflux disease)     History of uterine cancer     Hyperglycemia     Hyperlipidemia     Hypertension     Hypovitaminosis D     Iron deficiency anemia     Joint pain     Mood disorder ([facility])     Obesity     Obstructive sleep apnea     Ringing in ears     RLS (restless legs syndrome)     Snoring     Uterine cancer ([facility])     age 20   [facility] Vitamin D deficiency     Witnessed episode of apnea        Past Surgical History:   Procedure Laterality Date    APPENDECTOMY      HYSTERECTOMY      age 20   [facility] KNEE ARTHROSCOPY      KNEE SURGERY      Meniscus tear    TONSILLECTOMY      TUBAL LIGATION         Current Outpatient Medications   Medication Sig Dispense Refill    acetaminophen (TYLENOL) 650 mg CR tablet Take 1 tablet (650 mg total) by mouth every 8 (eight) hours as needed for mild pain 60 tablet 3    albuterol (Ventolin HFA) 90 mcg/act inhaler Inhale 2 puffs every 6 (six) hours as needed for wheezing 18 g 1    aspirin (Aspirin Low Dose) 81 mg EC tablet Take 1 tablet (81 mg total) by mouth daily 30 tablet 3    atorvastatin (LIPITOR) 40 mg tablet Take 1 tablet (40 mg total) by mouth daily 90 tablet 3    cholecalciferol (VITAMIN D3) 1,000 units tablet Take 1 tablet (1,000 Units total) by mouth daily 30 tablet 5    Diclofenac Sodium (VOLTAREN) 1 % Apply 2 g topically 4 (four) times a day 240 g 1    fluticasone (FLONASE) 50 mcg/act nasal spray 1 spray into each nostril daily 1 Bottle 3    gabapentin (NEURONTIN) 100 mg capsule Take 1 capsule (100 mg total) by mouth 3 (three) times a day 90 capsule 5    ketoconazole (NIZORAL) 2 % cream Apply topically daily 15 g 0    nystatin (MYCOSTATIN) powder Apply topically 3 (three) times a day 15 g 1    propranolol (INDERAL) 40 mg tablet Take 1 tablet (40 mg total) by mouth every 12 (twelve) hours 180 tablet 5     No current facility-administered medications for this visit           Allergies   Allergen Reactions    Aspirin GI Intolerance     Can only take baby aspirin        Review of Systems   Genitourinary hot flashes at night   Cardiology ankle/leg swelling   Gastrointestinal frequent heartburn/acid reflux   Neurology need to move extremities, muscle weakness and numbness/tingling of an extremity   Constitutional fatigue   Integumentary none   Psychiatry depression   Musculoskeletal joint pain, muscle aches, back pain, legs twitching/jerking and leg cramps   Pulmonary frequent cough and snoring   ENT throat clearing and ringing in ears   Endocrine none   Hematological blood donor       Video Exam    Vitals:    02/26/21 0805   Weight: 106 kg (234 lb)   Height: 5' 1" (1 549 m)       Physical Exam     EPWORTH SLEEPINESS SCORE  Total score: 5    Past History Since Last Sleep Center Visit:   She denies any significant changes to her health since her last visit  She reports that she has been having some difficulty using her CPAP equipment due to allergies  She is using Flonase twice daily, but has a lot of congestion  She reports that the pressure setting is comfortable and she likes the full face mask she currently uses  Restless leg symptoms have been very minimal and only occasional   The patient reports that she cleans the equipment appropriately and changes supplies on a regular basis  The review of systems and following portions of the patient's history were reviewed and updated as appropriate: allergies, current medications, past family history, past medical history, past social history, past surgical history, and problem list         OBJECTIVE    PAP Pressure: Nasal AutoPAP using a lower limit of 4 cm and an upper limit of 7 cm of water pressure  Type of mask used: full face  DME Provider: [facility]    Physical Exam  General Appearance:   Alert, cooperative, no distress, appears stated age, obese     Head:   Normocephalic, without obvious abnormality     Eyes:   EOM's intact          Nose:  Nares normal, septum midline, no mucosal lesions, drainage or sinus tenderness           Throat:  Lips and gums reportedly normal; edentulous, no reported lesions of tongue     Neck:    Lungs:   Circumference appears to be normal, no JVD     Respirations appear to be unlabored     Heart:      No reported palpitations or chest pain       Extremities:  Pain, cyanosis and edema denied     Skin:  Skin color appears to be normal, no rashes or lesions reported       Neurologic:  No focal deficits noted       ASSESSMENT / PLAN    1  Obstructive sleep apnea treated with continuous positive airway pressure (CPAP)  Ambulatory referral to Sleep Medicine    PAP DME Resupply/Reorder    PAP DME Pressure Change   2  Restless leg syndrome  Ambulatory referral to Sleep Medicine    Iron, TIBC and Ferritin Panel   3   Low serum ferritin level  Iron, TIBC and Ferritin Panel   4  Morbid obesity with body mass index (BMI) of 40 0 to 44 9 in adult [facility])           Counseling / Coordination of Care    A description of the counseling / coordination of care:     Impressions, Diagnostic results, Prognosis, Instructions for management, Risks and benefits of treatment, Patient and family education, Risk factor reductions and Importance of compliance with treatment    Today I reviewed the patient's compliance data  she has been able to use the equipment 10% of all days recorded  Average usage was 4 or more hours 6 7% of all days recorded  The estimated AHI is 4 5 abnormal breathing events per hour  A pressure change to 4-8cm will be ordered today, to further improve her treatment, due to 90% of the time spent at 7cm with some apneas noted  The patient feels they benefit from the use of PAP equipment and would like to continue PAP therapy  Response to treatment has been good  Supplies have been ordered for the next year  We discussed contacting her PCP regarding continuous allergy symptoms inhibiting her use of PAP therapy  We discussed a different allergy medication, nasal spray or possibly singulair at bedtime  We discussed limiting allergens in the bedroom, however, her dog does sleep on the bed and she does not plan to change that  Lab testing regarding low serum ferritin level has been ordered  It was recommended that she take an oral iron supplement daily or at least 3 times per week, as tolerated  She will continue using this equipment at the settings noted above for the next 12 months  At that timeshe will then return for a routine follow-up evaluation  I have asked the patient to contact the Sleep [facility] if she encounters any difficulties prior to that time  The following instructions have been given to the patient today:    Patient Instructions   1  Continue use of CPAP equipment nightly  2    Continue to clean your equipment, as discussed  3  Complete lab testing when able  4  Recommend oral iron supplement daily or at least 3 times per week, as tolerated  5   Discuss allergies with your PCP with possible different allergy medication, nasal spray or singulair at bedtime  You may benefit from seeing an allergist   3   Contact the [address] with any questions or concerns prior to your next visit, as needed  4  Schedule visit for follow-up in 1 year      Nursing Support:  When: Monday through Friday 7A-5PM except holidays  Where: Our direct line is [phone]  If you are having a true emergency please call 911  In the event that the line is busy or it is after hours please leave a voice message and we will return your call  Please speak clearly, leaving your full name, birth date, best number to reach you and the reason for your call  Medication refills: We will need the name of the medication, the dosage, the ordering provider, whether you get a 30 or 90 day refill, and the pharmacy name and address  Medications will be ordered by the provider only  Nurses cannot call in prescriptions  Please allow 7 days for medication refills  Physician requested updates: If your provider requested that you call with an update after starting medication, please be ready to provide us the medication and dosage, what time you take your medication, the time you attempt to fall asleep, time you fall asleep, when you wake up, and what time you get out of bed  Sleep Study Results: We will contact you with sleep study results and/or next steps after the physician has reviewed your testing            Maycol Whalen, [address] [place] [address]    I spent 15 minutes with patient today in which greater than 50% of the time was spent in counseling/coordination of care regarding YUE, allergies, restless legs, increasing use of PAP therapy      VIRTUAL VISIT DISCLAIMER    Reinaldo Trammell acknowledges that she has consented to an online visit or consultation  She understands that the online visit is based solely on information provided by her, and that, in the absence of a face-to-face physical evaluation by the physician, the diagnosis she receives is both limited and provisional in terms of accuracy and completeness  This is not intended to replace a full medical face-to-face evaluation by the physician  Tejal Toledo understands and accepts these terms

## 2021-03-01 ENCOUNTER — TELEPHONE (OUTPATIENT)
Dept: SLEEP CENTER | Facility: CLINIC | Age: 61
End: 2021-03-01
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
## 2021-03-23 ENCOUNTER — TELEMEDICINE (OUTPATIENT)
Dept: NEUROLOGY | Facility: CLINIC | Age: 61
End: 2021-03-23
Payer: MEDICARE

## 2021-03-23 DIAGNOSIS — G25.0 BENIGN ESSENTIAL TREMOR: Primary | ICD-10-CM

## 2021-03-23 PROCEDURE — 99212 OFFICE O/P EST SF 10 MIN: CPT | Performed by: NURSE PRACTITIONER

## 2021-03-23 NOTE — PATIENT INSTRUCTIONS
Patient to continue her current dose of gabapentin as well as propanolol  Patient should call given any interim changes or increase in her tremor  Patient to follow-up with PCP

## 2021-03-23 NOTE — PROGRESS NOTES
Virtual Regular Visit      Assessment/Plan:    Problem List Items Addressed This Visit     None               Reason for visit is   Chief Complaint   Patient presents with    Virtual Regular Visit        Encounter provider Annika Shelton    Provider located at [address] [facility] [address]      Recent Visits  No visits were found meeting these conditions  Showing recent visits within past 7 days and meeting all other requirements     Future Appointments  No visits were found meeting these conditions  Showing future appointments within next 150 days and meeting all other requirements        The patient was identified by name and date of birth  Reinaldo Trammell was informed that this is a telemedicine visit and that the visit is being conducted through {AMB CORONAVIRUS VISIT DJNIQW:88058}  {Telemedicine confidentiality :00927} {Telemedicine participants:41181}  She acknowledged consent and understanding of privacy and security of the video platform  The patient has agreed to participate and understands they can discontinue the visit at any time  Patient is aware this is a billable service  Subjective  Reinaldo Trammell is a 56 y o  female ***         HPI     Past Medical History:   Diagnosis Date    Allergic rhinitis     Anemia     Anxiety     Arthritis     Back pain     Cancer ([facility] [address] )     Chronic pain disorder     back    COPD (chronic obstructive pulmonary disease) (HCC)     CPAP (continuous positive airway pressure) dependence     Depression     Diabetes mellitus (HCC)     Pre- Diabetic    Dyslipidemia     Essential tremor     Excessive daytime sleepiness     GERD (gastroesophageal reflux disease)     History of uterine cancer     Hyperglycemia     Hyperlipidemia     Hypertension     Hypovitaminosis D     Iron deficiency anemia     Joint pain     Mood disorder (HCC)     Obesity     Obstructive sleep apnea  Ringing in ears     RLS (restless legs syndrome)     Snoring     Uterine cancer (HCC)     age 20   Jeral Hose Vitamin D deficiency     Witnessed episode of apnea        Past Surgical History:   Procedure Laterality Date    APPENDECTOMY      HYSTERECTOMY      age 20    KNEE ARTHROSCOPY      KNEE SURGERY      Meniscus tear    TONSILLECTOMY      TUBAL LIGATION         Current Outpatient Medications   Medication Sig Dispense Refill    acetaminophen (TYLENOL) 650 mg CR tablet Take 1 tablet (650 mg total) by mouth every 8 (eight) hours as needed for mild pain 60 tablet 3    albuterol (Ventolin HFA) 90 mcg/act inhaler Inhale 2 puffs every 6 (six) hours as needed for wheezing 18 g 1    aspirin (Aspirin Low Dose) 81 mg EC tablet Take 1 tablet (81 mg total) by mouth daily 30 tablet 3    atorvastatin (LIPITOR) 40 mg tablet Take 1 tablet (40 mg total) by mouth daily 90 tablet 3    cholecalciferol (VITAMIN D3) 1,000 units tablet Take 1 tablet (1,000 Units total) by mouth daily 30 tablet 5    Diclofenac Sodium (VOLTAREN) 1 % Apply 2 g topically 4 (four) times a day 240 g 1    fluticasone (FLONASE) 50 mcg/act nasal spray 1 spray into each nostril daily 1 Bottle 3    gabapentin (NEURONTIN) 100 mg capsule Take 1 capsule (100 mg total) by mouth 3 (three) times a day 90 capsule 5    ketoconazole (NIZORAL) 2 % cream Apply topically daily 15 g 0    nystatin (MYCOSTATIN) powder Apply topically 3 (three) times a day 15 g 1    propranolol (INDERAL) 40 mg tablet Take 1 tablet (40 mg total) by mouth every 12 (twelve) hours 180 tablet 5     No current facility-administered medications for this visit  Allergies   Allergen Reactions    Aspirin GI Intolerance     Can only take baby aspirin        Review of Systems   Constitutional: Negative  Negative for appetite change and fever  HENT: Negative  Negative for hearing loss, tinnitus, trouble swallowing and voice change  Eyes: Negative    Negative for photophobia and pain    Respiratory: Negative  Negative for shortness of breath  Cardiovascular: Negative  Negative for palpitations  Gastrointestinal: Negative  Negative for nausea and vomiting  Endocrine: Negative  Negative for cold intolerance  Genitourinary: Negative  Negative for dysuria, frequency and urgency  Musculoskeletal: Negative  Negative for myalgias and neck pain  Skin: Negative  Negative for rash  Neurological: Negative  Negative for dizziness, tremors, seizures, syncope, facial asymmetry, speech difficulty, weakness, light-headedness, numbness and headaches  Hematological: Negative  Does not bruise/bleed easily  Psychiatric/Behavioral: Negative  Negative for confusion, hallucinations and sleep disturbance  Video Exam    There were no vitals filed for this visit  Physical Exam     {covid time spent:88803}      VIRTUAL VISIT DISCLAIMER    Nicole Dang acknowledges that she has consented to an online visit or consultation  She understands that the online visit is based solely on information provided by her, and that, in the absence of a face-to-face physical evaluation by the physician, the diagnosis she receives is both limited and provisional in terms of accuracy and completeness  This is not intended to replace a full medical face-to-face evaluation by the physician  Nicole Dang understands and accepts these terms

## 2021-04-20 ENCOUNTER — OFFICE VISIT (OUTPATIENT)
Dept: FAMILY MEDICINE CLINIC | Facility: CLINIC | Age: 61
End: 2021-04-20

## 2021-04-20 VITALS
BODY MASS INDEX: 45.31 KG/M2 | RESPIRATION RATE: 16 BRPM | TEMPERATURE: 97.3 F | HEART RATE: 70 BPM | SYSTOLIC BLOOD PRESSURE: 120 MMHG | DIASTOLIC BLOOD PRESSURE: 72 MMHG | HEIGHT: 61 IN | WEIGHT: 240 LBS | OXYGEN SATURATION: 97 %

## 2021-04-20 DIAGNOSIS — E66.01 MORBID OBESITY WITH BMI OF 45.0-49.9, ADULT (HCC): ICD-10-CM

## 2021-04-20 DIAGNOSIS — I10 ESSENTIAL HYPERTENSION: ICD-10-CM

## 2021-04-20 DIAGNOSIS — Z72.0 TOBACCO ABUSE: Primary | ICD-10-CM

## 2021-04-20 DIAGNOSIS — J30.9 ALLERGIC RHINITIS, UNSPECIFIED SEASONALITY, UNSPECIFIED TRIGGER: ICD-10-CM

## 2021-04-20 DIAGNOSIS — L30.4 INTERTRIGO: ICD-10-CM

## 2021-04-20 PROCEDURE — 99213 OFFICE O/P EST LOW 20 MIN: CPT | Performed by: INTERNAL MEDICINE

## 2021-04-20 RX ORDER — NYSTATIN 100000 [USP'U]/G
POWDER TOPICAL 3 TIMES DAILY
Qty: 60 G | Refills: 2 | Status: SHIPPED | OUTPATIENT
Start: 2021-04-20 | End: 2022-03-23 | Stop reason: SDUPTHER

## 2021-04-20 RX ORDER — MONTELUKAST SODIUM 10 MG/1
10 TABLET ORAL
Qty: 30 TABLET | Refills: 3 | Status: SHIPPED | OUTPATIENT
Start: 2021-04-20 | End: 2022-01-07

## 2021-04-20 NOTE — ASSESSMENT & PLAN NOTE
10 cigarettes per day  Stressors - loud neighbours  Has good financial support but not enough to relocate as she desires  Expressed understanding and encouraged cessation of smoking  Will revisit topic at next appointment

## 2021-04-20 NOTE — PATIENT INSTRUCTIONS
Obesity   AMBULATORY CARE:   Obesity  is when your body mass index (BMI) is greater than 30  Your healthcare provider will use your height and weight to measure your BMI  The risks of obesity include  many health problems, such as injuries or physical disability  You may need tests to check for the following:  · Diabetes    · High blood pressure or high cholesterol    · Heart disease    · Gallbladder or liver disease    · Cancer of the colon, breast, prostate, liver, or kidney    · Sleep apnea    · Arthritis or gout    Seek care immediately if:   · You have a severe headache, confusion, or difficulty speaking  · You have weakness on one side of your body  · You have chest pain, sweating, or shortness of breath  Contact your healthcare provider if:   · You have symptoms of gallbladder or liver disease, such as pain in your upper abdomen  · You have knee or hip pain and discomfort while walking  · You have symptoms of diabetes, such as intense hunger and thirst, and frequent urination  · You have symptoms of sleep apnea, such as snoring or daytime sleepiness  · You have questions or concerns about your condition or care  Treatment for obesity  focuses on helping you lose weight to improve your health  Even a small decrease in BMI can reduce the risk for many health problems  Your healthcare provider will help you set a weight-loss goal   · Lifestyle changes  are the first step in treating obesity  These include making healthy food choices and getting regular physical activity  Your healthcare provider may suggest a weight-loss program that involves coaching, education, and therapy  · Medicine  may help you lose weight when it is used with a healthy diet and physical activity  · Surgery  can help you lose weight if you are very obese and have other health problems  There are several types of weight-loss surgery  Ask your healthcare provider for more information      Be successful losing weight:   · Set small, realistic goals  An example of a small goal is to walk for 20 minutes 5 days a week  Anther goal is to lose 5% of your body weight  · Tell friends, family members, and coworkers about your goals  and ask for their support  Ask a friend to lose weight with you, or join a weight-loss support group  · Identify foods or triggers that may cause you to overeat , and find ways to avoid them  Remove tempting high-calorie foods from your home and workplace  Place a bowl of fresh fruit on your kitchen counter  If stress causes you to eat, then find other ways to cope with stress  · Keep a diary to track what you eat and drink  Also write down how many minutes of physical activity you do each day  Weigh yourself once a week and record it in your diary  Eating changes: You will need to eat 500 to 1,000 fewer calories each day than you currently eat to lose 1 to 2 pounds a week  The following changes will help you cut calories:  · Eat smaller portions  Use small plates, no larger than 9 inches in diameter  Fill your plate half full of fruits and vegetables  Measure your food using measuring cups until you know what a serving size looks like  · Eat 3 meals and 1 or 2 snacks each day  Plan your meals in advance  Anastasiia Heritage and eat at home most of the time  Eat slowly  Do not skip meals  Skipping meals can lead to overeating later in the day  This can make it harder for you to lose weight  Talk with a dietitian to help you make a meal plan and schedule that is right for you  · Eat fruits and vegetables at every meal   They are low in calories and high in fiber, which makes you feel full  Do not add butter, margarine, or cream sauce to vegetables  Use herbs to season steamed vegetables  · Eat less fat and fewer fried foods  Eat more baked or grilled chicken and fish  These protein sources are lower in calories and fat than red meat  Limit fast food   Dress your salads with olive oil and vinegar instead of bottled dressing  · Limit the amount of sugar you eat  Do not drink sugary beverages  Limit alcohol  Activity changes:  Physical activity is good for your body in many ways  It helps you burn calories and build strong muscles  It decreases stress and depression, and improves your mood  It can also help you sleep better  Talk to your healthcare provider before you begin an exercise program   · Exercise for at least 30 minutes 5 days a week  Start slowly  Set aside time each day for physical activity that you enjoy and that is convenient for you  It is best to do both weight training and an activity that increases your heart rate, such as walking, bicycling, or swimming  · Find ways to be more active  Do yard work and housecleaning  Walk up the stairs instead of using elevators  Spend your leisure time going to events that require walking, such as outdoor festivals or fairs  This extra physical activity can help you lose weight and keep it off  Follow up with your healthcare provider as directed: You may need to meet with a dietitian  Write down your questions so you remember to ask them during your visits  © Copyright [facility] [address] Information is for End User's use only and may not be sold, redistributed or otherwise used for commercial purposes  All illustrations and images included in CareNotes® are the copyrighted property of [employer]  or [facility] Milo Velez   The above information is an  only  It is not intended as medical advice for individual conditions or treatments  Talk to your doctor, nurse or pharmacist before following any medical regimen to see if it is safe and effective for you

## 2021-04-20 NOTE — ASSESSMENT & PLAN NOTE
Possible allergens - dairy products in house pet  Advised avoidance of dietary allergens  Will continue flonase nasal spray  Will add singulair 10mg daily

## 2021-04-20 NOTE — ASSESSMENT & PLAN NOTE
Patient has gained weight since last appointment  Normal TFT in 2019 with no symptoms of hypothyroidism  Possible insulin resistance, however only prediabetic with last A1c 2020  Poor exercise habits and diet high in fat (baked red meat and dairy products)    Encouraged dietary modifications with incr fresh vegetables and fruits  Lower fat and processed carb intake    Ref to nutritionist

## 2021-04-20 NOTE — PROGRESS NOTES
Assessment/Plan:     Hypertension  Blood Pressure: 120/72 at goal     Will continue current therapy of propanolol 40      Tobacco abuse  10 cigarettes per day  Stressors - loud neighbours  Has good financial support but not enough to relocate as she desires  Expressed understanding and encouraged cessation of smoking  Will revisit topic at next appointment  Allergic rhinitis  Possible allergens - dairy products in house pet  Advised avoidance of dietary allergens  Will continue flonase nasal spray  Will add singulair 10mg daily  Morbid obesity with BMI of 45 0-49 9, adult [facility])  Patient has gained weight since last appointment  Normal TFT in 2019 with no symptoms of hypothyroidism  Possible insulin resistance, however only prediabetic with last A1c 2020  Poor exercise habits and diet high in fat (baked red meat and dairy products)    Encouraged dietary modifications with incr fresh vegetables and fruits  Lower fat and processed carb intake  Ref to nutritionist        Diagnoses and all orders for this visit:    Tobacco abuse    Essential hypertension    Morbid obesity with BMI of 45 0-49 9, adult ([facility] [address] )  -     Ambulatory referral to Nutrition Services; Future    Allergic rhinitis, unspecified seasonality, unspecified trigger  -     montelukast (SINGULAIR) 10 mg tablet; Take 1 tablet (10 mg total) by mouth daily at bedtime    Intertrigo  Comments:   Patient advised to maintain dryness between skin folds and educated on normal skin sarita  and increased warmth and moisture as cause of condition  Orders:  -     nystatin (MYCOSTATIN) powder; Apply topically 3 (three) times a day          Subjective:     Patient ID: Rachel Medeiros is a 59 y o  female  Patient came in for review today of hypertension  She reports feeling well today but acknwledges having excessive rhinorhea requiring b i d  Flonase usage  Patient remarks exposures dairy products and indoor pet      We discussed her smoking habits which she desires to quit (currently [id number] cigarettes per day) however difficulty in doing so due to stresses from loud neighbours  Patient is having difficulty losing weight  She has 2 meals per day, high fat content and does not exercise much apart from walking her dog around the block, leisurely  Review of Systems   Constitutional: Negative for fever  HENT: Positive for congestion and rhinorrhea  Negative for sore throat  Respiratory: Negative for cough, chest tightness, shortness of breath and wheezing  Cardiovascular: Negative for chest pain and palpitations  Gastrointestinal: Negative for abdominal pain  Neurological: Negative for dizziness and headaches  Objective:     Physical Exam  Vitals signs reviewed  Constitutional:       General: She is not in acute distress  Appearance: She is well-developed  HENT:      Head: Normocephalic  Right Ear: External ear normal       Left Ear: External ear normal       Nose: Rhinorrhea (clear moist nares) present  Mouth/Throat:      Comments: adentulous  Eyes:      Conjunctiva/sclera: Conjunctivae normal    Neck:      Musculoskeletal: Normal range of motion  Thyroid: No thyromegaly  Cardiovascular:      Rate and Rhythm: Normal rate and regular rhythm  Pulses: Normal pulses  Heart sounds: Normal heart sounds  No murmur  No friction rub  No gallop  Pulmonary:      Effort: Pulmonary effort is normal       Breath sounds: Normal breath sounds  No wheezing, rhonchi or rales  Abdominal:      General: Bowel sounds are normal  There is no distension  Palpations: Abdomen is soft  There is no mass  Tenderness: There is no abdominal tenderness  Neurological:      General: No focal deficit present  Mental Status: She is alert  Cranial Nerves: No cranial nerve deficit  Psychiatric:         Mood and Affect: Mood normal          Behavior: Behavior normal          Thought Content:  Thought content normal  Judgment: Judgment normal        BMI Counseling: Body mass index is 45 35 kg/m²  The BMI is above normal  Nutrition recommendations include reducing portion sizes, 3-5 servings of fruits/vegetables daily, reducing fast food intake, decreasing soda and/or juice intake, reducing intake of saturated fat and trans fat and reducing intake of cholesterol  Exercise recommendations include exercising 3-5 times per week and strength training exercises  Patient referred to nutritionist due to patient being morbidly obese

## 2021-05-28 ENCOUNTER — APPOINTMENT (OUTPATIENT)
Dept: LAB | Facility: CLINIC | Age: 61
End: 2021-05-28
Payer: MEDICARE

## 2021-05-28 DIAGNOSIS — G25.81 RESTLESS LEGS: ICD-10-CM

## 2021-05-28 DIAGNOSIS — R79.0 LOW FERRITIN: Primary | ICD-10-CM

## 2021-05-28 LAB
FERRITIN SERPL-MCNC: 128 NG/ML (ref 8–388)
IRON SATN MFR SERPL: 22 %
IRON SERPL-MCNC: 80 UG/DL (ref 50–170)
TIBC SERPL-MCNC: 356 UG/DL (ref 250–450)

## 2021-05-28 PROCEDURE — 82728 ASSAY OF FERRITIN: CPT

## 2021-05-28 PROCEDURE — 83550 IRON BINDING TEST: CPT

## 2021-05-28 PROCEDURE — 83540 ASSAY OF IRON: CPT

## 2021-05-28 PROCEDURE — 36415 COLL VENOUS BLD VENIPUNCTURE: CPT

## 2021-06-08 ENCOUNTER — TELEPHONE (OUTPATIENT)
Dept: SLEEP CENTER | Facility: CLINIC | Age: 61
End: 2021-06-08

## 2021-06-08 NOTE — TELEPHONE ENCOUNTER
Left message for patient  Advised ferritin level stable and will be re evaluated at follow up  Advised to call office to schedule follow up for February of 2022  Phone number provided

## 2021-06-08 NOTE — TELEPHONE ENCOUNTER
----- Message from Josefa Call 10 Yamilet Pierce sent at 6/1/2021  1:25 PM EDT -----  Ferritin level has been stable and >75  Will re-evaluate at follow up visit  Patient needs annual follow up visit scheduled for February 2022

## 2021-08-06 ENCOUNTER — OFFICE VISIT (OUTPATIENT)
Dept: FAMILY MEDICINE CLINIC | Facility: CLINIC | Age: 61
End: 2021-08-06

## 2021-08-06 VITALS
TEMPERATURE: 95.8 F | RESPIRATION RATE: 20 BRPM | BODY MASS INDEX: 45.88 KG/M2 | HEART RATE: 62 BPM | WEIGHT: 243 LBS | OXYGEN SATURATION: 97 % | SYSTOLIC BLOOD PRESSURE: 114 MMHG | DIASTOLIC BLOOD PRESSURE: 62 MMHG | HEIGHT: 61 IN

## 2021-08-06 DIAGNOSIS — N39.3 STRESS INCONTINENCE: Primary | ICD-10-CM

## 2021-08-06 DIAGNOSIS — Z00.00 ENCOUNTER FOR ANNUAL WELLNESS VISIT (AWV) IN MEDICARE PATIENT: ICD-10-CM

## 2021-08-06 DIAGNOSIS — I10 ESSENTIAL HYPERTENSION: ICD-10-CM

## 2021-08-06 PROCEDURE — 3074F SYST BP LT 130 MM HG: CPT | Performed by: FAMILY MEDICINE

## 2021-08-06 PROCEDURE — G0439 PPPS, SUBSEQ VISIT: HCPCS | Performed by: FAMILY MEDICINE

## 2021-08-06 PROCEDURE — 3078F DIAST BP <80 MM HG: CPT | Performed by: FAMILY MEDICINE

## 2021-08-06 RX ORDER — FERROUS SULFATE 325(65) MG
325 TABLET ORAL
COMMUNITY
End: 2021-12-20 | Stop reason: ALTCHOICE

## 2021-08-06 RX ORDER — CETIRIZINE HYDROCHLORIDE 10 MG/1
10 TABLET ORAL DAILY
COMMUNITY

## 2021-08-06 NOTE — PROGRESS NOTES
Assessment and Plan:     Problem List Items Addressed This Visit        Cardiovascular and Mediastinum    Hypertension    Relevant Orders    CBC and differential    Comprehensive metabolic panel    Lipid panel    UA w Reflex to Microscopic w Reflex to Culture       Other    Stress incontinence - Primary     Small amount of urine past on coughing  Para 3 with 2 large babies on delivery  Advised Kegel exercises  Encounter for annual wellness visit (AWV) in Medicare patient     Patient up-to-date with immunizations (apart from Radha) and screening  We discussed her hesitancy for COVID vaccination  She will consider getting vaccine from   We discussed advance care planning  five wishes booklet given to be completed at home  Preventive health issues were discussed with patient, and age appropriate screening tests were ordered as noted in patient's After Visit Summary  Personalized health advice and appropriate referrals for health education or preventive services given if needed, as noted in patient's After Visit Summary       History of Present Illness:     Patient presents for Medicare Annual Wellness visit    Patient Care Team:  Judy Marcos MD as PCP - General (Family Medicine)  Xiomara Portillo DO as PCP - [address] [place], [place] [id number] [facility] ([facility])     Problem List:     Patient Active Problem List   Diagnosis    Obstructive sleep apnea treated with continuous positive airway pressure (CPAP)    Restless leg syndrome    Allergic rhinitis    Depression    Low back pain    Essential tremor    Anxiety    Obesity    Hyperlipidemia    Tobacco abuse    Low serum ferritin level    COPD (chronic obstructive pulmonary disease) (HCC)    GERD (gastroesophageal reflux disease)    Edema    Major depressive disorder, recurrent episode, moderate (HCC)    Primary osteoarthritis of both knees    Morbid obesity with BMI of 45 0-49 9, adult ([facility] [address] )    Hypertension    Prediabetes    Stress incontinence    Encounter for annual wellness visit (AWV) in Medicare patient      Past Medical and Surgical History:     Past Medical History:   Diagnosis Date    Allergic rhinitis     Anemia     Anxiety     Arthritis     Back pain     Cancer (HCC)     Chronic pain disorder     back    COPD [facility]ch[facility] obstructive [facility] disease) ([facility])     CPAP (continuous positive airway pressure) dependence     Depression     Diabetes mellitus (HCC)     Pre- Diabetic    Dyslipidemia     Essential tremor     Excessive daytime sleepiness     GERD (gastroesophageal reflux disease)     History of uterine cancer     Hyperglycemia     Hyperlipidemia     Hypertension     Hypovitaminosis D     Iron deficiency anemia     Joint pain     Mood disorder ([facility])     Obesity     Obstructive sleep apnea     Ringing in ears     RLS (restless legs syndrome)     Snoring     Uterine cancer ([facility])     age 20   [employer] Vitamin D deficiency     Witnessed episode of apnea      Past Surgical History:   Procedure Laterality Date    APPENDECTOMY      HYSTERECTOMY      age 20   [employer] KNEE ARTHROSCOPY      KNEE SURGERY      Meniscus tear    TONSILLECTOMY      TUBAL LIGATION        Family History:     Family History   Problem Relation Age of Onset    Diabetes Mother     Asthma Mother     Hypertension Mother     Heart disease Mother     No Known Problems Father     No Known Problems Sister     No Known Problems Daughter     No Known Problems Maternal Grandmother     No Known Problems Paternal Grandmother     No Known Problems Sister     No Known Problems Sister     No Known Problems Daughter     No Known Problems Maternal Grandfather     No Known Problems Paternal Grandfather       Social History:     Social History     Socioeconomic History    Marital status: Single     Spouse name: None    Number of children: None    Years of education: None    Highest education level: None   Occupational History    None   Tobacco Use    Smoking status: Current Every Day Smoker     Packs/day: 0 50    Smokeless tobacco: Former User   Vaping Use    Vaping Use: Never used   Substance and Sexual Activity    Alcohol use: Not Currently     Comment: very seldom    Drug use: Not Currently     Comment: pt use at age 14/17    Sexual activity: None   Other Topics Concern    None   Social History Narrative    None     Social Determinants of Health     Financial Resource Strain:     Difficulty of Paying Living Expenses:    Food Insecurity:     Worried About Running Out of Food in the Last Year:     [address] in the Last Year:    Transportation Needs:     Lack of Transportation (Medical):      Lack of Transportation (Non-Medical):    Physical Activity:     Days of Exercise per Week:     Minutes of Exercise per Session:    Stress:     Feeling of Stress :    Social Connections:     Frequency of Communication with Friends and Family:     Frequency of Social Gatherings with Friends and Family:     Attends [employer] Services:     Active Member of Clubs or Organizations:     Attends Club or Organization Meetings:     Marital Status:    Intimate Partner Violence:     Fear of Current or Ex-Partner:     Emotionally Abused:     Physically Abused:     Sexually Abused:       Medications and Allergies:     Current Outpatient Medications   Medication Sig Dispense Refill    acetaminophen (TYLENOL) 650 mg CR tablet Take 1 tablet (650 mg total) by mouth every 8 (eight) hours as needed for mild pain 60 tablet 3    albuterol (Ventolin HFA) 90 mcg/act inhaler Inhale 2 puffs every 6 (six) hours as needed for wheezing 18 g 1    ascorbic acid (VITAMIN C) 250 MG CHEW Chew      aspirin (Aspirin Low Dose) 81 mg EC tablet Take 1 tablet (81 mg total) by mouth daily 30 tablet 3    atorvastatin (LIPITOR) 40 mg tablet Take 1 tablet (40 mg total) by mouth daily 90 tablet 3    cetirizine (ZyrTEC) 10 mg tablet Take 10 mg by mouth daily      cholecalciferol (VITAMIN D3) 1,000 units tablet Take 1 tablet (1,000 Units total) by mouth daily 30 tablet 5    Diclofenac Sodium (VOLTAREN) 1 % diclofenac 1 % topical gel      Diclofenac Sodium (VOLTAREN) 1 %       ferrous sulfate 325 (65 Fe) mg tablet Take 325 mg by mouth      fluticasone (FLONASE) 50 mcg/act nasal spray 1 spray into each nostril daily 1 Bottle 3    gabapentin (NEURONTIN) 100 mg capsule Take 1 capsule (100 mg total) by mouth 3 (three) times a day 90 capsule 5    montelukast (SINGULAIR) 10 mg tablet Take 1 tablet (10 mg total) by mouth daily at bedtime 30 tablet 3    nystatin (MYCOSTATIN) powder Apply topically 3 (three) times a day 60 g 2    propranolol (INDERAL) 40 mg tablet Take 1 tablet (40 mg total) by mouth every 12 (twelve) hours 180 tablet 5     No current facility-administered medications for this visit       Allergies   Allergen Reactions    Aspirin GI Intolerance     Can only take baby aspirin       Immunizations:     Immunization History   Administered Date(s) Administered    INFLUENZA 10/30/2013, 10/19/2015, 11/14/2016, 11/21/2017    Influenza, recombinant, quadrivalent,injectable, preservative free 10/18/2018, 12/16/2019, 09/28/2020    MMR 12/15/2020    Pneumococcal Polysaccharide PPV23 04/18/2016    Tdap 11/26/2020      Health Maintenance:         Topic Date Due    Breast Cancer Screening: Mammogram  12/16/2021    Colorectal Cancer Screening  10/08/2024    Cervical Cancer Screening  12/16/2024    HIV Screening  Completed    Hepatitis C Screening  Completed         Topic Date Due    COVID-19 Vaccine (1) Never done    Influenza Vaccine (1) 09/01/2021      Medicare Health Risk Assessment:     /62 (BP Location: Left arm, Patient Position: Sitting, Cuff Size: Large)   Pulse 62   Temp (!) 95 8 °F (35 4 °C) (Temporal)   Resp 20   Ht 5' 1" (1 549 m)   Wt 110 kg (243 lb)   LMP  (LMP Unknown)   SpO2 97%   BMI 45 91 kg/m²          Health Risk Assessment: Patient rates overall health as fair  Patient feels that their physical health rating is slightly worse  Patient is satisfied with their life  Eyesight was rated as slightly worse  Hearing was rated as same  Patient feels that their emotional and mental health rating is same  Patients states they are never, rarely angry  Patient states they are sometimes unusually tired/fatigued  Pain experienced in the last 7 days has been some  Patient's pain rating has been 7/10  Patient states that she has experienced no weight loss or gain in last 6 months  Depression Screening:   PHQ-2 Score: 0  PHQ-9 Score: 2      Fall Risk Screening: In the past year, patient has experienced: no history of falling in past year      Urinary Incontinence Screening:   Patient has leaked urine accidently in the last six months  When I cough    Home Safety:  Patient does not have trouble with stairs inside or outside of their home  Patient has working smoke alarms and has no working carbon monoxide detector  Home safety hazards include: none  Nutrition:   Current diet is Regular  Medications:   Patient is not currently taking any over-the-counter supplements  Patient is able to manage medications  Activities of Daily Living (ADLs)/Instrumental Activities of Daily Living (IADLs):   Walk and transfer into and out of bed and chair?: Yes  Dress and groom yourself?: Yes    Bathe or shower yourself?: Yes    Feed yourself?  Yes  Do your laundry/housekeeping?: Yes  Manage your money, pay your bills and track your expenses?: Yes  Make your own meals?: Yes    Do your own shopping?: Yes    Previous Hospitalizations:   Any hospitalizations or ED visits within the last 12 months?: Yes      Advance Care Planning:   Living will: No    Durable POA for healthcare: No    Advanced directive: No      PREVENTIVE SCREENINGS      Cardiovascular Screening:    General: Screening Not Indicated and History Lipid Disorder      Diabetes Screening: General: Screening Current      Colorectal Cancer Screening:     General: Screening Current      Breast Cancer Screening:     General: Screening Current      Cervical Cancer Screening:    General: Screening Current      Lung Cancer Screening:     General: Screening Not Indicated      Hepatitis C Screening:    General: Screening Current    Screening, Brief Intervention, and Referral to Treatment (SBIRT)    Screening  Typical number of drinks in a day: 0  Typical number of drinks in a week: 0  Interpretation: Low risk drinking behavior      Single Item Drug Screening:  How often have you used an illegal drug (including marijuana) or a prescription medication for non-medical reasons in the past year? never    Single Item Drug Screen Score: 0  Interpretation: Negative screen for possible drug use disorder      Luis Fernando Hamilton MD

## 2021-08-06 NOTE — ASSESSMENT & PLAN NOTE
Patient up-to-date with immunizations (apart from Radha) and screening  We discussed her hesitancy for COVID vaccination  She will consider getting vaccine from [facility]  We discussed advance care planning  five wishes booklet given to be completed at home

## 2021-08-06 NOTE — PATIENT INSTRUCTIONS
Medicare Preventive Visit Patient Instructions  Thank you for completing your Welcome to Medicare Visit or Medicare Annual Wellness Visit today  Your next wellness visit will be due in one year (8/7/2022)  The screening/preventive services that you may require over the next 5-10 years are detailed below  Some tests may not apply to you based off risk factors and/or age  Screening tests ordered at today's visit but not completed yet may show as past due  Also, please note that scanned in results may not display below  Preventive Screenings:  Service Recommendations Previous Testing/Comments   Colorectal Cancer Screening  * Colonoscopy    * Fecal Occult Blood Test (FOBT)/Fecal Immunochemical Test (FIT)  * Fecal DNA/Cologuard Test  * Flexible Sigmoidoscopy Age: 54-65 years old   Colonoscopy: every 10 years (may be performed more frequently if at higher risk)  OR  FOBT/FIT: every 1 year  OR  Cologuard: every 3 years  OR  Sigmoidoscopy: every 5 years  Screening may be recommended earlier than age 48 if at higher risk for colorectal cancer  Also, an individualized decision between you and your healthcare provider will decide whether screening between the ages of 74-80 would be appropriate  Colonoscopy: 10/08/2019  FOBT/FIT: Not on file  Cologuard: Not on file  Sigmoidoscopy: Not on file    Screening Current     Breast Cancer Screening Age: 36 years old  Frequency: every 1-2 years  Not required if history of left and right mastectomy Mammogram: 12/16/2020    Screening Current   Cervical Cancer Screening Between the ages of 21-29, pap smear recommended once every 3 years  Between the ages of 33-67, can perform pap smear with HPV co-testing every 5 years     Recommendations may differ for women with a history of total hysterectomy, cervical cancer, or abnormal pap smears in past  Pap Smear: 12/16/2019    Screening Current   Hepatitis C Screening Once for adults born between 1945 and 1965  More frequently in patients at high risk for Hepatitis C Hep C Antibody: 10/28/2018    Screening Current   Diabetes Screening 1-2 times per year if you're at risk for diabetes or have pre-diabetes Fasting glucose: 132 mg/dL   A1C: 5 8 %    Screening Current   Cholesterol Screening Once every 5 years if you don't have a lipid disorder  May order more often based on risk factors  Lipid panel: 09/28/2020    Screening Not Indicated  History Lipid Disorder     Other Preventive Screenings Covered by Medicare:  1  Abdominal Aortic Aneurysm (AAA) Screening: covered once if your at risk  You're considered to be at risk if you have a family history of AAA  2  Lung Cancer Screening: covers low dose CT scan once per year if you meet all of the following conditions: (1) Age 50-69; (2) No signs or symptoms of lung cancer; (3) Current smoker or have quit smoking within the last 15 years; (4) You have a tobacco smoking history of at least 30 pack years (packs per day multiplied by number of years you smoked); (5) You get a written order from a healthcare provider  3  Glaucoma Screening: covered annually if you're considered high risk: (1) You have diabetes OR (2) Family history of glaucoma OR (3)  aged 48 and older OR (3)  American aged 72 and older  3  Osteoporosis Screening: covered every 2 years if you meet one of the following conditions: (1) You're estrogen deficient and at risk for osteoporosis based off medical history and other findings; (2) Have a vertebral abnormality; (3) On glucocorticoid therapy for more than 3 months; (4) Have primary hyperparathyroidism; (5) On osteoporosis medications and need to assess response to drug therapy  · Last bone density test (DXA Scan): Not on file  5  HIV Screening: covered annually if you're between the age of 12-76  Also covered annually if you are younger than 13 and older than 72 with risk factors for HIV infection   For pregnant patients, it is covered up to 3 times per pregnancy  Immunizations:  Immunization Recommendations   Influenza Vaccine Annual influenza vaccination during flu season is recommended for all persons aged >= 6 months who do not have contraindications   Pneumococcal Vaccine (Prevnar and Pneumovax)  * Prevnar = PCV13  * Pneumovax = PPSV23   Adults 25-60 years old: 1-3 doses may be recommended based on certain risk factors  Adults 72 years old: Prevnar (PCV13) vaccine recommended followed by Pneumovax (PPSV23) vaccine  If already received PPSV23 since turning 65, then PCV13 recommended at least one year after PPSV23 dose  Hepatitis B Vaccine 3 dose series if at intermediate or high risk (ex: diabetes, end stage renal disease, liver disease)   Tetanus (Td) Vaccine - COST NOT COVERED BY MEDICARE PART B Following completion of primary series, a booster dose should be given every 10 years to maintain immunity against tetanus  Td may also be given as tetanus wound prophylaxis  Tdap Vaccine - COST NOT COVERED BY MEDICARE PART B Recommended at least once for all adults  For pregnant patients, recommended with each pregnancy  Shingles Vaccine (Shingrix) - COST NOT COVERED BY MEDICARE PART B  2 shot series recommended in those aged 48 and above     Health Maintenance Due:      Topic Date Due    Breast Cancer Screening: Mammogram  12/16/2021    Colorectal Cancer Screening  10/08/2024    Cervical Cancer Screening  12/16/2024    HIV Screening  Completed    Hepatitis C Screening  Completed     Immunizations Due:      Topic Date Due    COVID-19 Vaccine (1) Never done    Influenza Vaccine (1) 09/01/2021     Advance Directives   What are advance directives? Advance directives are legal documents that state your wishes and plans for medical care  These plans are made ahead of time in case you lose your ability to make decisions for yourself  Advance directives can apply to any medical decision, such as the treatments you want, and if you want to donate organs  What are the types of advance directives? There are many types of advance directives, and each state has rules about how to use them  You may choose a combination of any of the following:  · Living will: This is a written record of the treatment you want  You can also choose which treatments you do not want, which to limit, and which to stop at a certain time  This includes surgery, medicine, IV fluid, and tube feedings  · Durable power of  for healthcare [facility]): This is a written record that states who you want to make healthcare choices for you when you are unable to make them for yourself  This person, called a proxy, is usually a family member or a friend  You may choose more than 1 proxy  · Do not resuscitate (DNR) order:  A DNR order is used in case your heart stops beating or you stop breathing  It is a request not to have certain forms of treatment, such as CPR  A DNR order may be included in other types of advance directives  · Medical directive: This covers the care that you want if you are in a coma, near death, or unable to make decisions for yourself  You can list the treatments you want for each condition  Treatment may include pain medicine, surgery, blood transfusions, dialysis, IV or tube feedings, and a ventilator (breathing machine)  · Values history: This document has questions about your views, beliefs, and how you feel and think about life  This information can help others choose the care that you would choose  Why are advance directives important? An advance directive helps you control your care  Although spoken wishes may be used, it is better to have your wishes written down  Spoken wishes can be misunderstood, or not followed  Treatments may be given even if you do not want them  An advance directive may make it easier for your family to make difficult choices about your care     Urinary Incontinence   Urinary incontinence (UI)  is when you lose control of your bladder  UI develops because your bladder cannot store or empty urine properly  The 3 most common types of UI are stress incontinence, urge incontinence, or both  Medicines:   · May be given to help strengthen your bladder control  Report any side effects of medication to your healthcare provider  Do pelvic muscle exercises often:  Your pelvic muscles help you stop urinating  Squeeze these muscles tight for 5 seconds, then relax for 5 seconds  Gradually work up to squeezing for 10 seconds  Do 3 sets of 15 repetitions a day, or as directed  This will help strengthen your pelvic muscles and improve bladder control  Train your bladder:  Go to the bathroom at set times, such as every 2 hours, even if you do not feel the urge to go  You can also try to hold your urine when you feel the urge to go  For example, hold your urine for 5 minutes when you feel the urge to go  As that becomes easier, hold your urine for 10 minutes  Self-care:   · Keep a UI record  Write down how often you leak urine and how much you leak  Make a note of what you were doing when you leaked urine  · Drink liquids as directed  You may need to limit the amount of liquid you drink to help control your urine leakage  Do not drink any liquid right before you go to bed  Limit or do not have drinks that contain caffeine or alcohol  · Prevent constipation  Eat a variety of high-fiber foods  Good examples are high-fiber cereals, beans, vegetables, and whole-grain breads  Walking is the best way to trigger your intestines to have a bowel movement  · Exercise regularly and maintain a healthy weight  Weight loss and exercise will decrease pressure on your bladder and help you control your leakage  · Use a catheter as directed  to help empty your bladder  A catheter is a tiny, plastic tube that is put into your bladder to drain your urine  · Go to behavior therapy as directed    Behavior therapy may be used to help you learn to control your urge to urinate  Cigarette Smoking and Your Health   Risks to your health if you smoke:  Nicotine and other chemicals found in tobacco damage every cell in your body  Even if you are a light smoker, you have an increased risk for cancer, heart disease, and lung disease  If you are pregnant or have diabetes, smoking increases your risk for complications  Benefits to your health if you stop smoking:   · You decrease respiratory symptoms such as coughing, wheezing, and shortness of breath  · You reduce your risk for cancers of the lung, mouth, throat, kidney, bladder, pancreas, stomach, and cervix  If you already have cancer, you increase the benefits of chemotherapy  You also reduce your risk for cancer returning or a second cancer from developing  · You reduce your risk for heart disease, blood clots, heart attack, and stroke  · You reduce your risk for lung infections, and diseases such as pneumonia, asthma, chronic bronchitis, and emphysema  · Your circulation improves  More oxygen can be delivered to your body  If you have diabetes, you lower your risk for complications, such as kidney, artery, and eye diseases  You also lower your risk for nerve damage  Nerve damage can lead to amputations, poor vision, and blindness  · You improve your body's ability to heal and to fight infections  For more information and support to stop smoking:   · [employer]  gov  Phone: 1- 167 - 242-8825  Web Address: www [employer]  Weight Management   Why it is important to manage your weight:  Being overweight increases your risk of health conditions such as heart disease, high blood pressure, type 2 diabetes, and certain types of cancer  It can also increase your risk for osteoarthritis, sleep apnea, and other respiratory problems  Aim for a slow, steady weight loss  Even a small amount of weight loss can lower your risk of health problems    How to lose weight safely:  A safe and healthy way to lose weight is to eat fewer calories and get regular exercise  You can lose up about 1 pound a week by decreasing the number of calories you eat by 500 calories each day  Healthy meal plan for weight management:  A healthy meal plan includes a variety of foods, contains fewer calories, and helps you stay healthy  A healthy meal plan includes the following:  · Eat whole-grain foods more often  A healthy meal plan should contain fiber  Fiber is the part of grains, fruits, and vegetables that is not broken down by your body  Whole-grain foods are healthy and provide extra fiber in your diet  Some examples of whole-grain foods are whole-wheat breads and pastas, oatmeal, brown rice, and bulgur  · Eat a variety of vegetables every day  Include dark, leafy greens such as spinach, kale, kenia greens, and mustard greens  Eat yellow and orange vegetables such as carrots, sweet potatoes, and winter squash  · Eat a variety of fruits every day  Choose fresh or canned fruit (canned in its own juice or light syrup) instead of juice  Fruit juice has very little or no fiber  · Eat low-fat dairy foods  Drink fat-free (skim) milk or 1% milk  Eat fat-free yogurt and low-fat cottage cheese  Try low-fat cheeses such as mozzarella and other reduced-fat cheeses  · Choose meat and other protein foods that are low in fat  Choose beans or other legumes such as split peas or lentils  Choose fish, skinless poultry (chicken or turkey), or lean cuts of red meat (beef or pork)  Before you cook meat or poultry, cut off any visible fat  · Use less fat and oil  Try baking foods instead of frying them  Add less fat, such as margarine, sour cream, regular salad dressing and mayonnaise to foods  Eat fewer high-fat foods  Some examples of high-fat foods include french fries, doughnuts, ice cream, and cakes  · Eat fewer sweets  Limit foods and drinks that are high in sugar  This includes candy, cookies, regular soda, and sweetened drinks    Exercise:  Exercise at least 30 minutes per day on most days of the week  Some examples of exercise include walking, biking, dancing, and swimming  You can also fit in more physical activity by taking the stairs instead of the elevator or parking farther away from stores  Ask your healthcare provider about the best exercise plan for you  © Copyright [employer] 2018 Information is for End User's use only and may not be sold, redistributed or otherwise used for commercial purposes  All illustrations and images included in CareNotes® are the copyrighted property of [employer]  or [employer]      Kegel Exercises for Women   AMBULATORY CARE:   Kegel exercises  help strengthen your pelvic muscles  Pelvic muscles hold your pelvic organs, such as your bladder and uterus, in place  Kegel exercises help prevent or control problems with urine incontinence (leakage)  Incontinence may be caused by pregnancy, childbirth, or menopause  Contact your healthcare provider if:   · You cannot feel your muscles tighten or relax  · You continue to leak urine  · You have questions or concerns about your condition or care  Use the correct muscles:  Pelvic muscles are the muscles you use to control urine flow  To target these muscles, stop and start the flow of urine several times  This will help you become familiar with how it feels to tighten and relax these muscles  How to do Kegel exercises:   · Empty your bladder  You may lie down, stand up, or sit down to do these exercises  When you first try to do these exercises, it may be easier if you lie down  Tighten or squeeze your pelvic muscles slowly  It may feel like you are trying to hold back urine or gas  Hold this position for 3 seconds  Relax for 3 seconds  Repeat this cycle 10 times  · Do 10 sets of Kegel exercises, at least 3 times a day  Do not hold your breath when you do Kegel exercises  Keep your stomach, back, and leg muscles relaxed       · As your muscles get stronger, you will be able to hold the squeeze longer  Your healthcare provider may ask that you increase your pelvic muscle squeeze to 10 seconds  After you squeeze for 10 seconds, relax for 10 seconds  What else you should know:   · Once you know how to do Kegel exercises, use different positions  You can do these exercises while you lie on the floor, sit at your desk or watch TV, and while you stand  · You may notice improved bladder control within about 6 weeks  · Tighten your pelvic muscles before you sneeze, cough, or lift to prevent urine leakage  Follow up with your healthcare provider as directed:  Write down your questions so you remember to ask them during your visits  © Copyright [employer] 2021 Information is for End User's use only and may not be sold, redistributed or otherwise used for commercial purposes  All illustrations and images included in CareNotes® are the copyrighted property of [employer]  or Neris Vele[employer]   The above information is an  only  It is not intended as medical advice for individual conditions or treatments  Talk to your doctor, nurse or pharmacist before following any medical regimen to see if it is safe and effective for you

## 2021-08-06 NOTE — ASSESSMENT & PLAN NOTE
Small amount of urine past on coughing  Para 3 with 2 large babies on delivery  Advised Kegel exercises

## 2021-09-07 ENCOUNTER — TELEPHONE (OUTPATIENT)
Dept: NEUROLOGY | Facility: CLINIC | Age: 61
End: 2021-09-07

## 2021-09-20 ENCOUNTER — TELEPHONE (OUTPATIENT)
Dept: FAMILY MEDICINE CLINIC | Facility: CLINIC | Age: 61
End: 2021-09-20

## 2021-09-22 ENCOUNTER — TELEPHONE (OUTPATIENT)
Dept: FAMILY MEDICINE CLINIC | Facility: CLINIC | Age: 61
End: 2021-09-22

## 2021-09-22 DIAGNOSIS — G25.0 BENIGN ESSENTIAL TREMOR: Primary | ICD-10-CM

## 2021-09-23 ENCOUNTER — OFFICE VISIT (OUTPATIENT)
Dept: NEUROLOGY | Facility: CLINIC | Age: 61
End: 2021-09-23
Payer: COMMERCIAL

## 2021-09-23 VITALS
SYSTOLIC BLOOD PRESSURE: 138 MMHG | BODY MASS INDEX: 47.23 KG/M2 | RESPIRATION RATE: 19 BRPM | WEIGHT: 240.6 LBS | HEART RATE: 68 BPM | DIASTOLIC BLOOD PRESSURE: 82 MMHG | TEMPERATURE: 98.2 F | HEIGHT: 60 IN

## 2021-09-23 DIAGNOSIS — G25.0 BENIGN ESSENTIAL TREMOR: ICD-10-CM

## 2021-09-23 DIAGNOSIS — G25.0 BENIGN ESSENTIAL TREMOR: Primary | ICD-10-CM

## 2021-09-23 PROCEDURE — 3079F DIAST BP 80-89 MM HG: CPT | Performed by: NURSE PRACTITIONER

## 2021-09-23 PROCEDURE — 3008F BODY MASS INDEX DOCD: CPT | Performed by: NURSE PRACTITIONER

## 2021-09-23 PROCEDURE — 4004F PT TOBACCO SCREEN RCVD TLK: CPT | Performed by: NURSE PRACTITIONER

## 2021-09-23 PROCEDURE — 99213 OFFICE O/P EST LOW 20 MIN: CPT | Performed by: NURSE PRACTITIONER

## 2021-09-23 PROCEDURE — 3075F SYST BP GE 130 - 139MM HG: CPT | Performed by: NURSE PRACTITIONER

## 2021-09-23 PROCEDURE — 3008F BODY MASS INDEX DOCD: CPT | Performed by: FAMILY MEDICINE

## 2021-09-23 RX ORDER — GABAPENTIN 100 MG/1
CAPSULE ORAL
Qty: 315 CAPSULE | Refills: 2 | Status: SHIPPED | OUTPATIENT
Start: 2021-09-23 | End: 2022-05-04 | Stop reason: SDUPTHER

## 2021-09-23 NOTE — PATIENT INSTRUCTIONS
Patient clinically stable  Continue gabapentin 100 mg 1 tablet 3 times a day, patient can utilize an extra dose if needed  Continue propanolol 40 mg 1 tablet twice a day  Patient to call given any increased symptoms

## 2021-09-23 NOTE — PROGRESS NOTES
Patient ID: Melanie Aguayo is a 59 y o  female  Assessment/Plan:   essential tremor  Tremor at this point continues to be fairly well controlled on a combination of gabapentin and propanolol  Patient had side effects in the past to low-dose primidone  --continue gabapentin 100 mg 3 times a day, patient can utilize an extra dose for her more symptomatic days  --continue propanolol 40 mg twice a day  Unable to tolerate higher doses secondary to bradycardia  --will continue to monitor  --patient to call in the interim with any increased symptoms    No problem-specific Assessment & Plan notes found for this encounter  Diagnoses and all orders for this visit:    Benign essential tremor  -     gabapentin (NEURONTIN) 100 mg capsule; Take 1 tablet 3 times a day, and 1 as needed           Subjective:    TEJAS   Luis Fernando Flores is a 43-year-old female with COPD, hyperlipidemia, hypertension, YUE who presents today for neurologic follow-up for essential tremor  Patient on low-dose primidone in the past, unable to tolerate  Was placed on a combination of propanolol and gabapentin with benefit  Patient last seen in March  At that time, her tremors remained well controlled  Today, patient continues to do well with regards to her tremor  Overall, does not interfere with her ADLs  It is more prominent on the right  Also seems more prominent upon awaking however when she takes her meds within half an hour, her symptoms resolved full  She denies any tremor affecting her lower extremities, face or chin  She is tolerating her medication, no reports of lightheadedness or dizziness  Denies any shuffling, or hesitation in her gait, she does have some ambulatory dysfunction secondary to arthritic changes in her knees  No interim falls  Does have some occasional changes in her handwriting       The following portions of the patient's history were reviewed and updated as appropriate: allergies, current medications, past family history, past medical history, past social history, past surgical history and problem list          Objective:  Current Outpatient Medications   Medication Sig Dispense Refill    acetaminophen (TYLENOL) 650 mg CR tablet Take 1 tablet (650 mg total) by mouth every 8 (eight) hours as needed for mild pain 60 tablet 3    albuterol (Ventolin HFA) 90 mcg/act inhaler Inhale 2 puffs every 6 (six) hours as needed for wheezing 18 g 1    ascorbic acid (VITAMIN C) 250 MG CHEW Chew      aspirin (Aspirin Low Dose) 81 mg EC tablet Take 1 tablet (81 mg total) by mouth daily 30 tablet 3    atorvastatin (LIPITOR) 40 mg tablet Take 1 tablet (40 mg total) by mouth daily 90 tablet 3    cetirizine (ZyrTEC) 10 mg tablet Take 10 mg by mouth daily      cholecalciferol (VITAMIN D3) 1,000 units tablet Take 1 tablet (1,000 Units total) by mouth daily 30 tablet 5    Diclofenac Sodium (VOLTAREN) 1 % diclofenac 1 % topical gel      fluticasone (FLONASE) 50 mcg/act nasal spray 1 spray into each nostril daily 1 Bottle 3    gabapentin (NEURONTIN) 100 mg capsule Take 1 tablet 3 times a day, and 1 as needed 315 capsule 2    montelukast (SINGULAIR) 10 mg tablet Take 1 tablet (10 mg total) by mouth daily at bedtime 30 tablet 3    nystatin (MYCOSTATIN) powder Apply topically 3 (three) times a day 60 g 2    propranolol (INDERAL) 40 mg tablet Take 1 tablet (40 mg total) by mouth every 12 (twelve) hours 180 tablet 5    Diclofenac Sodium (VOLTAREN) 1 %       ferrous sulfate 325 (65 Fe) mg tablet Take 325 mg by mouth (Patient not taking: Reported on 9/23/2021)       No current facility-administered medications for this visit  Blood pressure 138/82, pulse 68, temperature 98 2 °F (36 8 °C), temperature source Tympanic, resp  rate 19, height 5' (1 524 m), weight 109 kg (240 lb 9 6 oz), not currently breastfeeding  Physical Exam  Constitutional:       Appearance: Normal appearance        Comments: Patient overweight   HENT: Head: Normocephalic  Cardiovascular:      Rate and Rhythm: Normal rate  Pulmonary:      Effort: Pulmonary effort is normal    Skin:     General: Skin is warm  Neurological:      Deep Tendon Reflexes: Strength normal       Reflex Scores:       Tricep reflexes are 1+ on the right side and 1+ on the left side  Brachioradialis reflexes are 1+ on the right side and 1+ on the left side  Psychiatric:         Mood and Affect: Mood normal          Speech: Speech normal          Behavior: Behavior normal          Thought Content: Thought content normal          Neurological Exam  Mental Status  Awake, alert and oriented to person, place and time  Speech is normal  Language is fluent with no aphasia  Cranial Nerves  CN III, IV, VI: Bilateral eyelid retraction  CN V: Facial sensation is normal   CN VII: Full and symmetric facial movement  CN XI: Shoulder shrug strength is normal   CN XII: Tongue midline without atrophy or fasciculations  Motor  Normal muscle bulk throughout  Normal muscle tone  No cogwheeling  No abnormal involuntary movements  Strength is 5/5 throughout all four extremities  No evidence of tremor today  Archimedes spiral done with out difficulty and fluid  No evidence of micrographia  Sensory  Temperature is normal in upper and lower extremities  Vibration is normal in upper and lower extremities  Reflexes                                           Right                      Left  Brachioradialis                    1+                         1+  Triceps                                1+                         1+  Achilles                                Tr                         Tr    Coordination  Right: Finger-to-nose normal   Left: Finger-to-nose normal   Mild dysmetria noted with coordination testing, no ataxia, no evidence of festination  Gait  Casual gait: Wide stance  Reduced stride length  Ambulates independently, waddling type gait, antalgic favoring her left knee  No festination or hesitation  ROS:  Personally reviewed with patient    Review of Systems  Constitutional: Negative  Negative for appetite change and fever  HENT: Negative  Negative for hearing loss, tinnitus, trouble swallowing and voice change  Eyes: Negative  Negative for photophobia and pain  Respiratory: Negative  Negative for shortness of breath  Cardiovascular: Negative  Negative for palpitations  Gastrointestinal: Negative  Negative for nausea and vomiting  Endocrine: Negative  Negative for cold intolerance  Genitourinary: Negative  Negative for dysuria, frequency and urgency  Musculoskeletal: Negative  Negative for myalgias and neck pain  Skin: Negative  Negative for rash  Neurological: Positive for tremors (bilateral hands)  Negative for dizziness, seizures, syncope, facial asymmetry, speech difficulty, weakness, light-headedness, numbness and headaches  Hematological: Negative  Does not bruise/bleed easily  Psychiatric/Behavioral: Negative  Negative for confusion, hallucinations and sleep disturbance          Depression

## 2021-09-23 NOTE — PROGRESS NOTES
Review of Systems   Constitutional: Negative  Negative for appetite change and fever  HENT: Negative  Negative for hearing loss, tinnitus, trouble swallowing and voice change  Eyes: Negative  Negative for photophobia and pain  Respiratory: Negative  Negative for shortness of breath  Cardiovascular: Negative  Negative for palpitations  Gastrointestinal: Negative  Negative for nausea and vomiting  Endocrine: Negative  Negative for cold intolerance  Genitourinary: Negative  Negative for dysuria, frequency and urgency  Musculoskeletal: Negative  Negative for myalgias and neck pain  Skin: Negative  Negative for rash  Neurological: Positive for tremors (bilateral hands)  Negative for dizziness, seizures, syncope, facial asymmetry, speech difficulty, weakness, light-headedness, numbness and headaches  Hematological: Negative  Does not bruise/bleed easily  Psychiatric/Behavioral: Negative  Negative for confusion, hallucinations and sleep disturbance          Depression

## 2021-09-27 ENCOUNTER — HOSPITAL ENCOUNTER (EMERGENCY)
Facility: HOSPITAL | Age: 61
Discharge: HOME | End: 2021-09-27
Attending: EMERGENCY MEDICINE
Payer: COMMERCIAL

## 2021-09-27 ENCOUNTER — APPOINTMENT (EMERGENCY)
Dept: RADIOLOGY | Facility: HOSPITAL | Age: 61
End: 2021-09-27
Payer: COMMERCIAL

## 2021-09-27 VITALS
HEART RATE: 79 BPM | SYSTOLIC BLOOD PRESSURE: 135 MMHG | TEMPERATURE: 98 F | RESPIRATION RATE: 18 BRPM | WEIGHT: 243.8 LBS | OXYGEN SATURATION: 94 % | DIASTOLIC BLOOD PRESSURE: 94 MMHG | BODY MASS INDEX: 47.61 KG/M2

## 2021-09-27 VITALS
RESPIRATION RATE: 18 BRPM | DIASTOLIC BLOOD PRESSURE: 62 MMHG | SYSTOLIC BLOOD PRESSURE: 127 MMHG | WEIGHT: 244.93 LBS | HEART RATE: 58 BPM | TEMPERATURE: 97.5 F | OXYGEN SATURATION: 95 % | BODY MASS INDEX: 47.83 KG/M2

## 2021-09-27 DIAGNOSIS — J44.1 COPD WITH ACUTE EXACERBATION (HCC): Primary | ICD-10-CM

## 2021-09-27 DIAGNOSIS — J44.1 COPD EXACERBATION (HCC): ICD-10-CM

## 2021-09-27 DIAGNOSIS — R06.00 DYSPNEA: Primary | ICD-10-CM

## 2021-09-27 DIAGNOSIS — J40 BRONCHITIS: ICD-10-CM

## 2021-09-27 LAB
ALBUMIN SERPL BCP-MCNC: 3.3 G/DL (ref 3–5.2)
ALP SERPL-CCNC: 75 U/L (ref 43–122)
ALT SERPL W P-5'-P-CCNC: 21 U/L
ANION GAP SERPL CALCULATED.3IONS-SCNC: 3 MMOL/L (ref 5–14)
AST SERPL W P-5'-P-CCNC: 27 U/L (ref 14–36)
ATRIAL RATE: 61 BPM
BASOPHILS # BLD AUTO: 0.1 THOUSANDS/ΜL (ref 0–0.1)
BASOPHILS NFR BLD AUTO: 1 % (ref 0–1)
BILIRUB SERPL-MCNC: 0.34 MG/DL
BUN SERPL-MCNC: 15 MG/DL (ref 5–25)
CALCIUM ALBUM COR SERPL-MCNC: 8.9 MG/DL (ref 8.3–10.1)
CALCIUM SERPL-MCNC: 8.3 MG/DL (ref 8.4–10.2)
CHLORIDE SERPL-SCNC: 106 MMOL/L (ref 97–108)
CO2 SERPL-SCNC: 30 MMOL/L (ref 22–30)
CREAT SERPL-MCNC: 0.69 MG/DL (ref 0.6–1.2)
EOSINOPHIL # BLD AUTO: 0.3 THOUSAND/ΜL (ref 0–0.4)
EOSINOPHIL NFR BLD AUTO: 5 % (ref 0–6)
ERYTHROCYTE [DISTWIDTH] IN BLOOD BY AUTOMATED COUNT: 13.5 %
GFR SERPL CREATININE-BSD FRML MDRD: 93 ML/MIN/1.73SQ M
GLUCOSE SERPL-MCNC: 121 MG/DL (ref 70–99)
HCT VFR BLD AUTO: 44.6 % (ref 36–46)
HGB BLD-MCNC: 14.7 G/DL (ref 12–16)
LYMPHOCYTES # BLD AUTO: 0.6 THOUSANDS/ΜL (ref 0.5–4)
LYMPHOCYTES NFR BLD AUTO: 10 % (ref 25–45)
MCH RBC QN AUTO: 30.7 PG (ref 26–34)
MCHC RBC AUTO-ENTMCNC: 32.9 G/DL (ref 31–36)
MCV RBC AUTO: 93 FL (ref 80–100)
MONOCYTES # BLD AUTO: 0.7 THOUSAND/ΜL (ref 0.2–0.9)
MONOCYTES NFR BLD AUTO: 11 % (ref 1–10)
NEUTROPHILS # BLD AUTO: 4.6 THOUSANDS/ΜL (ref 1.8–7.8)
NEUTS SEG NFR BLD AUTO: 73 % (ref 45–65)
P AXIS: 69 DEGREES
PLATELET # BLD AUTO: 202 THOUSANDS/UL (ref 150–450)
PMV BLD AUTO: 9.5 FL (ref 8.9–12.7)
POTASSIUM SERPL-SCNC: 4.1 MMOL/L (ref 3.6–5)
PR INTERVAL: 166 MS
PROT SERPL-MCNC: 5.6 G/DL (ref 5.9–8.4)
QRS AXIS: 17 DEGREES
QRSD INTERVAL: 84 MS
QT INTERVAL: 392 MS
QTC INTERVAL: 394 MS
RBC # BLD AUTO: 4.78 MILLION/UL (ref 4–5.2)
SARS-COV-2 RNA RESP QL NAA+PROBE: NEGATIVE
SODIUM SERPL-SCNC: 139 MMOL/L (ref 137–147)
T WAVE AXIS: 13 DEGREES
TROPONIN I SERPL-MCNC: <0.01 NG/ML (ref 0–0.03)
VENTRICULAR RATE: 61 BPM
WBC # BLD AUTO: 6.3 THOUSAND/UL (ref 4.5–11)

## 2021-09-27 PROCEDURE — 84484 ASSAY OF TROPONIN QUANT: CPT | Performed by: EMERGENCY MEDICINE

## 2021-09-27 PROCEDURE — 36415 COLL VENOUS BLD VENIPUNCTURE: CPT | Performed by: EMERGENCY MEDICINE

## 2021-09-27 PROCEDURE — 80053 COMPREHEN METABOLIC PANEL: CPT | Performed by: EMERGENCY MEDICINE

## 2021-09-27 PROCEDURE — 99284 EMERGENCY DEPT VISIT MOD MDM: CPT

## 2021-09-27 PROCEDURE — 71045 X-RAY EXAM CHEST 1 VIEW: CPT

## 2021-09-27 PROCEDURE — 93010 ELECTROCARDIOGRAM REPORT: CPT | Performed by: INTERNAL MEDICINE

## 2021-09-27 PROCEDURE — U0003 INFECTIOUS AGENT DETECTION BY NUCLEIC ACID (DNA OR RNA); SEVERE ACUTE RESPIRATORY SYNDROME CORONAVIRUS 2 (SARS-COV-2) (CORONAVIRUS DISEASE [COVID-19]), AMPLIFIED PROBE TECHNIQUE, MAKING USE OF HIGH THROUGHPUT TECHNOLOGIES AS DESCRIBED BY CMS-2020-01-R: HCPCS | Performed by: EMERGENCY MEDICINE

## 2021-09-27 PROCEDURE — 85025 COMPLETE CBC W/AUTO DIFF WBC: CPT | Performed by: EMERGENCY MEDICINE

## 2021-09-27 PROCEDURE — 99285 EMERGENCY DEPT VISIT HI MDM: CPT

## 2021-09-27 PROCEDURE — 99285 EMERGENCY DEPT VISIT HI MDM: CPT | Performed by: EMERGENCY MEDICINE

## 2021-09-27 PROCEDURE — 94640 AIRWAY INHALATION TREATMENT: CPT

## 2021-09-27 PROCEDURE — 93005 ELECTROCARDIOGRAM TRACING: CPT

## 2021-09-27 PROCEDURE — U0005 INFEC AGEN DETEC AMPLI PROBE: HCPCS | Performed by: EMERGENCY MEDICINE

## 2021-09-27 PROCEDURE — 99284 EMERGENCY DEPT VISIT MOD MDM: CPT | Performed by: EMERGENCY MEDICINE

## 2021-09-27 RX ORDER — ALBUTEROL SULFATE 2.5 MG/3ML
2.5 SOLUTION RESPIRATORY (INHALATION) ONCE
Status: COMPLETED | OUTPATIENT
Start: 2021-09-27 | End: 2021-09-27

## 2021-09-27 RX ORDER — GUAIFENESIN/DEXTROMETHORPHAN 100-10MG/5
10 SYRUP ORAL ONCE
Status: COMPLETED | OUTPATIENT
Start: 2021-09-27 | End: 2021-09-27

## 2021-09-27 RX ORDER — PREDNISONE 20 MG/1
60 TABLET ORAL ONCE
Status: COMPLETED | OUTPATIENT
Start: 2021-09-27 | End: 2021-09-27

## 2021-09-27 RX ORDER — AZITHROMYCIN 250 MG/1
500 TABLET, FILM COATED ORAL ONCE
Status: COMPLETED | OUTPATIENT
Start: 2021-09-27 | End: 2021-09-27

## 2021-09-27 RX ORDER — BENZONATATE 100 MG/1
100 CAPSULE ORAL ONCE
Status: COMPLETED | OUTPATIENT
Start: 2021-09-27 | End: 2021-09-27

## 2021-09-27 RX ORDER — PROMETHAZINE HYDROCHLORIDE AND CODEINE PHOSPHATE 6.25; 1 MG/5ML; MG/5ML
5 SYRUP ORAL EVERY 4 HOURS PRN
Qty: 118 ML | Refills: 0 | Status: SHIPPED | OUTPATIENT
Start: 2021-09-27 | End: 2021-10-07

## 2021-09-27 RX ORDER — PREDNISONE 20 MG/1
20 TABLET ORAL 2 TIMES DAILY WITH MEALS
Qty: 10 TABLET | Refills: 0 | Status: SHIPPED | OUTPATIENT
Start: 2021-09-27 | End: 2021-09-30 | Stop reason: HOSPADM

## 2021-09-27 RX ORDER — AZITHROMYCIN 250 MG/1
250 TABLET, FILM COATED ORAL EVERY 24 HOURS
Qty: 4 TABLET | Refills: 0 | Status: SHIPPED | OUTPATIENT
Start: 2021-09-27 | End: 2021-09-30 | Stop reason: HOSPADM

## 2021-09-27 RX ADMIN — PREDNISONE 60 MG: 20 TABLET ORAL at 18:27

## 2021-09-27 RX ADMIN — BENZONATATE 100 MG: 100 CAPSULE ORAL at 19:20

## 2021-09-27 RX ADMIN — ALBUTEROL SULFATE 2.5 MG: 2.5 SOLUTION RESPIRATORY (INHALATION) at 02:30

## 2021-09-27 RX ADMIN — AZITHROMYCIN MONOHYDRATE 500 MG: 250 TABLET ORAL at 01:08

## 2021-09-27 RX ADMIN — IPRATROPIUM BROMIDE 0.5 MG: 0.5 SOLUTION RESPIRATORY (INHALATION) at 01:09

## 2021-09-27 RX ADMIN — GUAIFENESIN AND DEXTROMETHORPHAN 10 ML: 100; 10 SYRUP ORAL at 01:31

## 2021-09-27 RX ADMIN — IPRATROPIUM BROMIDE 0.5 MG: 0.5 SOLUTION RESPIRATORY (INHALATION) at 02:30

## 2021-09-27 RX ADMIN — ALBUTEROL SULFATE 10 MG: 2.5 SOLUTION RESPIRATORY (INHALATION) at 18:26

## 2021-09-27 RX ADMIN — ALBUTEROL SULFATE 2.5 MG: 2.5 SOLUTION RESPIRATORY (INHALATION) at 01:09

## 2021-09-27 RX ADMIN — IPRATROPIUM BROMIDE 1 MG: 0.5 SOLUTION RESPIRATORY (INHALATION) at 18:26

## 2021-09-28 ENCOUNTER — APPOINTMENT (EMERGENCY)
Dept: RADIOLOGY | Facility: HOSPITAL | Age: 61
End: 2021-09-28
Payer: COMMERCIAL

## 2021-09-28 ENCOUNTER — HOSPITAL ENCOUNTER (OUTPATIENT)
Facility: HOSPITAL | Age: 61
Setting detail: OBSERVATION
Discharge: HOME | End: 2021-09-30
Attending: EMERGENCY MEDICINE | Admitting: FAMILY MEDICINE
Payer: COMMERCIAL

## 2021-09-28 DIAGNOSIS — G47.33 OBSTRUCTIVE SLEEP APNEA TREATED WITH CONTINUOUS POSITIVE AIRWAY PRESSURE (CPAP): ICD-10-CM

## 2021-09-28 DIAGNOSIS — R91.8 LUNG NODULES: ICD-10-CM

## 2021-09-28 DIAGNOSIS — J44.1 COPD EXACERBATION (HCC): ICD-10-CM

## 2021-09-28 DIAGNOSIS — R06.00 DYSPNEA, UNSPECIFIED TYPE: Primary | ICD-10-CM

## 2021-09-28 DIAGNOSIS — Z99.89 OBSTRUCTIVE SLEEP APNEA TREATED WITH CONTINUOUS POSITIVE AIRWAY PRESSURE (CPAP): ICD-10-CM

## 2021-09-28 LAB
ALBUMIN SERPL BCP-MCNC: 3.7 G/DL (ref 3–5.2)
ALP SERPL-CCNC: 78 U/L (ref 43–122)
ALT SERPL W P-5'-P-CCNC: 23 U/L
ANION GAP SERPL CALCULATED.3IONS-SCNC: 2 MMOL/L (ref 5–14)
AST SERPL W P-5'-P-CCNC: 31 U/L (ref 14–36)
BASOPHILS # BLD AUTO: 0 THOUSANDS/ΜL (ref 0–0.1)
BASOPHILS NFR BLD AUTO: 0 % (ref 0–1)
BILIRUB SERPL-MCNC: 0.42 MG/DL
BUN SERPL-MCNC: 14 MG/DL (ref 5–25)
CALCIUM SERPL-MCNC: 8.8 MG/DL (ref 8.4–10.2)
CHLORIDE SERPL-SCNC: 104 MMOL/L (ref 97–108)
CO2 SERPL-SCNC: 33 MMOL/L (ref 22–30)
CREAT SERPL-MCNC: 0.65 MG/DL (ref 0.6–1.2)
EOSINOPHIL # BLD AUTO: 0 THOUSAND/ΜL (ref 0–0.4)
EOSINOPHIL NFR BLD AUTO: 0 % (ref 0–6)
ERYTHROCYTE [DISTWIDTH] IN BLOOD BY AUTOMATED COUNT: 13.5 %
GFR SERPL CREATININE-BSD FRML MDRD: 95 ML/MIN/1.73SQ M
GLUCOSE SERPL-MCNC: 157 MG/DL (ref 70–99)
HCT VFR BLD AUTO: 48 % (ref 36–46)
HGB BLD-MCNC: 15.9 G/DL (ref 12–16)
LYMPHOCYTES # BLD AUTO: 0.6 THOUSANDS/ΜL (ref 0.5–4)
LYMPHOCYTES NFR BLD AUTO: 6 % (ref 25–45)
MAGNESIUM SERPL-MCNC: 2.2 MG/DL (ref 1.6–2.3)
MCH RBC QN AUTO: 30.8 PG (ref 26–34)
MCHC RBC AUTO-ENTMCNC: 33.2 G/DL (ref 31–36)
MCV RBC AUTO: 93 FL (ref 80–100)
MONOCYTES # BLD AUTO: 0.6 THOUSAND/ΜL (ref 0.2–0.9)
MONOCYTES NFR BLD AUTO: 5 % (ref 1–10)
NEUTROPHILS # BLD AUTO: 9.2 THOUSANDS/ΜL (ref 1.8–7.8)
NEUTS SEG NFR BLD AUTO: 88 % (ref 45–65)
NT-PROBNP SERPL-MCNC: 220 PG/ML (ref 0–299)
PLATELET # BLD AUTO: 230 THOUSANDS/UL (ref 150–450)
PMV BLD AUTO: 9.4 FL (ref 8.9–12.7)
POTASSIUM SERPL-SCNC: 4.3 MMOL/L (ref 3.6–5)
PROT SERPL-MCNC: 6.4 G/DL (ref 5.9–8.4)
RBC # BLD AUTO: 5.17 MILLION/UL (ref 4–5.2)
SODIUM SERPL-SCNC: 139 MMOL/L (ref 137–147)
TROPONIN I SERPL-MCNC: <0.01 NG/ML (ref 0–0.03)
WBC # BLD AUTO: 10.5 THOUSAND/UL (ref 4.5–11)

## 2021-09-28 PROCEDURE — 84484 ASSAY OF TROPONIN QUANT: CPT | Performed by: PHYSICIAN ASSISTANT

## 2021-09-28 PROCEDURE — 93005 ELECTROCARDIOGRAM TRACING: CPT

## 2021-09-28 PROCEDURE — 83735 ASSAY OF MAGNESIUM: CPT | Performed by: PHYSICIAN ASSISTANT

## 2021-09-28 PROCEDURE — 96374 THER/PROPH/DIAG INJ IV PUSH: CPT

## 2021-09-28 PROCEDURE — 94760 N-INVAS EAR/PLS OXIMETRY 1: CPT

## 2021-09-28 PROCEDURE — 99285 EMERGENCY DEPT VISIT HI MDM: CPT | Performed by: PHYSICIAN ASSISTANT

## 2021-09-28 PROCEDURE — 99285 EMERGENCY DEPT VISIT HI MDM: CPT

## 2021-09-28 PROCEDURE — 99219 PR INITIAL OBSERVATION CARE/DAY 50 MINUTES: CPT | Performed by: FAMILY MEDICINE

## 2021-09-28 PROCEDURE — 83880 ASSAY OF NATRIURETIC PEPTIDE: CPT | Performed by: PHYSICIAN ASSISTANT

## 2021-09-28 PROCEDURE — 71045 X-RAY EXAM CHEST 1 VIEW: CPT

## 2021-09-28 PROCEDURE — 80053 COMPREHEN METABOLIC PANEL: CPT | Performed by: PHYSICIAN ASSISTANT

## 2021-09-28 PROCEDURE — 36415 COLL VENOUS BLD VENIPUNCTURE: CPT | Performed by: PHYSICIAN ASSISTANT

## 2021-09-28 PROCEDURE — 85025 COMPLETE CBC W/AUTO DIFF WBC: CPT | Performed by: PHYSICIAN ASSISTANT

## 2021-09-28 PROCEDURE — 94644 CONT INHLJ TX 1ST HOUR: CPT

## 2021-09-28 PROCEDURE — 94664 DEMO&/EVAL PT USE INHALER: CPT

## 2021-09-28 RX ORDER — SODIUM CHLORIDE FOR INHALATION 0.9 %
3 VIAL, NEBULIZER (ML) INHALATION ONCE
Status: COMPLETED | OUTPATIENT
Start: 2021-09-28 | End: 2021-09-28

## 2021-09-28 RX ORDER — METHYLPREDNISOLONE SODIUM SUCCINATE 125 MG/2ML
125 INJECTION, POWDER, LYOPHILIZED, FOR SOLUTION INTRAMUSCULAR; INTRAVENOUS ONCE
Status: COMPLETED | OUTPATIENT
Start: 2021-09-28 | End: 2021-09-28

## 2021-09-28 RX ORDER — BENZONATATE 100 MG/1
100 CAPSULE ORAL ONCE
Status: COMPLETED | OUTPATIENT
Start: 2021-09-28 | End: 2021-09-28

## 2021-09-28 RX ORDER — IPRATROPIUM BROMIDE AND ALBUTEROL SULFATE 2.5; .5 MG/3ML; MG/3ML
3 SOLUTION RESPIRATORY (INHALATION)
Status: DISCONTINUED | OUTPATIENT
Start: 2021-09-28 | End: 2021-09-29

## 2021-09-28 RX ORDER — ALBUTEROL SULFATE 2.5 MG/3ML
10 SOLUTION RESPIRATORY (INHALATION) ONCE
Status: COMPLETED | OUTPATIENT
Start: 2021-09-28 | End: 2021-09-28

## 2021-09-28 RX ADMIN — ISODIUM CHLORIDE 3 ML: 0.03 SOLUTION RESPIRATORY (INHALATION) at 21:18

## 2021-09-28 RX ADMIN — BENZONATATE 100 MG: 100 CAPSULE ORAL at 21:05

## 2021-09-28 RX ADMIN — METHYLPREDNISOLONE SODIUM SUCCINATE 125 MG: 125 INJECTION, POWDER, FOR SOLUTION INTRAMUSCULAR; INTRAVENOUS at 20:15

## 2021-09-28 RX ADMIN — IPRATROPIUM BROMIDE 1 MG: 0.5 SOLUTION RESPIRATORY (INHALATION) at 21:17

## 2021-09-28 RX ADMIN — IPRATROPIUM BROMIDE AND ALBUTEROL SULFATE 3 ML: 2.5; .5 SOLUTION RESPIRATORY (INHALATION) at 20:13

## 2021-09-28 RX ADMIN — ALBUTEROL SULFATE 10 MG: 2.5 SOLUTION RESPIRATORY (INHALATION) at 21:18

## 2021-09-29 PROBLEM — R06.00 DYSPNEA: Status: ACTIVE | Noted: 2021-09-29

## 2021-09-29 PROBLEM — R91.8 LUNG NODULES: Status: ACTIVE | Noted: 2021-09-29

## 2021-09-29 LAB
ANION GAP SERPL CALCULATED.3IONS-SCNC: 8 MMOL/L (ref 5–14)
ATRIAL RATE: 51 BPM
BUN SERPL-MCNC: 15 MG/DL (ref 5–25)
CALCIUM SERPL-MCNC: 9.1 MG/DL (ref 8.4–10.2)
CHLORIDE SERPL-SCNC: 105 MMOL/L (ref 97–108)
CO2 SERPL-SCNC: 27 MMOL/L (ref 22–30)
CREAT SERPL-MCNC: 0.61 MG/DL (ref 0.6–1.2)
ERYTHROCYTE [DISTWIDTH] IN BLOOD BY AUTOMATED COUNT: 13.3 %
GFR SERPL CREATININE-BSD FRML MDRD: 97 ML/MIN/1.73SQ M
GLUCOSE SERPL-MCNC: 219 MG/DL (ref 70–99)
HCT VFR BLD AUTO: 47.4 % (ref 36–46)
HGB BLD-MCNC: 15.5 G/DL (ref 12–16)
LYMPHOCYTES # BLD AUTO: 0.5 THOUSAND/UL (ref 0.5–4)
LYMPHOCYTES # BLD AUTO: 8 % (ref 25–45)
MCH RBC QN AUTO: 30.4 PG (ref 26–34)
MCHC RBC AUTO-ENTMCNC: 32.8 G/DL (ref 31–36)
MCV RBC AUTO: 93 FL (ref 80–100)
MONOCYTES # BLD AUTO: 0.25 THOUSAND/UL (ref 0.2–0.9)
MONOCYTES NFR BLD AUTO: 4 % (ref 1–10)
NEUTS BAND NFR BLD MANUAL: 2 % (ref 0–8)
NEUTS SEG # BLD: 5.46 THOUSAND/UL (ref 1.8–7.8)
NEUTS SEG NFR BLD AUTO: 86 %
P AXIS: 80 DEGREES
PLATELET # BLD AUTO: 220 THOUSANDS/UL (ref 150–450)
PLATELET BLD QL SMEAR: ADEQUATE
PMV BLD AUTO: 9.3 FL (ref 8.9–12.7)
POTASSIUM SERPL-SCNC: 3.8 MMOL/L (ref 3.6–5)
PR INTERVAL: 166 MS
QRS AXIS: 25 DEGREES
QRSD INTERVAL: 70 MS
QT INTERVAL: 390 MS
QTC INTERVAL: 359 MS
RBC # BLD AUTO: 5.1 MILLION/UL (ref 4–5.2)
RBC MORPH BLD: NORMAL
SODIUM SERPL-SCNC: 140 MMOL/L (ref 137–147)
T WAVE AXIS: 33 DEGREES
TOTAL CELLS COUNTED SPEC: 100
VENTRICULAR RATE: 51 BPM
WBC # BLD AUTO: 6.2 THOUSAND/UL (ref 4.5–11)

## 2021-09-29 PROCEDURE — ND001 PR NO DOCUMENTATION: Performed by: FAMILY MEDICINE

## 2021-09-29 PROCEDURE — 94660 CPAP INITIATION&MGMT: CPT

## 2021-09-29 PROCEDURE — 36415 COLL VENOUS BLD VENIPUNCTURE: CPT | Performed by: STUDENT IN AN ORGANIZED HEALTH CARE EDUCATION/TRAINING PROGRAM

## 2021-09-29 PROCEDURE — 85007 BL SMEAR W/DIFF WBC COUNT: CPT | Performed by: STUDENT IN AN ORGANIZED HEALTH CARE EDUCATION/TRAINING PROGRAM

## 2021-09-29 PROCEDURE — 94664 DEMO&/EVAL PT USE INHALER: CPT

## 2021-09-29 PROCEDURE — 94640 AIRWAY INHALATION TREATMENT: CPT

## 2021-09-29 PROCEDURE — 93010 ELECTROCARDIOGRAM REPORT: CPT

## 2021-09-29 PROCEDURE — 94760 N-INVAS EAR/PLS OXIMETRY 1: CPT

## 2021-09-29 PROCEDURE — 85027 COMPLETE CBC AUTOMATED: CPT | Performed by: STUDENT IN AN ORGANIZED HEALTH CARE EDUCATION/TRAINING PROGRAM

## 2021-09-29 PROCEDURE — 80048 BASIC METABOLIC PNL TOTAL CA: CPT | Performed by: STUDENT IN AN ORGANIZED HEALTH CARE EDUCATION/TRAINING PROGRAM

## 2021-09-29 RX ORDER — LEVALBUTEROL 1.25 MG/.5ML
1.25 SOLUTION, CONCENTRATE RESPIRATORY (INHALATION)
Status: DISCONTINUED | OUTPATIENT
Start: 2021-09-29 | End: 2021-09-30 | Stop reason: HOSPADM

## 2021-09-29 RX ORDER — LANOLIN ALCOHOL/MO/W.PET/CERES
6 CREAM (GRAM) TOPICAL
Status: DISCONTINUED | OUTPATIENT
Start: 2021-09-29 | End: 2021-09-30 | Stop reason: HOSPADM

## 2021-09-29 RX ORDER — FLUTICASONE PROPIONATE 50 MCG
1 SPRAY, SUSPENSION (ML) NASAL DAILY
Status: DISCONTINUED | OUTPATIENT
Start: 2021-09-29 | End: 2021-09-30 | Stop reason: HOSPADM

## 2021-09-29 RX ORDER — METHYLPREDNISOLONE SODIUM SUCCINATE 40 MG/ML
40 INJECTION, POWDER, LYOPHILIZED, FOR SOLUTION INTRAMUSCULAR; INTRAVENOUS EVERY 12 HOURS SCHEDULED
Status: DISCONTINUED | OUTPATIENT
Start: 2021-09-29 | End: 2021-09-29

## 2021-09-29 RX ORDER — BENZONATATE 100 MG/1
100 CAPSULE ORAL 3 TIMES DAILY PRN
Status: DISCONTINUED | OUTPATIENT
Start: 2021-09-29 | End: 2021-09-30 | Stop reason: HOSPADM

## 2021-09-29 RX ORDER — GABAPENTIN 100 MG/1
200 CAPSULE ORAL 2 TIMES DAILY
Status: DISCONTINUED | OUTPATIENT
Start: 2021-09-29 | End: 2021-09-29

## 2021-09-29 RX ORDER — BUDESONIDE AND FORMOTEROL FUMARATE DIHYDRATE 160; 4.5 UG/1; UG/1
1 AEROSOL RESPIRATORY (INHALATION) 2 TIMES DAILY
Status: DISCONTINUED | OUTPATIENT
Start: 2021-09-29 | End: 2021-09-30 | Stop reason: HOSPADM

## 2021-09-29 RX ORDER — GUAIFENESIN 600 MG
600 TABLET, EXTENDED RELEASE 12 HR ORAL 2 TIMES DAILY
Status: DISCONTINUED | OUTPATIENT
Start: 2021-09-29 | End: 2021-09-30 | Stop reason: HOSPADM

## 2021-09-29 RX ORDER — HYDRALAZINE HYDROCHLORIDE 20 MG/ML
5 INJECTION INTRAMUSCULAR; INTRAVENOUS EVERY 6 HOURS PRN
Status: DISCONTINUED | OUTPATIENT
Start: 2021-09-29 | End: 2021-09-29

## 2021-09-29 RX ORDER — ATORVASTATIN CALCIUM 20 MG/1
40 TABLET, FILM COATED ORAL DAILY
Status: DISCONTINUED | OUTPATIENT
Start: 2021-09-29 | End: 2021-09-29

## 2021-09-29 RX ORDER — PREDNISONE 20 MG/1
40 TABLET ORAL 2 TIMES DAILY WITH MEALS
Status: DISCONTINUED | OUTPATIENT
Start: 2021-09-29 | End: 2021-09-30 | Stop reason: HOSPADM

## 2021-09-29 RX ORDER — ACETAMINOPHEN 325 MG/1
975 TABLET ORAL EVERY 6 HOURS PRN
Status: DISCONTINUED | OUTPATIENT
Start: 2021-09-29 | End: 2021-09-30 | Stop reason: HOSPADM

## 2021-09-29 RX ORDER — GABAPENTIN 100 MG/1
200 CAPSULE ORAL
Status: DISCONTINUED | OUTPATIENT
Start: 2021-09-29 | End: 2021-09-29

## 2021-09-29 RX ORDER — GABAPENTIN 100 MG/1
100 CAPSULE ORAL 3 TIMES DAILY
Status: DISCONTINUED | OUTPATIENT
Start: 2021-09-29 | End: 2021-09-29

## 2021-09-29 RX ORDER — METHYLPREDNISOLONE SODIUM SUCCINATE 40 MG/ML
40 INJECTION, POWDER, LYOPHILIZED, FOR SOLUTION INTRAMUSCULAR; INTRAVENOUS EVERY 12 HOURS SCHEDULED
Status: DISCONTINUED | OUTPATIENT
Start: 2021-09-30 | End: 2021-09-29

## 2021-09-29 RX ORDER — ATORVASTATIN CALCIUM 40 MG/1
40 TABLET, FILM COATED ORAL
Status: DISCONTINUED | OUTPATIENT
Start: 2021-09-29 | End: 2021-09-30 | Stop reason: HOSPADM

## 2021-09-29 RX ORDER — MONTELUKAST SODIUM 10 MG/1
10 TABLET ORAL
Status: DISCONTINUED | OUTPATIENT
Start: 2021-09-29 | End: 2021-09-30 | Stop reason: HOSPADM

## 2021-09-29 RX ORDER — ALBUTEROL SULFATE 2.5 MG/3ML
2.5 SOLUTION RESPIRATORY (INHALATION) EVERY 6 HOURS PRN
Status: DISCONTINUED | OUTPATIENT
Start: 2021-09-29 | End: 2021-09-29

## 2021-09-29 RX ORDER — NICOTINE 21 MG/24HR
1 PATCH, TRANSDERMAL 24 HOURS TRANSDERMAL DAILY
Status: DISCONTINUED | OUTPATIENT
Start: 2021-09-29 | End: 2021-09-30 | Stop reason: HOSPADM

## 2021-09-29 RX ORDER — LIDOCAINE 50 MG/G
1 PATCH TOPICAL DAILY
Status: DISCONTINUED | OUTPATIENT
Start: 2021-09-29 | End: 2021-09-30 | Stop reason: HOSPADM

## 2021-09-29 RX ORDER — ALBUTEROL SULFATE 90 UG/1
2 AEROSOL, METERED RESPIRATORY (INHALATION) EVERY 6 HOURS PRN
Status: DISCONTINUED | OUTPATIENT
Start: 2021-09-29 | End: 2021-09-30 | Stop reason: HOSPADM

## 2021-09-29 RX ORDER — HYDRALAZINE HYDROCHLORIDE 20 MG/ML
5 INJECTION INTRAMUSCULAR; INTRAVENOUS EVERY 6 HOURS PRN
Status: DISCONTINUED | OUTPATIENT
Start: 2021-09-29 | End: 2021-09-30 | Stop reason: HOSPADM

## 2021-09-29 RX ORDER — GABAPENTIN 100 MG/1
200 CAPSULE ORAL 2 TIMES DAILY
Status: DISCONTINUED | OUTPATIENT
Start: 2021-09-29 | End: 2021-09-30 | Stop reason: HOSPADM

## 2021-09-29 RX ORDER — ASPIRIN 81 MG/1
81 TABLET ORAL DAILY
Status: DISCONTINUED | OUTPATIENT
Start: 2021-09-29 | End: 2021-09-30 | Stop reason: HOSPADM

## 2021-09-29 RX ORDER — AZITHROMYCIN 250 MG/1
500 TABLET, FILM COATED ORAL EVERY 24 HOURS
Status: DISCONTINUED | OUTPATIENT
Start: 2021-09-29 | End: 2021-09-29

## 2021-09-29 RX ORDER — AZITHROMYCIN 250 MG/1
500 TABLET, FILM COATED ORAL EVERY 24 HOURS
Status: DISCONTINUED | OUTPATIENT
Start: 2021-09-30 | End: 2021-09-30 | Stop reason: HOSPADM

## 2021-09-29 RX ADMIN — IPRATROPIUM BROMIDE AND ALBUTEROL SULFATE 3 ML: 2.5; .5 SOLUTION RESPIRATORY (INHALATION) at 02:42

## 2021-09-29 RX ADMIN — ASPIRIN 81 MG: 81 TABLET, COATED ORAL at 08:01

## 2021-09-29 RX ADMIN — LEVALBUTEROL HYDROCHLORIDE 1.25 MG: 1.25 SOLUTION, CONCENTRATE RESPIRATORY (INHALATION) at 07:43

## 2021-09-29 RX ADMIN — ENOXAPARIN SODIUM 40 MG: 40 INJECTION SUBCUTANEOUS at 08:03

## 2021-09-29 RX ADMIN — BENZONATATE 100 MG: 100 CAPSULE ORAL at 21:25

## 2021-09-29 RX ADMIN — METHYLPREDNISOLONE SODIUM SUCCINATE 40 MG: 40 INJECTION, POWDER, FOR SOLUTION INTRAMUSCULAR; INTRAVENOUS at 08:05

## 2021-09-29 RX ADMIN — GABAPENTIN 200 MG: 100 CAPSULE ORAL at 17:44

## 2021-09-29 RX ADMIN — AZITHROMYCIN MONOHYDRATE 500 MG: 250 TABLET ORAL at 03:08

## 2021-09-29 RX ADMIN — PREDNISONE 40 MG: 20 TABLET ORAL at 21:25

## 2021-09-29 RX ADMIN — IPRATROPIUM BROMIDE 0.5 MG: 0.5 SOLUTION RESPIRATORY (INHALATION) at 12:45

## 2021-09-29 RX ADMIN — BUDESONIDE AND FORMOTEROL FUMARATE DIHYDRATE 1 PUFF: 160; 4.5 AEROSOL RESPIRATORY (INHALATION) at 22:11

## 2021-09-29 RX ADMIN — BUDESONIDE AND FORMOTEROL FUMARATE DIHYDRATE 1 PUFF: 160; 4.5 AEROSOL RESPIRATORY (INHALATION) at 07:48

## 2021-09-29 RX ADMIN — Medication 1 PATCH: at 08:04

## 2021-09-29 RX ADMIN — LIDOCAINE 1 PATCH: 50 PATCH TOPICAL at 11:38

## 2021-09-29 RX ADMIN — GABAPENTIN 200 MG: 100 CAPSULE ORAL at 06:24

## 2021-09-29 RX ADMIN — IPRATROPIUM BROMIDE 0.5 MG: 0.5 SOLUTION RESPIRATORY (INHALATION) at 07:43

## 2021-09-29 RX ADMIN — ATORVASTATIN CALCIUM 40 MG: 20 TABLET, FILM COATED ORAL at 16:32

## 2021-09-29 RX ADMIN — GUAIFENESIN 600 MG: 600 TABLET ORAL at 09:32

## 2021-09-29 RX ADMIN — FLUTICASONE PROPIONATE 1 SPRAY: 50 SPRAY, METERED NASAL at 09:32

## 2021-09-29 RX ADMIN — BENZONATATE 100 MG: 100 CAPSULE ORAL at 11:56

## 2021-09-29 RX ADMIN — MELATONIN TAB 3 MG 6 MG: 3 TAB at 21:25

## 2021-09-29 RX ADMIN — MONTELUKAST 10 MG: 10 TABLET, FILM COATED ORAL at 21:25

## 2021-09-29 RX ADMIN — LEVALBUTEROL HYDROCHLORIDE 1.25 MG: 1.25 SOLUTION, CONCENTRATE RESPIRATORY (INHALATION) at 12:45

## 2021-09-29 RX ADMIN — IPRATROPIUM BROMIDE 0.5 MG: 0.5 SOLUTION RESPIRATORY (INHALATION) at 20:01

## 2021-09-29 RX ADMIN — GUAIFENESIN 600 MG: 600 TABLET ORAL at 17:44

## 2021-09-29 RX ADMIN — ACETAMINOPHEN 975 MG: 325 TABLET ORAL at 17:48

## 2021-09-29 RX ADMIN — LEVALBUTEROL HYDROCHLORIDE 1.25 MG: 1.25 SOLUTION, CONCENTRATE RESPIRATORY (INHALATION) at 20:01

## 2021-09-30 VITALS
HEIGHT: 63 IN | WEIGHT: 240.52 LBS | HEART RATE: 81 BPM | DIASTOLIC BLOOD PRESSURE: 78 MMHG | RESPIRATION RATE: 19 BRPM | OXYGEN SATURATION: 92 % | SYSTOLIC BLOOD PRESSURE: 140 MMHG | BODY MASS INDEX: 42.62 KG/M2 | TEMPERATURE: 96.9 F

## 2021-09-30 PROBLEM — R06.00 DYSPNEA: Status: RESOLVED | Noted: 2021-09-29 | Resolved: 2021-09-30

## 2021-09-30 PROCEDURE — 94640 AIRWAY INHALATION TREATMENT: CPT

## 2021-09-30 PROCEDURE — 99217 PR OBSERVATION CARE DISCHARGE MANAGEMENT: CPT | Performed by: FAMILY MEDICINE

## 2021-09-30 PROCEDURE — 94660 CPAP INITIATION&MGMT: CPT

## 2021-09-30 PROCEDURE — 94760 N-INVAS EAR/PLS OXIMETRY 1: CPT

## 2021-09-30 RX ORDER — AZITHROMYCIN 500 MG/1
500 TABLET, FILM COATED ORAL EVERY 24 HOURS
Qty: 1 TABLET | Refills: 0 | Status: SHIPPED | OUTPATIENT
Start: 2021-10-01 | End: 2021-10-02

## 2021-09-30 RX ORDER — AZITHROMYCIN 500 MG/1
500 TABLET, FILM COATED ORAL EVERY 24 HOURS
Qty: 1 TABLET | Refills: 0 | Status: SHIPPED | OUTPATIENT
Start: 2021-10-01 | End: 2021-09-30

## 2021-09-30 RX ORDER — PREDNISONE 20 MG/1
40 TABLET ORAL DAILY
Qty: 6 TABLET | Refills: 0 | Status: SHIPPED | OUTPATIENT
Start: 2021-09-30 | End: 2021-09-30

## 2021-09-30 RX ORDER — PREDNISONE 20 MG/1
40 TABLET ORAL DAILY
Qty: 6 TABLET | Refills: 0 | Status: SHIPPED | OUTPATIENT
Start: 2021-09-30 | End: 2021-09-30 | Stop reason: HOSPADM

## 2021-09-30 RX ORDER — BUDESONIDE AND FORMOTEROL FUMARATE DIHYDRATE 160; 4.5 UG/1; UG/1
1 AEROSOL RESPIRATORY (INHALATION) 2 TIMES DAILY
Qty: 10.2 G | Refills: 1 | Status: SHIPPED | OUTPATIENT
Start: 2021-09-30 | End: 2021-09-30

## 2021-09-30 RX ORDER — GUAIFENESIN 600 MG
600 TABLET, EXTENDED RELEASE 12 HR ORAL 2 TIMES DAILY PRN
Qty: 60 TABLET | Refills: 0 | Status: SHIPPED | OUTPATIENT
Start: 2021-09-30 | End: 2021-09-30

## 2021-09-30 RX ORDER — BENZONATATE 100 MG/1
100 CAPSULE ORAL 3 TIMES DAILY PRN
Qty: 20 CAPSULE | Refills: 0 | Status: SHIPPED | OUTPATIENT
Start: 2021-09-30 | End: 2021-09-30

## 2021-09-30 RX ORDER — BUDESONIDE AND FORMOTEROL FUMARATE DIHYDRATE 160; 4.5 UG/1; UG/1
1 AEROSOL RESPIRATORY (INHALATION) 2 TIMES DAILY
Qty: 10.2 G | Refills: 0 | Status: SHIPPED | OUTPATIENT
Start: 2021-09-30 | End: 2021-12-20 | Stop reason: SDUPTHER

## 2021-09-30 RX ORDER — BENZONATATE 100 MG/1
100 CAPSULE ORAL 3 TIMES DAILY PRN
Qty: 20 CAPSULE | Refills: 0 | Status: SHIPPED | OUTPATIENT
Start: 2021-09-30 | End: 2021-10-14 | Stop reason: ALTCHOICE

## 2021-09-30 RX ORDER — PREDNISONE 20 MG/1
40 TABLET ORAL DAILY
Qty: 6 TABLET | Refills: 0 | Status: SHIPPED | OUTPATIENT
Start: 2021-09-30 | End: 2021-10-03

## 2021-09-30 RX ORDER — GUAIFENESIN 600 MG
600 TABLET, EXTENDED RELEASE 12 HR ORAL 2 TIMES DAILY PRN
Qty: 60 TABLET | Refills: 0 | Status: SHIPPED | OUTPATIENT
Start: 2021-09-30 | End: 2021-12-20 | Stop reason: SDUPTHER

## 2021-09-30 RX ADMIN — IPRATROPIUM BROMIDE 0.5 MG: 0.5 SOLUTION RESPIRATORY (INHALATION) at 07:36

## 2021-09-30 RX ADMIN — AZITHROMYCIN MONOHYDRATE 500 MG: 250 TABLET ORAL at 08:45

## 2021-09-30 RX ADMIN — LEVALBUTEROL HYDROCHLORIDE 1.25 MG: 1.25 SOLUTION, CONCENTRATE RESPIRATORY (INHALATION) at 07:36

## 2021-09-30 RX ADMIN — PREDNISONE 40 MG: 20 TABLET ORAL at 08:51

## 2021-09-30 RX ADMIN — GABAPENTIN 200 MG: 100 CAPSULE ORAL at 08:45

## 2021-09-30 RX ADMIN — IPRATROPIUM BROMIDE 0.5 MG: 0.5 SOLUTION RESPIRATORY (INHALATION) at 13:37

## 2021-09-30 RX ADMIN — GUAIFENESIN 600 MG: 600 TABLET ORAL at 08:45

## 2021-09-30 RX ADMIN — Medication 1 PATCH: at 08:46

## 2021-09-30 RX ADMIN — LIDOCAINE 1 PATCH: 50 PATCH TOPICAL at 08:44

## 2021-09-30 RX ADMIN — ASPIRIN 81 MG: 81 TABLET, COATED ORAL at 08:45

## 2021-09-30 RX ADMIN — ENOXAPARIN SODIUM 40 MG: 40 INJECTION SUBCUTANEOUS at 08:44

## 2021-09-30 RX ADMIN — FLUTICASONE PROPIONATE 1 SPRAY: 50 SPRAY, METERED NASAL at 14:19

## 2021-09-30 RX ADMIN — BUDESONIDE AND FORMOTEROL FUMARATE DIHYDRATE 1 PUFF: 160; 4.5 AEROSOL RESPIRATORY (INHALATION) at 08:45

## 2021-09-30 RX ADMIN — LEVALBUTEROL HYDROCHLORIDE 1.25 MG: 1.25 SOLUTION, CONCENTRATE RESPIRATORY (INHALATION) at 13:37

## 2021-10-13 ENCOUNTER — CONSULT (OUTPATIENT)
Dept: PULMONOLOGY | Facility: CLINIC | Age: 61
End: 2021-10-13
Payer: COMMERCIAL

## 2021-10-13 VITALS
TEMPERATURE: 97.3 F | DIASTOLIC BLOOD PRESSURE: 80 MMHG | HEART RATE: 76 BPM | HEIGHT: 63 IN | SYSTOLIC BLOOD PRESSURE: 134 MMHG | OXYGEN SATURATION: 96 % | BODY MASS INDEX: 43.16 KG/M2 | WEIGHT: 243.6 LBS

## 2021-10-13 DIAGNOSIS — R06.00 DYSPNEA, UNSPECIFIED TYPE: ICD-10-CM

## 2021-10-13 DIAGNOSIS — J30.89 NON-SEASONAL ALLERGIC RHINITIS, UNSPECIFIED TRIGGER: ICD-10-CM

## 2021-10-13 DIAGNOSIS — E66.01 MORBID OBESITY (HCC): ICD-10-CM

## 2021-10-13 DIAGNOSIS — R91.8 LUNG NODULES: ICD-10-CM

## 2021-10-13 DIAGNOSIS — Z23 ENCOUNTER FOR IMMUNIZATION: ICD-10-CM

## 2021-10-13 DIAGNOSIS — J44.9 CHRONIC OBSTRUCTIVE PULMONARY DISEASE, UNSPECIFIED COPD TYPE (HCC): Primary | ICD-10-CM

## 2021-10-13 DIAGNOSIS — G47.33 OSA (OBSTRUCTIVE SLEEP APNEA): ICD-10-CM

## 2021-10-13 DIAGNOSIS — F17.210 NICOTINE DEPENDENCE, CIGARETTES, UNCOMPLICATED: ICD-10-CM

## 2021-10-13 PROCEDURE — 1036F TOBACCO NON-USER: CPT | Performed by: INTERNAL MEDICINE

## 2021-10-13 PROCEDURE — 99205 OFFICE O/P NEW HI 60 MIN: CPT | Performed by: INTERNAL MEDICINE

## 2021-10-13 PROCEDURE — 90682 RIV4 VACC RECOMBINANT DNA IM: CPT

## 2021-10-13 PROCEDURE — G0008 ADMIN INFLUENZA VIRUS VAC: HCPCS

## 2021-10-14 ENCOUNTER — OFFICE VISIT (OUTPATIENT)
Dept: FAMILY MEDICINE CLINIC | Facility: CLINIC | Age: 61
End: 2021-10-14

## 2021-10-14 VITALS
RESPIRATION RATE: 18 BRPM | SYSTOLIC BLOOD PRESSURE: 140 MMHG | TEMPERATURE: 97 F | BODY MASS INDEX: 43.05 KG/M2 | OXYGEN SATURATION: 96 % | HEART RATE: 69 BPM | DIASTOLIC BLOOD PRESSURE: 80 MMHG | HEIGHT: 63 IN | WEIGHT: 243 LBS

## 2021-10-14 DIAGNOSIS — E66.01 CLASS 3 SEVERE OBESITY DUE TO EXCESS CALORIES WITHOUT SERIOUS COMORBIDITY WITH BODY MASS INDEX (BMI) OF 40.0 TO 44.9 IN ADULT (HCC): ICD-10-CM

## 2021-10-14 DIAGNOSIS — R91.8 LUNG NODULES: ICD-10-CM

## 2021-10-14 DIAGNOSIS — R03.0 ELEVATED BLOOD PRESSURE READING WITHOUT DIAGNOSIS OF HYPERTENSION: ICD-10-CM

## 2021-10-14 DIAGNOSIS — Z09 HOSPITAL DISCHARGE FOLLOW-UP: Primary | ICD-10-CM

## 2021-10-14 DIAGNOSIS — Z72.0 TOBACCO ABUSE: ICD-10-CM

## 2021-10-14 DIAGNOSIS — J44.9 CHRONIC OBSTRUCTIVE PULMONARY DISEASE, UNSPECIFIED COPD TYPE (HCC): ICD-10-CM

## 2021-10-14 PROCEDURE — 3077F SYST BP >= 140 MM HG: CPT | Performed by: FAMILY MEDICINE

## 2021-10-14 PROCEDURE — 99213 OFFICE O/P EST LOW 20 MIN: CPT | Performed by: FAMILY MEDICINE

## 2021-10-14 PROCEDURE — 1111F DSCHRG MED/CURRENT MED MERGE: CPT | Performed by: FAMILY MEDICINE

## 2021-10-14 PROCEDURE — 3079F DIAST BP 80-89 MM HG: CPT | Performed by: FAMILY MEDICINE

## 2021-10-19 ENCOUNTER — OFFICE VISIT (OUTPATIENT)
Dept: FAMILY MEDICINE CLINIC | Facility: CLINIC | Age: 61
End: 2021-10-19

## 2021-10-19 VITALS
SYSTOLIC BLOOD PRESSURE: 110 MMHG | OXYGEN SATURATION: 97 % | HEIGHT: 63 IN | WEIGHT: 244.4 LBS | HEART RATE: 62 BPM | BODY MASS INDEX: 43.3 KG/M2 | DIASTOLIC BLOOD PRESSURE: 78 MMHG | RESPIRATION RATE: 16 BRPM | TEMPERATURE: 98 F

## 2021-10-19 DIAGNOSIS — Z87.891 HISTORY OF TOBACCO ABUSE: Primary | ICD-10-CM

## 2021-10-19 DIAGNOSIS — M94.0 COSTOCHONDRITIS: ICD-10-CM

## 2021-10-19 DIAGNOSIS — E66.01 MORBID OBESITY WITH BMI OF 45.0-49.9, ADULT (HCC): ICD-10-CM

## 2021-10-19 DIAGNOSIS — J44.9 CHRONIC OBSTRUCTIVE PULMONARY DISEASE, UNSPECIFIED COPD TYPE (HCC): ICD-10-CM

## 2021-10-19 PROBLEM — R03.0 ELEVATED BLOOD PRESSURE READING WITHOUT DIAGNOSIS OF HYPERTENSION: Status: RESOLVED | Noted: 2020-09-27 | Resolved: 2021-10-19

## 2021-10-19 PROCEDURE — 3008F BODY MASS INDEX DOCD: CPT | Performed by: INTERNAL MEDICINE

## 2021-10-19 PROCEDURE — 99213 OFFICE O/P EST LOW 20 MIN: CPT | Performed by: INTERNAL MEDICINE

## 2021-10-19 PROCEDURE — 3078F DIAST BP <80 MM HG: CPT | Performed by: INTERNAL MEDICINE

## 2021-10-19 PROCEDURE — 1036F TOBACCO NON-USER: CPT | Performed by: INTERNAL MEDICINE

## 2021-10-19 PROCEDURE — 3074F SYST BP LT 130 MM HG: CPT | Performed by: INTERNAL MEDICINE

## 2021-12-02 ENCOUNTER — OFFICE VISIT (OUTPATIENT)
Dept: PULMONOLOGY | Facility: CLINIC | Age: 61
End: 2021-12-02
Payer: COMMERCIAL

## 2021-12-02 VITALS
RESPIRATION RATE: 18 BRPM | WEIGHT: 253.6 LBS | DIASTOLIC BLOOD PRESSURE: 60 MMHG | TEMPERATURE: 97.3 F | SYSTOLIC BLOOD PRESSURE: 110 MMHG | HEART RATE: 60 BPM | OXYGEN SATURATION: 99 % | HEIGHT: 63 IN | BODY MASS INDEX: 44.93 KG/M2

## 2021-12-02 DIAGNOSIS — G47.33 OSA (OBSTRUCTIVE SLEEP APNEA): ICD-10-CM

## 2021-12-02 DIAGNOSIS — J44.9 CHRONIC OBSTRUCTIVE PULMONARY DISEASE, UNSPECIFIED COPD TYPE (HCC): Primary | ICD-10-CM

## 2021-12-02 DIAGNOSIS — F17.211 NICOTINE DEPENDENCE, CIGARETTES, IN REMISSION: ICD-10-CM

## 2021-12-02 DIAGNOSIS — E66.01 MORBID OBESITY (HCC): ICD-10-CM

## 2021-12-02 DIAGNOSIS — J30.89 NON-SEASONAL ALLERGIC RHINITIS, UNSPECIFIED TRIGGER: ICD-10-CM

## 2021-12-02 PROCEDURE — 1036F TOBACCO NON-USER: CPT | Performed by: INTERNAL MEDICINE

## 2021-12-02 PROCEDURE — 99214 OFFICE O/P EST MOD 30 MIN: CPT | Performed by: INTERNAL MEDICINE

## 2021-12-20 ENCOUNTER — OFFICE VISIT (OUTPATIENT)
Dept: FAMILY MEDICINE CLINIC | Facility: CLINIC | Age: 61
End: 2021-12-20

## 2021-12-20 VITALS
WEIGHT: 253 LBS | BODY MASS INDEX: 44.83 KG/M2 | HEART RATE: 85 BPM | TEMPERATURE: 97.6 F | OXYGEN SATURATION: 98 % | RESPIRATION RATE: 18 BRPM | DIASTOLIC BLOOD PRESSURE: 74 MMHG | HEIGHT: 63 IN | SYSTOLIC BLOOD PRESSURE: 126 MMHG

## 2021-12-20 DIAGNOSIS — R73.03 PREDIABETES: Primary | ICD-10-CM

## 2021-12-20 DIAGNOSIS — E66.01 CLASS 3 SEVERE OBESITY DUE TO EXCESS CALORIES WITHOUT SERIOUS COMORBIDITY WITH BODY MASS INDEX (BMI) OF 40.0 TO 44.9 IN ADULT (HCC): ICD-10-CM

## 2021-12-20 DIAGNOSIS — R06.00 DYSPNEA, UNSPECIFIED TYPE: ICD-10-CM

## 2021-12-20 DIAGNOSIS — J45.909 UNCOMPLICATED ASTHMA, UNSPECIFIED ASTHMA SEVERITY, UNSPECIFIED WHETHER PERSISTENT: ICD-10-CM

## 2021-12-20 DIAGNOSIS — E78.5 HYPERLIPIDEMIA, UNSPECIFIED HYPERLIPIDEMIA TYPE: ICD-10-CM

## 2021-12-20 PROBLEM — R60.0 PEDAL EDEMA: Status: ACTIVE | Noted: 2021-12-20

## 2021-12-20 PROBLEM — Z00.00 ENCOUNTER FOR ANNUAL WELLNESS VISIT (AWV) IN MEDICARE PATIENT: Status: RESOLVED | Noted: 2021-08-06 | Resolved: 2021-12-20

## 2021-12-20 PROBLEM — E66.9 OBESITY: Status: RESOLVED | Noted: 2018-08-08 | Resolved: 2021-12-20

## 2021-12-20 LAB — SL AMB POCT HEMOGLOBIN AIC: 6.2 (ref ?–6.5)

## 2021-12-20 PROCEDURE — 99213 OFFICE O/P EST LOW 20 MIN: CPT | Performed by: INTERNAL MEDICINE

## 2021-12-20 PROCEDURE — 83036 HEMOGLOBIN GLYCOSYLATED A1C: CPT | Performed by: INTERNAL MEDICINE

## 2021-12-20 PROCEDURE — 3008F BODY MASS INDEX DOCD: CPT | Performed by: INTERNAL MEDICINE

## 2021-12-20 PROCEDURE — 3078F DIAST BP <80 MM HG: CPT | Performed by: INTERNAL MEDICINE

## 2021-12-20 PROCEDURE — 3074F SYST BP LT 130 MM HG: CPT | Performed by: INTERNAL MEDICINE

## 2021-12-20 PROCEDURE — 1036F TOBACCO NON-USER: CPT | Performed by: INTERNAL MEDICINE

## 2021-12-20 RX ORDER — GUAIFENESIN 600 MG
600 TABLET, EXTENDED RELEASE 12 HR ORAL 2 TIMES DAILY PRN
Qty: 60 TABLET | Refills: 0 | Status: SHIPPED | OUTPATIENT
Start: 2021-12-20

## 2021-12-20 RX ORDER — BUDESONIDE AND FORMOTEROL FUMARATE DIHYDRATE 160; 4.5 UG/1; UG/1
1 AEROSOL RESPIRATORY (INHALATION) 2 TIMES DAILY
Qty: 10.2 G | Refills: 3 | Status: SHIPPED | OUTPATIENT
Start: 2021-12-20 | End: 2022-03-31 | Stop reason: SDUPTHER

## 2021-12-20 RX ORDER — ATORVASTATIN CALCIUM 40 MG/1
40 TABLET, FILM COATED ORAL DAILY
Qty: 90 TABLET | Refills: 3 | Status: SHIPPED | OUTPATIENT
Start: 2021-12-20

## 2021-12-20 RX ORDER — ALBUTEROL SULFATE 90 UG/1
2 AEROSOL, METERED RESPIRATORY (INHALATION) EVERY 6 HOURS PRN
Qty: 18 G | Refills: 1 | Status: SHIPPED | OUTPATIENT
Start: 2021-12-20

## 2022-01-07 DIAGNOSIS — J30.9 ALLERGIC RHINITIS, UNSPECIFIED SEASONALITY, UNSPECIFIED TRIGGER: ICD-10-CM

## 2022-01-07 RX ORDER — MONTELUKAST SODIUM 10 MG/1
TABLET ORAL
Qty: 30 TABLET | Refills: 3 | Status: SHIPPED | OUTPATIENT
Start: 2022-01-07 | End: 2022-07-07

## 2022-01-11 ENCOUNTER — HOSPITAL ENCOUNTER (OUTPATIENT)
Dept: CT IMAGING | Facility: HOSPITAL | Age: 62
Discharge: HOME | End: 2022-01-11
Attending: INTERNAL MEDICINE
Payer: MEDICARE

## 2022-01-11 ENCOUNTER — HOSPITAL ENCOUNTER (OUTPATIENT)
Dept: PULMONOLOGY | Facility: HOSPITAL | Age: 62
Discharge: HOME | End: 2022-01-11
Attending: INTERNAL MEDICINE
Payer: MEDICARE

## 2022-01-11 DIAGNOSIS — J44.9 CHRONIC OBSTRUCTIVE PULMONARY DISEASE, UNSPECIFIED COPD TYPE (HCC): ICD-10-CM

## 2022-01-11 DIAGNOSIS — F17.211 NICOTINE DEPENDENCE, CIGARETTES, IN REMISSION: ICD-10-CM

## 2022-01-11 PROCEDURE — 71271 CT THORAX LUNG CANCER SCR C-: CPT

## 2022-01-11 PROCEDURE — 94060 EVALUATION OF WHEEZING: CPT

## 2022-01-11 PROCEDURE — 94729 DIFFUSING CAPACITY: CPT

## 2022-01-11 PROCEDURE — 94060 EVALUATION OF WHEEZING: CPT | Performed by: INTERNAL MEDICINE

## 2022-01-11 PROCEDURE — 94760 N-INVAS EAR/PLS OXIMETRY 1: CPT

## 2022-01-11 PROCEDURE — 94726 PLETHYSMOGRAPHY LUNG VOLUMES: CPT | Performed by: INTERNAL MEDICINE

## 2022-01-11 PROCEDURE — 94729 DIFFUSING CAPACITY: CPT | Performed by: INTERNAL MEDICINE

## 2022-01-11 PROCEDURE — 94726 PLETHYSMOGRAPHY LUNG VOLUMES: CPT

## 2022-01-11 RX ORDER — ALBUTEROL SULFATE 2.5 MG/3ML
2.5 SOLUTION RESPIRATORY (INHALATION) ONCE AS NEEDED
Status: COMPLETED | OUTPATIENT
Start: 2022-01-11 | End: 2022-01-11

## 2022-01-11 RX ADMIN — ALBUTEROL SULFATE 2.5 MG: 2.5 SOLUTION RESPIRATORY (INHALATION) at 14:29

## 2022-01-12 DIAGNOSIS — R05.9 COUGH: Primary | ICD-10-CM

## 2022-02-06 ENCOUNTER — HOSPITAL ENCOUNTER (EMERGENCY)
Facility: HOSPITAL | Age: 62
Discharge: HOME | End: 2022-02-06
Attending: EMERGENCY MEDICINE
Payer: MEDICARE

## 2022-02-06 ENCOUNTER — APPOINTMENT (EMERGENCY)
Dept: CT IMAGING | Facility: HOSPITAL | Age: 62
End: 2022-02-06
Payer: MEDICARE

## 2022-02-06 VITALS
OXYGEN SATURATION: 97 % | WEIGHT: 257 LBS | SYSTOLIC BLOOD PRESSURE: 145 MMHG | TEMPERATURE: 97.7 F | HEART RATE: 60 BPM | RESPIRATION RATE: 18 BRPM | DIASTOLIC BLOOD PRESSURE: 57 MMHG | BODY MASS INDEX: 45.53 KG/M2

## 2022-02-06 DIAGNOSIS — M54.50 ACUTE LOW BACK PAIN: Primary | ICD-10-CM

## 2022-02-06 LAB
BACTERIA UR QL AUTO: ABNORMAL /HPF
BILIRUB UR QL STRIP: NEGATIVE
CLARITY UR: CLEAR
COLOR UR: ABNORMAL
GLUCOSE UR STRIP-MCNC: NEGATIVE MG/DL
HGB UR QL STRIP.AUTO: 10
KETONES UR STRIP-MCNC: NEGATIVE MG/DL
LEUKOCYTE ESTERASE UR QL STRIP: NEGATIVE
NITRITE UR QL STRIP: NEGATIVE
NON-SQ EPI CELLS URNS QL MICRO: ABNORMAL /HPF
PH UR STRIP.AUTO: 6.5 [PH]
PROT UR STRIP-MCNC: NEGATIVE MG/DL
RBC #/AREA URNS AUTO: ABNORMAL /HPF
SP GR UR STRIP.AUTO: 1.01 (ref 1–1.04)
UROBILINOGEN UA: NEGATIVE MG/DL
WBC #/AREA URNS AUTO: ABNORMAL /HPF

## 2022-02-06 PROCEDURE — 99282 EMERGENCY DEPT VISIT SF MDM: CPT

## 2022-02-06 PROCEDURE — 81001 URINALYSIS AUTO W/SCOPE: CPT

## 2022-02-06 PROCEDURE — 72131 CT LUMBAR SPINE W/O DYE: CPT

## 2022-02-06 PROCEDURE — G1004 CDSM NDSC: HCPCS

## 2022-02-06 PROCEDURE — 81003 URINALYSIS AUTO W/O SCOPE: CPT

## 2022-02-06 PROCEDURE — 99284 EMERGENCY DEPT VISIT MOD MDM: CPT

## 2022-02-06 RX ORDER — ACETAMINOPHEN 325 MG/1
650 TABLET ORAL ONCE
Status: COMPLETED | OUTPATIENT
Start: 2022-02-06 | End: 2022-02-06

## 2022-02-06 RX ORDER — LIDOCAINE 50 MG/G
1 PATCH TOPICAL ONCE
Status: DISCONTINUED | OUTPATIENT
Start: 2022-02-06 | End: 2022-02-06 | Stop reason: HOSPADM

## 2022-02-06 RX ORDER — SENNOSIDES 8.6 MG
650 CAPSULE ORAL EVERY 8 HOURS PRN
Qty: 30 TABLET | Refills: 0 | Status: SHIPPED | OUTPATIENT
Start: 2022-02-06

## 2022-02-06 RX ADMIN — ACETAMINOPHEN 650 MG: 325 TABLET ORAL at 11:28

## 2022-02-06 RX ADMIN — LIDOCAINE 1 PATCH: 50 PATCH TOPICAL at 11:28

## 2022-02-06 NOTE — ED PROVIDER NOTES
History  Chief Complaint   Patient presents with    Back Pain     lower back  since Thursday  No fall     59 y o  F with PMH of anemia, HTN, CA, COPD,  DM, depression, essential tremor, GERD presents to ED c/o of lower back pain x 3 days  She reports hx of back pain in a similar area 5+ years ago and injections at that time, none since  History provided by:  Patient   used: No    Back Pain  Location:  Lumbar spine and gluteal region  Quality:  Unable to specify  Radiates to:  Does not radiate  Pain severity:  Severe  Pain is:  Same all the time  Onset quality:  Sudden (reports she woke up with the pain on Thursday )  Duration:  3 days  Timing:  Constant  Progression:  Unchanged  Chronicity:  New  Context: not falling, not jumping from heights, not lifting heavy objects, not MVA, not pedestrian accident, not physical stress, not recent illness, not recent injury and not twisting    Relieved by:  Nothing  Worsened by:  Ambulation, bending, movement, sitting, touching, palpation and twisting  Ineffective treatments:  OTC medications and being still  Associated symptoms: no abdominal pain, no abdominal swelling, no bladder incontinence, no bowel incontinence, no chest pain, no dysuria, no fever, no headaches, no leg pain, no numbness, no paresthesias, no pelvic pain, no perianal numbness, no tingling, no weakness and no weight loss    Risk factors: hx of cancer (reports uterine cancer in her 25s, denies radiation, chemo, or having to follow with oncology )    Risk factors: no recent surgery, no steroid use and no vascular disease        Prior to Admission Medications   Prescriptions Last Dose Informant Patient Reported?  Taking?   acetaminophen (TYLENOL) 650 mg CR tablet  Self No No   Sig: Take 1 tablet (650 mg total) by mouth every 8 (eight) hours as needed for mild pain   albuterol (Ventolin HFA) 90 mcg/act inhaler   No No   Sig: Inhale 2 puffs every 6 (six) hours as needed for wheezing aspirin (Aspirin Low Dose) 81 mg EC tablet  Self No No   Sig: Take 1 tablet (81 mg total) by mouth daily   atorvastatin (LIPITOR) 40 mg tablet   No No   Sig: Take 1 tablet (40 mg total) by mouth daily   budesonide-formoterol (SYMBICORT) 160-4 5 mcg/act inhaler   No No   Sig: Inhale 1 puff 2 (two) times a day Rinse mouth after use     cetirizine (ZyrTEC) 10 mg tablet  Self Yes No   Sig: Take 10 mg by mouth daily   cholecalciferol (VITAMIN D3) 1,000 units tablet  Self No No   Sig: Take 1 tablet (1,000 Units total) by mouth daily   fluticasone (FLONASE) 50 mcg/act nasal spray  Self No No   Si spray into each nostril daily   gabapentin (NEURONTIN) 100 mg capsule  Self No No   Sig: Take 1 tablet 3 times a day, and 1 as needed   guaiFENesin (MUCINEX) 600 mg 12 hr tablet   No No   Sig: Take 1 tablet (600 mg total) by mouth 2 (two) times a day as needed for cough   montelukast (SINGULAIR) 10 mg tablet   No No   Sig: take 1 tablet by mouth at bedtime   nicotine (NICODERM CQ) 7 mg/24hr TD 24 hr patch  Self No No   Sig: Place 1 patch on the skin every 24 hours   nystatin (MYCOSTATIN) powder  Self No No   Sig: Apply topically 3 (three) times a day   propranolol (INDERAL) 40 mg tablet  Self No No   Sig: Take 1 tablet (40 mg total) by mouth every 12 (twelve) hours      Facility-Administered Medications: None       Past Medical History:   Diagnosis Date    Allergic rhinitis     Anemia     Anxiety     Arthritis     Back pain     Cancer ([facility]     Chronic pain disorder     back    COPD (chronic obstructive pulmonary disease) ([facility])     CPAP (continuous positive airway pressure) dependence     Depression     Diabetes mellitus (HCC)     Pre- Diabetic    Dyslipidemia     Essential tremor     Excessive daytime sleepiness     GERD (gastroesophageal reflux disease)     History of uterine cancer     Hyperglycemia     Hyperlipidemia     Hypertension     Hypovitaminosis D     Iron deficiency anemia     Joint pain  Mood disorder (HCC)     Obesity     Obstructive sleep apnea     Ringing in ears     RLS (restless legs syndrome)     Snoring     Uterine cancer (HCC)     age 20   [facility] Vitamin D deficiency     Witnessed episode of apnea        Past Surgical History:   Procedure Laterality Date    APPENDECTOMY      HYSTERECTOMY      age 20    KNEE ARTHROSCOPY      KNEE SURGERY      Meniscus tear    TONSILLECTOMY      TUBAL LIGATION         Family History   Problem Relation Age of Onset    Diabetes Mother     Asthma Mother     Hypertension Mother     Heart disease Mother     No Known Problems Father     No Known Problems Sister     No Known Problems Daughter     No Known Problems Maternal Grandmother     No Known Problems Paternal Grandmother     No Known Problems Sister     No Known Problems Sister     No Known Problems Daughter     No Known Problems Maternal Grandfather     No Known Problems Paternal Grandfather      I have reviewed and agree with the history as documented  E-Cigarette/Vaping    E-Cigarette Use Never User      E-Cigarette/Vaping Substances    Nicotine No     THC No     CBD No     Flavoring No     Other No     Unknown No      Social History     Tobacco Use    Smoking status: Former Smoker     Packs/day: 1 50     Years: 47 00     Pack years: 70 50     Types: Cigarettes     Start date:      Quit date: 2021     Years since quittin 3    Smokeless tobacco: Never Used   Vaping Use    Vaping Use: Never used   Substance Use Topics    Alcohol use: Not Currently     Comment: very seldom    Drug use: Not Currently     Comment: pt use at age [age]       Review of Systems   Constitutional: Negative for chills, fever and weight loss  HENT: Negative for ear pain and sore throat  Eyes: Negative for pain and visual disturbance  Respiratory: Negative for cough and shortness of breath  Cardiovascular: Negative for chest pain and palpitations     Gastrointestinal: Negative for abdominal pain, bowel incontinence and vomiting  Genitourinary: Negative for bladder incontinence, decreased urine volume, dysuria, flank pain, frequency, hematuria, pelvic pain, vaginal bleeding and vaginal discharge  Musculoskeletal: Positive for back pain  Negative for arthralgias, gait problem, joint swelling, neck pain and neck stiffness  Skin: Negative for color change and rash  Neurological: Negative for tingling, seizures, syncope, weakness, numbness, headaches and paresthesias  All other systems reviewed and are negative  Physical Exam  Physical Exam  Vitals and nursing note reviewed  Constitutional:       General: She is awake  Appearance: Normal appearance  She is well-developed and well-groomed  She is not ill-appearing, toxic-appearing or diaphoretic  Comments: Patient crying  HENT:      Head: Normocephalic and atraumatic  Jaw: No swelling  Right Ear: External ear normal       Left Ear: External ear normal       Mouth/Throat:      Lips: Pink  Mouth: Mucous membranes are moist    Eyes:      General: Lids are normal  Vision grossly intact  Right eye: No discharge  Left eye: No discharge  Conjunctiva/sclera: Conjunctivae normal       Pupils: Pupils are equal, round, and reactive to light  Cardiovascular:      Rate and Rhythm: Normal rate and regular rhythm  Heart sounds: Normal heart sounds  Pulmonary:      Effort: Pulmonary effort is normal  No respiratory distress  Breath sounds: Normal breath sounds  Abdominal:      General: Abdomen is flat  There is no distension  Palpations: Abdomen is soft  Tenderness: There is no abdominal tenderness  There is no right CVA tenderness, left CVA tenderness, guarding or rebound  Musculoskeletal:      Cervical back: Normal, normal range of motion and neck supple  No tenderness  Thoracic back: Normal       Lumbar back: Tenderness and bony tenderness present   No swelling, edema, deformity, signs of trauma or spasms  Decreased range of motion (secondary to pain )  Negative right straight leg raise test and negative left straight leg raise test         Back:       Right hip: Normal       Left hip: Normal       Right lower leg: No edema  Left lower leg: No edema  Comments: No deformities, crepitus, step-off, skin changes noted to the area of pain  Skin:     General: Skin is warm and dry  Capillary Refill: Capillary refill takes less than 2 seconds  Coloration: Skin is not jaundiced or pale  Findings: No bruising, erythema, lesion or rash  Neurological:      General: No focal deficit present  Mental Status: She is alert  Sensory: No sensory deficit  Motor: No weakness  Coordination: Coordination normal       Gait: Gait normal       Deep Tendon Reflexes: Reflexes normal    Psychiatric:         Mood and Affect: Mood normal          Behavior: Behavior normal  Behavior is cooperative  Thought Content:  Thought content normal          Vital Signs  ED Triage Vitals   Temperature Pulse Respirations Blood Pressure SpO2   02/06/22 1053 02/06/22 1053 02/06/22 1053 02/06/22 1053 02/06/22 1053   97 7 °F (36 5 °C) 60 18 145/57 97 %      Temp Source Heart Rate Source Patient Position - Orthostatic VS BP Location FiO2 (%)   02/06/22 1053 -- -- -- --   Oral          Pain Score       02/06/22 1256       5           Vitals:    02/06/22 1053   BP: 145/57   Pulse: 60         Visual Acuity      ED Medications  Medications   acetaminophen (TYLENOL) tablet 650 mg (650 mg Oral Given 2/6/22 1128)       Diagnostic Studies  Results Reviewed     Procedure Component Value Units Date/Time    Urine Microscopic [[id number]]  (Abnormal) Collected: 02/06/22 1137    Lab Status: Final result Specimen: Urine, Clean Catch Updated: 02/06/22 1219     RBC, UA 4-10 /hpf      WBC, UA 2-4 /hpf      Epithelial Cells Occasional /hpf      Bacteria, UA Moderate /hpf UA w Reflex to Microscopic w Reflex to Culture [[id number]]  (Abnormal) Collected: 02/06/22 1137    Lab Status: Final result Specimen: Urine, Clean Catch Updated: 02/06/22 1148     Color, UA Straw     Clarity, UA Clear     Specific Gravity, UA 1 010     pH, UA 6 5     Leukocytes, UA Negative     Nitrite, UA Negative     Protein, UA Negative mg/dl      Glucose, UA Negative mg/dl      Ketones, UA Negative mg/dl      Bilirubin, UA Negative     Blood, UA 10 0     UROBILINOGEN UA Negative mg/dL                  CT spine lumbar without contrast   Final Result by Mariella Denson DO (02/06 1231)   Progressive degenerative changes of the lumbar spine, most pronounced L4-L5 and L5-S1  If symptoms warrant, consider follow-up neurology consultation and/or outpatient MRI of the lumbar spine  Workstation performed: [id number]                    Procedures  Procedures         ED Course  ED Course as of 02/06/22 1854   Sun Feb 06, 2022   1122 Most recent eGFR and Cr WNL  46 Pt reported urinary frequency to RN, UA added    1143 Lidoderm patch removed due to itching                                SBIRT 22yo+      Most Recent Value   SBIRT (24 yo +)    In order to provide better care to our patients, we are screening all of our patients for alcohol and drug use  Would it be okay to ask you these screening questions? No Filed at: 02/06/2022 1107                    [facility]  Number of Diagnoses or Management Options  Acute low back pain  Diagnosis management comments: Patient is experiencing acute lower back pain present for <6 weeks  Midline lower back without radiation to flank or abdomen or chest   The patient has no symptoms of new extremity weakness or paresthesia, urinary retention or incontinence, fecal incontinence, saddle anesthesia or paresthesia, unintentional weight loss, unexplained fevers/chills or night sweats, recent trauma, persistent vertigo present at rest, or truncal ataxia   The patient has no history of  osteoporosis, chronic corticosteroid use, immunosuppression, recent spinal procedures or IVDA  There were no unexplained abnormal vital signs or new neurologic deficits on physical exam  However dur to midline tenderness, and reported PMH of CA (in remission) discussed with attending, will do CT lumbar spine  Patient has allergy to aspirin, says it causes GI upset, reports PO NSAIDs also cause GI upset, did not want parenteral toradol due to fear of GI upset  Patient given lidoderm patch, removed due to itchiness, no skin changes or allergic signs or symptoms noted  Some improvement in pain with patch, tylenol  Will discharge with voltaren gel, tylenol  Degenerative changes noted on CT  Outpatient follow up advised  All results shared with patient in AVS  Results also explained to patient  Ambulatory referral comprehensive spine given  Neurology f/u also given  PVR WNL  Patient originally denied urinary symptoms, later told RN that she has been having urinary frequency for an unknown amount of time  Hematuria noted in UA, discussed with attending will not treat for UTI based on results  Culture was sent  Patient to follow up with PCP, urology as needed regarding symptoms, hematuria  All imaging and/or lab testing discussed with patient, strict return to ED precautions discussed  Patient recommended to follow up promptly with appropriate outpatient provider  Patient and/or family members verbalizes understanding and agrees with plan  Patient and/or family members were given opportunity to ask questions, all questions were answered at this time  Patient is stable for discharge      Portions of the record may have been created with voice recognition software  Occasional wrong word or "sound a like" substitutions may have occurred due to the inherent limitations of voice recognition software  Read the chart carefully and recognize, using context, where substitutions have occurred            Amount and/or Complexity of Data Reviewed  Clinical lab tests: ordered and reviewed  Tests in the radiology section of CPT®: ordered and reviewed  Discuss the patient with other providers: yes (Dr Will Gillette )        Disposition  Final diagnoses:   Acute low back pain     Time reflects when diagnosis was documented in both MDM as applicable and the Disposition within this note     Time User Action Codes Description Comment    2/6/2022 12:42 PM Curtis Has Add [M54 50] Acute low back pain       ED Disposition     ED Disposition Condition Date/Time Comment    Discharge Stable Sun Feb 6, 2022 12:55 PM Kassandra Woodson discharge to home/self care  Follow-up Information     Follow up With Specialties Details Why Contact Info Additional [facility] Neurology [facility] Neurology  As needed [address] [address]  [address] [facility] Neurology [facility], [address], [address]    [facility] For Urology [facility] Urology Schedule an appointment as soon as possible for a visit  hematuria [address] [phone]  [phone]  [facility] For Urology [facility], [address], [address],  [facility]          Discharge Medication List as of 2/6/2022 12:59 PM      START taking these medications    Details   !! acetaminophen (TYLENOL) 650 mg CR tablet Take 1 tablet (650 mg total) by mouth every 8 (eight) hours as needed for mild pain, Starting Sun 2/6/2022, Normal      Diclofenac Sodium (VOLTAREN) 1 % Apply 2 g topically 4 (four) times a day, Starting Sun 2/6/2022, Normal       !! - Potential duplicate medications found  Please discuss with provider        CONTINUE these medications which have NOT CHANGED    Details   !! acetaminophen (TYLENOL) 650 mg CR tablet Take 1 tablet (650 mg total) by mouth every 8 (eight) hours as needed for mild pain, Starting Tue 12/15/2020, Normal      albuterol (Ventolin HFA) 90 mcg/act inhaler Inhale 2 puffs every 6 (six) hours as needed for wheezing, Starting Mon 12/20/2021, Normal      aspirin (Aspirin Low Dose) 81 mg EC tablet Take 1 tablet (81 mg total) by mouth daily, Starting Tue 12/15/2020, Normal      atorvastatin (LIPITOR) 40 mg tablet Take 1 tablet (40 mg total) by mouth daily, Starting Mon 12/20/2021, Normal      budesonide-formoterol (SYMBICORT) 160-4 5 mcg/act inhaler Inhale 1 puff 2 (two) times a day Rinse mouth after use , Starting Mon 12/20/2021, Normal      cetirizine (ZyrTEC) 10 mg tablet Take 10 mg by mouth daily, Historical Med      cholecalciferol (VITAMIN D3) 1,000 units tablet Take 1 tablet (1,000 Units total) by mouth daily, Starting Tue 12/15/2020, Normal      fluticasone (FLONASE) 50 mcg/act nasal spray 1 spray into each nostril daily, Starting Tue 12/15/2020, Normal      gabapentin (NEURONTIN) 100 mg capsule Take 1 tablet 3 times a day, and 1 as needed, Normal      guaiFENesin (MUCINEX) 600 mg 12 hr tablet Take 1 tablet (600 mg total) by mouth 2 (two) times a day as needed for cough, Starting Mon 12/20/2021, Normal      montelukast (SINGULAIR) 10 mg tablet take 1 tablet by mouth at bedtime, Normal      nicotine (NICODERM CQ) 7 mg/24hr TD 24 hr patch Place 1 patch on the skin every 24 hours, Starting Thu 10/14/2021, Normal      nystatin (MYCOSTATIN) powder Apply topically 3 (three) times a day, Starting Tue 4/20/2021, Normal      propranolol (INDERAL) 40 mg tablet Take 1 tablet (40 mg total) by mouth every 12 (twelve) hours, Starting Tue 12/15/2020, Normal       !! - Potential duplicate medications found  Please discuss with provider                PDMP Review     None          ED Provider  Electronically Signed by           You Vela PA-C  02/06/22 4304

## 2022-02-06 NOTE — DISCHARGE INSTRUCTIONS
Comprehensive spine should call you tomorrow, if not call the number listed  Take tylenol, use gel as needed for pain  Use heating pad  Return to ED for new or worsening symptoms as discussed  We will call you if urine culture results are positive  Follow up with urology concerning blood in urine  Below are the findings from your CT:  FINDINGS:  ALIGNMENT:  There are 5 lumbar type vertebral bodies  Normal alignment of the lumbar spine  Stable grade 1 anterolisthesis L4 on L5  VERTEBRAL BODIES:  No fracture  No lytic or blastic lesion  DEGENERATIVE CHANGES:  Lower Thoracic spine:    T11-T12: Disc space narrowing  Vacuum disc  T12-L1: Normal            L1-2:  Normal disc height  No herniation  Normal facet joints  No canal or foraminal stenosis  L2-3:  Normal disc height  No herniation  Normal facet joints  No canal or foraminal stenosis  L3-4:  Mild disc bulge  Mild facet hypertrophic changes bilaterally as well as ligamentum flavum hypertrophy  L4-5:  Mild disc space narrowing  Mild diffuse disc bulge with superimposed right far lateral disc herniation  This abuts the exiting right L4 nerve root  Facet hypertrophic changes bilaterally as well as ligamentum flavum hypertrophy  Air present in   the facet joints bilaterally  Small focus of air in the inferior aspect of the left neural foramen likely due to air within a synovial cyst of the left facet joint  Moderate central stenosis, mild right-sided neural foraminal narrowing and moderate   bilateral lateral recess narrowing, left side greater than right  Facet hypertrophic changes encroach upon the bilateral L5 nerve roots in the lateral recesses, left side greater than right  L5-S1:  Moderate disc space narrowing  Vacuum disc  Diffuse disc bulge with superimposed central, left paramedian disc herniation, extending below the intervertebral disc space    This abuts the bilateral S1 nerve roots in the lateral recesses, left   side greater than right  Facet hypertrophic changes bilaterally, left side greater than right  Mild central stenosis, mild bilateral neural foraminal narrowing and mild bilateral lateral recess narrowing, left side greater than right  PARASPINAL SOFT TISSUES:   Normal               IMPRESSION:  Progressive degenerative changes of the lumbar spine, most pronounced L4-L5 and L5-S1  If symptoms warrant, consider follow-up neurology consultation and/or outpatient MRI of the lumbar spine

## 2022-02-06 NOTE — Clinical Note
Percy Adams was seen and treated in our emergency department on 2/6/2022  Diagnosis:     Brittany Leaks    She may return on this date: 02/09/2022         If you have any questions or concerns, please don't hesitate to call        Kathy Gaspar PA-C    ______________________________           _______________          _______________  Hospital Representative                              Date                                Time

## 2022-02-07 ENCOUNTER — NURSE TRIAGE (OUTPATIENT)
Dept: PHYSICAL THERAPY | Facility: OTHER | Age: 62
End: 2022-02-07

## 2022-02-07 DIAGNOSIS — M54.50 ACUTE MIDLINE LOW BACK PAIN, UNSPECIFIED WHETHER SCIATICA PRESENT: Primary | ICD-10-CM

## 2022-02-07 NOTE — TELEPHONE ENCOUNTER
Additional Information   Negative: Is this related to a work injury?  Negative: Is this related to an MVA?  Negative: Are you currently recieving homecare services?  Negative: Has the patient had unexplained weight loss?  Negative: Does the patient have a fever?  Negative: Is the patient experiencing blood in sputum?  Negative: Has the patient experienced major trauma? (fall from height, high speed collision, direct blow to spine) and is also experiencing nausea, light-headedness, or loss of consciousness?  Negative: Is the patient experiencing urine retention?  Negative: Is the patient experiencing acute drop foot or paralysis?  Negative: Is this a chronic condition? Background - Initial Assessment  Clinical complaint: midline low back pain- denies any radiation to LE  Date of onset: Thursday 2/3/22- NKI  Frequency of pain: intermittent  Quality of pain: shooting    Protocols used: SL AMB COMPREHENSIVE SPINE PROGRAM PROTOCOL    Patient seen in ED yesterday- PLEASE SEE NOTES  Nurse reviewed the program with her and triaged for above complaints  NO RF s/s present  Referral entered for the [facility] site and contact info given to her as well  Nurse also offered a call from the [facility] counselor d/t SBT score  Patient declined  Patient was pleasant and appropriate during this encounter  Patient appreciative of call and triage  Nurse wished her well and referral closed

## 2022-02-22 ENCOUNTER — EVALUATION (OUTPATIENT)
Dept: PHYSICAL THERAPY | Facility: CLINIC | Age: 62
End: 2022-02-22
Payer: MEDICARE

## 2022-02-22 VITALS — TEMPERATURE: 98.6 F | DIASTOLIC BLOOD PRESSURE: 94 MMHG | SYSTOLIC BLOOD PRESSURE: 148 MMHG | HEART RATE: 65 BPM

## 2022-02-22 DIAGNOSIS — M54.50 ACUTE MIDLINE LOW BACK PAIN, UNSPECIFIED WHETHER SCIATICA PRESENT: ICD-10-CM

## 2022-02-22 PROCEDURE — 97162 PT EVAL MOD COMPLEX 30 MIN: CPT | Performed by: PHYSICAL THERAPIST

## 2022-02-22 PROCEDURE — 97110 THERAPEUTIC EXERCISES: CPT | Performed by: PHYSICAL THERAPIST

## 2022-02-22 NOTE — PROGRESS NOTES
PT Evaluation     Today's date: 2022  Patient name: Fiona Rowe  :   MRN: [id number]  Referring provider: Rayshawn Mendoza PT  Dx:   Encounter Diagnosis     ICD-10-CM    1  Acute midline low back pain, unspecified whether sciatica present  M54 50 Ambulatory referral to PT spine                  Assessment  Assessment details: Pt is a 59y o  year old female presenting to physical therapy for Acute midline low back pain that began about one month ago  She presents via comp spine program and is high risk based on Start Back Assessment tool  She presents with the following impairments: lumbar ROM deficits, LE weakness b/l, hypomobility in lumbar spine, TTP along sacrum and lumbar vertebrae, as well as hamstring flexibility deficits and + slump and pSLR worse on L LE affecting her function with walking and standing long periods, sitting, sleeping, lifting, vacuuming, and navigating stairs  She will benefit from mobility based program and core stability training  Pt will benefit from skilled physical therapy to address functional limitations noted in evaluation and meet patient goals  Impairments: abnormal muscle firing, abnormal or restricted ROM, activity intolerance, impaired physical strength, lacks appropriate home exercise program, pain with function, poor posture  and poor body mechanics    Symptom irritability: moderate  Goals  ST  Pt will reduce pain to 1/10 at rest   2  Pt will improve TA activation to good  LT  Pt will improve lumbar flexion to min deficits for improved forward bending w/o pain  2  Pt will improve b/l hip flexion to 4+/5 for improved squatting ability  3  Pt will be I w HEP  4  Pt will walk for 10+ minutes w/o pain for improved community ambulation      Plan  Patient would benefit from: PT eval and skilled physical therapy  Planned modality interventions: biofeedback, cryotherapy, electrical stimulation/Russian stimulation, TENS, thermotherapy: hydrocollator packs and unattended electrical stimulation  Planned therapy interventions: abdominal trunk stabilization, joint mobilization, manual therapy, balance, neuromuscular re-education, patient education, strengthening, stretching, therapeutic activities, therapeutic exercise, flexibility, functional ROM exercises, gait training, home exercise program and body mechanics training  Frequency: 2x week  Duration in weeks: 8  Treatment plan discussed with: patient        Subjective Evaluation    History of Present Illness  Mechanism of injury: Pt reports onset of low back pain about 1vmonth ago that sent her into the ER  She had a CT scan indicating damage to L4-L5  She received some medication, but has not noticed any relief  She notices increased pain with rainy weather  She has pain with sitting long periods, walking, standing long periods, vacuuming, going up stairs, and lifting  Pain gets worse towards the end of the day  She reports occasional numbness and tingling in both of her legs, but currently has none  Denies night pain, but occasional has hip soreness lying on her L side  She would like to improve her walking so she can walk further distances without her pain limiting her  Recurrent probem    Pain  Current pain rating: 3  At worst pain rating: 10      Diagnostic Tests  CT scan: abnormal        Objective     Palpation   Left   Tenderness of the erector spinae, lumbar paraspinals and quadratus lumborum  Right   Tenderness of the erector spinae, lumbar paraspinals and quadratus lumborum       Active Range of Motion     Lumbar   Flexion:  with pain Restriction level: moderate  Extension:  with pain Restriction level: maximal  Left lateral flexion:  WFL  Right lateral flexion:  WFL  Left rotation:  with pain Restriction level: moderate  Right rotation:  Restriction level: minimal    Additional Active Range of Motion Details  joellen's sign returning from flexion    Joint Play   Joints within functional limits: T12, L1, L2 and L3     Hypomobile: T8, T9, T10, T11, L4, L5 and S1     Pain: T8, T9, T10, T11, L4, L5 and S1     Strength/Myotome Testing     Lumbar   Left   Heel walk: normal  Toe walk: normal    Right   Heel walk: normal  Toe walk: normal    Left Hip   Planes of Motion   Flexion: 4-    Right Hip   Planes of Motion   Flexion: 4-    Left Knee   Flexion: 4  Extension: 4+    Right Knee   Flexion: 4  Extension: 4+    Additional Strength Details  Pt has limited squat depth due to pain and poor form  Muscle Activation   Patient unable to activate left transverse abdominals, left multifidus, right transverse abdominals and right multifidus  Additional Muscle Activation Details  Poor activation    Tests     Lumbar     Left   Positive passive SLR and slump test      Right   Positive passive SLR  Negative slump test      Ambulation     Observational Gait   Gait: antalgic and asymmetric   Decreased walking speed and stride length                Precautions: High Risk, (/94)    Date 2/22            Visit # IE            FOTO 48/57             Re-eval IE              Manuals 2/22                                                                Neuro Re-Ed 2/22            TA bracing 5x10" seated            Clams             SB TA U S  [facility]             [place]                                       Ther Ex 2/22            Bike             TM             Hamstring Str             Leg Press             Hip 3 way                                                    Ther Activity 2/22            Marching 10x            Squats 8x            Steps                                       Gait Training                                       Modalities

## 2022-02-25 ENCOUNTER — APPOINTMENT (OUTPATIENT)
Dept: PHYSICAL THERAPY | Facility: CLINIC | Age: 62
End: 2022-02-25
Payer: MEDICARE

## 2022-03-01 ENCOUNTER — OFFICE VISIT (OUTPATIENT)
Dept: PHYSICAL THERAPY | Facility: CLINIC | Age: 62
End: 2022-03-01
Payer: MEDICARE

## 2022-03-01 DIAGNOSIS — M54.50 ACUTE MIDLINE LOW BACK PAIN, UNSPECIFIED WHETHER SCIATICA PRESENT: Primary | ICD-10-CM

## 2022-03-01 PROCEDURE — 97112 NEUROMUSCULAR REEDUCATION: CPT

## 2022-03-01 PROCEDURE — 97110 THERAPEUTIC EXERCISES: CPT

## 2022-03-01 NOTE — PROGRESS NOTES
Daily Note     Today's date: 3/1/2022  Patient name: Arjun Moseley  : [id number]  MRN: [id number]  Referring provider: Anayeli Majano, PT  Dx:   Encounter Diagnosis     ICD-10-CM    1  Acute midline low back pain, unspecified whether sciatica present  M54 50        Start Time: 1525  Stop Time: 1615  Total time in clinic (min): 50 minutes    Subjective: Pt reports she is feeling ok today, states she has been trying to do her HEP, but has increased pain with laying on her back  Pt states she has not been able to do those exercises in supine  Pt reports she has some pain in her knee today  Objective: See treatment diary below      Assessment: Tolerated treatment well  Patient demonstrated fatigue post treatment and would benefit from continued PT  Pt performed exercises as noted with no signs of increased pain or adverse symptoms  Pt shows minimal reproduction of symptoms upon standing up post Tx with increased LBP, which subsided within minutes  Pt will benefit from further skilled PT to increase strength, flexibility and function  Continue to progress as able  Plan: Continue per plan of care        Precautions: High Risk, (/94)    Date 2/22 3/1           Visit # IE 2           FOTO 48/57             Re-eval IE              Manuals 2/22 3/1                                                               Neuro Re-Ed 2/22 3/1           TA bracing 5x10" seated 10x10" seated            Clams             SB TA Press             Xockets             Bridges  np                                     Ther Ex 2/22 3/1           Bike  6'           TM  np           Hamstring Str  seated 3x30"           Leg Press  nv           Hip 3 way  10x ea           Calf stretch  Seated 3x30"                                     Ther Activity 2/22 3/1           Marching 10x 10x           Squats 8x 10x           Steps  nv                                     Gait Training  3/1 Modalities  3/1

## 2022-03-03 ENCOUNTER — OFFICE VISIT (OUTPATIENT)
Dept: PHYSICAL THERAPY | Facility: CLINIC | Age: 62
End: 2022-03-03
Payer: MEDICARE

## 2022-03-03 DIAGNOSIS — M54.50 ACUTE MIDLINE LOW BACK PAIN, UNSPECIFIED WHETHER SCIATICA PRESENT: Primary | ICD-10-CM

## 2022-03-03 PROCEDURE — 97112 NEUROMUSCULAR REEDUCATION: CPT | Performed by: PHYSICAL THERAPIST

## 2022-03-03 PROCEDURE — 97110 THERAPEUTIC EXERCISES: CPT | Performed by: PHYSICAL THERAPIST

## 2022-03-03 NOTE — PROGRESS NOTES
Daily Note     Today's date: 3/3/2022  Patient name: Bettina Gregg  :   MRN: [id number]  Referring provider: Alesia Shah, PT  Dx:   Encounter Diagnosis     ICD-10-CM    1  Acute midline low back pain, unspecified whether sciatica present  M54 50                   Subjective: Pt reports some increased back pain today with lots of walking  Objective: See treatment diary below      Assessment: Pt does well w current program, but does not tolerate TA bracing or bridges well in supine position  She will benefit from continued seated and standing exercises due to poor tolerance to supine position  She does well w seated clams w some hip fatigue following  Patient demonstrated fatigue post treatment and would benefit from continued PT  Plan: Continue per plan of care  Progress treatment as tolerated         Precautions: High Risk, (/94)    Date 2/22 3/1 3/3          Visit # IE 2 3          FOTO 48/57             Re-eval IE              Manuals 2/22 3/1 3/3                                                              Neuro Re-Ed 2/22 3/1 3/3          TA bracing 5x10" seated 10x10" seated  10x10" w add sq           Clams   30x GTB seated          SB TA Press             Pallof Press   15x UTR w GTB          Bridges  np 5x                                    Ther Ex 2/22 3/1 3/3          Bike  6' 6'          TM  np           Hamstring Str  seated 3x30" seated 3x30"          Leg Press  nv 3x10 55#          Hip 3 way  10x ea 10x ea          Calf stretch  Seated 3x30"           LAQs   10x ea                       Ther Activity 2/22 3/1 3/3          Marching 10x 10x 15x          Squats 8x 10x 2x10          Steps  nv nv                                    Gait Training  3/1                                     Modalities  3/1

## 2022-03-08 ENCOUNTER — OFFICE VISIT (OUTPATIENT)
Dept: PHYSICAL THERAPY | Facility: CLINIC | Age: 62
End: 2022-03-08
Payer: MEDICARE

## 2022-03-08 DIAGNOSIS — M54.50 ACUTE MIDLINE LOW BACK PAIN, UNSPECIFIED WHETHER SCIATICA PRESENT: Primary | ICD-10-CM

## 2022-03-08 PROCEDURE — 97110 THERAPEUTIC EXERCISES: CPT | Performed by: PHYSICAL THERAPIST

## 2022-03-08 PROCEDURE — 97530 THERAPEUTIC ACTIVITIES: CPT | Performed by: PHYSICAL THERAPIST

## 2022-03-08 PROCEDURE — 97112 NEUROMUSCULAR REEDUCATION: CPT | Performed by: PHYSICAL THERAPIST

## 2022-03-08 NOTE — PROGRESS NOTES
Daily Note     Today's date: 3/8/2022  Patient name: Kassandra Woodson  :   MRN: [id number]  Referring provider: Garcia Blum PT  Dx:   Encounter Diagnosis     ICD-10-CM    1  Acute midline low back pain, unspecified whether sciatica present  M54 50                   Subjective: Pt reports her back is doing a little better today  Objective: See treatment diary below      Assessment: Pt does well w continued progression of core strengthening and functional activities  She has poor tolerance to supine position and is only able to complete TA bracing w add sq  She does well addition of step ups  Patient demonstrated fatigue post treatment and would benefit from continued PT  Plan: Continue per plan of care  Progress treatment as tolerated         Precautions: High Risk, (/94)    Date 2/22 3/1 3/3 3/8         Visit # IE 2 3 4         FOTO 48/57             Re-eval IE              Manuals 2/22 3/1 3/3 3/8                                                             Neuro Re-Ed 2/22 3/1 3/3 3/8         TA bracing 5x10" seated 10x10" seated  10x10" w add sq  10x10" w add sq          Clams   30x GTB seated 30x GTB seated         SB TA Press             Pallof Press   15x UTR w GTB 15x UTR w BTB         Bridges  np 5x                                    Ther Ex 2/22 3/1 3/3 3/8         Bike  6' 6' 7'         TM  np           Hamstring Str  seated 3x30" seated 3x30" seated 3x30"         Leg Press  nv 3x10 55# 3x10 55#         Hip 3 way  10x ea 10x ea 10x ea         Calf stretch  Seated 3x30"           LAQs   10x ea 10x ea                      Ther Activity 2/22 3/1 3/3          Marching 10x 10x 15x 20x ea         Squats 8x 10x 2x10 2x10         Steps  nv nv 10x ea 1R                                   Gait Training  3/1                                     Modalities  3/1

## 2022-03-10 ENCOUNTER — OFFICE VISIT (OUTPATIENT)
Dept: PHYSICAL THERAPY | Facility: CLINIC | Age: 62
End: 2022-03-10
Payer: MEDICARE

## 2022-03-10 DIAGNOSIS — M54.50 ACUTE MIDLINE LOW BACK PAIN, UNSPECIFIED WHETHER SCIATICA PRESENT: Primary | ICD-10-CM

## 2022-03-10 PROCEDURE — 97530 THERAPEUTIC ACTIVITIES: CPT | Performed by: PHYSICAL THERAPIST

## 2022-03-10 PROCEDURE — 97110 THERAPEUTIC EXERCISES: CPT | Performed by: PHYSICAL THERAPIST

## 2022-03-10 PROCEDURE — 97112 NEUROMUSCULAR REEDUCATION: CPT | Performed by: PHYSICAL THERAPIST

## 2022-03-10 NOTE — PROGRESS NOTES
Daily Note     Today's date: 3/10/2022  Patient name: Toma Ricardo  :   MRN: [id number]  Referring provider: Carlos A Pang, PT  Dx:   Encounter Diagnosis     ICD-10-CM    1  Acute midline low back pain, unspecified whether sciatica present  M54 50                   Subjective: Pt reports her back is sore today, and she wishes to forgo any table exercises today  Objective: See treatment diary below      Assessment: Pt does well w bike and leg press progression  She is challenged w increased height of step up and has some pain in the R LE  Patient demonstrated fatigue post treatment and would benefit from continued PT  Plan: Continue per plan of care  Progress treatment as tolerated         Precautions: High Risk, (/94)    Date 2/22 3/1 3/3 3/8 3/10        Visit # IE 2 3 4 5        FOTO [id number]     54/57        Re-eval IE              Manuals 2/22 3/1 3/3 3/8 3/10                                                            Neuro Re-Ed 2/22 3/1 3/3 3/8 3/10        TA bracing 5x10" seated 10x10" seated  10x10" w add sq  10x10" w add sq          Clams   30x GTB seated 30x GTB seated 30x GTB seated        SB TA Press     10x10"         Pallof Press   15x UTR w GTB 15x UTR w BTB 15x UTR w BTB        Bridges  np 5x                                    Ther Ex 2/22 3/1 3/3 3/8 3/10        Bike  6' 6' 7' 8'        TM  np           Hamstring Str  seated 3x30" seated 3x30" seated 3x30" seated 3x30"        Leg Press  nv 3x10 55# 3x10 55# 3x10 65#        Hip 3 way  10x ea 10x ea 10x ea         Calf stretch  Seated 3x30"           LAQs   10x ea 10x ea                      Ther Activity 2/22 3/1 3/3  3/10        Marching 10x 10x 15x 20x ea 20x ea        Squats 8x 10x 2x10 2x10 2x10        Steps  nv nv 10x ea 1R 10x ea 2R                                  Gait Training  3/1                                     Modalities  3/1

## 2022-03-15 ENCOUNTER — OFFICE VISIT (OUTPATIENT)
Dept: PHYSICAL THERAPY | Facility: CLINIC | Age: 62
End: 2022-03-15
Payer: MEDICARE

## 2022-03-15 DIAGNOSIS — M54.50 ACUTE MIDLINE LOW BACK PAIN, UNSPECIFIED WHETHER SCIATICA PRESENT: Primary | ICD-10-CM

## 2022-03-15 PROCEDURE — 97110 THERAPEUTIC EXERCISES: CPT | Performed by: PHYSICAL THERAPIST

## 2022-03-15 PROCEDURE — 97112 NEUROMUSCULAR REEDUCATION: CPT | Performed by: PHYSICAL THERAPIST

## 2022-03-15 PROCEDURE — 97530 THERAPEUTIC ACTIVITIES: CPT | Performed by: PHYSICAL THERAPIST

## 2022-03-15 NOTE — PROGRESS NOTES
Daily Note     Today's date: 3/15/2022  Patient name: Susan Patel  :   MRN: [id number]  Referring provider: Casimiro Denise PT  Dx:   Encounter Diagnosis     ICD-10-CM    1  Acute midline low back pain, unspecified whether sciatica present  M54 50                   Subjective: Pt reports she is feeling much better today  Objective: See treatment diary below      Assessment: Pt does well w current program and does not complete supine activities due to poor tolerance to position  She does well w seated hip ADD and progression to LAQs w weight  Patient demonstrated fatigue post treatment and would benefit from continued PT  Plan: Continue per plan of care  Progress treatment as tolerated         Precautions: High Risk, (/94)    Date 2/22 3/1 3/3 3/8 3/10 3/15       Visit # IE 2 3 4 5 6       FOTO [id number]     54/57        Re-eval IE              Manuals 2/22 3/1 3/3 3/8 3/10 3/15                                                           Neuro Re-Ed 2/22 3/1 3/3 3/8 3/10 3/15       TA bracing 5x10" seated 10x10" seated  10x10" w add sq  10x10" w add sq   10x10" w seated hip add       Clams   30x GTB seated 30x GTB seated 30x GTB seated 40x GTB seated       SB TA Press     10x10"  10x10"       Pallof Press   15x UTR w GTB 15x UTR w BTB 15x UTR w BTB        Bridges  np 5x                                    Ther Ex 2/22 3/1 3/3 3/8 3/10 3/15       Bike  6' 6' 7' 8' 8'       TM  np           Hamstring Str  seated 3x30" seated 3x30" seated 3x30" seated 3x30" seated 3x30"       Leg Press  nv 3x10 55# 3x10 55# 3x10 65# 3x10 65#       Hip 3 way  10x ea 10x ea 10x ea         Calf stretch  Seated 3x30"           LAQs   10x ea 10x ea  20x ea 2#                    Ther Activity 2/22 3/1 3/3  3/10 3/15       Marching 10x 10x 15x 20x ea 20x ea 20x ea       Squats 8x 10x 2x10 2x10 2x10 2x10       Steps  nv nv 10x ea 1R 10x ea 2R 10x ea 2R                                 Gait Training  3/1 Modalities  3/1

## 2022-03-17 ENCOUNTER — APPOINTMENT (OUTPATIENT)
Dept: PHYSICAL THERAPY | Facility: CLINIC | Age: 62
End: 2022-03-17
Payer: MEDICARE

## 2022-03-22 ENCOUNTER — APPOINTMENT (OUTPATIENT)
Dept: PHYSICAL THERAPY | Facility: CLINIC | Age: 62
End: 2022-03-22
Payer: MEDICARE

## 2022-03-23 ENCOUNTER — OFFICE VISIT (OUTPATIENT)
Dept: FAMILY MEDICINE CLINIC | Facility: CLINIC | Age: 62
End: 2022-03-23

## 2022-03-23 VITALS
SYSTOLIC BLOOD PRESSURE: 136 MMHG | RESPIRATION RATE: 18 BRPM | HEIGHT: 63 IN | TEMPERATURE: 98.2 F | OXYGEN SATURATION: 98 % | BODY MASS INDEX: 45.66 KG/M2 | DIASTOLIC BLOOD PRESSURE: 82 MMHG | WEIGHT: 257.7 LBS | HEART RATE: 88 BPM

## 2022-03-23 DIAGNOSIS — L30.4 INTERTRIGO: ICD-10-CM

## 2022-03-23 DIAGNOSIS — Z12.31 ENCOUNTER FOR SCREENING MAMMOGRAM FOR BREAST CANCER: ICD-10-CM

## 2022-03-23 DIAGNOSIS — E66.01 MORBID OBESITY WITH BMI OF 40.0-44.9, ADULT (HCC): Primary | ICD-10-CM

## 2022-03-23 PROCEDURE — 3079F DIAST BP 80-89 MM HG: CPT | Performed by: FAMILY MEDICINE

## 2022-03-23 PROCEDURE — 3075F SYST BP GE 130 - 139MM HG: CPT | Performed by: FAMILY MEDICINE

## 2022-03-23 PROCEDURE — 99213 OFFICE O/P EST LOW 20 MIN: CPT | Performed by: FAMILY MEDICINE

## 2022-03-23 RX ORDER — NYSTATIN 100000 [USP'U]/G
POWDER TOPICAL 3 TIMES DAILY
Qty: 120 G | Refills: 2 | Status: SHIPPED | OUTPATIENT
Start: 2022-03-23

## 2022-03-23 NOTE — PROGRESS NOTES
Assessment/Plan: Morbid obesity with BMI of 40 0-44 9, adult ([place] [address] )   Unable to afford follow-up with bariatric surgery/weight management team   Will attempt to do weight management in office and discussed exercise, dietary changes today  Patient advised to use food diary to monitor food intake -types, quantity  Will attempt trial of Wegovy starting at 0 25 mg weekly per month with taper up dosing over the next 4 months  Goal dosage of 2 4 mg with interval monitoring in office over this time  Diagnoses and all orders for this visit:    Morbid obesity with BMI of 40 0-44 9, adult ([facility] )  -     Discontinue: Semaglutide-Weight Management (WEGOVY) 0 25 MG/0 5ML; Inject 0 5 mL (0 25 mg total) under the skin once a week for 28 days, THEN 1 mL (0 5 mg total) once a week for 28 days, THEN 2 mL (1 mg total) once a week for 28 days, THEN 3 mL (1 5 mg total) once a week for 28 days, THEN 4 mL (2 mg total) once a week for 28 days   -     Semaglutide-Weight Management (WEGOVY) 0 25 MG/0 5ML; Inject 0 5 mL (0 25 mg total) under the skin once a week for 28 days, THEN 1 mL (0 5 mg total) once a week for 28 days  Encounter for screening mammogram for breast cancer  -     Mammo screening bilateral w 3d & cad; Future    [facility]tr[facility]  Comments:   Patient advised to maintain dryness between skin folds and educated on normal skin sarita  and increased warmth and moisture as cause of condition  Orders:  -     nystatin (MYCOSTATIN) powder; Apply topically 3 (three) times a day          Subjective:      Patient ID: Jessica Bourgeois is a 59 y o  female  Jessica Bourgeois is a very pleasant 59 y o  female who presents today for  Weight check  She notes inability to afford follow-up with weight management/ bariatric surgery  Due to her recent cessation of smoking she has had increasing difficulty in weight management    She details to me cutting down on her portion sizes but still engaging in meat consumption ( less red meat) and also cut away fat on her meat products consumed  She does go walking with her dog however have difficulty with increased physical  Activity due to pain in her right knee  We discussed sitting/ lying exercises for 30 minutes every day including bicycle exercises and exercises with elevation of limbs over the head  We discussed less processed foods in the diet and minimizing meat consumption to further minimize fat consumption and replacing benefits of meat eating with multi vitamin supplementation for B12 and protein supplementation through beans, peas and broccoli  The following portions of the patient's history were reviewed and updated as appropriate: allergies, current medications, past family history, past medical history, past social history, past surgical history and problem list     Review of Systems   Constitutional: Negative for appetite change and fever  Respiratory: Negative for cough and shortness of breath  Cardiovascular: Negative for chest pain and palpitations  Gastrointestinal: Negative for abdominal pain and vomiting  Musculoskeletal: Positive for arthralgias ( right knee)  Negative for back pain  Skin: Positive for rash  Negative for color change  Neurological: Positive for dizziness (  In the past 3 days  )  All other systems reviewed and are negative  Objective:      /82 (BP Location: Left arm, Patient Position: Sitting, Cuff Size: Standard)   Pulse 88   Temp 98 2 °F (36 8 °C) (Temporal)   Resp 18   Ht 5' 3" (1 6 m)   Wt 117 kg (257 lb 11 2 oz)   LMP  (LMP Unknown)   SpO2 98%   Breastfeeding No   BMI 45 65 kg/m²          Physical Exam  Vitals reviewed  Constitutional:       General: She is not in acute distress  Appearance: She is obese  HENT:      Head: Atraumatic  Eyes:      Conjunctiva/sclera: Conjunctivae normal    Cardiovascular:      Rate and Rhythm: Normal rate and regular rhythm  Pulses: Normal pulses        Heart sounds: Normal heart sounds  No murmur heard  Pulmonary:      Effort: Pulmonary effort is normal       Breath sounds: Normal breath sounds  Abdominal:      General: Abdomen is flat  Bowel sounds are normal  There is no distension  Palpations: Abdomen is soft  Tenderness: There is no abdominal tenderness  Musculoskeletal:      Cervical back: Normal range of motion  Skin:     General: Skin is warm and dry  Neurological:      Mental Status: She is alert     Psychiatric:         Behavior: Behavior normal

## 2022-03-23 NOTE — ASSESSMENT & PLAN NOTE
Unable to afford follow-up with bariatric surgery/weight management team   Will attempt to do weight management in office and discussed exercise, dietary changes today  Patient advised to use food diary to monitor food intake -types, quantity  Will attempt trial of Wegovy starting at 0 25 mg weekly per month with taper up dosing over the next 4 months  Goal dosage of 2 4 mg with interval monitoring in office over this time

## 2022-03-25 ENCOUNTER — APPOINTMENT (OUTPATIENT)
Dept: PHYSICAL THERAPY | Facility: CLINIC | Age: 62
End: 2022-03-25
Payer: MEDICARE

## 2022-03-29 ENCOUNTER — APPOINTMENT (OUTPATIENT)
Dept: PHYSICAL THERAPY | Facility: CLINIC | Age: 62
End: 2022-03-29
Payer: MEDICARE

## 2022-03-31 ENCOUNTER — OFFICE VISIT (OUTPATIENT)
Dept: PULMONOLOGY | Facility: CLINIC | Age: 62
End: 2022-03-31
Payer: MEDICARE

## 2022-03-31 ENCOUNTER — APPOINTMENT (OUTPATIENT)
Dept: PHYSICAL THERAPY | Facility: CLINIC | Age: 62
End: 2022-03-31
Payer: MEDICARE

## 2022-03-31 VITALS
HEART RATE: 60 BPM | HEIGHT: 63 IN | TEMPERATURE: 97.2 F | DIASTOLIC BLOOD PRESSURE: 62 MMHG | SYSTOLIC BLOOD PRESSURE: 108 MMHG | RESPIRATION RATE: 18 BRPM | BODY MASS INDEX: 45.71 KG/M2 | OXYGEN SATURATION: 96 % | WEIGHT: 258 LBS

## 2022-03-31 DIAGNOSIS — E66.01 MORBID OBESITY (HCC): ICD-10-CM

## 2022-03-31 DIAGNOSIS — J44.9 CHRONIC OBSTRUCTIVE PULMONARY DISEASE, UNSPECIFIED COPD TYPE (HCC): Primary | ICD-10-CM

## 2022-03-31 DIAGNOSIS — R06.00 DYSPNEA, UNSPECIFIED TYPE: ICD-10-CM

## 2022-03-31 DIAGNOSIS — J30.89 NON-SEASONAL ALLERGIC RHINITIS, UNSPECIFIED TRIGGER: ICD-10-CM

## 2022-03-31 DIAGNOSIS — G47.33 OSA (OBSTRUCTIVE SLEEP APNEA): ICD-10-CM

## 2022-03-31 DIAGNOSIS — F17.211 NICOTINE DEPENDENCE, CIGARETTES, IN REMISSION: ICD-10-CM

## 2022-03-31 DIAGNOSIS — R91.8 LUNG NODULES: ICD-10-CM

## 2022-03-31 PROCEDURE — 99214 OFFICE O/P EST MOD 30 MIN: CPT | Performed by: INTERNAL MEDICINE

## 2022-03-31 RX ORDER — BUDESONIDE AND FORMOTEROL FUMARATE DIHYDRATE 160; 4.5 UG/1; UG/1
1 AEROSOL RESPIRATORY (INHALATION) 2 TIMES DAILY
Qty: 10.2 G | Refills: 5 | Status: SHIPPED | OUTPATIENT
Start: 2022-03-31

## 2022-03-31 NOTE — PROGRESS NOTES
Office Progress Note - Pulmonary    Honey Yasmeen 59 y o  female MRN: [id number]    Encounter: [id number]      Assessment:   Chronic obstructive pulmonary disease   Obstructive sleep apnea   Allergic rhinitis   Lung nodules   History of tobacco use   Morbid obesity   Dyspnea on exertion  Plan:     Symbicort 160/4 5, 2 inhalations twice a day   Albuterol rescue inhaler 2 inhalations 4 times a day as needed   Call the company and registered the CPAP machine so he can get a replacement   Fluticasone nasal spray 2 sprays to each nostril once a day   Montelukast 10 mg once a day   Weight loss   Lung cancer screening CT in January of 2023   Follow-up in 6 months  Discussion:   The patient's COPD is in remission  I have maintained her on the Symbicort 160/4 5, 2 inhalations twice a day  I have renewed the prescription  She will use the albuterol rescue inhaler 2 inhalations 4 times a day as needed  I told the patient to call the company and register her CPAP machine so she will be able to get a replacement for her pre cold machine  Her allergic rhinitis is more symptomatic  I have maintained her on the montelukast 10 mg once a day and fluticasone nasal spray 2 sprays to each nostril once a day  I talked to her about her weight however the patient is having major difficulty to lose weight him  She has degenerative joint disease of her knees which will not allow her to do a lot of activities  Her CT findings showed nonspecific focal areas of inflammation  She has stable pulmonary nodules  I have ordered a lung cancer screening CT of the chest to be done in January of 2023  I will see her in 6 months in a follow-up visit  Subjective:   Patient is here for follow-up visit  She has dyspnea on exertion which is chronic and has not changed  She has occasional cough  She has postnasal dripping  She is using Symbicort 2 inhalations twice a day    Sometimes she needs to use her rescue inhaler  She has not used her CPAP machine because it was recalled  Unfortunately she had difficulty registering the machine online  She is using fluticasone nasal spray 2 sprays to each nostril once a day and montelukast 10 mg once a day  Review of systems:  A 12 point system review is done and aside from what is stated above the rest of the review of systems is negative  Family history and social history are reviewed  Medications list is reviewed  Vitals: Blood pressure 108/62, pulse 60, temperature (!) 97 2 °F (36 2 °C), resp  rate 18, height 5' 3" (1 6 m), weight 117 kg (258 lb), SpO2 96 %, not currently breastfeeding ,     Physical Exam  Gen: Awake, alert, oriented x 3, no acute distress  HEENT: Mucous membranes moist, no oral lesions, no thrush  NECK: No accessory muscle use, JVP not elevated  Cardiac: Regular, single S1, single S2, no murmurs, no rubs, no gallops  Lungs:  Decreased breath sounds  No wheezing or rhonchi  Abdomen: normoactive bowel sounds, soft nontender, nondistended, no rebound or rigidity, no guarding  Extremities: no cyanosis, no clubbing, no edema  Neuro:  Grossly nonfocal   Skin:  No rash  CT scan of the chest is reviewed on the [facility] system  It showed few peripheral focal areas of ground-glass opacity  Stable pulmonary nodules

## 2022-04-04 DIAGNOSIS — G25.0 BENIGN ESSENTIAL TREMOR: ICD-10-CM

## 2022-04-04 RX ORDER — PROPRANOLOL HYDROCHLORIDE 40 MG/1
TABLET ORAL
Qty: 180 TABLET | Refills: 5 | Status: SHIPPED | OUTPATIENT
Start: 2022-04-04

## 2022-04-25 ENCOUNTER — TELEPHONE (OUTPATIENT)
Dept: NEUROLOGY | Facility: CLINIC | Age: 62
End: 2022-04-25

## 2022-04-25 NOTE — TELEPHONE ENCOUNTER
Called and left a voicemail for patient - Please call back to confirm upcoming appointment with [facility]  Provided patient with apt date, time and location  Informed patient that check in is at least 15 minutes prior to apt time

## 2022-05-04 ENCOUNTER — OFFICE VISIT (OUTPATIENT)
Dept: NEUROLOGY | Facility: CLINIC | Age: 62
End: 2022-05-04
Payer: MEDICARE

## 2022-05-04 VITALS
BODY MASS INDEX: 51.44 KG/M2 | SYSTOLIC BLOOD PRESSURE: 110 MMHG | HEIGHT: 60 IN | RESPIRATION RATE: 20 BRPM | HEART RATE: 76 BPM | DIASTOLIC BLOOD PRESSURE: 70 MMHG | WEIGHT: 262 LBS | TEMPERATURE: 97.2 F

## 2022-05-04 DIAGNOSIS — M54.16 RADICULOPATHY, LUMBAR REGION: Primary | ICD-10-CM

## 2022-05-04 DIAGNOSIS — G25.0 BENIGN ESSENTIAL TREMOR: ICD-10-CM

## 2022-05-04 PROCEDURE — 99213 OFFICE O/P EST LOW 20 MIN: CPT | Performed by: NURSE PRACTITIONER

## 2022-05-04 RX ORDER — CLOTRIMAZOLE 1 %
CREAM (GRAM) TOPICAL
COMMUNITY
Start: 2022-04-18

## 2022-05-04 RX ORDER — RIBOFLAVIN (VITAMIN B2) 100 MG
100 TABLET ORAL DAILY
COMMUNITY

## 2022-05-04 RX ORDER — GABAPENTIN 300 MG/1
300 CAPSULE ORAL 3 TIMES DAILY
Qty: 90 CAPSULE | Refills: 2 | Status: SHIPPED | OUTPATIENT
Start: 2022-05-04

## 2022-05-04 RX ORDER — TERBINAFINE HYDROCHLORIDE 250 MG/1
250 TABLET ORAL DAILY
COMMUNITY
Start: 2022-04-18

## 2022-05-04 NOTE — PATIENT INSTRUCTIONS
Increase gabapentin to see if this helps with back pain and tremors  Continue propranolol at current dose  Continue home back exercises learned in PT- plan to go to pain management for further evaluation/treatment of back pain  Great job with quitting tobacco  Make sure you follow with your sleep specialist and discuss the CPAP recall- it would be important for you to be on the CPAP   Follow up in 3 months     Essential Tremor   AMBULATORY CARE:   An essential tremor  is uncontrolled muscle shaking that has no known cause  Your healthcare provider can diagnose essential tremor based on your signs and symptoms  Other tests may be used to make sure another condition is not causing the tremors  Essential tremors can happen at any age but are most common after 36years of age  Common signs and symptoms of essential tremor:  Signs and symptoms may be mild or severe  You may have tremors when you try to hold still or when you move  Any of the following may get worse slowly, over time:  · Hands or arms shake, especially when you hold or reach for objects    · Voice quivers when you speak    · Legs shake when you stand still    · Trouble controlling your hands or arms, holding objects, or writing    · Trouble doing daily activities such as brushing your teeth or shaving    · Head nodding or shaking you cannot control    · Shaking brought on or made worse by stress, fatigue, or chemicals such as caffeine    · A feeling of shaking or trembling inside, even if your body does not shake    Contact your healthcare provider if:   · You have new or worsening symptoms  · You have questions or concerns about your condition or care  Treatment  may not be needed  If your tremors are severe or keep you from doing daily activities, the following may help:  · Medicines  may be given to control tremors so you can do your normal activities more easily  Medicines will not completely control or stop the tremors, but they may help  · Deep brain stimulation  is a procedure used if other treatments do not control your tremors  Electric stimulation is given to parts of the brain that control movement  The stimulation stops those parts of the brain from working  · Surgery  may be used if your tremors are severe and medicines have not helped  Surgery may be used to destroy part of your thalamus  The thalamus is a part of the brain that control movement and coordination  Manage your symptoms:   · Go to occupational therapy as directed  An occupational therapist can help you find new ways to do your daily activities  For example, you may learn to use wrist weights during activities such as brushing your teeth  The weights can help steady your arms and hands  · Use assistive devices as directed  Weighted utensils can help you control your movements as you eat  Adaptive equipment, such as a Siminars mouse, for the computer can help make computers easier to use  It may also help to install voice recognition software on your computer  The software allows you to talk instead of using the keyboard to type  Electric appliances such as can openers and toothbrushes can make daily living activities easier  Ask your healthcare provider or occupational therapist for more information on assistive devices  · Wear clothing that is easy to put on and take off  Examples include shoes that do not need laces and shirts without buttons  You can also get a device to help you get buttons through the button holes  · Do not have caffeine  Caffeine can make tremors worse  · Do not smoke  Nicotine and other chemicals in cigarettes and cigars can make tremors worse  Ask your healthcare provider for information if you currently smoke and need help to quit  E-cigarettes or smokeless tobacco still contain nicotine  Talk to your healthcare provider before you use these products  · Manage stress  Stress can trigger tremors or make them worse   Find ways to manage stress, such as deep breathing, listening to music, or getting a massage  Talk to your healthcare provider if you need help to control anxiety  · Ask about medicines  Some medicines can make tremors worse  An example is cold medicines  Talk to your healthcare provider about all the medicines you take  · Keep a record of your tremors  Include when the tremors happened, and how long they lasted  List anything you think might have triggered the tremors or made them stop  It might be helpful to include any foods or drinks you had that contained caffeine or were unusual for you  Bring the record with you to your follow-up appointments  Follow up with your healthcare provider as directed: You may be referred to a neurologist (nerve specialist)  Write down your questions so you remember to ask them during your visits  © Copyright [employer] 2022 Information is for End User's use only and may not be sold, redistributed or otherwise used for commercial purposes  All illustrations and images included in CareNotes® are the copyrighted property of A D A M , Inc  or [facility] Milo Pierce  The above information is an  only  It is not intended as medical advice for individual conditions or treatments  Talk to your doctor, nurse or pharmacist before following any medical regimen to see if it is safe and effective for you

## 2022-05-04 NOTE — PROGRESS NOTES
Patient ID: Shira Rodriguez is a 57 y o  female  Assessment/Plan:  Patient Instructions:  Increase gabapentin to see if this helps with back pain and tremors  Continue propranolol at current dose  Continue home back exercises learned in PT- plan to go to pain management for further evaluation/treatment of back pain  Great job with quitting tobacco  Make sure you follow with your sleep specialist and discuss the CPAP recall- it would be important for you to be on the CPAP   Follow up in 3 months        Diagnoses and all orders for this visit:    Radiculopathy, lumbar region  -     Ambulatory Referral to Pain Management; Future    Benign essential tremor  -     gabapentin (NEURONTIN) 300 mg capsule; Take 1 capsule (300 mg total) by mouth 3 (three) times a day Take 1 tablet 3 times a day  -     Vitamin B12; Future  -     Comprehensive metabolic panel; Future  -     TSH, 3rd generation with Free T4 reflex; Future    Other orders  -     terbinafine (LamISIL) 250 mg tablet; Take 250 mg by mouth daily  -     clotrimazole (LOTRIMIN) 1 % cream; APPLY TOPICALLY TO THE AFFECTED AREA AND SURROUNDING AREAS OF SKI     (REFER TO PRESCRIPTION NOTES)  -     Ascorbic Acid (vitamin C) 100 MG tablet; Take 100 mg by mouth daily         Subjective:    TEJAS Campbell is a 75-year-old female with COPD, hyperlipidemia, hypertension, obesity, arthritis YUE who presents today for neurologic follow-up for essential tremor  Patient on low-dose primidone in the past, unable to tolerate- caused dizziness  Was placed on a combination of propanolol (low dose because of history of bradycardia) and gabapentin with benefit  Last seen in September 2021; no changes at that time except for adding extra dose of gabapentin at times if needed  She states today that she has experienced an increase in her tremor of both upper extremities  She denies lower extremity or head tremor   She has used extra doses of the gabapentin during the day without side effect  She has quit tobacco use since last visit  She states there has been weight gain; she is working on diet/exercise but finds this difficult because of arthritis and low back pain  She recently had repeat CT spine and a course of PT with only some improvement  No bowel/bladder complaints, saddle anesthesia, or significant focal leg weakness/numbness  She also mentions she has not been using her CPAP anymore because of the recall; of note on record review her tremor improved significantly with CPAP use  CT head 11/2020:  PARENCHYMA:  No intracranial mass, mass effect or midline shift  No CT signs of acute infarction  No acute parenchymal hemorrhage  Minimal white matter changes are better appreciated on prior MRI  There is at least one stable tiny hypodensity in the   left centrum semiovale  VENTRICLES AND EXTRA-AXIAL SPACES:  Normal for the patient's age  VISUALIZED ORBITS AND PARANASAL SINUSES:  Unremarkable  CALVARIUM AND EXTRACRANIAL SOFT TISSUES:  Normal   IMPRESSION:  No acute intracranial abnormality  CT L spine 2/6/2022:  L1-2:  Normal disc height  No herniation  Normal facet joints  No canal or foraminal stenosis  L2-3:  Normal disc height  No herniation  Normal facet joints  No canal or foraminal stenosis  L3-4:  Mild disc bulge  Mild facet hypertrophic changes bilaterally as well as ligamentum flavum hypertrophy  L4-5:  Mild disc space narrowing  Mild diffuse disc bulge with superimposed right far lateral disc herniation  This abuts the exiting right L4 nerve root  Facet hypertrophic changes bilaterally as well as ligamentum flavum hypertrophy  Air present in   the facet joints bilaterally  Small focus of air in the inferior aspect of the left neural foramen likely due to air within a synovial cyst of the left facet joint    Moderate central stenosis, mild right-sided neural foraminal narrowing and moderate   bilateral lateral recess narrowing, left side greater than right  Facet hypertrophic changes encroach upon the bilateral L5 nerve roots in the lateral recesses, left side greater than right  L5-S1:  Moderate disc space narrowing  Vacuum disc  Diffuse disc bulge with superimposed central, left paramedian disc herniation, extending below the intervertebral disc space  This abuts the bilateral S1 nerve roots in the lateral recesses, left   side greater than right  Facet hypertrophic changes bilaterally, left side greater than right  Mild central stenosis, mild bilateral neural foraminal narrowing and mild bilateral lateral recess narrowing, left side greater than right  Recent Labs:  a1c 6 2  Bun/creat 15/0 61  Ferritin 128  Ceruloplasmin 37 (2018)  TSH 3 080 (2018)      The following portions of the patient's history were reviewed and updated as appropriate: allergies, current medications, past family history, past medical history, past social history, past surgical history and problem list          Objective:    Blood pressure 110/70, pulse 76, temperature (!) 97 2 °F (36 2 °C), temperature source Temporal, resp  rate 20, height 5' (1 524 m), weight 119 kg (262 lb), not currently breastfeeding  Physical Exam  Vitals reviewed  Constitutional:       General: She is not in acute distress  Appearance: She is obese  She is not ill-appearing, toxic-appearing or diaphoretic  HENT:      Head: Normocephalic and atraumatic  Right Ear: External ear normal       Left Ear: External ear normal       Mouth/Throat:      Pharynx: Oropharynx is clear  Eyes:      General: Lids are normal       Extraocular Movements: Extraocular movements intact  Pupils: Pupils are equal, round, and reactive to light  Cardiovascular:      Rate and Rhythm: Normal rate  Pulses: Normal pulses  Pulmonary:      Effort: Pulmonary effort is normal    Abdominal:      General: There is no distension  Musculoskeletal:      Cervical back: Normal range of motion  No tenderness  Lumbar back: Tenderness present  Negative right straight leg raise test and negative left straight leg raise test       Right lower leg: Edema present  Left lower leg: Edema present  Comments: Mild lower extremity edema  Midline L spine tenderness  No dermatomal sensory deficit or focal weakness of lower extremities   Skin:     General: Skin is warm  Capillary Refill: Capillary refill takes less than 2 seconds  Neurological:      Mental Status: She is alert  Sensory: No sensory deficit  Motor: No weakness  Coordination: Romberg sign negative  Deep Tendon Reflexes:      Reflex Scores:       Brachioradialis reflexes are 2+ on the right side and 2+ on the left side  Patellar reflexes are 1+ on the right side and 1+ on the left side  Comments: Tremor is worse with action bilateral hands; able to perform FNF testing without significant dysmetria; no tremor at rest     Psychiatric:         Mood and Affect: Mood normal          Speech: Speech normal          Behavior: Behavior normal          Neurological Exam  Mental Status  Alert  Oriented to person, place and time  Speech is normal  Language is fluent with no aphasia  Cranial Nerves  CN II: Visual acuity is normal  Visual fields full to confrontation  CN III, IV, VI: Extraocular movements intact bilaterally  Normal lids and orbits bilaterally  Pupils equal round and reactive to light bilaterally  CN V: Facial sensation is normal   CN VII: Full and symmetric facial movement  CN VIII: Hearing is normal   CN IX, X: Palate elevates symmetrically  Normal gag reflex  CN XI: Shoulder shrug strength is normal   CN XII: Tongue midline without atrophy or fasciculations  Motor  Normal muscle bulk throughout  Sensory  Light touch is normal in upper and lower extremities       Reflexes                                           Right                      Left  Brachioradialis                    2+ 2+  Patellar                                1+                         1+  Achilles                                Tr                         Tr    Coordination  Right: Finger-to-nose normal   Left: Finger-to-nose normal     Gait  Casual gait: Antalgic gait  Romberg is absent  Able to rise from chair without using arms  ROS:    Review of Systems   Constitutional: Negative  Negative for appetite change and fever  HENT: Negative  Negative for hearing loss, tinnitus, trouble swallowing and voice change  Eyes: Negative  Negative for photophobia and pain  Respiratory: Negative  Negative for shortness of breath  Cardiovascular: Negative  Negative for palpitations  Gastrointestinal: Negative  Negative for nausea and vomiting  Endocrine: Negative  Negative for cold intolerance  Genitourinary: Negative  Negative for dysuria, frequency and urgency  Musculoskeletal: Negative  Negative for myalgias and neck pain  Skin: Negative  Negative for rash  Neurological: Positive for tremors  Negative for dizziness, seizures, syncope, facial asymmetry, speech difficulty, weakness (Hands), light-headedness, numbness and headaches  Hematological: Negative  Does not bruise/bleed easily  Psychiatric/Behavioral: Negative  Negative for confusion, hallucinations and sleep disturbance     ROS was reviewed and updated as appropriate

## 2022-05-18 ENCOUNTER — HOSPITAL ENCOUNTER (OUTPATIENT)
Dept: MAMMOGRAPHY | Facility: CLINIC | Age: 62
Discharge: HOME | End: 2022-05-18
Payer: MEDICARE

## 2022-05-18 VITALS — WEIGHT: 257 LBS | BODY MASS INDEX: 50.45 KG/M2 | HEIGHT: 60 IN

## 2022-05-18 DIAGNOSIS — Z12.31 ENCOUNTER FOR SCREENING MAMMOGRAM FOR BREAST CANCER: ICD-10-CM

## 2022-05-18 PROCEDURE — 77067 SCR MAMMO BI INCL CAD: CPT

## 2022-05-18 PROCEDURE — 77063 BREAST TOMOSYNTHESIS BI: CPT

## 2022-05-25 ENCOUNTER — OFFICE VISIT (OUTPATIENT)
Dept: FAMILY MEDICINE CLINIC | Facility: CLINIC | Age: 62
End: 2022-05-25

## 2022-05-25 VITALS
HEART RATE: 67 BPM | OXYGEN SATURATION: 97 % | WEIGHT: 271 LBS | TEMPERATURE: 98.6 F | BODY MASS INDEX: 53.2 KG/M2 | RESPIRATION RATE: 18 BRPM | DIASTOLIC BLOOD PRESSURE: 72 MMHG | HEIGHT: 60 IN | SYSTOLIC BLOOD PRESSURE: 114 MMHG

## 2022-05-25 DIAGNOSIS — E66.01 MORBID OBESITY WITH BMI OF 40.0-44.9, ADULT (HCC): ICD-10-CM

## 2022-05-25 DIAGNOSIS — Z23 ENCOUNTER FOR IMMUNIZATION: Primary | ICD-10-CM

## 2022-05-25 PROBLEM — M94.0 COSTOCHONDRITIS: Status: RESOLVED | Noted: 2021-10-19 | Resolved: 2022-05-25

## 2022-05-25 PROCEDURE — 3074F SYST BP LT 130 MM HG: CPT | Performed by: FAMILY MEDICINE

## 2022-05-25 PROCEDURE — 3078F DIAST BP <80 MM HG: CPT | Performed by: FAMILY MEDICINE

## 2022-05-25 PROCEDURE — G0009 ADMIN PNEUMOCOCCAL VACCINE: HCPCS | Performed by: FAMILY MEDICINE

## 2022-05-25 PROCEDURE — 99213 OFFICE O/P EST LOW 20 MIN: CPT | Performed by: FAMILY MEDICINE

## 2022-05-25 PROCEDURE — 90677 PCV20 VACCINE IM: CPT | Performed by: FAMILY MEDICINE

## 2022-05-25 NOTE — PROGRESS NOTES
Assessment/Plan: Morbid obesity with BMI of 40 0-44 9, adult ([facility] )  Wt Readings from Last 3 Encounters:   05/18/22 117 kg (257 lb)   05/04/22 119 kg (262 lb)   03/31/22 117 kg (258 lb)     Weight today 123 kg  Patient reports increased weight since last visit with no understanding as she continues similar diet and walking habits  Advised food diary to assess baseline of dietary intake (food types and choices, portion sizes, calories)  Patient recommended to measure all foods in cup/oz  At follow-up visit will assess baseline dietary habits with intentions to institute change more specifically at that time  However today patient was advised small meal intakes at regular intervals of 2-4 hours to minimize long periods of fasting leading to hunger and eating with intention to satisfy hunger  Example food intakes provided   And benefit of small healthy food choices at regular intervals verses large meals twice per day on weight management  Patient encouraged to continue with daily exercising  Semaglutide reordered and patient to call pharmacy as she received letter from insurance stating medication approved but never followed up with pharmacy to pick it up  Diagnoses and all orders for this visit:    Encounter for immunization  -     Pneumococcal Conjugate Vaccine 20-valent (PCV20)    Morbid obesity with BMI of 40 0-44 9, adult ([facility] )  -     Semaglutide-Weight Management (WEGOVY) 0 25 MG/0 5ML; Inject 0 5 mL (0 25 mg total) under the skin once a week for 28 days, THEN 1 mL (0 5 mg total) once a week for 28 days  Subjective:      Patient ID: Deejay Ballard is a 57 y o  female  Deejay Ballard is a very pleasant 57 y o  female who presents today for weight check       Typical dietary intake example:  - 10am - 2 eggs and 2 toast and glass of milk  - 5-7pm - protein ribs or chicken without skin with fat cut off, rice   - 9pm - snack size popcorn  ( 1-2 cups  )      Patient reports difficulty with portion sizes particularly at dinner as her  cooks this meal, he tends to cook fatty meats and served her large portion sizes  She tries to cut off the meat off of the fat and  And she does report difficulty trying to get him to understand that she cannot eat these large size is  Patient was recommended to have  come in to follow-up visits for her weight management to gain better support in the home environment  Patient reports consuming salt this week and for that she knows she gets swelling in her legs  As she has today  The following portions of the patient's history were reviewed and updated as appropriate: allergies, current medications, past family history, past medical history, past social history, past surgical history and problem list     Review of Systems   Constitutional: Negative for chills and fever  Respiratory: Positive for cough (  Improving since smoking cessation significantly ) and shortness of breath  Cardiovascular: Negative for chest pain and palpitations  Gastrointestinal: Negative for abdominal pain  Musculoskeletal: Positive for arthralgias and back pain  Skin: Negative for rash  All other systems reviewed and are negative  Objective:      /72 (BP Location: Left arm, Patient Position: Sitting, Cuff Size: Large)   Pulse 67   Temp 98 6 °F (37 °C) (Temporal)   Resp 18   Ht 5' (1 524 m)   Wt 123 kg (271 lb)   LMP  (LMP Unknown)   SpO2 97%   BMI 52 93 kg/m²          Physical Exam  Vitals reviewed  Constitutional:       General: She is not in acute distress  Appearance: She is normal weight  HENT:      Head: Atraumatic  Eyes:      Conjunctiva/sclera: Conjunctivae normal    Cardiovascular:      Rate and Rhythm: Normal rate and regular rhythm  Pulses: Normal pulses  Heart sounds: Normal heart sounds  No murmur heard    Pulmonary:      Effort: Pulmonary effort is normal       Breath sounds: Normal breath sounds  No wheezing, rhonchi or rales  Musculoskeletal:         General: Normal range of motion  Cervical back: Normal range of motion  Right lower leg: Edema present  Left lower leg: Edema present  Skin:     General: Skin is warm and dry  Neurological:      General: No focal deficit present  Mental Status: She is alert     Psychiatric:         Behavior: Behavior normal

## 2022-05-25 NOTE — ASSESSMENT & PLAN NOTE
Wt Readings from Last 3 Encounters:   05/18/22 117 kg (257 lb)   05/04/22 119 kg (262 lb)   03/31/22 117 kg (258 lb)     Weight today 123 kg  Patient reports increased weight since last visit with no understanding as she continues similar diet and walking habits  Advised food diary to assess baseline of dietary intake (food types and choices, portion sizes, calories)  Patient recommended to measure all foods in cup/oz  At follow-up visit will assess baseline dietary habits with intentions to institute change more specifically at that time  However today patient was advised small meal intakes at regular intervals of 2-4 hours to minimize long periods of fasting leading to hunger and eating with intention to satisfy hunger  Example food intakes provided   And benefit of small healthy food choices at regular intervals verses large meals twice per day on weight management  Patient encouraged to continue with daily exercising  Semaglutide reordered and patient to call pharmacy as she received letter from insurance stating medication approved but never followed up with pharmacy to pick it up

## 2022-05-25 NOTE — Clinical Note
Kimo Corcoran can you put in the site of the pneumococcal vaccine so I can close the chart? Thank you!

## 2022-06-17 ENCOUNTER — APPOINTMENT (OUTPATIENT)
Dept: LAB | Facility: HOSPITAL | Age: 62
End: 2022-06-17
Payer: MEDICARE

## 2022-06-17 DIAGNOSIS — G25.0 BENIGN ESSENTIAL TREMOR: ICD-10-CM

## 2022-06-17 LAB
ALBUMIN SERPL BCP-MCNC: 3 G/DL (ref 3–5.2)
ALP SERPL-CCNC: 74 U/L (ref 43–122)
ALT SERPL W P-5'-P-CCNC: 20 U/L
ANION GAP SERPL CALCULATED.3IONS-SCNC: 4 MMOL/L (ref 5–14)
AST SERPL W P-5'-P-CCNC: 25 U/L (ref 14–36)
BILIRUB SERPL-MCNC: 0.8 MG/DL
BUN SERPL-MCNC: 18 MG/DL (ref 5–25)
CALCIUM ALBUM COR SERPL-MCNC: 9.1 MG/DL (ref 8.3–10.1)
CALCIUM SERPL-MCNC: 8.3 MG/DL (ref 8.4–10.2)
CHLORIDE SERPL-SCNC: 107 MMOL/L (ref 97–108)
CO2 SERPL-SCNC: 29 MMOL/L (ref 22–30)
CREAT SERPL-MCNC: 0.75 MG/DL (ref 0.6–1.2)
GFR SERPL CREATININE-BSD FRML MDRD: 84 ML/MIN/1.73SQ M
GLUCOSE P FAST SERPL-MCNC: 137 MG/DL (ref 70–99)
POTASSIUM SERPL-SCNC: 4.7 MMOL/L (ref 3.6–5)
PROT SERPL-MCNC: 5.1 G/DL (ref 5.9–8.4)
SODIUM SERPL-SCNC: 140 MMOL/L (ref 137–147)
TSH SERPL DL<=0.05 MIU/L-ACNC: 3.63 UIU/ML (ref 0.45–4.5)
VIT B12 SERPL-MCNC: 519 PG/ML (ref 100–900)

## 2022-06-17 PROCEDURE — 36415 COLL VENOUS BLD VENIPUNCTURE: CPT

## 2022-06-17 PROCEDURE — 84443 ASSAY THYROID STIM HORMONE: CPT

## 2022-06-17 PROCEDURE — 82607 VITAMIN B-12: CPT

## 2022-06-17 PROCEDURE — 80053 COMPREHEN METABOLIC PANEL: CPT

## 2022-06-29 ENCOUNTER — OFFICE VISIT (OUTPATIENT)
Dept: FAMILY MEDICINE CLINIC | Facility: CLINIC | Age: 62
End: 2022-06-29

## 2022-06-29 VITALS
TEMPERATURE: 97.1 F | SYSTOLIC BLOOD PRESSURE: 128 MMHG | DIASTOLIC BLOOD PRESSURE: 80 MMHG | WEIGHT: 268 LBS | OXYGEN SATURATION: 95 % | HEIGHT: 60 IN | HEART RATE: 75 BPM | BODY MASS INDEX: 52.61 KG/M2 | RESPIRATION RATE: 18 BRPM

## 2022-06-29 DIAGNOSIS — R15.9 FECAL INCONTINENCE: ICD-10-CM

## 2022-06-29 DIAGNOSIS — E66.01 MORBID OBESITY WITH BMI OF 40.0-44.9, ADULT (HCC): Primary | ICD-10-CM

## 2022-06-29 DIAGNOSIS — R73.03 PREDIABETES: ICD-10-CM

## 2022-06-29 PROCEDURE — 3079F DIAST BP 80-89 MM HG: CPT | Performed by: FAMILY MEDICINE

## 2022-06-29 PROCEDURE — 3074F SYST BP LT 130 MM HG: CPT | Performed by: FAMILY MEDICINE

## 2022-06-29 PROCEDURE — 99213 OFFICE O/P EST LOW 20 MIN: CPT | Performed by: FAMILY MEDICINE

## 2022-07-04 PROBLEM — R15.9 FECAL INCONTINENCE: Status: ACTIVE | Noted: 2022-07-04

## 2022-07-04 NOTE — ASSESSMENT & PLAN NOTE
Noted over past 2 months  With soiling after defecation noted in underwear  Noted weakness to pelvic floor muscles on attempted keigle exercises in past previously recommended  Recommended to continue Keigle exercises daily 10 sets each for 10 seconds and will re-assess if symptoms unimproved in 3 months

## 2022-07-04 NOTE — PROGRESS NOTES
Assessment/Plan: Morbid obesity with BMI of 40 0-44 9, adult ([facility] )  unabale to afford weight management clinic follow up and insurance will not cover wegovy medication prescribed  3lbs lost in last 4 weeks (0 75lb / week)  Review diet and encouraged persistence of exercise recently increased with extension of time/intensity on interval basis  Will attempt to get Saxendra covered on weekly ramp up dosing starting at 0 6mg daily to a goal of 3mg daily  Intention to continue treatment for 12 weeks and then reassess improvement if able to access medication  Fecal incontinence  Noted over past 2 months  With soiling after defecation noted in underwear  Noted weakness to pelvic floor muscles on attempted keigle exercises in past previously recommended  Recommended to continue Keigle exercises daily 10 sets each for 10 seconds and will re-assess if symptoms unimproved in 3 months  Diagnoses and all orders for this visit:    Morbid obesity with BMI of 40 0-44 9, adult ([facility] )  -     liraglutide (SAXENDA) injection; Inject 0 1 mL (0 6 mg total) under the skin daily for 7 days, THEN 0 2 mL (1 2 mg total) daily for 7 days, THEN 0 3 mL (1 8 mg total) daily for 7 days, THEN 0 4 mL (2 4 mg total) daily for 7 days, THEN 0 5 mL (3 mg total) daily  Prediabetes  -     liraglutide (SAXENDA) injection; Inject 0 1 mL (0 6 mg total) under the skin daily for 7 days, THEN 0 2 mL (1 2 mg total) daily for 7 days, THEN 0 3 mL (1 8 mg total) daily for 7 days, THEN 0 4 mL (2 4 mg total) daily for 7 days, THEN 0 5 mL (3 mg total) daily  Fecal incontinence          Subjective:      Patient ID: Kassandra Woodson is a 57 y o  female  Kassandra Woodson is a very pleasant 57 y o  female who presents today for weight check  She reports continued attempts to curb diet with decreased portion sizes   She still eats pork chops with fat cut away and chicken with skin cut off - doesn't like breast and eats thighs though- baked fish  Drinks juices infrequently and mostly water  Had slice of cheesecake - first time in a month  She also complains of fecal incontinence over past 2 months  After normal defecation, finds small amounts of soiling in her underwear, does not feel anything passing  Denies any history of anal trauma and engaged in anal sex more than 2 decades ago  [place] exercise once before and states was not able to stop urine and therefore informed her that this suggests weakness of pelvic muscles  Patient also noted to have bilateral pedal edema and reported to have been because of being in chair all night with  in hospital last pm       The following portions of the patient's history were reviewed and updated as appropriate: allergies, current medications, past family history, past medical history, past social history, past surgical history and problem list     Review of Systems   Respiratory: Negative for cough and shortness of breath  Cardiovascular: Negative for chest pain and palpitations  Gastrointestinal: Negative for abdominal pain and vomiting  Fecal incontinence   All other systems reviewed and are negative  Objective:      /80 (BP Location: Left arm, Patient Position: Sitting, Cuff Size: Adult)   Pulse 75   Temp (!) 97 1 °F (36 2 °C) (Temporal)   Resp 18   Ht 5' (1 524 m)   Wt 122 kg (268 lb)   LMP  (LMP Unknown)   SpO2 95%   BMI 52 34 kg/m²          Physical Exam  Vitals reviewed  Constitutional:       General: She is not in acute distress  Appearance: She is normal weight  HENT:      Head: Atraumatic  Eyes:      Conjunctiva/sclera: Conjunctivae normal    Cardiovascular:      Rate and Rhythm: Normal rate and regular rhythm  Pulses: Normal pulses  Heart sounds: Normal heart sounds  No murmur heard  Pulmonary:      Effort: Pulmonary effort is normal       Breath sounds: Normal breath sounds  Abdominal:      General: Abdomen is flat   Bowel sounds are normal  There is no distension  Palpations: Abdomen is soft  Tenderness: There is no abdominal tenderness  Genitourinary:     Exam position: Prone  Rectum: No mass, tenderness, external hemorrhoid or internal hemorrhoid  Normal anal tone  Musculoskeletal:         General: Normal range of motion  Cervical back: Normal range of motion  Right lower leg: Edema present  Left lower leg: Edema present  Skin:     General: Skin is warm and dry  Neurological:      General: No focal deficit present  Mental Status: She is alert     Psychiatric:         Behavior: Behavior normal

## 2022-07-04 NOTE — ASSESSMENT & PLAN NOTE
jesuse to afford weight management clinic follow up and insurance will not cover wegovy medication prescribed  3lbs lost in last 4 weeks (0 75lb / week)  Review diet and encouraged persistence of exercise recently increased with extension of time/intensity on interval basis  Will attempt to get Saxendra covered on weekly ramp up dosing starting at 0 6mg daily to a goal of 3mg daily  Intention to continue treatment for 12 weeks and then reassess improvement if able to access medication

## 2022-07-07 DIAGNOSIS — J30.9 ALLERGIC RHINITIS, UNSPECIFIED SEASONALITY, UNSPECIFIED TRIGGER: ICD-10-CM

## 2022-07-07 RX ORDER — MONTELUKAST SODIUM 10 MG/1
TABLET ORAL
Qty: 30 TABLET | Refills: 3 | Status: SHIPPED | OUTPATIENT
Start: 2022-07-07

## 2022-07-22 ENCOUNTER — TELEPHONE (OUTPATIENT)
Dept: PULMONOLOGY | Facility: CLINIC | Age: 62
End: 2022-07-22

## 2022-07-22 NOTE — TELEPHONE ENCOUNTER
Lvm for pt to schedule a 6m compliance f/u @ our [address] with Dr Dale Maya or MARIAA in September  Please offer AP appt before offering Dr Dale Maya

## 2022-07-29 ENCOUNTER — TELEPHONE (OUTPATIENT)
Dept: NEUROLOGY | Facility: CLINIC | Age: 62
End: 2022-07-29

## 2022-07-29 NOTE — TELEPHONE ENCOUNTER
Called and spoke to patient - confirmed upcoming appointment with Dina Sanchez on 08/04/22 1:00 pm at the [facility] office  Provided patient with apt date, time and location  Informed patient that check in is at least 15 minutes prior to apt time  The patient is not  having any issues or concerns at this time

## 2022-08-12 ENCOUNTER — TELEPHONE (OUTPATIENT)
Dept: PULMONOLOGY | Facility: CLINIC | Age: 62
End: 2022-08-12

## 2022-08-12 NOTE — TELEPHONE ENCOUNTER
Lvm for pt to schedule a 6m f/u @ our [address] with Dr Feliciano Perez or MARIAA in October

## 2022-08-28 NOTE — PROGRESS NOTES
Assessment/Plan:    Lung nodules  CT lung (January 2021): "There is a stable 6 mm left upper lobe pulmonary nodule and a stable 3 mm left lower lobe pulmonary nodule"    CT Lung Jan 2022:     1  Peripheral groundglass opacities mostly in a basilar distribution raises suspicion for Covid 19 infiltrates  Patient previously tested positive in September 2021 and findings could reflect breakthrough infection versus residual infiltrates  Chest x-ray dated 9/28/2021 appeared normal    2   Stable left upper lobe 5 mm and left lower lobe 4 mm pulmonary nodules      Lung-RADS 5, other clinically significant findings  Recommend three-month follow-up CT of the chest to ensure resolution or stability of opacities as above  Due for annual screening with LDCT in 12 months, January 2023 however repeat CT ordered and not yet done - due since Apr 2022  Patient reminded to have done  Fecal incontinence  Complete resolution after Kiegel maneuvers and cessation of chocolate intake  Morbid obesity with BMI of 40 0-44 9, adult ([address] )  Weight increased back to 271lbs  Reports unable to exercise due to intolerance of exertional pain  Has to stop and rest for 5 minutes every 5 minutes due to back and leg pain on ambulating  Attempts isometric exercises on bed  Further cut down intake of foods prepared by  and cut out chocolate as it causes fecal incontinence for her  - will review in 2 weeks with meal picture diary to get idea of patients current intake and attempt to design dietary plan from there    - Referral to orthopedics for possible spinal surgery to correct radiculopathy and allow for better exercise functionality   - Will confer with nutritionist for assistance in this patients condition   - Called pharmacy to assess prescription for saxenda - patient did not follow up and did not hear from pharmacy for prescription availability - requires PA and forms requested from Pharmacy to be completed and returned  Diagnoses and all orders for this visit:    Morbid obesity with BMI of 40 0-44 9, adult ([facility] )  -     Cancel: Ambulatory Referral to Spine Surgery; Future    Fecal smearing    Lung nodules    Lumbar radiculopathy  -     Cancel: Ambulatory Referral to Spine Surgery; Future  -     Ambulatory Referral to Orthopedic Surgery; Future          Subjective:      Patient ID: Lisandra Regalado is a 57 y o  female  Lisandra Regalado is a very pleasant 57 y o  female who presents today with complaints of fecal incontinence and review of weight loss  At last follow up, patient lost 3lbs in 4 weeks at rate of 0 75lb/week  Today weight change of +3lbs (rate +0 5lb/week)  she continues to cut down on fatty foods and frying her foods  They bake meats and she eats until full rather than completing full served portions her  gives her  Patient noted to have fecal leaking after defecation throughout the day  After attempts to address with kegiel maneuvres, patient reports improvement and resolution  Also reports related to consuming chocolate and has stopped since ceasing consumption  The following portions of the patient's history were reviewed and updated as appropriate: allergies, current medications, past family history, past medical history, past social history, past surgical history and problem list     Review of Systems   Constitutional: Negative for fever  Eyes: Negative for pain and visual disturbance  Respiratory: Negative for cough and shortness of breath  Cardiovascular: Negative for chest pain and palpitations  Gastrointestinal: Negative for abdominal pain, constipation, diarrhea and vomiting  Genitourinary: Negative for dysuria and hematuria  Musculoskeletal: Positive for arthralgias and back pain  Negative for joint swelling  Skin: Negative for rash  Neurological: Negative for dizziness, syncope and headaches  All other systems reviewed and are negative  Objective:      /80 (BP Location: Right arm, Patient Position: Sitting, Cuff Size: Large)   Pulse 77   Temp 98 7 °F (37 1 °C) (Temporal)   Resp 16   Ht 5' 1" (1 549 m)   Wt 123 kg (271 lb 6 4 oz)   LMP  (LMP Unknown)   SpO2 95%   Breastfeeding No   BMI 51 28 kg/m²          Physical Exam  Vitals reviewed  Constitutional:       General: She is not in acute distress  Appearance: She is normal weight  HENT:      Head: Atraumatic  Eyes:      Conjunctiva/sclera: Conjunctivae normal    Cardiovascular:      Rate and Rhythm: Normal rate and regular rhythm  Pulses: Normal pulses  Heart sounds: Normal heart sounds  No murmur heard  Pulmonary:      Effort: Pulmonary effort is normal       Breath sounds: Normal breath sounds  No wheezing, rhonchi or rales  Abdominal:      General: Abdomen is flat  Bowel sounds are normal  There is no distension  Palpations: Abdomen is soft  Tenderness: There is no abdominal tenderness  Musculoskeletal:      Cervical back: Normal range of motion  Lumbar back: Tenderness (Mid lumbar region) present  No swelling  Normal range of motion  Positive left straight leg raise test  Negative right straight leg raise test       Comments:     Skin:     General: Skin is warm and dry  Neurological:      General: No focal deficit present  Mental Status: She is alert  Sensory: No sensory deficit  Motor: No weakness     Psychiatric:         Behavior: Behavior normal

## 2022-08-28 NOTE — ASSESSMENT & PLAN NOTE
CT lung (January 2021): "There is a stable 6 mm left upper lobe pulmonary nodule and a stable 3 mm left lower lobe pulmonary nodule"    CT Lung Jan 2022:     1  Peripheral groundglass opacities mostly in a basilar distribution raises suspicion for Covid 19 infiltrates  Patient previously tested positive in September 2021 and findings could reflect breakthrough infection versus residual infiltrates  Chest x-ray dated 9/28/2021 appeared normal    2   Stable left upper lobe 5 mm and left lower lobe 4 mm pulmonary nodules      Lung-RADS 5, other clinically significant findings  Recommend three-month follow-up CT of the chest to ensure resolution or stability of opacities as above  Due for annual screening with LDCT in 12 months, January 2023 however repeat CT ordered and not yet done - due since Apr 2022  Patient reminded to have done

## 2022-08-29 ENCOUNTER — OFFICE VISIT (OUTPATIENT)
Dept: FAMILY MEDICINE CLINIC | Facility: CLINIC | Age: 62
End: 2022-08-29

## 2022-08-29 VITALS
DIASTOLIC BLOOD PRESSURE: 80 MMHG | HEIGHT: 61 IN | TEMPERATURE: 98.7 F | SYSTOLIC BLOOD PRESSURE: 126 MMHG | HEART RATE: 77 BPM | OXYGEN SATURATION: 95 % | RESPIRATION RATE: 16 BRPM | BODY MASS INDEX: 51.24 KG/M2 | WEIGHT: 271.4 LBS

## 2022-08-29 DIAGNOSIS — R91.8 LUNG NODULES: ICD-10-CM

## 2022-08-29 DIAGNOSIS — E66.01 MORBID OBESITY WITH BMI OF 40.0-44.9, ADULT (HCC): Primary | ICD-10-CM

## 2022-08-29 DIAGNOSIS — R15.1 FECAL SMEARING: ICD-10-CM

## 2022-08-29 DIAGNOSIS — M54.16 LUMBAR RADICULOPATHY: ICD-10-CM

## 2022-08-29 PROCEDURE — 3074F SYST BP LT 130 MM HG: CPT | Performed by: INTERNAL MEDICINE

## 2022-08-29 PROCEDURE — 99213 OFFICE O/P EST LOW 20 MIN: CPT | Performed by: INTERNAL MEDICINE

## 2022-08-29 PROCEDURE — 3079F DIAST BP 80-89 MM HG: CPT | Performed by: INTERNAL MEDICINE

## 2022-08-29 NOTE — ASSESSMENT & PLAN NOTE
Weight increased back to 271lbs  Reports unable to exercise due to intolerance of exertional pain  Has to stop and rest for 5 minutes every 5 minutes due to back and leg pain on ambulating  Attempts isometric exercises on bed  Further cut down intake of foods prepared by  and cut out chocolate as it causes fecal incontinence for her  - will review in 2 weeks with meal picture diary to get idea of patients current intake and attempt to design dietary plan from there  - Referral to orthopedics for possible spinal surgery to correct radiculopathy and allow for better exercise functionality   - Will confer with nutritionist for assistance in this patients condition   - Called pharmacy to assess prescription for saxenda - patient did not follow up and did not hear from pharmacy for prescription availability - requires PA and forms requested from Pharmacy to be completed and returned

## 2022-09-12 DIAGNOSIS — G25.0 BENIGN ESSENTIAL TREMOR: ICD-10-CM

## 2022-09-12 NOTE — TELEPHONE ENCOUNTER
Received refill request for gabapentin 300 mg TID from pharmacy     Pended script below, please review and sign if agreeable

## 2022-09-13 RX ORDER — GABAPENTIN 300 MG/1
300 CAPSULE ORAL 3 TIMES DAILY
Qty: 90 CAPSULE | Refills: 2 | Status: SHIPPED | OUTPATIENT
Start: 2022-09-13

## 2022-09-19 ENCOUNTER — OFFICE VISIT (OUTPATIENT)
Dept: FAMILY MEDICINE CLINIC | Facility: CLINIC | Age: 62
End: 2022-09-19

## 2022-09-19 ENCOUNTER — HOSPITAL ENCOUNTER (OUTPATIENT)
Dept: RADIOLOGY | Facility: HOSPITAL | Age: 62
Discharge: HOME | End: 2022-09-19
Attending: ORTHOPAEDIC SURGERY
Payer: MEDICARE

## 2022-09-19 ENCOUNTER — OFFICE VISIT (OUTPATIENT)
Dept: OBGYN CLINIC | Facility: HOSPITAL | Age: 62
End: 2022-09-19
Payer: MEDICARE

## 2022-09-19 VITALS
RESPIRATION RATE: 18 BRPM | TEMPERATURE: 97.6 F | BODY MASS INDEX: 50.83 KG/M2 | HEART RATE: 61 BPM | WEIGHT: 269 LBS | DIASTOLIC BLOOD PRESSURE: 60 MMHG | SYSTOLIC BLOOD PRESSURE: 116 MMHG | OXYGEN SATURATION: 97 %

## 2022-09-19 VITALS
SYSTOLIC BLOOD PRESSURE: 113 MMHG | BODY MASS INDEX: 50.79 KG/M2 | WEIGHT: 269 LBS | DIASTOLIC BLOOD PRESSURE: 80 MMHG | HEART RATE: 73 BPM | HEIGHT: 61 IN

## 2022-09-19 DIAGNOSIS — G95.19 NEUROGENIC CLAUDICATION (HCC): Primary | ICD-10-CM

## 2022-09-19 DIAGNOSIS — E66.01 MORBID OBESITY WITH BMI OF 50.0-59.9, ADULT (HCC): ICD-10-CM

## 2022-09-19 DIAGNOSIS — M54.50 LUMBAR PAIN: ICD-10-CM

## 2022-09-19 DIAGNOSIS — G95.19 NEUROGENIC CLAUDICATION (HCC): ICD-10-CM

## 2022-09-19 DIAGNOSIS — M43.10 DEGENERATIVE SPONDYLOLISTHESIS: Primary | ICD-10-CM

## 2022-09-19 DIAGNOSIS — M54.16 LUMBAR RADICULOPATHY: ICD-10-CM

## 2022-09-19 PROCEDURE — 3079F DIAST BP 80-89 MM HG: CPT | Performed by: FAMILY MEDICINE

## 2022-09-19 PROCEDURE — 3074F SYST BP LT 130 MM HG: CPT | Performed by: FAMILY MEDICINE

## 2022-09-19 PROCEDURE — 99204 OFFICE O/P NEW MOD 45 MIN: CPT | Performed by: ORTHOPAEDIC SURGERY

## 2022-09-19 PROCEDURE — 72110 X-RAY EXAM L-2 SPINE 4/>VWS: CPT

## 2022-09-19 PROCEDURE — 99213 OFFICE O/P EST LOW 20 MIN: CPT | Performed by: FAMILY MEDICINE

## 2022-09-19 RX ORDER — CLOTRIMAZOLE 1 %
CREAM (GRAM) TOPICAL
COMMUNITY

## 2022-09-19 RX ORDER — TERBINAFINE HYDROCHLORIDE 250 MG/1
TABLET ORAL
COMMUNITY

## 2022-09-19 NOTE — PATIENT INSTRUCTIONS
[employer]  Make a food diary for all of your meals over the next two weeks:    Make columns for each of the following:  - Date  - Meal (Breakfast, Lunch, Dinner, Snacks)  - Food (list all of the foods in your meal or if using a one pot dish then you can use the "[employer]" to assist with your calorie information for the recipe)  - Portions of each food in your meal

## 2022-09-19 NOTE — PROGRESS NOTES
NAME: Britt Perez  : 1960  PCP: Mata Romero MD      Chief Complaint: low back pain without radicular symptoms    HPI:  57 y o  female presenting for initial visit with chief complaint of low back and buttock pain  She reports her back pain has gotten worse over the past year or so  She notes the LBP is worse when ambulating and frequently needs to sit down and take a break when walking any distance  She is able to resume walking after some rest  Patient notes it is also difficult to bend down and pick items up due to her LBP  She occasionally uses a cane for ambulatory assistance, but notes she has bad knees and ankles making ambulation more difficult  Patient also notes difficulty with steps, but notes this is mainly due to her knee pain  Denies numbness and tingling  Denies burning  Denies any rekha trauma  Denies fever or chills  Denies any bladder or bowel changes  Patient has hx of prediabetes  She has a previous 48 year smoking history, but notes smoking cessation x1 year  Conservative therapy includes the following:   Patient takes Tylenol for her arthritis, but notes this does not help her back pain  Patient has had spinal injections in the past and notes they provided temporary relief  Patient did formal physical therapy for her back pain, but it only helped temporarily  Denies aqua therapy  These therapeutic modalities were ineffective  The patient has noticed significant impairment in performing the following ADLs: Patient is currently on disability for the past 2-3 years  She worked at [employer] for 13 years prior to disability  She notes having to walk far to work everyday and standing a majority of the day       FAMILY HISTORY       PAST MEDICAL HISTORY:   Past Medical History:   Diagnosis Date    Allergic rhinitis     Anemia     Anxiety     Arthritis     Back pain     Cancer ([facility] [address] )     Chronic pain disorder     back    COPD [facility] obstructive pulmonary disease) ([facility] )     CPAP (continuous positive airway pressure) dependence     Depression     Diabetes mellitus (HCC)     Pre- Diabetic    Dyslipidemia     Essential tremor     Excessive daytime sleepiness     GERD (gastroesophageal reflux disease)     History of uterine cancer     Hyperglycemia     Hyperlipidemia     Hypertension     Hypovitaminosis D     Iron deficiency anemia     Joint pain     Mood disorder (HCC)     Obesity     Obstructive sleep apnea     Ringing in ears     RLS (restless legs syndrome)     Snoring     Uterine cancer (HCC)     age 20   Anushka Anderson Vitamin D deficiency     Witnessed episode of apnea        MEDICATIONS:  Current Outpatient Medications   Medication Sig Dispense Refill    acetaminophen (TYLENOL) 650 mg CR tablet Take 1 tablet (650 mg total) by mouth every 8 (eight) hours as needed for mild pain 60 tablet 3    acetaminophen (TYLENOL) 650 mg CR tablet Take 1 tablet (650 mg total) by mouth every 8 (eight) hours as needed for mild pain (Patient not taking: No sig reported) 30 tablet 0    albuterol (Ventolin HFA) 90 mcg/act inhaler Inhale 2 puffs every 6 (six) hours as needed for wheezing 18 g 1    Ascorbic Acid (vitamin C) 100 MG tablet Take 100 mg by mouth daily      aspirin (Aspirin Low Dose) 81 mg EC tablet Take 1 tablet (81 mg total) by mouth daily 30 tablet 3    atorvastatin (LIPITOR) 40 mg tablet Take 1 tablet (40 mg total) by mouth daily 90 tablet 3    budesonide-formoterol (SYMBICORT) 160-4 5 mcg/act inhaler Inhale 1 puff 2 (two) times a day Rinse mouth after use  10 2 g 5    cetirizine (ZyrTEC) 10 mg tablet Take 10 mg by mouth daily      cholecalciferol (VITAMIN D3) 1,000 units tablet Take 1 tablet (1,000 Units total) by mouth daily 30 tablet 5    clotrimazole (LOTRIMIN) 1 % cream clotrimazole 1 % topical cream   APPLY TOPICALLY TO THE AFFECTED AREA AND SURROUNDING AREAS OF SKI     (REFER TO PRESCRIPTION NOTES)        Diclofenac Sodium (VOLTAREN) 1 % Apply 2 g topically 4 (four) times a day (Patient not taking: Reported on 3/31/2022 ) 100 g 0    fluticasone (FLONASE) 50 mcg/act nasal spray 1 spray into each nostril daily 1 Bottle 3    gabapentin (NEURONTIN) 300 mg capsule Take 1 capsule (300 mg total) by mouth 3 (three) times a day Take 1 tablet 3 times a day 90 capsule 2    guaiFENesin (MUCINEX) 600 mg 12 hr tablet Take 1 tablet (600 mg total) by mouth 2 (two) times a day as needed for cough (Patient not taking: Reported on 3/31/2022 ) 60 tablet 0    liraglutide (SAXENDA) injection Inject 0 1 mL (0 6 mg total) under the skin daily for 7 days, THEN 0 2 mL (1 2 mg total) daily for 7 days, THEN 0 3 mL (1 8 mg total) daily for 7 days, THEN 0 4 mL (2 4 mg total) daily for 7 days, THEN 0 5 mL (3 mg total) daily  35 mL 0    montelukast (SINGULAIR) 10 mg tablet take 1 tablet by mouth at bedtime 30 tablet 3    nystatin (MYCOSTATIN) powder Apply topically 3 (three) times a day 120 g 2    propranolol (INDERAL) 40 mg tablet take 1 tablet by mouth every 12 hours 180 tablet 5    terbinafine (LamISIL) 250 mg tablet terbinafine HCl 250 mg tablet   take 1 tablet by mouth once daily       No current facility-administered medications for this visit         PAST SURGICAL HISTORY:  Past Surgical History:   Procedure Laterality Date    APPENDECTOMY      HYSTERECTOMY      age 20    KNEE ARTHROSCOPY      KNEE SURGERY      Meniscus tear    TONSILLECTOMY      TUBAL LIGATION         SOCIAL HISTORY:  Social History     Socioeconomic History    Marital status: Single     Spouse name: Not on file    Number of children: Not on file    Years of education: Not on file    Highest education level: Not on file   Occupational History    Not on file   Tobacco Use    Smoking status: Former Smoker     Packs/day: 1 50     Years: 47 00     Pack years: 70 50     Types: Cigarettes     Start date: 63     Quit date: 2021     Years since quittin 9    Smokeless tobacco: Never Used Vaping Use    Vaping Use: Never used   Substance and Sexual Activity    Alcohol use: Not Currently     Comment: very seldom    Drug use: Not Currently     Comment: pt use at age 14/17    Sexual activity: Not on file   Other Topics Concern    Not on file   Social History Narrative    Not on file     Social Determinants of Health     Financial Resource Strain: Low Risk     Difficulty of Paying Living Expenses: Not hard at all   Food Insecurity: No Food Insecurity    Worried About [address] in the Last Year: Never true    [address] in the Last Year: Never true   Transportation Needs: No Transportation Needs    Lack of Transportation (Medical): No    Lack of Transportation (Non-Medical): No   Physical Activity: Not on file   Stress: Not on file   Social Connections: Not on file   Intimate Partner Violence: Not on file   Housing Stability: Low Risk     Unable to Pay for Housing in the Last Year: No    Number of Places Lived in the Last Year: 1    Unstable Housing in the Last Year: No       ALLERGIES:  Allergies   Allergen Reactions    Aspirin GI Intolerance     Can only take baby aspirin        ROS:   Constitutional:  No fever, chills, night sweats, loss of appetite   HEENT No no sore throat, difficulty swallowing   Cardiovascular:  No chest pain, palpitations, BLE edema, SANCHEZ   Respiratory:  No SOB, coughing, chest congestion   Gastrointestinal:  No nausea, vomiting, abdominal pain   Genitourinary:  No dysuria, hematuria, urinary urgency/frequency   Musculoskeletal:  See HPI   Skin:  No rash, erythema, edema   Neurologic:  See HPI   Psychiatric Illness:  No depression, anxiety, mood disorder, substance abuse disorder     PHYSICAL EXAM:  /80   Pulse 73   Ht 5' 1" (1 549 m)   Wt 122 kg (269 lb)   LMP  (LMP Unknown)   BMI 50 83 kg/m²           General:  Well-developed,appears well, no acute distress   Respiratory:  No SOB, no abnormal effort (use of accessory muscles)      GI / Abdominal:  Soft  No abdominal masses or tenderness  Nondistended  Gait & balance No evidence of myelopathic gait  Lumbar spine range of motion:  -Forward flexion to 45  -Extension to neutral  -Lateral bend 30 right, 30 left  -Rotation 30 right, 30 left  There is no point tenderness with palpation along the posterior cervical, thoracic, lumbar spine  +Tenderness left PSIS     Neurologic:    Lower Extremity Motor Function **exam limited by patient ability to fully participate 2/2 habitus   Right  Left    Iliopsoas  5/5  5/5    Quadriceps 5/5 5/5   Tibialis anterior  5/5  5/5    EHL  5/5  5/5    Gastroc  muscle  5/5  5/5                Sensory: light touch is intact to bilateral upper and lower extremities     Reflexes:    Right Left   Biceps 2+ 2+   Triceps 2+ 2+   Brachioradialis 2+ 2+   Patellar 1+ 1+   Achilles 1+ 1+   Babinski neg neg     Other tests:  Pope's: Negative  Clonus: Negative    +left sided SI joint provacative maneuvers     IMAGING: I have personally reviewed the images and these are my findings:  Lumbar XRays from 9/19/22: L4-5 spondylolisthesis with dynamic instability noted on the flexion/extension radiographs, lumbar degenerative disease with loss of disc space height most notable at L5-S1, mild coronal imbalance with left lateral shift, overall maintained global sagittal alignment         ASSESSMENT / Medical Decision Making (MDM):  Patient is a 67-year-old female with history of low back pain radiating into bilateral buttock  Pain is exacerbated with walking and relieved with rest   No concept tower bladder, no fever, chills, night sweats  Patient is currently on disability secondary to her back issues for the last several years  She previously worked at [employer] and was able to walk several blocks prior to her symptoms deteriorating over the course of the past 3 years  Physical exam and showing no focal neurological deficits    Exam was limited due to patient habitus and ability to participate  Patient BMI of 51  Patient did have exquisite tenderness over the left paraspinal musculature as well as the PSIS and a positive ELISA the left side  Imaging reviewed as above  Findings this is notable for L4-5 spondylolisthesis with dynamic instability on flexion-extension films  Impression of neurogenic claudication and degenerative lumbar spondylolisthesis, as well as left-sided SI joint dysfunction  Plan:  The above clinical physical imaging findings were reviewed with patient she has a constellation findings most consistent with neurogenic claudication secondary degenerative lumbar spondylolisthesis  She also does have a component of left-sided SI joint dysfunction  Patient does note that she had moderate symptom relief with an injection several years ago  She has not had any other recent conservative treatments  We discussed the role of aqua therapy but patient would like to continue in a physical therapy program close to her home     We did  the patient that this pathology is potentially treated with surgical intervention unfortunately due to her elevated BMI surgery would not be an option at this time due to potential complications  We provided a consult to Nutrition Services to assess healthy eating habits as well as potential weight loss therapies  In addition we did provide the patient with a consult to Pain Management for evaluation for left SI joint injection as well as future epidural injection  We did  the patient that her SI is unlikely her main cause symptoms but could potentially help with pain relief due to her left SI joint dysfunction  MRI lumbar spine script was provided to the patient to evaluate the neural elements  Patient was congratulated for being 1 year tobacco free  Patient will follow-up upon completion of physical therapy and MRI

## 2022-09-21 NOTE — ASSESSMENT & PLAN NOTE
Followed up with ortho and confirmed suspicion of neurogenic claudication related to spondylisthesis as well as SI joint dysfunction  Benefit from surgery however unable to do in this patient due to BMI of 51  Aquatherapy recommended however patient prefers PT close to home and therefore will continue with PT and ref to pain management

## 2022-09-21 NOTE — PROGRESS NOTES
Name: Chely Alvarenga      :       MRN: [id number]  Encounter Provider: Kev Toth MD  Encounter Date: 2022   Encounter department: [facility]     1  Neurogenic claudication (HCC)  Assessment & Plan:  Followed up with ortho and confirmed suspicion of neurogenic claudication related to spondylisthesis as well as SI joint dysfunction  Benefit from surgery however unable to do in this patient due to BMI of 51  Aquatherapy recommended however patient prefers PT close to home and therefore will continue with PT and ref to pain management  2  Morbid obesity with BMI of 50 0-59 9, adult [facility])  Assessment & Plan:  Minimal Weight change 271lbs to 269lb in past 2 weeks  - will review in 2 weeks with food diary to get idea of patients current intake and attempt to design dietary plan from there  Will list of green, yellow and red food allowances in preparation fro that time based on common foods currently consumed  - Will complete PA for saxenda            Subjective      Chely Alavrenga is a very pleasant 57 y o  female who presents today for review of weight loss  Patient has not been able to bring in photo diary and did not write down meals as alternative either  Was not able to follow up with pharmacy but I intend to check for PA from pharmacy myself and send online in the next week for liraglutide  She reports good water consumption, high red meat consumption and attempt to control portions limited to satiety sensation  Review of Systems   Constitutional: Negative for chills and fever  Respiratory: Negative for cough and shortness of breath  Cardiovascular: Negative for chest pain  Gastrointestinal: Negative for abdominal pain  Musculoskeletal: Positive for arthralgias and back pain  Skin: Negative for rash  All other systems reviewed and are negative        Current Outpatient Medications on File Prior to Visit   Medication Sig    acetaminophen (TYLENOL) 650 mg CR tablet Take 1 tablet (650 mg total) by mouth every 8 (eight) hours as needed for mild pain    acetaminophen (TYLENOL) 650 mg CR tablet Take 1 tablet (650 mg total) by mouth every 8 (eight) hours as needed for mild pain (Patient not taking: No sig reported)    albuterol (Ventolin HFA) 90 mcg/act inhaler Inhale 2 puffs every 6 (six) hours as needed for wheezing    Ascorbic Acid (vitamin C) 100 MG tablet Take 100 mg by mouth daily    aspirin (Aspirin Low Dose) 81 mg EC tablet Take 1 tablet (81 mg total) by mouth daily    atorvastatin (LIPITOR) 40 mg tablet Take 1 tablet (40 mg total) by mouth daily    budesonide-formoterol (SYMBICORT) 160-4 5 mcg/act inhaler Inhale 1 puff 2 (two) times a day Rinse mouth after use   cetirizine (ZyrTEC) 10 mg tablet Take 10 mg by mouth daily    cholecalciferol (VITAMIN D3) 1,000 units tablet Take 1 tablet (1,000 Units total) by mouth daily    Diclofenac Sodium (VOLTAREN) 1 % Apply 2 g topically 4 (four) times a day (Patient not taking: Reported on 3/31/2022 )    fluticasone (FLONASE) 50 mcg/act nasal spray 1 spray into each nostril daily    gabapentin (NEURONTIN) 300 mg capsule Take 1 capsule (300 mg total) by mouth 3 (three) times a day Take 1 tablet 3 times a day    guaiFENesin (MUCINEX) 600 mg 12 hr tablet Take 1 tablet (600 mg total) by mouth 2 (two) times a day as needed for cough (Patient not taking: Reported on 3/31/2022 )    liraglutide (SAXENDA) injection Inject 0 1 mL (0 6 mg total) under the skin daily for 7 days, THEN 0 2 mL (1 2 mg total) daily for 7 days, THEN 0 3 mL (1 8 mg total) daily for 7 days, THEN 0 4 mL (2 4 mg total) daily for 7 days, THEN 0 5 mL (3 mg total) daily      montelukast (SINGULAIR) 10 mg tablet take 1 tablet by mouth at bedtime    nystatin (MYCOSTATIN) powder Apply topically 3 (three) times a day    propranolol (INDERAL) 40 mg tablet take 1 tablet by mouth every 12 hours Objective     /60 (BP Location: Left arm, Patient Position: Sitting, Cuff Size: Adult)   Pulse 61   Temp 97 6 °F (36 4 °C) (Temporal)   Resp 18   Wt 122 kg (269 lb)   LMP  (LMP Unknown)   SpO2 97%   BMI 50 83 kg/m²     Physical Exam  Vitals reviewed  Constitutional:       General: She is not in acute distress  Appearance: She is obese  HENT:      Head: Atraumatic  Eyes:      Conjunctiva/sclera: Conjunctivae normal    Cardiovascular:      Rate and Rhythm: Normal rate and regular rhythm  Pulses: Normal pulses  Heart sounds: Normal heart sounds  No murmur heard  Pulmonary:      Effort: Pulmonary effort is normal       Breath sounds: Normal breath sounds  No wheezing, rhonchi or rales  Musculoskeletal:         General: Normal range of motion  Cervical back: Normal range of motion  Right lower leg: No edema  Left lower leg: No edema  Skin:     General: Skin is warm and dry  Neurological:      Mental Status: She is alert  Mental status is at baseline     Psychiatric:         Behavior: Behavior normal        Shaunna Arreola MD

## 2022-09-21 NOTE — ASSESSMENT & PLAN NOTE
Minimal Weight change 271lbs to 269lb in past 2 weeks  - will review in 2 weeks with food diary to get idea of patients current intake and attempt to design dietary plan from there  Will list of green, yellow and red food allowances in preparation fro that time based on common foods currently consumed    - Will complete PA for noemi

## 2022-09-23 ENCOUNTER — EVALUATION (OUTPATIENT)
Dept: PHYSICAL THERAPY | Facility: MEDICAL CENTER | Age: 62
End: 2022-09-23
Payer: MEDICARE

## 2022-09-23 DIAGNOSIS — M54.50 LUMBAR PAIN: Primary | ICD-10-CM

## 2022-09-23 DIAGNOSIS — M43.10 DEGENERATIVE SPONDYLOLISTHESIS: ICD-10-CM

## 2022-09-23 PROCEDURE — 97161 PT EVAL LOW COMPLEX 20 MIN: CPT | Performed by: PHYSICAL THERAPIST
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
## 2022-09-26 ENCOUNTER — OFFICE VISIT (OUTPATIENT)
Dept: PHYSICAL THERAPY | Facility: MEDICAL CENTER | Age: 62
End: 2022-09-26
Payer: MEDICARE

## 2022-09-26 DIAGNOSIS — M54.50 LUMBAR PAIN: Primary | ICD-10-CM

## 2022-09-26 DIAGNOSIS — M43.10 DEGENERATIVE SPONDYLOLISTHESIS: ICD-10-CM

## 2022-09-26 PROCEDURE — 97140 MANUAL THERAPY 1/> REGIONS: CPT | Performed by: PHYSICAL THERAPIST

## 2022-09-26 PROCEDURE — 97110 THERAPEUTIC EXERCISES: CPT | Performed by: PHYSICAL THERAPIST

## 2022-09-26 NOTE — PROGRESS NOTES
Daily Note     Today's date: 2022  Patient name: Marvin Denney  :   MRN: [id number]  Referring provider: Aracely Mata  Dx:   Encounter Diagnosis     ICD-10-CM    1  Lumbar pain  M54 50    2  Degenerative spondylolisthesis  M43 10                   Subjective: Patient reports that she has been having a lot of pain over the past few days in her low back/tailbone region  Objective: See treatment diary below      Assessment: Patient responded well to STM to bilateral lumbar paraspinals with report of decreased LBP when ambulating post-tx  Tactile cueing provided for proper TA activation in sidelying position  Decreased reps of seated resisted hip ABD due to patient report of increased LBP after periods of prolonged sitting  Also added standing heel raises to improve LE functional strength to decrease compensatory strain on lumbar spine during ADL  Patient reported decreased pain after MHP post-tx  She was educated to add TA activation to HEP  Patient would benefit from continued PT to address impairments to maximize function  Plan: Continue per plan of care        Precautions: LATEX ALLERGY, ADHESIVE ALLERGY, grade I anterolisthesis L4/L5 (dynamic instability per x-ray), GERD, COPD, sleep apnea, hx of uterine cancer (removal via hysterectomy), hx of L knee arthroscopy  *Unable to lie supine or prone    HEP: glute sets, sidelying TA activation  Manuals            Sidelying STM to lumbar paraspinals  KP x15'                                                  Neuro Re-Ed                                                                 Ther Ex             Sidelying TA activation  3"x20           Seated glute squeezes HEP HEP review           Seated ball squeezes NV 5"x20           Seated clams NV 5"x15 YTB           Marches NV- seated NV           Heel raises NV- seated 3"x20 stand           HEP education and instruction  x5'                                     Ther Activity Gait Training                                       Modalities             MHP lumbar PRN (sidelying/seated)  10' sidelying post

## 2022-09-29 ENCOUNTER — OFFICE VISIT (OUTPATIENT)
Dept: PHYSICAL THERAPY | Facility: MEDICAL CENTER | Age: 62
End: 2022-09-29
Payer: MEDICARE

## 2022-09-29 DIAGNOSIS — M43.10 DEGENERATIVE SPONDYLOLISTHESIS: ICD-10-CM

## 2022-09-29 DIAGNOSIS — M54.50 LUMBAR PAIN: Primary | ICD-10-CM

## 2022-09-29 PROCEDURE — 97140 MANUAL THERAPY 1/> REGIONS: CPT | Performed by: PHYSICAL THERAPIST

## 2022-09-29 PROCEDURE — 97110 THERAPEUTIC EXERCISES: CPT | Performed by: PHYSICAL THERAPIST

## 2022-09-29 NOTE — PROGRESS NOTES
Daily Note     Today's date: 2022  Patient name: Jeovany Bill  :   MRN: [id number]  Referring provider: Jen Sacks  Dx:   Encounter Diagnosis     ICD-10-CM    1  Lumbar pain  M54 50    2  Degenerative spondylolisthesis  M43 10                   Subjective: Patient reports that she had some muscular soreness after last visit, but she is feeling pretty good today  She notes that she felt relief after the STM last session  Objective: See treatment diary below      Assessment: Continued with STM to bilateral lumbar paraspinals to decrease soft tissue restrictions with patient reporting decreased pain post-tx  Added standing marches/toe raises today to further improve CKC strength and to decrease compensatory strain on lumbar spine when ambulating  Also added pball press to improve core strength  Decreased reps of heel raises due to fatigue  Patient demonstrated appropriate fatigue post-tx and was able to complete program without pain  Patient would benefit from continued PT to improve lumbar mobility and functional strength to increase functional activity tolerance  Plan: Continue per plan of care        Precautions: LATEX ALLERGY, ADHESIVE ALLERGY, grade I anterolisthesis L4/L5 (dynamic instability per x-ray), GERD, COPD, sleep apnea, hx of uterine cancer (removal via hysterectomy), hx of L knee arthroscopy  *Unable to lie supine or prone    HEP: glute sets, sidelying TA activation  Manuals           Sidelying STM to lumbar paraspinals  KP x15' KP x15'                                                 Neuro Re-Ed                                                                 Ther Ex             Sidelying TA activation  3"x20 5"x30          Standing TA pball press   3"x20          Seated glute squeezes HEP HEP review HEP          Seated ball squeezes NV 5"x20 5"x20          Seated clams NV 5"x15 YTB 5"x20 YTB          Marches NV- seated NV x10 ea stand Heel raises NV- seated 3"x20 stand 5"x15 stand          Toe raises   5"x15          HEP education and instruction  x5'                                     Ther Activity                                       Gait Training                                       Modalities             MHP lumbar PRN (sidelying/seated)  10' sidelying post np

## 2022-10-03 ENCOUNTER — OFFICE VISIT (OUTPATIENT)
Dept: FAMILY MEDICINE CLINIC | Facility: CLINIC | Age: 62
End: 2022-10-03

## 2022-10-03 VITALS
BODY MASS INDEX: 51.54 KG/M2 | RESPIRATION RATE: 18 BRPM | WEIGHT: 273 LBS | HEART RATE: 75 BPM | SYSTOLIC BLOOD PRESSURE: 132 MMHG | HEIGHT: 61 IN | OXYGEN SATURATION: 98 % | DIASTOLIC BLOOD PRESSURE: 84 MMHG | TEMPERATURE: 97.5 F

## 2022-10-03 DIAGNOSIS — E66.01 MORBID OBESITY WITH BMI OF 50.0-59.9, ADULT (HCC): ICD-10-CM

## 2022-10-03 DIAGNOSIS — J30.89 SEASONAL ALLERGIC RHINITIS DUE TO OTHER ALLERGIC TRIGGER: Primary | ICD-10-CM

## 2022-10-03 PROCEDURE — 3079F DIAST BP 80-89 MM HG: CPT | Performed by: FAMILY MEDICINE

## 2022-10-03 PROCEDURE — 3075F SYST BP GE 130 - 139MM HG: CPT | Performed by: FAMILY MEDICINE

## 2022-10-03 PROCEDURE — 99213 OFFICE O/P EST LOW 20 MIN: CPT | Performed by: FAMILY MEDICINE

## 2022-10-03 RX ORDER — AZELASTINE 1 MG/ML
1 SPRAY, METERED NASAL 2 TIMES DAILY
Qty: 30 ML | Refills: 1 | Status: SHIPPED | OUTPATIENT
Start: 2022-10-03 | End: 2022-11-02

## 2022-10-03 NOTE — PROGRESS NOTES
Name: Najma Avila      :       MRN: [id number]  Encounter Provider: Danilo London MD  Encounter Date: 10/3/2022   Encounter department: [facility]     1  Seasonal allergic rhinitis due to other allergic trigger  Assessment & Plan:  Worsening in last week due to seasonal changes  Denies ill contacts and allergens in home have not changed  Taking flonase and allegra  Will do trial of azelastine nasal spray and assess efficacy at follow up appt  Orders:  -     azelastine (ASTELIN) 0 1 % nasal spray; 1 spray into each nostril 2 (two) times a day Use in each nostril as directed    2  Morbid obesity with BMI of 50 0-59 9, adult [facility])  Assessment & Plan:  Discussed food groups and gave food diary print out with sample completed together in office for surveillance over next 2 weeks  Discussed possible dietary plans however finance and dietary habits will be prohibiting factors which we discussed  Patient counseled regarding barriers to dietary changes  She received denial from insurance last week for saxenda and therefore I will contact insurance again to obtain suitable alternatives to medicinal weight loss regimens that are covered under her plan  We will discuss reading nutrition facts, review food groups and plan with food diary how we can reduce caloric intake by 500 to 1000 gilson per day in order to start achieving weight loss goal of 1-2 lbs per week  Subjective      Najma Avila is a very pleasant 57 y o  female who presents today for review of weight loss plans  Of note, forgot to bring in utensils to demonstrate portion sizes of her home materials but encouraged she may do so at next visit  Was not able to download [facility] and we tried together in office - could not find this christiano but downloaded alternative calorie tracker   Staes she did not know how to do the food diary - how and what to write but reports to me typical brEAKFAST - 2 BOILED EGGS, hash brown, 2 toast and glass of milk 1%  Minimal intake of fruits due to affordability but may consume bananas  Suggestion to try higher fiber breakfast in that case with oatmeal sweetened with bananas made however with more details on food intake we will discuss more realistic options at follow up visit  Review of Systems   Constitutional: Negative for chills and fever  HENT: Positive for congestion and rhinorrhea  Negative for sore throat  Respiratory: Negative for cough and shortness of breath  Cardiovascular: Negative for chest pain  Musculoskeletal: Positive for arthralgias and back pain  All other systems reviewed and are negative  Current Outpatient Medications on File Prior to Visit   Medication Sig    acetaminophen (TYLENOL) 650 mg CR tablet Take 1 tablet (650 mg total) by mouth every 8 (eight) hours as needed for mild pain    acetaminophen (TYLENOL) 650 mg CR tablet Take 1 tablet (650 mg total) by mouth every 8 (eight) hours as needed for mild pain (Patient not taking: No sig reported)    albuterol (Ventolin HFA) 90 mcg/act inhaler Inhale 2 puffs every 6 (six) hours as needed for wheezing    Ascorbic Acid (vitamin C) 100 MG tablet Take 100 mg by mouth daily    aspirin (Aspirin Low Dose) 81 mg EC tablet Take 1 tablet (81 mg total) by mouth daily    atorvastatin (LIPITOR) 40 mg tablet Take 1 tablet (40 mg total) by mouth daily    budesonide-formoterol (SYMBICORT) 160-4 5 mcg/act inhaler Inhale 1 puff 2 (two) times a day Rinse mouth after use   cetirizine (ZyrTEC) 10 mg tablet Take 10 mg by mouth daily    cholecalciferol (VITAMIN D3) 1,000 units tablet Take 1 tablet (1,000 Units total) by mouth daily    clotrimazole (LOTRIMIN) 1 % cream clotrimazole 1 % topical cream   APPLY TOPICALLY TO THE AFFECTED AREA AND SURROUNDING AREAS OF SKI     (REFER TO PRESCRIPTION NOTES)      Diclofenac Sodium (VOLTAREN) 1 % Apply 2 g topically 4 (four) times a day (Patient not taking: Reported on 3/31/2022 )    fluticasone (FLONASE) 50 mcg/act nasal spray 1 spray into each nostril daily    gabapentin (NEURONTIN) 300 mg capsule Take 1 capsule (300 mg total) by mouth 3 (three) times a day Take 1 tablet 3 times a day    guaiFENesin (MUCINEX) 600 mg 12 hr tablet Take 1 tablet (600 mg total) by mouth 2 (two) times a day as needed for cough (Patient not taking: Reported on 3/31/2022 )    liraglutide (SAXENDA) injection Inject 0 1 mL (0 6 mg total) under the skin daily for 7 days, THEN 0 2 mL (1 2 mg total) daily for 7 days, THEN 0 3 mL (1 8 mg total) daily for 7 days, THEN 0 4 mL (2 4 mg total) daily for 7 days, THEN 0 5 mL (3 mg total) daily   montelukast (SINGULAIR) 10 mg tablet take 1 tablet by mouth at bedtime    nystatin (MYCOSTATIN) powder Apply topically 3 (three) times a day    propranolol (INDERAL) 40 mg tablet take 1 tablet by mouth every 12 hours    terbinafine (LamISIL) 250 mg tablet terbinafine HCl 250 mg tablet   take 1 tablet by mouth once daily       Objective     /84 (BP Location: Left arm, Patient Position: Sitting, Cuff Size: Large)   Pulse 75   Temp 97 5 °F (36 4 °C) (Temporal)   Resp 18   Ht 5' 1" (1 549 m)   Wt 124 kg (273 lb)   LMP  (LMP Unknown)   SpO2 98%   BMI 51 58 kg/m²     Physical Exam  Vitals reviewed  Constitutional:       General: She is not in acute distress  Appearance: She is obese  HENT:      Head: Atraumatic  Eyes:      Conjunctiva/sclera: Conjunctivae normal    Cardiovascular:      Rate and Rhythm: Normal rate  Pulmonary:      Effort: Pulmonary effort is normal    Abdominal:      Palpations: Abdomen is soft  Musculoskeletal:         General: Normal range of motion  Cervical back: Normal range of motion  Skin:     General: Skin is warm and dry  Neurological:      Mental Status: She is alert     Psychiatric:         Behavior: Behavior normal        Collette Lozoya MD

## 2022-10-03 NOTE — ASSESSMENT & PLAN NOTE
Discussed food groups and gave food diary print out with sample completed together in office for surveillance over next 2 weeks  Discussed possible dietary plans however finance and dietary habits will be prohibiting factors which we discussed  Patient counseled regarding barriers to dietary changes  She received denial from insurance last week for saxenda and therefore I will contact insurance again to obtain suitable alternatives to medicinal weight loss regimens that are covered under her plan  We will discuss reading nutrition facts, review food groups and plan with food diary how we can reduce caloric intake by 500 to 1000 gilson per day in order to start achieving weight loss goal of 1-2 lbs per week

## 2022-10-03 NOTE — ASSESSMENT & PLAN NOTE
Worsening in last week due to seasonal changes  Denies ill contacts and allergens in home have not changed  Taking flonase and allegra  Will do trial of azelastine nasal spray and assess efficacy at follow up appt

## 2022-10-04 ENCOUNTER — APPOINTMENT (OUTPATIENT)
Dept: PHYSICAL THERAPY | Facility: MEDICAL CENTER | Age: 62
End: 2022-10-04

## 2022-10-07 ENCOUNTER — OFFICE VISIT (OUTPATIENT)
Dept: PHYSICAL THERAPY | Facility: MEDICAL CENTER | Age: 62
End: 2022-10-07
Payer: MEDICARE

## 2022-10-07 DIAGNOSIS — M54.50 LUMBAR PAIN: Primary | ICD-10-CM

## 2022-10-07 DIAGNOSIS — M43.10 DEGENERATIVE SPONDYLOLISTHESIS: ICD-10-CM

## 2022-10-07 PROCEDURE — 97110 THERAPEUTIC EXERCISES: CPT | Performed by: PHYSICAL THERAPIST

## 2022-10-07 PROCEDURE — 97140 MANUAL THERAPY 1/> REGIONS: CPT | Performed by: PHYSICAL THERAPIST

## 2022-10-07 NOTE — PROGRESS NOTES
Daily Note     Today's date: 10/7/2022  Patient name: Nancy Reyna  : [id number]  MRN: [id number]  Referring provider: Corrine Farrar  Dx:   Encounter Diagnosis     ICD-10-CM    1  Lumbar pain  M54 50    2  Degenerative spondylolisthesis  M43 10                   Subjective: Patient reports that she felt relief after the STM at last session  She notes that she had a lot of pain again a few days ago due to the weather changes, but she is feeling better again today  She will be going for her MRI on Monday (10/10)  Objective: See treatment diary below      Assessment: Continued with STM to bilateral lumbar paraspinals to address soft tissue restrictions with patient reporting decreased pain post-tx  Also continued with gentle CKC strengthening program to decrease compensatory strain on the lumbar spine when ambulating  Patient was able to progress reps for standing marches/heel raises/toe raises, which demonstrates improved endurance  Patient demonstrated appropriate fatigue post-tx and was able to complete all interventions in a pain-free range  Patient would benefit from continued PT to improve lumbar mobility and progress core stability program as tolerated to be able to ambulate longer distances  Plan: Continue per plan of care        Precautions: LATEX ALLERGY, ADHESIVE ALLERGY, grade I anterolisthesis L4/L5 (dynamic instability per x-ray), GERD, COPD, sleep apnea, hx of uterine cancer (removal via hysterectomy), hx of L knee arthroscopy  *Unable to lie supine or prone    HEP: glute sets, sidelying TA activation  Manuals 9/23 9/26 9/29 10/7         Sidelying STM to lumbar paraspinals  KP x15' KP x15' KP x15'                                                Neuro Re-Ed                                                                 Ther Ex             Sidelying TA activation  3"x20 5"x30 5"x30         Standing TA pball press   3"x20 5"x30         Seated glute squeezes HEP HEP review HEP HEP         Seated ball squeezes NV 5"x20 5"x20 5"x20         Seated clams NV 5"x15 YTB 5"x20 YTB 5"x20 YTB         Marches NV- seated NV x10 ea stand x20 ea stand         Heel raises NV- seated 3"x20 stand 5"x15 stand 5"x20 stand         Toe raises   5"x15 5"x20         HEP education and instruction  x5'                                     Ther Activity                                       Gait Training                                       Modalities             MHP lumbar PRN (sidelying/seated)  10' sidelying post np np

## 2022-10-11 ENCOUNTER — APPOINTMENT (OUTPATIENT)
Dept: PHYSICAL THERAPY | Facility: MEDICAL CENTER | Age: 62
End: 2022-10-11

## 2022-10-11 DIAGNOSIS — M43.10 DEGENERATIVE SPONDYLOLISTHESIS: ICD-10-CM

## 2022-10-11 DIAGNOSIS — M54.50 LUMBAR PAIN: Primary | ICD-10-CM

## 2022-10-11 NOTE — PROGRESS NOTES
Daily Note     Today's date: 10/11/2022  Patient name: Willy Vazquez  :   MRN: [id number]  Referring provider: Car Issa  Dx:   Encounter Diagnosis     ICD-10-CM    1  Lumbar pain  M54 50    2  Degenerative spondylolisthesis  M43 10                   Subjective: ***      Objective: See treatment diary below      Assessment: Tolerated treatment {Tolerated treatment :}   Patient {assessment:}      Plan: {PLAN:}     Precautions: LATEX ALLERGY, ADHESIVE ALLERGY, grade I anterolisthesis L4/L5 (dynamic instability per x-ray), GERD, COPD, sleep apnea, hx of uterine cancer (removal via hysterectomy), hx of L knee arthroscopy  *Unable to lie supine or prone    HEP: glute sets, sidelying TA activation  Manuals 9/23 9/26 9/29 10/7         Sidelying STM to lumbar paraspinals  KP x15' KP x15' KP x15'                                                Neuro Re-Ed                                                                 Ther Ex             Sidelying TA activation  3"x20 5"x30 5"x30         Standing TA pball press   3"x20 5"x30         Seated glute squeezes HEP HEP review HEP HEP         Seated ball squeezes NV 5"x20 5"x20 5"x20         Seated clams NV 5"x15 YTB 5"x20 YTB 5"x20 YTB         Marches NV- seated NV x10 ea stand x20 ea stand         Heel raises NV- seated 3"x20 stand 5"x15 stand 5"x20 stand         Toe raises   5"x15 5"x20         HEP education and instruction  x5'                                     Ther Activity                                       Gait Training                                       Modalities             MHP lumbar PRN (sidelying/seated)  10' sidelying post np np

## 2022-10-14 ENCOUNTER — OFFICE VISIT (OUTPATIENT)
Dept: PHYSICAL THERAPY | Facility: MEDICAL CENTER | Age: 62
End: 2022-10-14
Payer: MEDICARE

## 2022-10-14 DIAGNOSIS — M54.50 LUMBAR PAIN: Primary | ICD-10-CM

## 2022-10-14 DIAGNOSIS — M43.10 DEGENERATIVE SPONDYLOLISTHESIS: ICD-10-CM

## 2022-10-14 PROCEDURE — 97110 THERAPEUTIC EXERCISES: CPT | Performed by: PHYSICAL THERAPIST

## 2022-10-14 NOTE — PROGRESS NOTES
Daily Note     Today's date: 10/14/2022  Patient name: Shelly Connolly  :   MRN: [id number]  Referring provider: Floyd Caba  Dx:   Encounter Diagnosis     ICD-10-CM    1  Lumbar pain  M54 50    2  Degenerative spondylolisthesis  M43 10                   Subjective: Patient reports that she has been in a lot of pain the past few days  She has a lot of difficulty standing and walking for prolonged periods  She notes she had some soreness after the massage last session  Objective: See treatment diary below      Assessment: Held STM today due to report of soreness after this intervention at prior sessions  Continued with gentle functional strengthening program along with hip girdle/core stability within a pain-free range to decrease compensatory strain on lumbar spine during ADL  Performed toe raises in a seated position today due to pain after prolonged WB activity  Added core rows today to further improve core strength for ambulation  Patient was educated to add seated hip ABD to HEP to improve lumbopelvic strength  She demonstrated appropriate fatigue post-tx and tolerated treatment well  She reported alleviation of symptoms at the end of session  Patient would benefit from continued PT to progress interventions to maximize functional mobility  Plan: Continue per plan of care        Precautions: LATEX ALLERGY, ADHESIVE ALLERGY, grade I anterolisthesis L4/L5 (dynamic instability per x-ray), GERD, COPD, sleep apnea, hx of uterine cancer (removal via hysterectomy), hx of L knee arthroscopy  *Unable to lie supine or prone    HEP: glute sets, sidelying TA activation, seated clams  Manuals 9/23 9/26 9/29 10/7 10/14        Sidelying STM to lumbar paraspinals  KP x15' KP x15' KP x15' held                                               Neuro Re-Ed                                                                 Ther Ex             Sidelying TA activation  3"x20 5"x30 5"x30 HEP Standing TA pball press   3"x20 5"x30 5"x30        Seated glute squeezes HEP HEP review HEP HEP HEP        Seated ball squeezes NV 5"x20 5"x20 5"x20 5"x20        Seated clams NV 5"x15 YTB 5"x20 YTB 5"x20 YTB 5"x20 YTB        Marches NV- seated NV x10 ea stand x20 ea stand x20 ea stand        Heel raises NV- seated 3"x20 stand 5"x15 stand 5"x20 stand 5"x10 stand, 5"x30 seated        Toe raises   5"x15 5"x20 5"x20        Core row     2x10 YTB        HEP education and instruction  x5'                                     Ther Activity                                       Gait Training                                       Modalities             MHP lumbar PRN (sidelying/seated)  10' sidelying post np np 8' pre (+ throughout tx)

## 2022-10-18 ENCOUNTER — OFFICE VISIT (OUTPATIENT)
Dept: PHYSICAL THERAPY | Facility: MEDICAL CENTER | Age: 62
End: 2022-10-18
Payer: MEDICARE

## 2022-10-18 DIAGNOSIS — M54.50 LUMBAR PAIN: Primary | ICD-10-CM

## 2022-10-18 DIAGNOSIS — M43.10 DEGENERATIVE SPONDYLOLISTHESIS: ICD-10-CM

## 2022-10-18 PROCEDURE — 97110 THERAPEUTIC EXERCISES: CPT | Performed by: PHYSICAL THERAPIST

## 2022-10-18 NOTE — PROGRESS NOTES
Daily Note     Today's date: 10/18/2022  Patient name: Arjun Moseley  :   MRN: [id number]  Referring provider: Ortiz Woodard  Dx:   Encounter Diagnosis     ICD-10-CM    1  Lumbar pain  M54 50    2  Degenerative spondylolisthesis  M43 10                   Subjective: Patient reports that she is having a lot of pain today after being on her feet all morning doing a lot of walking  She does note that she felt better after her last PT session  Objective: See treatment diary below      Assessment: Patient ambulated in session today with antalgic gait pattern and use of SPC  Continued to hold on manual therapy due to patient responding well to active lumbopelvic stability and functional strengthening exercises last session  Patient responds well to continued positional changes throughout session versus sustaining positions for prolonged periods  Added side stepping and step ups to further improve LE strength and decrease compensatory strain on lumbar spine when ambulating  Patient reported alleviation of symptoms after MHP application post-tx and demonstrated slightly improved quality of gait mechanics at the end of session  She demonstrated appropriate fatigue post-tx and was able to complete program without pain  Patient would benefit from continued PT to progress core/hip girdle strengthening as appropriate to be able to stand for longer periods and ambulate longer distances  Plan: Continue per plan of care        Precautions: LATEX ALLERGY, ADHESIVE ALLERGY, grade I anterolisthesis L4/L5 (dynamic instability per x-ray), GERD, COPD, sleep apnea, hx of uterine cancer (removal via hysterectomy), hx of L knee arthroscopy  *Unable to lie supine or prone    HEP: glute sets, sidelying TA activation, seated clams  Manuals 9/23 9/26 9/29 10/7 10/14 10/18       Sidelying STM to lumbar paraspinals  KP x15' KP x15' KP x15' held held                                              Neuro Re-Ed Ther Ex             Sidelying TA activation  3"x20 5"x30 5"x30 HEP        Standing TA pball press   3"x20 5"x30 5"x30 5"x30       Seated glute squeezes HEP HEP review HEP HEP HEP        Seated ball squeezes NV 5"x20 5"x20 5"x20 5"x20 5"x30       Seated clams NV 5"x15 YTB 5"x20 YTB 5"x20 YTB 5"x20 YTB 5"x30 YTB       Marches NV- seated NV x10 ea stand x20 ea stand x20 ea stand x20 ea stand, x20 ea seated       Heel raises NV- seated 3"x20 stand 5"x15 stand 5"x20 stand 5"x10 stand, 5"x30 seated 5"x30 stand, 5"x30 seated       Toe raises   5"x15 5"x20 5"x20 5"x20 seated       Core row     2x10 YTB 2x10 YTB       Side stepping      10'x2 (x5) at mirror       Step ups      x5 no riser (L lead)       HEP education and instruction  x5'                                     Ther Activity                                       Gait Training                                       Modalities             MHP lumbar PRN (sidelying/seated)  10' sidelying post np np 8' pre (+ throughout tx) 10' pre (+ throughout tx), x5' post

## 2022-10-21 ENCOUNTER — OFFICE VISIT (OUTPATIENT)
Dept: PHYSICAL THERAPY | Facility: MEDICAL CENTER | Age: 62
End: 2022-10-21
Payer: MEDICARE

## 2022-10-21 ENCOUNTER — OFFICE VISIT (OUTPATIENT)
Dept: FAMILY MEDICINE CLINIC | Facility: CLINIC | Age: 62
End: 2022-10-21

## 2022-10-21 VITALS
TEMPERATURE: 98.4 F | RESPIRATION RATE: 16 BRPM | SYSTOLIC BLOOD PRESSURE: 126 MMHG | WEIGHT: 271 LBS | OXYGEN SATURATION: 97 % | DIASTOLIC BLOOD PRESSURE: 80 MMHG | HEART RATE: 74 BPM | HEIGHT: 61 IN | BODY MASS INDEX: 51.16 KG/M2

## 2022-10-21 DIAGNOSIS — Z23 ENCOUNTER FOR IMMUNIZATION: Primary | ICD-10-CM

## 2022-10-21 DIAGNOSIS — E66.01 MORBID OBESITY WITH BMI OF 50.0-59.9, ADULT (HCC): ICD-10-CM

## 2022-10-21 DIAGNOSIS — M43.10 DEGENERATIVE SPONDYLOLISTHESIS: ICD-10-CM

## 2022-10-21 DIAGNOSIS — M54.50 LUMBAR PAIN: Primary | ICD-10-CM

## 2022-10-21 PROCEDURE — 3074F SYST BP LT 130 MM HG: CPT | Performed by: FAMILY MEDICINE

## 2022-10-21 PROCEDURE — 97110 THERAPEUTIC EXERCISES: CPT | Performed by: PHYSICAL THERAPIST

## 2022-10-21 PROCEDURE — 90682 RIV4 VACC RECOMBINANT DNA IM: CPT | Performed by: FAMILY MEDICINE

## 2022-10-21 PROCEDURE — 3079F DIAST BP 80-89 MM HG: CPT | Performed by: FAMILY MEDICINE

## 2022-10-21 PROCEDURE — 99213 OFFICE O/P EST LOW 20 MIN: CPT | Performed by: FAMILY MEDICINE

## 2022-10-21 PROCEDURE — G0008 ADMIN INFLUENZA VIRUS VAC: HCPCS | Performed by: FAMILY MEDICINE

## 2022-10-21 NOTE — ASSESSMENT & PLAN NOTE
Discussion today for education on nutrition fact label reading and reviewed home dietary log  Will review with more details to assess food types and caloric intake with intention to compare to patients daily caloric requirement and goals of weight loss to decrease by 1-2lb per week depending on capabilities and desires

## 2022-10-21 NOTE — PROGRESS NOTES
Name: Isaac Welsh      :       MRN: [id number]  Encounter Provider: Janelle Garcia MD  Encounter Date: 10/21/2022   Encounter department: [address] [place] [address]     1  Encounter for immunization  -     influenza vaccine, quadrivalent, recombinant, PF, 0 5 mL, for patients 18 yr+ (FLUBLOK)    2  Morbid obesity with BMI of 50 0-59 9, adult [facility])  Assessment & Plan:  Discussion today for education on nutrition fact label reading and reviewed home dietary log  Will review with more details to assess food types and caloric intake with intention to compare to patients daily caloric requirement and goals of weight loss to decrease by 1-2lb per week depending on capabilities and desires  Subjective      Isaac Welsh is a very pleasant 57 y o  female who presents today for review of dietary log brought in today and teaching of reading nutrition facts  Patient dietary logs demonstrate minimal fruit and vegetable intake  She notes drinking 5-6 bottles of 12-24oz bottles of water daily  She reports minimal snacking but does consume a lot of processed foods such as potato chips, french fries, tater tots She notes plans for surgery as she is being followed up by orthopedic surgery and discussed weight loss in preparation for surgery but with no specific goals made         Review of Systems   Constitutional: Negative for chills and fever  Respiratory: Negative for cough, shortness of breath and wheezing  Cardiovascular: Negative for chest pain, palpitations and leg swelling  Gastrointestinal: Negative for abdominal pain, constipation, diarrhea and vomiting  Musculoskeletal: Positive for back pain  Negative for arthralgias  Skin: Negative for rash  All other systems reviewed and are negative        Current Outpatient Medications on File Prior to Visit   Medication Sig   • acetaminophen (TYLENOL) 650 mg CR tablet Take 1 tablet (650 mg total) by mouth every 8 (eight) hours as needed for mild pain   • acetaminophen (TYLENOL) 650 mg CR tablet Take 1 tablet (650 mg total) by mouth every 8 (eight) hours as needed for mild pain (Patient not taking: No sig reported)   • albuterol (Ventolin HFA) 90 mcg/act inhaler Inhale 2 puffs every 6 (six) hours as needed for wheezing   • Ascorbic Acid (vitamin C) 100 MG tablet Take 100 mg by mouth daily   • aspirin (Aspirin Low Dose) 81 mg EC tablet Take 1 tablet (81 mg total) by mouth daily   • atorvastatin (LIPITOR) 40 mg tablet Take 1 tablet (40 mg total) by mouth daily   • azelastine (ASTELIN) 0 1 % nasal spray 1 spray into each nostril 2 (two) times a day Use in each nostril as directed   • budesonide-formoterol (SYMBICORT) 160-4 5 mcg/act inhaler Inhale 1 puff 2 (two) times a day Rinse mouth after use  • cetirizine (ZyrTEC) 10 mg tablet Take 10 mg by mouth daily   • cholecalciferol (VITAMIN D3) 1,000 units tablet Take 1 tablet (1,000 Units total) by mouth daily   • clotrimazole (LOTRIMIN) 1 % cream clotrimazole 1 % topical cream   APPLY TOPICALLY TO THE AFFECTED AREA AND SURROUNDING AREAS OF SKI     (REFER TO PRESCRIPTION NOTES)  • Diclofenac Sodium (VOLTAREN) 1 % Apply 2 g topically 4 (four) times a day (Patient not taking: Reported on 3/31/2022 )   • fluticasone (FLONASE) 50 mcg/act nasal spray 1 spray into each nostril daily   • gabapentin (NEURONTIN) 300 mg capsule Take 1 capsule (300 mg total) by mouth 3 (three) times a day Take 1 tablet 3 times a day   • guaiFENesin (MUCINEX) 600 mg 12 hr tablet Take 1 tablet (600 mg total) by mouth 2 (two) times a day as needed for cough (Patient not taking: Reported on 3/31/2022 )   • liraglutide (SAXENDA) injection Inject 0 1 mL (0 6 mg total) under the skin daily for 7 days, THEN 0 2 mL (1 2 mg total) daily for 7 days, THEN 0 3 mL (1 8 mg total) daily for 7 days, THEN 0 4 mL (2 4 mg total) daily for 7 days, THEN 0 5 mL (3 mg total) daily     • montelukast (SINGULAIR) 10 mg tablet take 1 tablet by mouth at bedtime   • nystatin (MYCOSTATIN) powder Apply topically 3 (three) times a day   • propranolol (INDERAL) 40 mg tablet take 1 tablet by mouth every 12 hours   • terbinafine (LamISIL) 250 mg tablet terbinafine HCl 250 mg tablet   take 1 tablet by mouth once daily       Objective     /80 (BP Location: Right arm, Patient Position: Sitting, Cuff Size: Large)   Pulse 74   Temp 98 4 °F (36 9 °C) (Temporal)   Resp 16   Ht 5' 1" (1 549 m)   Wt 123 kg (271 lb)   LMP  (LMP Unknown)   SpO2 97%   Breastfeeding No   BMI 51 21 kg/m²     Physical Exam  Vitals reviewed  Constitutional:       General: She is not in acute distress  Appearance: She is obese  Eyes:      Conjunctiva/sclera: Conjunctivae normal    Cardiovascular:      Rate and Rhythm: Normal rate  Pulmonary:      Effort: Pulmonary effort is normal    Musculoskeletal:         General: Normal range of motion  Skin:     General: Skin is dry  Neurological:      General: No focal deficit present  Mental Status: She is alert     Psychiatric:         Behavior: Behavior normal        Coreen Marquez MD

## 2022-10-21 NOTE — PROGRESS NOTES
Daily Note     Today's date: 10/21/2022  Patient name: Kobe Villeda  :   MRN: [id number]  Referring provider: Shagufta Philip  Dx:   Encounter Diagnosis     ICD-10-CM    1  Lumbar pain  M54 50    2  Degenerative spondylolisthesis  M43 10                   Subjective: Patient reports that she is feeling pretty good today compared to last session with less pain  She notes that she is able to stand for longer periods of time when doing the dishes  Objective: See treatment diary below      Assessment: Continued to hold on STM due to patient demonstrating a positive response to active mobility and strengthening interventions at prior sessions with report of increased standing tolerance at home  Patient demonstrated increased gait speed and increased speed of sit to stand transfers throughout session today  Able to progress reps and resistance for all core and hip girdle stability interventions  Also able to progress reps for CKC functional strengthening, which demonstrates good progress toward goals  Patient demonstrated appropriate fatigue post-tx and was able to complete program without pain  Patient would benefit from continued PT to improve core and LE strength to be able to ambulate longer distances with less stress on lumbar spine  Plan: Continue per plan of care        Precautions: LATEX ALLERGY, ADHESIVE ALLERGY, grade I anterolisthesis L4/L5 (dynamic instability per x-ray), GERD, COPD, sleep apnea, hx of uterine cancer (removal via hysterectomy), hx of L knee arthroscopy  *Unable to lie supine or prone    HEP: glute sets, sidelying TA activation, seated clams  Manuals 9/23 9/26 9/29 10/7 10/14 10/18 10/21      Sidelying STM to lumbar paraspinals  KP x15' KP x15' KP x15' held held held                                             Neuro Re-Ed                                                                 Ther Ex             Sidelying TA activation  3"x20 5"x30 5"x30 HEP Standing TA pball press   3"x20 5"x30 5"x30 5"x30 5"x35      Seated glute squeezes HEP HEP review HEP HEP HEP        Seated ball squeezes NV 5"x20 5"x20 5"x20 5"x20 5"x30 5"x35      Seated clams NV 5"x15 YTB 5"x20 YTB 5"x20 YTB 5"x20 YTB 5"x30 YTB 5"x35 YTB      Marches NV- seated NV x10 ea stand x20 ea stand x20 ea stand x20 ea stand, x20 ea seated x20 ea stand, x30 ea seated      Heel raises NV- seated 3"x20 stand 5"x15 stand 5"x20 stand 5"x10 stand, 5"x30 seated 5"x30 stand, 5"x30 seated 5"x35 stand, 5"x35 seated      Toe raises   5"x15 5"x20 5"x20 5"x20 seated 5"x30 seated      Core row     2x10 YTB 2x10 YTB 3x10 YTB      Side stepping      10'x2 (x5) at mirror 10'x2 (x6) at mirror      Step ups      x5 no riser (L lead) 2x5 no riser (L lead)      HEP education and instruction  x5'                                     Ther Activity                                       Gait Training                                       Modalities             MHP lumbar PRN (sidelying/seated)  10' sidelying post np np 8' pre (+ throughout tx) 10' pre (+ throughout tx), x5' post 15' pre (+ throughout tx)

## 2022-10-25 ENCOUNTER — OFFICE VISIT (OUTPATIENT)
Dept: PHYSICAL THERAPY | Facility: MEDICAL CENTER | Age: 62
End: 2022-10-25
Payer: MEDICARE

## 2022-10-25 DIAGNOSIS — M54.50 LUMBAR PAIN: Primary | ICD-10-CM

## 2022-10-25 DIAGNOSIS — M43.10 DEGENERATIVE SPONDYLOLISTHESIS: ICD-10-CM

## 2022-10-25 PROCEDURE — 97110 THERAPEUTIC EXERCISES: CPT | Performed by: PHYSICAL THERAPIST

## 2022-10-25 NOTE — PROGRESS NOTES
Daily Note     Today's date: 10/25/2022  Patient name: Ema Dutton  : [birth year]  MRN: [id number]  Referring provider: Jennifer Ochoa  Dx:   Encounter Diagnosis     ICD-10-CM    1  Lumbar pain  M54 50    2  Degenerative spondylolisthesis  M43 10                   Subjective: Patient reports that she is feeling a little better again today with less pain  Objective: See treatment diary below      Assessment: Patient demonstrated increased gait speed when ambulating today and was able to ambulate without use of SPC, which demonstrates good progress toward goals  Patient continues to respond well to active strengthening interventions and is demonstrating appropriate progress with program as outlined below  She demonstrates improved exercise capacity when performing exercises in various positions and when avoiding periods of prolonged sitting/standing  Able to progress reps for step ups, which demonstrates an improvement in functional endurance  Patient demonstrated appropriate fatigue post-tx and was able to complete program without pain  Patient would benefit from continued PT to progress core and hip girdle strengthening program to decrease compensatory strain on lumbar spine during ADL  Plan: Continue per plan of care        Precautions: LATEX ALLERGY, ADHESIVE ALLERGY, grade I anterolisthesis L4/L5 (dynamic instability per x-ray), GERD, COPD, sleep apnea, hx of uterine cancer (removal via hysterectomy), hx of L knee arthroscopy  *Unable to lie supine or prone    HEP: glute sets, sidelying TA activation, seated clams  Manuals 9/23 9/26 9/29 10/7 10/14 10/18 10/21 10/25     Sidelying STM to lumbar paraspinals  KP x15' KP x15' KP x15' held held held held                                            Neuro Re-Ed                                                                 Ther Ex             Sidelying TA activation  3"x20 5"x30 5"x30 HEP        Standing TA pball press   3"x20 5"x30 5"x30 5"x30 5"x35 5"x35     Seated glute squeezes HEP HEP review HEP HEP HEP        Seated ball squeezes NV 5"x20 5"x20 5"x20 5"x20 5"x30 5"x35 5"x40     Seated clams NV 5"x15 YTB 5"x20 YTB 5"x20 YTB 5"x20 YTB 5"x30 YTB 5"x35 YTB 5"x40 YTB     Marches NV- seated NV x10 ea stand x20 ea stand x20 ea stand x20 ea stand, x20 ea seated x20 ea stand, x30 ea seated x20 ea stand 1#, x20 ea seated 1#     Heel raises NV- seated 3"x20 stand 5"x15 stand 5"x20 stand 5"x10 stand, 5"x30 seated 5"x30 stand, 5"x30 seated 5"x35 stand, 5"x35 seated 5"x25 stand, 5"x35 seated     Toe raises   5"x15 5"x20 5"x20 5"x20 seated 5"x30 seated 5"x35 seated, 5"x25 stand     Core row     2x10 YTB 2x10 YTB 3x10 YTB 2x10 RTB     Side stepping      10'x2 (x5) at mirror 10'x2 (x6) at mirror 10'x2 (x6) at mirror     Step ups      x5 no riser (L lead) 2x5 no riser (L lead) x15 no riser (L lead)     HEP education and instruction  x5'                                     Ther Activity                                       Gait Training                                       Modalities             MHP lumbar PRN (sidelying/seated)  10' sidelying post np np 8' pre (+ throughout tx) 10' pre (+ throughout tx), x5' post 15' pre (+ throughout tx) 10' pre (+ throughout tx)

## 2022-10-28 ENCOUNTER — OFFICE VISIT (OUTPATIENT)
Dept: PHYSICAL THERAPY | Facility: MEDICAL CENTER | Age: 62
End: 2022-10-28
Payer: MEDICARE

## 2022-10-28 DIAGNOSIS — M54.50 LUMBAR PAIN: Primary | ICD-10-CM

## 2022-10-28 DIAGNOSIS — M43.10 DEGENERATIVE SPONDYLOLISTHESIS: ICD-10-CM

## 2022-10-28 PROCEDURE — 97110 THERAPEUTIC EXERCISES: CPT | Performed by: PHYSICAL THERAPIST

## 2022-10-28 NOTE — PROGRESS NOTES
Daily Note     Today's date: 10/28/2022  Patient name: Reuben Bass  :   MRN: [id number]  Referring provider: Rafa Ziegler  Dx:   Encounter Diagnosis     ICD-10-CM    1  Lumbar pain  M54 50    2  Degenerative spondylolisthesis  M43 10                   Subjective: Patient reports that she is having a lot of pain today after being on her feet and doing a lot of walking this morning  She does note that she is able to do more walking compared to previously  Objective: See treatment diary below      Assessment: Decreased intensity of core/hip girdle strengthening program today due to baseline soreness  Patient tolerated modified program well  Added seated pball rollouts today to improve lumbar mobility with patient reporting decreased pain when standing and ambulating after this intervention  Patient reported decreased symptom intensity post-tx  Will monitor response and progress next visit as appropriate  Patient would benefit from continued PT to improve lumbopelvic mobility and stability to maximize function  Plan: Continue per plan of care        Precautions: LATEX ALLERGY, ADHESIVE ALLERGY, grade I anterolisthesis L4/L5 (dynamic instability per x-ray), GERD, COPD, sleep apnea, hx of uterine cancer (removal via hysterectomy), hx of L knee arthroscopy  *Unable to lie supine or prone    HEP: glute sets, sidelying TA activation, seated clams  Manuals 9/23 9/26 9/29 10/7 10/14 10/18 10/21 10/25 10/28    Sidelying STM to lumbar paraspinals  KP x15' KP x15' KP x15' held held held held held                                           Neuro Re-Ed                                                                 Ther Ex             Sidelying TA activation  3"x20 5"x30 5"x30 HEP        Standing TA pball press   3"x20 5"x30 5"x30 5"x30 5"x35 5"x35 5"x30    Seated glute squeezes HEP HEP review HEP HEP HEP        Seated ball squeezes NV 5"x20 5"x20 5"x20 5"x20 5"x30 5"x35 5"x40 5"x30    Seated clams NV 5"x15 YTB 5"x20 YTB 5"x20 YTB 5"x20 YTB 5"x30 YTB 5"x35 YTB 5"x40 YTB 5"x30 YTB    Sae NV- seated NV x10 ea stand x20 ea stand x20 ea stand x20 ea stand, x20 ea seated x20 ea stand, x30 ea seated x20 ea stand 1#, x20 ea seated 1# x20 ea stand no weight    Heel raises NV- seated 3"x20 stand 5"x15 stand 5"x20 stand 5"x10 stand, 5"x30 seated 5"x30 stand, 5"x30 seated 5"x35 stand, 5"x35 seated 5"x25 stand, 5"x35 seated 5"x30 stand, 5"x30 seated    Toe raises   5"x15 5"x20 5"x20 5"x20 seated 5"x30 seated 5"x35 seated, 5"x25 stand 5"x35 seated, 5"x20 stand    Seated pball rollouts         10"x10 FWD    Core row     2x10 YTB 2x10 YTB 3x10 YTB 2x10 RTB 2x10 YTB    Side stepping      10'x2 (x5) at mirror 10'x2 (x6) at mirror 10'x2 (x6) at mirror held    Step ups      x5 no riser (L lead) 2x5 no riser (L lead) x15 no riser (L lead) held    HEP education and instruction  x5'                                     Ther Activity                                       Gait Training                                       Modalities             MHP lumbar PRN (sidelying/seated)  10' sidelying post np np 8' pre (+ throughout tx) 10' pre (+ throughout tx), x5' post 15' pre (+ throughout tx) 10' pre (+ throughout tx) 15' pre (+ throughout tx)

## 2022-11-01 ENCOUNTER — OFFICE VISIT (OUTPATIENT)
Dept: PHYSICAL THERAPY | Facility: MEDICAL CENTER | Age: 62
End: 2022-11-01

## 2022-11-01 DIAGNOSIS — M54.50 LUMBAR PAIN: Primary | ICD-10-CM

## 2022-11-01 DIAGNOSIS — M43.10 DEGENERATIVE SPONDYLOLISTHESIS: ICD-10-CM

## 2022-11-01 NOTE — PROGRESS NOTES
Daily Note     Today's date: 2022  Patient name: Aury Sheikh  : [birth year]  MRN: [id number]  Referring provider: Pricila Rider  Dx:   Encounter Diagnosis     ICD-10-CM    1  Lumbar pain  M54 50    2  Degenerative spondylolisthesis  M43 10                   Subjective: Patient reports that she is feeling slightly better with less pain compared to last session  Objective: See treatment diary below      Assessment: Patient demonstrated increased gait speed compared to last session  Continued to hold on manual interventions to focus on core/hip girdle strengthening program to improve standing tolerance and distance of ambulation  Patient was able to return to resisted standing marches today and side stepping at mirror, which demonstrates an improvement in CKC strength  Held step ups due to fatigue at end of session  Patient tolerated treatment well  Patient would benefit from continued PT to progress treatment as appropriate to improve ability to ambulate longer distances and increase standing tolerance  Plan: Continue per plan of care        Precautions: LATEX ALLERGY, ADHESIVE ALLERGY, grade I anterolisthesis L4/L5 (dynamic instability per x-ray), GERD, COPD, sleep apnea, hx of uterine cancer (removal via hysterectomy), hx of L knee arthroscopy  *Unable to lie supine or prone    HEP: glute sets, sidelying TA activation, seated clams  Manuals 9/23 9/26 9/29 10/7 10/14 10/18 10/21 10/25 10/28 11/1   Sidelying STM to lumbar paraspinals  KP x15' KP x15' KP x15' held held held held held held                                          Neuro Re-Ed                                                                 Ther Ex             Sidelying TA activation  3"x20 5"x30 5"x30 HEP        Standing TA pball press   3"x20 5"x30 5"x30 5"x30 5"x35 5"x35 5"x30 5"x30   Seated glute squeezes HEP HEP review HEP HEP HEP        Seated ball squeezes NV 5"x20 5"x20 5"x20 5"x20 5"x30 5"x35 5"x40 5"x30 5"x30 Seated clams NV 5"x15 YTB 5"x20 YTB 5"x20 YTB 5"x20 YTB 5"x30 YTB 5"x35 YTB 5"x40 YTB 5"x30 YTB 5"x30 YTB   Sae NV- seated NV x10 ea stand x20 ea stand x20 ea stand x20 ea stand, x20 ea seated x20 ea stand, x30 ea seated x20 ea stand 1#, x20 ea seated 1# x20 ea stand no weight x30 ea stand 1#   Heel raises NV- seated 3"x20 stand 5"x15 stand 5"x20 stand 5"x10 stand, 5"x30 seated 5"x30 stand, 5"x30 seated 5"x35 stand, 5"x35 seated 5"x25 stand, 5"x35 seated 5"x30 stand, 5"x30 seated 5"x30 stand, 5"x30 seated   Toe raises   5"x15 5"x20 5"x20 5"x20 seated 5"x30 seated 5"x35 seated, 5"x25 stand 5"x35 seated, 5"x20 stand 5"x35 seated, 5"x25 stand   Seated pball rollouts         10"x10 FWD 10"x10 FWD   Core row     2x10 YTB 2x10 YTB 3x10 YTB 2x10 RTB 2x10 YTB 2x10 RTB   Side stepping      10'x2 (x5) at mirror 10'x2 (x6) at mirror 10'x2 (x6) at mirror held 10'x2 (x5) at mirror   Step ups      x5 no riser (L lead) 2x5 no riser (L lead) x15 no riser (L lead) held held   HEP education and instruction  x5'                                     Ther Activity                                       Gait Training                                       Modalities             MHP lumbar PRN (sidelying/seated)  10' sidelying post np np 8' pre (+ throughout tx) 10' pre (+ throughout tx), x5' post 15' pre (+ throughout tx) 10' pre (+ throughout tx) 15' pre (+ throughout tx) 15' pre (+ throughout tx)

## 2022-11-04 ENCOUNTER — OFFICE VISIT (OUTPATIENT)
Dept: PHYSICAL THERAPY | Facility: MEDICAL CENTER | Age: 62
End: 2022-11-04

## 2022-11-04 DIAGNOSIS — M43.10 DEGENERATIVE SPONDYLOLISTHESIS: ICD-10-CM

## 2022-11-04 DIAGNOSIS — M54.50 LUMBAR PAIN: Primary | ICD-10-CM

## 2022-11-04 NOTE — PROGRESS NOTES
Daily Note     Today's date: 2022  Patient name: Humaira Valdovinos  :   MRN: [id number]  Referring provider: Jose Mreedith  Dx:   Encounter Diagnosis     ICD-10-CM    1  Lumbar pain  M54 50    2  Degenerative spondylolisthesis  M43 10                   Subjective: Patient reports that she continues to have some pain in her back when standing and walking for long periods of time, but she does note that she is able to stand and walk for longer compared to her first session  Objective: See treatment diary below      Assessment: Continued to hold on manual interventions to focus on functional strengthening/core stability program outlined below  Patient demonstrated good technique with all strengthening exercises  Patient was able to progress reps for seated hip ABD/ADD, which demonstrates good progress toward goals  Able to return to step ups today for CKC strengthening due to lower symptom irritability  She is also demonstrating increased standing tolerance throughout sessions  Patient is gaining independence with her program  Patient demonstrated appropriate fatigue post-tx and reported decreased pain after pball rolls at the end of session  Patient would benefit from continued PT to maximize independence with HEP prior to upcoming d/c       Plan: Continue per plan of care  Potential d/c next visit       Precautions: LATEX ALLERGY, ADHESIVE ALLERGY, grade I anterolisthesis L4/L5 (dynamic instability per x-ray), GERD, COPD, sleep apnea, hx of uterine cancer (removal via hysterectomy), hx of L knee arthroscopy  *Unable to lie supine or prone    HEP: glute sets, sidelying TA activation, seated clams  Manuals 9/23 9/26 9/29 10/7 10/14 10/18 10/21 10/25 10/28 11/1 11/4   Sidelying STM to lumbar paraspinals  KP x15' KP x15' KP x15' held held held held held held held                                             Neuro Re-Ed Ther Ex              Sidelying TA activation  3"x20 5"x30 5"x30 HEP         Standing TA pball press   3"x20 5"x30 5"x30 5"x30 5"x35 5"x35 5"x30 5"x30 5"x35   Seated glute squeezes HEP HEP review HEP HEP HEP         Seated ball squeezes NV 5"x20 5"x20 5"x20 5"x20 5"x30 5"x35 5"x40 5"x30 5"x30 5"x35   Seated clams NV 5"x15 YTB 5"x20 YTB 5"x20 YTB 5"x20 YTB 5"x30 YTB 5"x35 YTB 5"x40 YTB 5"x30 YTB 5"x30 YTB 5"x35 YTB   Marches NV- seated NV x10 ea stand x20 ea stand x20 ea stand x20 ea stand, x20 ea seated x20 ea stand, x30 ea seated x20 ea stand 1#, x20 ea seated 1# x20 ea stand no weight x30 ea stand 1# x30 ea stand   Heel raises NV- seated 3"x20 stand 5"x15 stand 5"x20 stand 5"x10 stand, 5"x30 seated 5"x30 stand, 5"x30 seated 5"x35 stand, 5"x35 seated 5"x25 stand, 5"x35 seated 5"x30 stand, 5"x30 seated 5"x30 stand, 5"x30 seated 5"x30 stand, 5"x30 seated   Toe raises   5"x15 5"x20 5"x20 5"x20 seated 5"x30 seated 5"x35 seated, 5"x25 stand 5"x35 seated, 5"x20 stand 5"x35 seated, 5"x25 stand 5"x35 stand, 5"x35 seated   Seated pball rollouts         10"x10 FWD 10"x10 FWD 10"x10 FWD   Core row     2x10 YTB 2x10 YTB 3x10 YTB 2x10 RTB 2x10 YTB 2x10 RTB 2x10 RTB   Side stepping      10'x2 (x5) at mirror 10'x2 (x6) at mirror 10'x2 (x6) at mirror held 10'x2 (x5) at mirror 10'x2 (x6) at mirror   Step ups      x5 no riser (L lead) 2x5 no riser (L lead) x15 no riser (L lead) held held x20 no riser (L lead)   HEP education and instruction  x5'                                        Ther Activity                                          Gait Training                                          Modalities              MHP lumbar PRN (sidelying/seated)  10' sidelying post np np 8' pre (+ throughout tx) 10' pre (+ throughout tx), x5' post 15' pre (+ throughout tx) 10' pre (+ throughout tx) 15' pre (+ throughout tx) 15' pre (+ throughout tx) 15' pre (+ throughout tx)

## 2022-11-06 DIAGNOSIS — J30.9 ALLERGIC RHINITIS, UNSPECIFIED SEASONALITY, UNSPECIFIED TRIGGER: ICD-10-CM

## 2022-11-07 RX ORDER — MONTELUKAST SODIUM 10 MG/1
TABLET ORAL
Qty: 30 TABLET | Refills: 3 | Status: SHIPPED | OUTPATIENT
Start: 2022-11-07

## 2022-11-08 ENCOUNTER — OFFICE VISIT (OUTPATIENT)
Dept: PHYSICAL THERAPY | Facility: MEDICAL CENTER | Age: 62
End: 2022-11-08

## 2022-11-08 DIAGNOSIS — M43.10 DEGENERATIVE SPONDYLOLISTHESIS: ICD-10-CM

## 2022-11-08 DIAGNOSIS — M54.50 LUMBAR PAIN: Primary | ICD-10-CM

## 2022-11-08 NOTE — PROGRESS NOTES
Discharge Summary    Today's date: 2022  Patient name: Brendon Boyer  :   MRN: [id number]  Referring provider: Eliezer Scott  Dx:   Encounter Diagnosis     ICD-10-CM    1  Lumbar pain  M54 50    2  Degenerative spondylolisthesis  M43 10                   Subjective: Patient reports that she continues to have pain in her back when standing and walking, but she feels that she has improved somewhat since her initial visit  She reports that she feels comfortable with her exercise program and feels ready to be discharged to her home program to continue to work on her strength  Objective: See treatment diary below    LE strength: grossly 3+/5 all planes    Outcome measure:  FOTO: 44/100 on 22, 47/100 on 22    Assessment: Patient has demonstrated progress with improving LE strength since her initial visit, which has improved her quality of gait mechanics for shorter distance ambulation  Although improved since IE, patient continues to demonstrate limitations in her standing tolerance and duration of longer distance ambulation due to continued LBP with prolonged WB  Patient has not met all of her goals for PT due to continued strength deficits and functional limitations  However, she is independent in an illustrated HEP for continued core stability and functional strengthening to decrease compensatory strain on her lumbar spine during ADL  Due to plateau in overall progress, patient is no longer benefiting from formal PT services  She is agreeable to be discharged to her HEP  Patient was encouraged to follow-up if needed in the future  Goals  STG:  Patient will be independent with home exercise program - met   Patient will be able to perform proper TA contraction to decrease excessive stress on lumbar spine when standing - met  LTG:  Patient will increase LE strength to grossly 4/5 in all planes to be able to ambulate longer distances  - not met (improved since IE; pt is independent in an illustrated HEP for progressive strengthening)  Patient will be able to ambulate longer distances in the community compared to baseline - not met (independent in an illustrated HEP for progressive strengthening)  Patient will be able to stand to do the dishes - not met (independent in an illustrated HEP for progressive strengthening)   Patient will exceed MCID on FOTO to demonstrate improved function  - not met (improved since IE; independent in illustrated HEP for continued strengthening)  Patient will be able to manage symptoms independently  - met     Plan: Discharge to HEP       Precautions: LATEX ALLERGY, ADHESIVE ALLERGY, grade I anterolisthesis L4/L5 (dynamic instability per x-ray), GERD, COPD, sleep apnea, hx of uterine cancer (removal via hysterectomy), hx of L knee arthroscopy  *Unable to lie supine or prone    HEP: glute sets, sidelying TA activation, seated clams  Manuals 11/8 9/23 9/26 9/29 10/7 10/14 10/18 10/21 10/25 10/28 11/1 11/4   Sidelying STM to lumbar paraspinals   KP x15' KP x15' KP x15' held held held held held held held                                                Neuro Re-Ed                                                                           Ther Ex               Sidelying TA activation   3"x20 5"x30 5"x30 HEP         Standing TA pball press    3"x20 5"x30 5"x30 5"x30 5"x35 5"x35 5"x30 5"x30 5"x35   Seated glute squeezes  HEP HEP review HEP HEP HEP         Seated ball squeezes 5"x30 NV 5"x20 5"x20 5"x20 5"x20 5"x30 5"x35 5"x40 5"x30 5"x30 5"x35   Seated clams 5"x30 YTB NV 5"x15 YTB 5"x20 YTB 5"x20 YTB 5"x20 YTB 5"x30 YTB 5"x35 YTB 5"x40 YTB 5"x30 YTB 5"x30 YTB 5"x35 YTB   Marches x30 ea stand NV- seated NV x10 ea stand x20 ea stand x20 ea stand x20 ea stand, x20 ea seated x20 ea stand, x30 ea seated x20 ea stand 1#, x20 ea seated 1# x20 ea stand no weight x30 ea stand 1# x30 ea stand   Heel raises x30 stand, x30 seated NV- seated 3"x20 stand 5"x15 stand 5"x20 stand 5"x10 stand, 5"x30 seated 5"x30 stand, 5"x30 seated 5"x35 stand, 5"x35 seated 5"x25 stand, 5"x35 seated 5"x30 stand, 5"x30 seated 5"x30 stand, 5"x30 seated 5"x30 stand, 5"x30 seated   Toe raises x30 seated, x30 stand   5"x15 5"x20 5"x20 5"x20 seated 5"x30 seated 5"x35 seated, 5"x25 stand 5"x35 seated, 5"x20 stand 5"x35 seated, 5"x25 stand 5"x35 stand, 5"x35 seated   Seated pball rollouts 10"x10         10"x10 FWD 10"x10 FWD 10"x10 FWD   Core row 3x10 RTB     2x10 YTB 2x10 YTB 3x10 YTB 2x10 RTB 2x10 YTB 2x10 RTB 2x10 RTB   Side stepping 10'x2 (x6) at mirror      10'x2 (x5) at mirror 10'x2 (x6) at mirror 10'x2 (x6) at mirror held 10'x2 (x5) at mirror 10'x2 (x6) at mirror   Step ups       x5 no riser (L lead) 2x5 no riser (L lead) x15 no riser (L lead) held held x20 no riser (L lead)   HEP education and instruction x10'  x5'                                          Ther Activity                                             Gait Training                                             Modalities               MHP lumbar PRN (sidelying/seated) 15' pre + throughout tx  10' sidelying post np np 8' pre (+ throughout tx) 10' pre (+ throughout tx), x5' post 15' pre (+ throughout tx) 10' pre (+ throughout tx) 15' pre (+ throughout tx) 15' pre (+ throughout tx) 15' pre (+ throughout tx)

## 2022-11-11 ENCOUNTER — APPOINTMENT (OUTPATIENT)
Dept: PHYSICAL THERAPY | Facility: MEDICAL CENTER | Age: 62
End: 2022-11-11

## 2022-11-11 ENCOUNTER — TELEPHONE (OUTPATIENT)
Dept: NEUROLOGY | Facility: CLINIC | Age: 62
End: 2022-11-11

## 2022-11-11 NOTE — TELEPHONE ENCOUNTER
Called and spoke to patient - confirmed upcoming appointment with Prieto Medeiros on 11/16/22 3:00 pm at the [facility] office  Provided patient with apt date, time and location  Informed patient that check in is at least 15 minutes prior to apt time  The patient is not  having any issues or concerns at this time

## 2022-11-15 ENCOUNTER — APPOINTMENT (OUTPATIENT)
Dept: PHYSICAL THERAPY | Facility: MEDICAL CENTER | Age: 62
End: 2022-11-15

## 2022-11-15 DIAGNOSIS — G95.19 NEUROGENIC CLAUDICATION (HCC): Primary | ICD-10-CM

## 2022-11-15 DIAGNOSIS — M54.16 RADICULOPATHY, LUMBAR REGION: ICD-10-CM

## 2022-11-16 ENCOUNTER — OFFICE VISIT (OUTPATIENT)
Dept: NEUROLOGY | Facility: CLINIC | Age: 62
End: 2022-11-16

## 2022-11-16 VITALS
RESPIRATION RATE: 16 BRPM | TEMPERATURE: 96.8 F | HEIGHT: 60 IN | HEART RATE: 53 BPM | BODY MASS INDEX: 53.6 KG/M2 | WEIGHT: 273 LBS | DIASTOLIC BLOOD PRESSURE: 63 MMHG | SYSTOLIC BLOOD PRESSURE: 138 MMHG

## 2022-11-16 DIAGNOSIS — G95.19 NEUROGENIC CLAUDICATION (HCC): ICD-10-CM

## 2022-11-16 DIAGNOSIS — G25.0 BENIGN ESSENTIAL TREMOR: ICD-10-CM

## 2022-11-16 DIAGNOSIS — G25.81 RESTLESS LEG SYNDROME: Primary | ICD-10-CM

## 2022-11-16 DIAGNOSIS — M54.16 RADICULOPATHY, LUMBAR REGION: ICD-10-CM

## 2022-11-16 NOTE — PATIENT INSTRUCTIONS
Move the 2nd propranolol dose to 3pm- let me know how this works or if there is any side effects from changing  Continue with plans to get MRI and see pain management  Make sure to prevent falls  Follow up in 6 months or sooner if needed  Essential Tremor   AMBULATORY CARE:   An essential tremor  is uncontrolled muscle shaking that has no known cause  Your healthcare provider can diagnose essential tremor based on your signs and symptoms  Other tests may be used to make sure another condition is not causing the tremors  Essential tremors can happen at any age but are most common after 36years of age  Common signs and symptoms of essential tremor:  Signs and symptoms may be mild or severe  You may have tremors when you try to hold still or when you move  Any of the following may get worse slowly, over time:  Hands or arms shake, especially when you hold or reach for objects    Voice quivers when you speak    Legs shake when you stand still    Trouble controlling your hands or arms, holding objects, or writing    Trouble doing daily activities such as brushing your teeth or shaving    Head nodding or shaking you cannot control    Shaking brought on or made worse by stress, fatigue, or chemicals such as caffeine    A feeling of shaking or trembling inside, even if your body does not shake    Contact your healthcare provider if:   You have new or worsening symptoms  You have questions or concerns about your condition or care  Treatment  may not be needed  If your tremors are severe or keep you from doing daily activities, the following may help:  Medicines  may be given to control tremors so you can do your normal activities more easily  Medicines will not completely control or stop the tremors, but they may help  Deep brain stimulation  is a procedure used if other treatments do not control your tremors  Electric stimulation is given to parts of the brain that control movement   The stimulation stops those parts of the brain from working  Surgery  may be used if your tremors are severe and medicines have not helped  Surgery may be used to destroy part of your thalamus  The thalamus is a part of the brain that control movement and coordination  Manage your symptoms:   Go to occupational therapy as directed  An occupational therapist can help you find new ways to do your daily activities  For example, you may learn to use wrist weights during activities such as brushing your teeth  The weights can help steady your arms and hands  Use assistive devices as directed  Weighted utensils can help you control your movements as you eat  Adaptive equipment, such as a [employer] mouse, for the computer can help make computers easier to use  It may also help to install voice recognition software on your computer  The software allows you to talk instead of using the keyboard to type  Electric appliances such as can openers and toothbrushes can make daily living activities easier  Ask your healthcare provider or occupational therapist for more information on assistive devices  Wear clothing that is easy to put on and take off  Examples include shoes that do not need laces and shirts without buttons  You can also get a device to help you get buttons through the button holes  Do not have caffeine  Caffeine can make tremors worse  Do not smoke  Nicotine and other chemicals in cigarettes and cigars can make tremors worse  Ask your healthcare provider for information if you currently smoke and need help to quit  E-cigarettes or smokeless tobacco still contain nicotine  Talk to your healthcare provider before you use these products  Manage stress  Stress can trigger tremors or make them worse  Find ways to manage stress, such as deep breathing, listening to music, or getting a massage  Talk to your healthcare provider if you need help to control anxiety  Ask about medicines    Some medicines can make tremors worse  An example is cold medicines  Talk to your healthcare provider about all the medicines you take  Keep a record of your tremors  Include when the tremors happened, and how long they lasted  List anything you think might have triggered the tremors or made them stop  It might be helpful to include any foods or drinks you had that contained caffeine or were unusual for you  Bring the record with you to your follow-up appointments  Follow up with your healthcare provider as directed: You may be referred to a neurologist (nerve specialist)  Write down your questions so you remember to ask them during your visits  © Copyright [employer] 2022 Information is for End User's use only and may not be sold, redistributed or otherwise used for commercial purposes  All illustrations and images included in CareNotes® are the copyrighted property of [employer]  or [employer]  The above information is an  only  It is not intended as medical advice for individual conditions or treatments  Talk to your doctor, nurse or pharmacist before following any medical regimen to see if it is safe and effective for you

## 2022-11-16 NOTE — PROGRESS NOTES
Patient ID: Janet Morataya is a 57 y o  female  Assessment/Plan:  Patient Instructions:  Move the 2nd propranolol dose to 3pm- let me know how this works or if there is any side effects from changing  Continue with plans to get MRI and see pain management  Make sure to prevent falls  Follow up in 6 months or sooner if needed  She is taking IR propranolol BID- discussed with the shorter half life of this medication she may benefit from moving up the night dose to the afternoon so she can have better symptom control during wake hours  Discussed continued use of CPAP  Could consider repeat iron panel in future if restless leg symptoms worsen or could consider slight increase in gabapentin dose if needed  Diagnoses and all orders for this visit:    Restless leg syndrome    Essential tremor    Radiculopathy, lumbar region         Subjective:    TEJAS  Percy Adams is a 27-year-old female with COPD, hyperlipidemia, hypertension, obesity, arthritis YUE who presents today for neurologic follow-up for essential tremor, which she states has worsened in her hands especially in the late afternoon hours-notices it most when trying to eat at dinner  She takes her gabapentin at 0800, 2 pm and 9 pm; she takes her propranolol at 0800 and 9pm  She denies significant medication side effects  At last visit she also complained of back pain which she states continues today despite increase in gabapentin dose  Since her last office visit in May she has gone through formal PT, saw orthopedics, and she has an upcoming MRI L spine ordered because of increased pain with activity/neuogenic claudication of lower extremities  Pain is more to right lower back  She has her new CPAP machine and is using this, admits to forgetting at times  She states she will get occasional restless leg symptoms  Overall sleeping well  She tries to stay active by walking with her dog    Repeat labs done at last visit reviewed- normal TSH, b12, and cmp with slight elevation in blood sugar and mildly low calcium level  The following portions of the patient's history were reviewed and updated as appropriate: allergies, current medications, past family history, past medical history, past social history, past surgical history and problem list          Objective:    Blood pressure 138/63, pulse (!) 53, temperature (!) 96 8 °F (36 °C), temperature source Temporal, resp  rate 16, height 5' (1 524 m), weight 124 kg (273 lb), not currently breastfeeding  Manual pulse 66    Physical Exam  Vitals reviewed  Constitutional:       General: She is not in acute distress  Appearance: She is obese  HENT:      Head: Normocephalic  Mouth/Throat:      Pharynx: Oropharynx is clear  Eyes:      General: Lids are normal       Extraocular Movements: Extraocular movements intact  Pupils: Pupils are equal, round, and reactive to light  Cardiovascular:      Rate and Rhythm: Bradycardia present  Pulses: Normal pulses  Pulmonary:      Effort: Pulmonary effort is normal    Abdominal:      General: Bowel sounds are normal  There is no distension  Musculoskeletal:      Cervical back: No tenderness  Lumbar back: Tenderness present  Back:       Right lower leg: No edema  Left lower leg: No edema  Skin:     Findings: No rash  Neurological:      Mental Status: She is alert  Motor: Motor strength is normal       Gait: Gait abnormal       Deep Tendon Reflexes: Reflexes abnormal       Reflex Scores:       Brachioradialis reflexes are 1+ on the right side and 1+ on the left side  Patellar reflexes are 1+ on the right side and 1+ on the left side  Psychiatric:         Mood and Affect: Mood normal          Speech: Speech normal          Neurological Exam  Mental Status  Alert  Oriented to person, place and time  Speech is normal  Language is fluent with no aphasia      Cranial Nerves  CN II: Visual acuity is normal  Visual fields full to confrontation  CN III, IV, VI: Extraocular movements intact bilaterally  Normal lids and orbits bilaterally  Pupils equal round and reactive to light bilaterally  CN V: Facial sensation is normal   CN VII: Full and symmetric facial movement  CN VIII: Hearing is normal   CN IX, X: Palate elevates symmetrically  Normal gag reflex  CN XI: Shoulder shrug strength is normal   CN XII: Tongue midline without atrophy or fasciculations  Motor  Normal muscle bulk throughout  Strength is 5/5 throughout all four extremities  High frequency, low amplitude action tremor R>L hand when in outstretched position  Sensory  Light touch is normal in upper and lower extremities  Reflexes                                            Right                      Left  Brachioradialis                    1+                         1+  Patellar                                1+                         1+    Coordination  Right: Finger-to-nose normal Left: Finger-to-nose normal     Gait   Abnormal gait  Casual gait: Wide stance  Antalgic gait  Unable to rise from chair without using arms  Uses cane  ROS:    Review of Systems   Constitutional: Negative  Negative for appetite change and fever  HENT: Negative  Negative for hearing loss, tinnitus, trouble swallowing and voice change  Eyes: Negative  Negative for photophobia, pain and visual disturbance  Respiratory: Negative  Negative for shortness of breath  Cardiovascular: Negative  Negative for palpitations  Gastrointestinal: Negative  Negative for nausea and vomiting  Endocrine: Negative  Negative for cold intolerance  Genitourinary: Negative  Negative for dysuria, frequency and urgency  Musculoskeletal: Negative  Negative for gait problem, myalgias and neck pain  Skin: Negative  Negative for rash  Allergic/Immunologic: Negative  Neurological: Positive for tremors   Negative for dizziness, seizures, syncope, facial asymmetry, speech difficulty, weakness, light-headedness, numbness and headaches  Hematological: Negative  Does not bruise/bleed easily  Psychiatric/Behavioral: Negative  Negative for confusion, hallucinations and sleep disturbance     ROS was reviewed and updated as appropriate

## 2022-11-17 ENCOUNTER — OFFICE VISIT (OUTPATIENT)
Dept: PULMONOLOGY | Facility: CLINIC | Age: 62
End: 2022-11-17

## 2022-11-17 VITALS
HEART RATE: 64 BPM | SYSTOLIC BLOOD PRESSURE: 132 MMHG | WEIGHT: 273 LBS | BODY MASS INDEX: 53.6 KG/M2 | HEIGHT: 60 IN | OXYGEN SATURATION: 91 % | DIASTOLIC BLOOD PRESSURE: 88 MMHG | TEMPERATURE: 97.3 F

## 2022-11-17 DIAGNOSIS — R06.09 DOE (DYSPNEA ON EXERTION): ICD-10-CM

## 2022-11-17 DIAGNOSIS — E66.01 MORBID OBESITY (HCC): ICD-10-CM

## 2022-11-17 DIAGNOSIS — G47.33 OSA (OBSTRUCTIVE SLEEP APNEA): ICD-10-CM

## 2022-11-17 DIAGNOSIS — F17.211 NICOTINE DEPENDENCE, CIGARETTES, IN REMISSION: ICD-10-CM

## 2022-11-17 DIAGNOSIS — J30.89 NON-SEASONAL ALLERGIC RHINITIS, UNSPECIFIED TRIGGER: ICD-10-CM

## 2022-11-17 DIAGNOSIS — J44.9 CHRONIC OBSTRUCTIVE PULMONARY DISEASE, UNSPECIFIED COPD TYPE (HCC): Primary | ICD-10-CM

## 2022-11-17 LAB
DME PARACHUTE DELIVERY DATE REQUESTED: NORMAL
DME PARACHUTE ITEM DESCRIPTION: NORMAL
DME PARACHUTE ORDER STATUS: NORMAL
DME PARACHUTE SUPPLIER NAME: NORMAL
DME PARACHUTE SUPPLIER PHONE: NORMAL

## 2022-11-17 NOTE — PROGRESS NOTES
Office Progress Note - Pulmonary    Bettina Gregg 57 y o  female MRN: [id number]    Encounter: [id number]      Assessment:  • Chronic obstructive pulmonary disease  • Dyspnea on exertion  • Allergic rhinitis  • Obstructive sleep apnea  • Morbid obesity  • History of tobacco use  Plan:   •  6 minutes walk test per  • Symbicort 160/4 5, 2 inhalations twice a day  • Albuterol rescue inhaler 2 inhalations 4 times a day as needed  • Montelukast 10 mg once a day  • Cetirizine 10 mg once a day  • Change the auto CPAP to 5-20 cm water  • Follow-up in 6 months  Discussion:   The patient's COPD is in remission  I have maintained her on the Symbicort 160/4 5, 2 inhalations twice a day  She will use the albuterol rescue inhaler 2 inhalations 4 times a day as needed  Her obstructive sleep apnea is on during treated  She is on auto CPAP 4-8 cm water  I have change it to 5-20 cm water  I have alerted her that she has gained about 20 lb since her last visit  I have advised her to lose weight  She did not desaturate on the 6 minutes walk test   I will see her in 6 months in a follow-up visit  Subjective: The patient is here for a follow-up visit  She has dyspnea on exertion which has not changed  Denies any significant cough, wheezing or sputum production  She is using her nocturnal CPAP every night  She has received her new CPAP machine  Review of systems:  A 12 point system review is done and aside from what is stated above the rest of the review of systems is negative  Family history and social history are reviewed  Medications list is reviewed        Vitals: Blood pressure 132/88, pulse 64, temperature (!) 97 3 °F (36 3 °C), height 5' (1 524 m), weight 124 kg (273 lb), SpO2 91 %, not currently breastfeeding ,     Physical Exam  Gen: Awake, alert, oriented x 3, no acute distress  HEENT: Mucous membranes moist, no oral lesions, no thrush  NECK: No accessory muscle use, JVP not elevated  Cardiac: Regular, single S1, single S2, no murmurs, no rubs, no gallops  Lungs:  Decreased breath sounds  No wheezing or rhonchi  Abdomen: normoactive bowel sounds, soft nontender, nondistended, no rebound or rigidity, no guarding  Extremities: no cyanosis, no clubbing, no edema  Neuro:  Grossly nonfocal   Skin:  No rash  6 minutes walk test was done today in the office  The patient started with heart rate of 89 beats per minute and O2 saturation 97%  At the end of the test the heart rate was 96 per beats per minute and the O2 saturation 96%  She walked 182 m      Lab Results   Component Value Date    SODIUM 140 06/17/2022    K 4 7 06/17/2022     06/17/2022    CO2 29 06/17/2022    BUN 18 06/17/2022    CREATININE 0 75 06/17/2022    GLUC 219 (H) 09/29/2021    CALCIUM 8 3 (L) 06/17/2022

## 2022-11-18 ENCOUNTER — APPOINTMENT (OUTPATIENT)
Dept: PHYSICAL THERAPY | Facility: MEDICAL CENTER | Age: 62
End: 2022-11-18

## 2022-11-21 ENCOUNTER — OFFICE VISIT (OUTPATIENT)
Dept: FAMILY MEDICINE CLINIC | Facility: CLINIC | Age: 62
End: 2022-11-21

## 2022-11-21 VITALS
RESPIRATION RATE: 18 BRPM | WEIGHT: 272 LBS | OXYGEN SATURATION: 97 % | BODY MASS INDEX: 53.12 KG/M2 | HEART RATE: 77 BPM | DIASTOLIC BLOOD PRESSURE: 70 MMHG | SYSTOLIC BLOOD PRESSURE: 122 MMHG | TEMPERATURE: 97.8 F

## 2022-11-21 DIAGNOSIS — E66.01 MORBID OBESITY WITH BMI OF 50.0-59.9, ADULT (HCC): Primary | ICD-10-CM

## 2022-11-21 NOTE — ASSESSMENT & PLAN NOTE
Reviewed calorie deficits today  Patient maintenance calorie intake 2052 calories per day  Goal for 1/2 lb weight loss weekly 1802 per day,  1 lb weight loss per week 1552 per day  Download in my fitness [employer] on phone and patient will continue with set up for review of food diary and food tracking we using apps next appointment in 1 week

## 2022-11-21 NOTE — PROGRESS NOTES
Name: Jie Beverly      : [id number]      MRN: [id number]  Encounter Provider: Micky Valdes MD  Encounter Date: 2022   Encounter department: [address] [place] [address]     1  Morbid obesity with BMI of 50 0-59 9, adult [facility])  Assessment & Plan:  Reviewed calorie deficits today  Patient maintenance calorie intake 2052 calories per day  Goal for 1/2 lb weight loss weekly 1802 per day,  1 lb weight loss per week 1552 per day  Download in [employer] on phone and patient will continue with set up for review of food diary and food tracking we using apps next appointment in 1 week  Subjective      Jie Beverly is a very pleasant 57 y o  female who presents today for   Review dietary plan  Food diary pamphlet given with examples, Education to review nutritional facts and my plate information also provided  Review of Systems   Constitutional: Negative for fever  Respiratory: Negative for cough and shortness of breath  Cardiovascular: Negative for chest pain and palpitations  Gastrointestinal: Negative for abdominal pain  Genitourinary: Negative for dysuria and hematuria  Perineal order reported  Denies discharge and fecal incontinence  Will review at next appointment in 1 week  Musculoskeletal: Positive for arthralgias and back pain  Skin: Negative for rash  All other systems reviewed and are negative        Current Outpatient Medications on File Prior to Visit   Medication Sig   • acetaminophen (TYLENOL) 650 mg CR tablet Take 1 tablet (650 mg total) by mouth every 8 (eight) hours as needed for mild pain   • acetaminophen (TYLENOL) 650 mg CR tablet Take 1 tablet (650 mg total) by mouth every 8 (eight) hours as needed for mild pain (Patient not taking: Reported on 2022)   • albuterol (Ventolin HFA) 90 mcg/act inhaler Inhale 2 puffs every 6 (six) hours as needed for wheezing   • Ascorbic Acid (vitamin C) 100 MG tablet Take 100 mg by mouth daily   • aspirin (Aspirin Low Dose) 81 mg EC tablet Take 1 tablet (81 mg total) by mouth daily   • atorvastatin (LIPITOR) 40 mg tablet Take 1 tablet (40 mg total) by mouth daily   • azelastine (ASTELIN) 0 1 % nasal spray 1 spray into each nostril 2 (two) times a day Use in each nostril as directed   • budesonide-formoterol (SYMBICORT) 160-4 5 mcg/act inhaler Inhale 1 puff 2 (two) times a day Rinse mouth after use  • cetirizine (ZyrTEC) 10 mg tablet Take 10 mg by mouth daily   • cholecalciferol (VITAMIN D3) 1,000 units tablet Take 1 tablet (1,000 Units total) by mouth daily   • clotrimazole (LOTRIMIN) 1 % cream clotrimazole 1 % topical cream   APPLY TOPICALLY TO THE AFFECTED AREA AND SURROUNDING AREAS OF SKI     (REFER TO PRESCRIPTION NOTES)  • Diclofenac Sodium (VOLTAREN) 1 % Apply 2 g topically 4 (four) times a day   • fluticasone (FLONASE) 50 mcg/act nasal spray 1 spray into each nostril daily   • gabapentin (NEURONTIN) 300 mg capsule Take 1 capsule (300 mg total) by mouth 3 (three) times a day Take 1 tablet 3 times a day   • guaiFENesin (MUCINEX) 600 mg 12 hr tablet Take 1 tablet (600 mg total) by mouth 2 (two) times a day as needed for cough   • liraglutide (SAXENDA) injection Inject 0 1 mL (0 6 mg total) under the skin daily for 7 days, THEN 0 2 mL (1 2 mg total) daily for 7 days, THEN 0 3 mL (1 8 mg total) daily for 7 days, THEN 0 4 mL (2 4 mg total) daily for 7 days, THEN 0 5 mL (3 mg total) daily   (Patient not taking: Reported on 11/17/2022)   • montelukast (SINGULAIR) 10 mg tablet take 1 tablet by mouth at bedtime   • nystatin (MYCOSTATIN) powder Apply topically 3 (three) times a day   • propranolol (INDERAL) 40 mg tablet take 1 tablet by mouth every 12 hours   • terbinafine (LamISIL) 250 mg tablet terbinafine HCl 250 mg tablet   take 1 tablet by mouth once daily (Patient not taking: Reported on 11/17/2022)       Objective     /70 (BP Location: Left arm, Patient Position: Sitting, Cuff Size: Adult)   Pulse 77   Temp 97 8 °F (36 6 °C) (Temporal)   Resp 18   Wt 123 kg (272 lb)   LMP  (LMP Unknown)   SpO2 97%   BMI 53 12 kg/m²     Physical Exam  Vitals reviewed  Constitutional:       General: She is not in acute distress  Appearance: She is obese  Eyes:      Conjunctiva/sclera: Conjunctivae normal    Cardiovascular:      Rate and Rhythm: Normal rate  Pulmonary:      Effort: Pulmonary effort is normal    Musculoskeletal:         General: Normal range of motion  Skin:     General: Skin is dry  Neurological:      General: No focal deficit present  Mental Status: She is alert     Psychiatric:         Behavior: Behavior normal        Luciana Lemon MD

## 2022-11-22 ENCOUNTER — TELEPHONE (OUTPATIENT)
Dept: FAMILY MEDICINE CLINIC | Facility: CLINIC | Age: 62
End: 2022-11-22

## 2022-11-22 ENCOUNTER — APPOINTMENT (OUTPATIENT)
Dept: PHYSICAL THERAPY | Facility: MEDICAL CENTER | Age: 62
End: 2022-11-22

## 2022-11-22 NOTE — TELEPHONE ENCOUNTER
[facility] Plasma    Patient notified form is ready for     Form scanned into patient chart  Form placed in  bin

## 2022-11-25 ENCOUNTER — APPOINTMENT (OUTPATIENT)
Dept: PHYSICAL THERAPY | Facility: MEDICAL CENTER | Age: 62
End: 2022-11-25

## 2022-11-26 ENCOUNTER — HOSPITAL ENCOUNTER (OUTPATIENT)
Dept: RADIOLOGY | Facility: HOSPITAL | Age: 62
Discharge: HOME | End: 2022-11-26

## 2022-11-29 ENCOUNTER — APPOINTMENT (OUTPATIENT)
Dept: PHYSICAL THERAPY | Facility: MEDICAL CENTER | Age: 62
End: 2022-11-29

## 2022-12-01 ENCOUNTER — OFFICE VISIT (OUTPATIENT)
Dept: FAMILY MEDICINE CLINIC | Facility: CLINIC | Age: 62
End: 2022-12-01

## 2022-12-01 VITALS
SYSTOLIC BLOOD PRESSURE: 124 MMHG | RESPIRATION RATE: 18 BRPM | BODY MASS INDEX: 53.6 KG/M2 | HEIGHT: 60 IN | DIASTOLIC BLOOD PRESSURE: 72 MMHG | WEIGHT: 273 LBS | HEART RATE: 60 BPM | OXYGEN SATURATION: 96 % | TEMPERATURE: 97.8 F

## 2022-12-01 DIAGNOSIS — E66.01 MORBID OBESITY WITH BMI OF 50.0-59.9, ADULT (HCC): ICD-10-CM

## 2022-12-01 DIAGNOSIS — R19.7 DIARRHEA, UNSPECIFIED TYPE: Primary | ICD-10-CM

## 2022-12-02 PROBLEM — R15.9 FECAL INCONTINENCE: Status: RESOLVED | Noted: 2022-07-04 | Resolved: 2022-12-02

## 2022-12-02 PROBLEM — R19.7 DIARRHEA: Status: ACTIVE | Noted: 2022-12-02

## 2022-12-02 NOTE — ASSESSMENT & PLAN NOTE
Started this am - no unusual foods consumed nor ill ocntacts  10 episodes  No blood or mucus  No N/V or fever  Suspected early viral GE vs food poisoning  Recommended hydration with diluted juice/gatorade and continue observation  Will investigate if persists beyond 10 days

## 2022-12-02 NOTE — ASSESSMENT & PLAN NOTE
Reviewed use of [employer] christ[employer] and need for accurate food logging  Aim for 1lb weight loss per week with calorie goal of 1610kcal/day  Goal weight of 160lbs and patient informed of intention to achieve over the next 2 years  If insignificant weight loss in the first 6-12 months (aiming for 10%) then will consider addition of medications  Patient encouraged to do stationary exercises to decrease back pain and promote persistence of exercise but will primarily focus on diet for now

## 2022-12-02 NOTE — PROGRESS NOTES
Name: Chuck Constantino      :       MRN: [id number]  Encounter Provider: Eleanor Santos MD  Encounter Date: 2022   Encounter department: [facility]     1  Diarrhea, unspecified type  Assessment & Plan:  Started this am - no unusual foods consumed nor ill ocntacts  10 episodes  No blood or mucus  No N/V or fever  Suspected early viral GE vs food poisoning  Recommended hydration with diluted juice/gatorade and continue observation  Will investigate if persists beyond 10 days  2  Morbid obesity with BMI of 50 0-59 9, adult [facility])  Assessment & Plan:  Reviewed use of [employer] christ[employer] and need for accurate food logging  Aim for 1lb weight loss per week with calorie goal of 1610kcal/day  Goal weight of 160lbs and patient informed of intention to achieve over the next 2 years  If insignificant weight loss in the first 6-12 months (aiming for 10%) then will consider addition of medications  Patient encouraged to do stationary exercises to decrease back pain and promote persistence of exercise but will primarily focus on diet for now  Subjective      Chuck Constantino is a very pleasant 57 y o  female who presents today for weight loss management  She reports did not bring in food logs taken over past few weeks however we went through use of [employer] together for continued logging  Patient had complaints of vaginal odor but did not want to do examination today due to sudden onset of diarrhea since this morning  See A&P for details  Review of Systems   Constitutional: Negative for chills and fever  HENT: Negative for ear pain, rhinorrhea and sore throat  Eyes: Negative for pain and redness  Respiratory: Negative for cough, shortness of breath and wheezing  Cardiovascular: Positive for leg swelling  Negative for chest pain and palpitations  Gastrointestinal: Positive for diarrhea   Negative for abdominal pain, constipation, nausea and vomiting  Genitourinary: Negative for dysuria and hematuria  Musculoskeletal: Negative for arthralgias and back pain  Skin: Negative for color change and rash  Neurological: Negative for dizziness, syncope and headaches  All other systems reviewed and are negative  Current Outpatient Medications on File Prior to Visit   Medication Sig   • acetaminophen (TYLENOL) 650 mg CR tablet Take 1 tablet (650 mg total) by mouth every 8 (eight) hours as needed for mild pain   • acetaminophen (TYLENOL) 650 mg CR tablet Take 1 tablet (650 mg total) by mouth every 8 (eight) hours as needed for mild pain (Patient not taking: Reported on 5/4/2022)   • albuterol (Ventolin HFA) 90 mcg/act inhaler Inhale 2 puffs every 6 (six) hours as needed for wheezing   • Ascorbic Acid (vitamin C) 100 MG tablet Take 100 mg by mouth daily   • aspirin (Aspirin Low Dose) 81 mg EC tablet Take 1 tablet (81 mg total) by mouth daily   • atorvastatin (LIPITOR) 40 mg tablet Take 1 tablet (40 mg total) by mouth daily   • azelastine (ASTELIN) 0 1 % nasal spray 1 spray into each nostril 2 (two) times a day Use in each nostril as directed   • budesonide-formoterol (SYMBICORT) 160-4 5 mcg/act inhaler Inhale 1 puff 2 (two) times a day Rinse mouth after use  • cetirizine (ZyrTEC) 10 mg tablet Take 10 mg by mouth daily   • cholecalciferol (VITAMIN D3) 1,000 units tablet Take 1 tablet (1,000 Units total) by mouth daily   • clotrimazole (LOTRIMIN) 1 % cream clotrimazole 1 % topical cream   APPLY TOPICALLY TO THE AFFECTED AREA AND SURROUNDING AREAS OF SKI     (REFER TO PRESCRIPTION NOTES)     • Diclofenac Sodium (VOLTAREN) 1 % Apply 2 g topically 4 (four) times a day   • fluticasone (FLONASE) 50 mcg/act nasal spray 1 spray into each nostril daily   • gabapentin (NEURONTIN) 300 mg capsule Take 1 capsule (300 mg total) by mouth 3 (three) times a day Take 1 tablet 3 times a day   • guaiFENesin (MUCINEX) 600 mg 12 hr tablet Take 1 tablet (600 mg total) by mouth 2 (two) times a day as needed for cough   • liraglutide (SAXENDA) injection Inject 0 1 mL (0 6 mg total) under the skin daily for 7 days, THEN 0 2 mL (1 2 mg total) daily for 7 days, THEN 0 3 mL (1 8 mg total) daily for 7 days, THEN 0 4 mL (2 4 mg total) daily for 7 days, THEN 0 5 mL (3 mg total) daily  (Patient not taking: Reported on 11/17/2022)   • montelukast (SINGULAIR) 10 mg tablet take 1 tablet by mouth at bedtime   • nystatin (MYCOSTATIN) powder Apply topically 3 (three) times a day   • propranolol (INDERAL) 40 mg tablet take 1 tablet by mouth every 12 hours   • terbinafine (LamISIL) 250 mg tablet terbinafine HCl 250 mg tablet   take 1 tablet by mouth once daily (Patient not taking: Reported on 11/17/2022)       Objective     /72 (BP Location: Left arm, Patient Position: Sitting, Cuff Size: Adult)   Pulse 60   Temp 97 8 °F (36 6 °C) (Temporal)   Resp 18   Ht 5' (1 524 m)   Wt 124 kg (273 lb)   LMP  (LMP Unknown)   SpO2 96%   BMI 53 32 kg/m²     Physical Exam  Vitals reviewed  Constitutional:       General: She is not in acute distress  Appearance: She is obese  HENT:      Head: Atraumatic  Eyes:      Conjunctiva/sclera: Conjunctivae normal    Cardiovascular:      Rate and Rhythm: Normal rate and regular rhythm  Pulses: Normal pulses  Heart sounds: Normal heart sounds  No murmur heard  Pulmonary:      Effort: Pulmonary effort is normal       Breath sounds: Normal breath sounds  No wheezing, rhonchi or rales  Abdominal:      General: Abdomen is flat  Bowel sounds are normal  There is no distension  Palpations: Abdomen is soft  Tenderness: There is no abdominal tenderness  There is no guarding or rebound  Musculoskeletal:         General: Normal range of motion  Cervical back: Normal range of motion  Right lower leg: No edema  Left lower leg: No edema  Skin:     General: Skin is warm and dry        Findings: No rash  Neurological:      General: No focal deficit present  Mental Status: She is alert     Psychiatric:         Mood and Affect: Mood normal          Behavior: Behavior normal        Chau Lantigua MD

## 2022-12-09 DIAGNOSIS — J30.89 SEASONAL ALLERGIC RHINITIS DUE TO OTHER ALLERGIC TRIGGER: ICD-10-CM

## 2022-12-14 RX ORDER — AZELASTINE HYDROCHLORIDE 137 UG/1
SPRAY, METERED NASAL
Qty: 30 ML | Refills: 1 | Status: SHIPPED | OUTPATIENT
Start: 2022-12-14

## 2022-12-20 ENCOUNTER — OFFICE VISIT (OUTPATIENT)
Dept: OBGYN CLINIC | Facility: HOSPITAL | Age: 62
End: 2022-12-20

## 2022-12-20 VITALS — HEIGHT: 60 IN | BODY MASS INDEX: 53.67 KG/M2 | WEIGHT: 273.37 LBS

## 2022-12-20 DIAGNOSIS — M54.16 LUMBAR RADICULOPATHY: ICD-10-CM

## 2022-12-20 DIAGNOSIS — M43.10 DEGENERATIVE SPONDYLOLISTHESIS: ICD-10-CM

## 2022-12-20 DIAGNOSIS — M54.50 LUMBAR PAIN: ICD-10-CM

## 2022-12-20 DIAGNOSIS — G95.19 NEUROGENIC CLAUDICATION (HCC): Primary | ICD-10-CM

## 2022-12-20 NOTE — PROGRESS NOTES
NAME: Walter Abrams  : 1960  PCP: Lynne Kelly MD      Chief Complaint: lumbar MRI review - follow up    HPI:  57 y o  female presenting for follow up visit and lumbar MRI review  Andie Santiago was initially seen on 2022 with chief complaint of low back and buttock pain  Pain is worse with prolonged ambulation and needs to sit down and rest before resuming activity/walking  She occasionally uses a cane for ambulatory assistance, but notes she has bad knees and ankles making ambulation more difficult  She notes symptoms have remained unchanged since her initial visit  She has been to multiple formal physical therapy sessions without appreciable benefit  She is still on disability from work at [employer] due to her current pain and symptoms  Denies numbness and tingling  Denies burning  Denies any rekha trauma  Denies fever or chills  Denies any bladder or bowel changes  Patient has hx of prediabetes  She has a previous 48 year smoking history, but notes smoking cessation x1 year       FAMILY HISTORY   No pertinent family medical history    PAST MEDICAL HISTORY:   Past Medical History:   Diagnosis Date   • Allergic rhinitis    • Anemia    • Anxiety    • Arthritis    • Back pain    • Cancer (HCC)    • Chronic pain disorder     back   • COPD (chronic obstructive pulmonary disease) ([facility])    • CPAP (continuous positive airway pressure) dependence    • Depression    • Diabetes mellitus ([facility])     Pre- Diabetic   • Dyslipidemia    • Essential tremor    • Excessive daytime sleepiness    • GERD (gastroesophageal reflux disease)    • History of uterine cancer    • Hyperglycemia    • Hyperlipidemia    • Hypertension    • Hypovitaminosis D    • Iron deficiency anemia    • Joint pain    • Mood disorder ([facility] [address] )    • Obesity    • Obstructive sleep apnea    • Ringing in ears    • RLS (restless legs syndrome)    • Snoring    • Uterine cancer [facility])     age 20   • Vitamin D deficiency    • Witnessed episode of apnea        MEDICATIONS:  Current Outpatient Medications   Medication Sig Dispense Refill   • acetaminophen (TYLENOL) 650 mg CR tablet Take 1 tablet (650 mg total) by mouth every 8 (eight) hours as needed for mild pain 60 tablet 3   • albuterol (Ventolin HFA) 90 mcg/act inhaler Inhale 2 puffs every 6 (six) hours as needed for wheezing 18 g 1   • Ascorbic Acid (vitamin C) 100 MG tablet Take 100 mg by mouth daily     • aspirin (Aspirin Low Dose) 81 mg EC tablet Take 1 tablet (81 mg total) by mouth daily 30 tablet 3   • atorvastatin (LIPITOR) 40 mg tablet Take 1 tablet (40 mg total) by mouth daily 90 tablet 3   • Azelastine HCl 137 MCG/SPRAY SOLN INSTILL 1 SPRAY INTO EACH NOSTRIL 2 TIMES A DAY AS DIRECTED 30 mL 1   • budesonide-formoterol (SYMBICORT) 160-4 5 mcg/act inhaler Inhale 1 puff 2 (two) times a day Rinse mouth after use  10 2 g 5   • cholecalciferol (VITAMIN D3) 1,000 units tablet Take 1 tablet (1,000 Units total) by mouth daily 30 tablet 5   • clotrimazole (LOTRIMIN) 1 % cream clotrimazole 1 % topical cream   APPLY TOPICALLY TO THE AFFECTED AREA AND SURROUNDING AREAS OF SKI     (REFER TO PRESCRIPTION NOTES)       • Diclofenac Sodium (VOLTAREN) 1 % Apply 2 g topically 4 (four) times a day 100 g 0   • fluticasone (FLONASE) 50 mcg/act nasal spray 1 spray into each nostril daily 1 Bottle 3   • gabapentin (NEURONTIN) 300 mg capsule Take 1 capsule (300 mg total) by mouth 3 (three) times a day Take 1 tablet 3 times a day 90 capsule 2   • montelukast (SINGULAIR) 10 mg tablet take 1 tablet by mouth at bedtime 30 tablet 3   • nystatin (MYCOSTATIN) powder Apply topically 3 (three) times a day 120 g 2   • propranolol (INDERAL) 40 mg tablet take 1 tablet by mouth every 12 hours 180 tablet 5   • acetaminophen (TYLENOL) 650 mg CR tablet Take 1 tablet (650 mg total) by mouth every 8 (eight) hours as needed for mild pain (Patient not taking: Reported on 5/4/2022) 30 tablet 0   • cetirizine (ZyrTEC) 10 mg tablet Take 10 mg by mouth daily (Patient not taking: Reported on 2022)     • guaiFENesin (MUCINEX) 600 mg 12 hr tablet Take 1 tablet (600 mg total) by mouth 2 (two) times a day as needed for cough (Patient not taking: Reported on 2022) 60 tablet 0   • liraglutide (SAXENDA) injection Inject 0 1 mL (0 6 mg total) under the skin daily for 7 days, THEN 0 2 mL (1 2 mg total) daily for 7 days, THEN 0 3 mL (1 8 mg total) daily for 7 days, THEN 0 4 mL (2 4 mg total) daily for 7 days, THEN 0 5 mL (3 mg total) daily  (Patient not taking: Reported on 2022) 35 mL 0   • terbinafine (LamISIL) 250 mg tablet terbinafine HCl 250 mg tablet   take 1 tablet by mouth once daily (Patient not taking: Reported on 2022)       No current facility-administered medications for this visit  PAST SURGICAL HISTORY:  Past Surgical History:   Procedure Laterality Date   • APPENDECTOMY     • HYSTERECTOMY      age 20   • KNEE ARTHROSCOPY     • KNEE SURGERY      Meniscus tear   • TONSILLECTOMY     • TUBAL LIGATION         SOCIAL HISTORY:  Social History     Socioeconomic History   • Marital status:       Spouse name: Not on file   • Number of children: Not on file   • Years of education: Not on file   • Highest education level: Not on file   Occupational History   • Not on file   Tobacco Use   • Smoking status: Former     Packs/day: 1 50     Years: 47 00     Pack years: 70 50     Types: Cigarettes     Start date: 63     Quit date: 2021     Years since quittin 2   • Smokeless tobacco: Never   Vaping Use   • Vaping Use: Never used   Substance and Sexual Activity   • Alcohol use: Not Currently     Comment: very seldom   • Drug use: Not Currently     Comment: pt use at age [age]   • Sexual activity: Not on file   Other Topics Concern   • Not on file   Social History Narrative   • Not on file     Social Determinants of Health     Financial Resource Strain: Low Risk    • Difficulty of Paying Living Expenses: Not hard at all   Food Insecurity: No Food Insecurity   • Worried About Running Out of Food in the Last Year: Never true   • Ran Out of Food in the Last Year: Never true   Transportation Needs: No Transportation Needs   • Lack of Transportation (Medical): No   • Lack of Transportation (Non-Medical): No   Physical Activity: Not on file   Stress: Not on file   Social Connections: Not on file   Intimate Partner Violence: Not on file   Housing Stability: Low Risk    • Unable to Pay for Housing in the Last Year: No   • Number of Places Lived in the Last Year: 1   • Unstable Housing in the Last Year: No       ALLERGIES:  Allergies   Allergen Reactions   • Aspirin GI Intolerance     Can only take baby aspirin    • Latex Itching       ROS:   Constitutional:  No fever, chills, night sweats, loss of appetite   HEENT No no sore throat, difficulty swallowing   Cardiovascular:  No chest pain, palpitations, BLE edema, SANCHEZ   Respiratory:  No SOB, coughing, chest congestion   Gastrointestinal:  No nausea, vomiting, abdominal pain   Genitourinary:  No dysuria, hematuria, urinary urgency/frequency   Musculoskeletal:  See HPI   Skin:  No rash, erythema, edema   Neurologic:  See HPI   Psychiatric Illness:  No depression, anxiety, mood disorder, substance abuse disorder     PHYSICAL EXAM:  Ht 5' (1 524 m)   Wt 124 kg (273 lb 5 9 oz)   LMP  (LMP Unknown)   BMI 53 39 kg/m²           General:  Well-developed,appears well, no acute distress   Respiratory:  No SOB, no abnormal effort (use of accessory muscles)  GI / Abdominal:  Soft  No abdominal masses or tenderness  Nondistended  Gait & balance No evidence of myelopathic gait  Lumbar spine range of motion:  -Forward flexion to 45  -Extension to neutral  -Lateral bend 30 right, 30 left  -Rotation 30 right, 30 left  There is no point tenderness with palpation along the posterior cervical, thoracic, lumbar spine       +Tenderness left PSIS     Neurologic:    Lower Extremity Motor Function **exam limited by patient ability to fully participate 2/2 habitus   Right  Left    Iliopsoas  5/5  5/5    Quadriceps 5/5 5/5   Tibialis anterior  5/5  5/5    EHL  5/5  5/5    Gastroc  muscle  5/5  5/5                Sensory: light touch is intact to bilateral upper and lower extremities     Reflexes:    Right Left   Biceps 2+ 2+   Triceps 2+ 2+   Brachioradialis 2+ 2+   Patellar 1+ 1+   Achilles 1+ 1+   Babinski neg neg     Other tests:  Pope's: Negative  Clonus: Negative    +left sided SI joint provacative maneuvers     IMAGING: I have personally reviewed the images and these are my findings:  MRI lumbar spine from 11/26/2022: Multilevel degenerative changes, L4-5 degenerative spondylolisthesis, with moderate/severe bilateral lateral recess stenosis      ASSESSMENT / Medical Decision Making (MDM):  Patient is a 15-year-old female with history of low back pain radiating into bilateral buttock  Pain is exacerbated with walking and relieved with rest  She is here for follow up and lumbar MRI review  Her pain and symptoms have remained unchanged  No incontinence of bowel/bladder, no fevers/chills, or night sweats  Physical exam and showing no focal neurological deficits  Imaging reviewed as above  Findings most notable for L4-5 degenerative spondylolisthesis with lateral recess stenosis  Impression of degenerative lumbar spondylolisthesis, spinal stenosis    Plan:  The above clinical physical imaging findings were reviewed with patient she has a constellation findings most consistent with neurogenic claudication secondary degenerative lumbar spondylolisthesis, lumbar spinal stenosis  She also does have a component of left-sided SI joint dysfunction  Dima Cagle notes that her symptoms have persisted despite oral medications and formal physical therapy  She does note that she had moderate symptom relief with an injection several years ago but has not had any recent injections   Fortunately, Dima Cagle is without focal neurologic deficits  Given her continued pain/symptoms, I think she may benefit from a targeted steroid injection  Referral to pain management for evaluation and treatment  Discussed potential role of steroid injection at or near the source of pain to provide targeted relief  Unfortunately due to her medical comorbidities as well as her elevated BMI she is not a surgical candidate at this time  We did  her on the benefits of maintaining a healthy diet and encouraged nutrition consult and follow-up with her PCP  Provided patient a printout of her lumbar MRI images and reviewed the findings in detail  Patient instructed to return to office/ER sooner if symptoms are not improving, getting worse, or new worrisome/neurologic symptoms arise

## 2022-12-23 DIAGNOSIS — G25.0 BENIGN ESSENTIAL TREMOR: ICD-10-CM

## 2022-12-23 NOTE — TELEPHONE ENCOUNTER
Dayo banerjee:    I need a refill for my gabapentin and its 300 milligram capsules and I take it three times a day  And my name is Ishmael Diaz  My phone number is [phone]  Thank you   Bye

## 2022-12-27 RX ORDER — GABAPENTIN 300 MG/1
300 CAPSULE ORAL 3 TIMES DAILY
Qty: 90 CAPSULE | Refills: 5 | Status: SHIPPED | OUTPATIENT
Start: 2022-12-27

## 2022-12-28 ENCOUNTER — ANNUAL EXAM (OUTPATIENT)
Dept: FAMILY MEDICINE CLINIC | Facility: CLINIC | Age: 62
End: 2022-12-28

## 2022-12-28 VITALS
TEMPERATURE: 97.2 F | BODY MASS INDEX: 53.2 KG/M2 | HEART RATE: 72 BPM | HEIGHT: 60 IN | SYSTOLIC BLOOD PRESSURE: 130 MMHG | RESPIRATION RATE: 16 BRPM | WEIGHT: 271 LBS | OXYGEN SATURATION: 97 % | DIASTOLIC BLOOD PRESSURE: 70 MMHG

## 2022-12-28 DIAGNOSIS — Z01.419 ENCOUNTER FOR GYNECOLOGICAL EXAMINATION WITHOUT ABNORMAL FINDING: Primary | ICD-10-CM

## 2022-12-28 DIAGNOSIS — B36.9 CUTANEOUS FUNGAL INFECTION: ICD-10-CM

## 2022-12-28 RX ORDER — KETOCONAZOLE 20 MG/G
CREAM TOPICAL DAILY
Qty: 15 G | Refills: 0 | Status: SHIPPED | OUTPATIENT
Start: 2022-12-28 | End: 2023-01-11

## 2022-12-28 NOTE — PROGRESS NOTES
Name: Glenys Liz      :       MRN: [id number]  Encounter Provider: Jw Flores MD  Encounter Date: 2022   Encounter department: [address] [place] [address]     1  Encounter for gynecological examination without abnormal finding  Assessment & Plan:  Has past medical history of uterine cancer   Patient has current gynecology complaints of foul smell for 3/4 months  Patient has no abnormal vaginal bleeding  Breast lumps or abnormalities: None  Last mammogram: Last year   Last pap smear: Does not remember  LMP: 20years old   Pregnancy history: 3 children, vaginal   Urinary symptoms: No urinary symptoms   Sexually active: Yes, one male partner  Safety: Yes currently feels safe     Orders:  -     VAGINOSIS DNA PROBE (AFFIRM)  -     Chlamydia/GC amplified DNA by PCR    2  Cutaneous fungal infection  Assessment & Plan:  Present along urogenital area and thigh creases  Likely cause of smell patient is concerned about  Using Nystatin powder right     PLAN:   - Will switch Nystatin to Ketoconazole daily for 2 weeks  - Keep area clean and dry, avoid more than one shower daily   - Consider oral antifungal if continued to be issue    Orders:  -     ketoconazole (NIZORAL) 2 % cream; Apply topically daily for 14 days  -     VAGINOSIS DNA PROBE (AFFIRM)  -     Chlamydia/GC amplified DNA by PCR         Subjective     Patient is a 57year old female presenting today for annual gyn examination  Does have a complaint of foul odor in vaginal area for 3 months  Review of Systems   Constitutional: Negative for chills and fever  HENT: Negative for ear pain and sore throat  Eyes: Negative for pain and visual disturbance  Respiratory: Negative for cough and shortness of breath  Cardiovascular: Negative for chest pain and palpitations  Gastrointestinal: Negative for abdominal pain and vomiting  Genitourinary: Negative for dysuria and hematuria  Musculoskeletal: Negative for arthralgias and back pain  Skin: Negative for color change and rash  Neurological: Negative for seizures and syncope  All other systems reviewed and are negative  Past Medical History:   Diagnosis Date   • Allergic rhinitis    • Anemia    • Anxiety    • Arthritis    • Back pain    • Cancer (Formerly [facility])    • Chronic pain disorder     back   • COPD (chronic obstructive pulmonary disease) (Formerly [facility])    • CPAP (continuous positive airway pressure) dependence    • Depression    • Diabetes mellitus (Formerly [facility])     Pre- Diabetic   • Dyslipidemia    • Essential tremor    • Excessive daytime sleepiness    • GERD (gastroesophageal reflux disease)    • History of uterine cancer    • Hyperglycemia    • Hyperlipidemia    • Hypertension    • Hypovitaminosis D    • Iron deficiency anemia    • Joint pain    • Mood disorder ([facility] [address] )    • Obesity    • Obstructive sleep apnea    • Ringing in ears    • RLS (restless legs syndrome)    • Snoring    • Uterine cancer (Formerly [facility])     age 20   • Vitamin D deficiency    • Witnessed episode of apnea      Past Surgical History:   Procedure Laterality Date   • APPENDECTOMY     • HYSTERECTOMY      age 20   • KNEE ARTHROSCOPY     • KNEE SURGERY      Meniscus tear   • TONSILLECTOMY     • TUBAL LIGATION       Family History   Problem Relation Age of Onset   • Diabetes Mother    • Asthma Mother    • Hypertension Mother    • Heart disease Mother    • No Known Problems Father    • No Known Problems Sister    • No Known Problems Daughter    • No Known Problems Maternal Grandmother    • No Known Problems Paternal Grandmother    • No Known Problems Sister    • No Known Problems Sister    • No Known Problems Daughter    • No Known Problems Maternal Grandfather    • No Known Problems Paternal Grandfather      Social History     Socioeconomic History   • Marital status:       Spouse name: None   • Number of children: None   • Years of education: None   • Highest education level: None Occupational History   • None   Tobacco Use   • Smoking status: Former     Packs/day: 1 50     Years: 47 00     Pack years: 70 50     Types: Cigarettes     Start date: 63     Quit date: 2021     Years since quittin 2   • Smokeless tobacco: Never   Vaping Use   • Vaping Use: Never used   Substance and Sexual Activity   • Alcohol use: Not Currently     Comment: very seldom   • Drug use: Not Currently     Comment: pt use at age [age]   • Sexual activity: None   Other Topics Concern   • None   Social History Narrative   • None     Social Determinants of Health     Financial Resource Strain: Low Risk    • Difficulty of Paying Living Expenses: Not hard at all   Food Insecurity: No Food Insecurity   • Worried About Running Out of Food in the Last Year: Never true   • Ran Out of Food in the Last Year: Never true   Transportation Needs: No Transportation Needs   • Lack of Transportation (Medical): No   • Lack of Transportation (Non-Medical):  No   Physical Activity: Not on file   Stress: Not on file   Social Connections: Not on file   Intimate Partner Violence: Not on file   Housing Stability: Not on file     Current Outpatient Medications on File Prior to Visit   Medication Sig   • acetaminophen (TYLENOL) 650 mg CR tablet Take 1 tablet (650 mg total) by mouth every 8 (eight) hours as needed for mild pain   • acetaminophen (TYLENOL) 650 mg CR tablet Take 1 tablet (650 mg total) by mouth every 8 (eight) hours as needed for mild pain (Patient not taking: Reported on 2022)   • albuterol (Ventolin HFA) 90 mcg/act inhaler Inhale 2 puffs every 6 (six) hours as needed for wheezing   • Ascorbic Acid (vitamin C) 100 MG tablet Take 100 mg by mouth daily   • aspirin (Aspirin Low Dose) 81 mg EC tablet Take 1 tablet (81 mg total) by mouth daily   • atorvastatin (LIPITOR) 40 mg tablet Take 1 tablet (40 mg total) by mouth daily   • Azelastine HCl 137 MCG/SPRAY SOLN INSTILL 1 SPRAY INTO EACH NOSTRIL 2 TIMES A DAY AS DIRECTED • budesonide-formoterol (SYMBICORT) 160-4 5 mcg/act inhaler Inhale 1 puff 2 (two) times a day Rinse mouth after use  • cetirizine (ZyrTEC) 10 mg tablet Take 10 mg by mouth daily (Patient not taking: Reported on 12/20/2022)   • cholecalciferol (VITAMIN D3) 1,000 units tablet Take 1 tablet (1,000 Units total) by mouth daily   • Diclofenac Sodium (VOLTAREN) 1 % Apply 2 g topically 4 (four) times a day   • fluticasone (FLONASE) 50 mcg/act nasal spray 1 spray into each nostril daily   • gabapentin (NEURONTIN) 300 mg capsule Take 1 capsule (300 mg total) by mouth 3 (three) times a day Take 1 tablet 3 times a day   • guaiFENesin (MUCINEX) 600 mg 12 hr tablet Take 1 tablet (600 mg total) by mouth 2 (two) times a day as needed for cough (Patient not taking: Reported on 12/20/2022)   • liraglutide (SAXENDA) injection Inject 0 1 mL (0 6 mg total) under the skin daily for 7 days, THEN 0 2 mL (1 2 mg total) daily for 7 days, THEN 0 3 mL (1 8 mg total) daily for 7 days, THEN 0 4 mL (2 4 mg total) daily for 7 days, THEN 0 5 mL (3 mg total) daily   (Patient not taking: Reported on 11/17/2022)   • montelukast (SINGULAIR) 10 mg tablet take 1 tablet by mouth at bedtime   • propranolol (INDERAL) 40 mg tablet take 1 tablet by mouth every 12 hours   • terbinafine (LamISIL) 250 mg tablet terbinafine HCl 250 mg tablet   take 1 tablet by mouth once daily (Patient not taking: Reported on 11/17/2022)     Allergies   Allergen Reactions   • Aspirin GI Intolerance     Can only take baby aspirin    • Latex Itching     Immunization History   Administered Date(s) Administered   • INFLUENZA 10/30/2013, 10/19/2015, 11/14/2016, 11/21/2017, 10/21/2022   • Influenza, recombinant, quadrivalent,injectable, preservative free 10/18/2018, 12/16/2019, 09/28/2020, 10/13/2021, 10/21/2022   • MMR 12/15/2020   • Pneumococcal Conjugate Vaccine 20-valent (Pcv20), Polysace 05/26/2022   • Pneumococcal Polysaccharide PPV23 04/18/2016   • Tdap 11/26/2020 Objective     /70 (BP Location: Left arm, Patient Position: Sitting, Cuff Size: Large)   Pulse 72   Temp (!) 97 2 °F (36 2 °C) (Temporal)   Resp 16   Ht 5' (1 524 m)   Wt 123 kg (271 lb)   LMP  (LMP Unknown)   SpO2 97%   BMI 52 93 kg/m²     Physical Exam  Vitals reviewed  Constitutional:       Appearance: Normal appearance  Cardiovascular:      Rate and Rhythm: Normal rate and regular rhythm  Pulses: Normal pulses  Heart sounds: Normal heart sounds  Pulmonary:      Effort: Pulmonary effort is normal       Breath sounds: Normal breath sounds  Abdominal:      General: Bowel sounds are normal  There is no distension  Palpations: Abdomen is soft  There is no mass  Tenderness: There is no abdominal tenderness  Hernia: There is no hernia in the left inguinal area or right inguinal area  Genitourinary:     General: Normal vulva  Pubic Area: No rash  Labia:         Right: Rash present  No tenderness, lesion or injury  Left: No rash, tenderness, lesion or injury  Vagina: Tenderness (on exertion of speculum, however none on digital examination) present  No vaginal discharge, erythema, bleeding, lesions or prolapsed vaginal walls  Uterus: Absent  Adnexa: Right adnexa normal and left adnexa normal         Right: No mass, tenderness or fullness  Left: No mass, tenderness or fullness  Rectum: Normal       Comments: Rash present in urogenital region and creases of thighs  Red erythematic wet rash with satellite lesions  Cervix and Uterus is absent  Lymphadenopathy:      Lower Body: No right inguinal adenopathy  No left inguinal adenopathy  Skin:     Findings: No erythema  Neurological:      Mental Status: She is alert and oriented to person, place, and time     Psychiatric:         Mood and Affect: Mood normal          Behavior: Behavior normal        Esperanza Tomas MD

## 2022-12-29 LAB
C TRACH DNA SPEC QL NAA+PROBE: NEGATIVE
N GONORRHOEA DNA SPEC QL NAA+PROBE: NEGATIVE

## 2022-12-29 NOTE — ASSESSMENT & PLAN NOTE
Has past medical history of uterine cancer   Patient has current gynecology complaints of foul smell for 3/4 months  Patient has no abnormal vaginal bleeding     Breast lumps or abnormalities: None  Last mammogram: Last year   Last pap smear: Does not remember  LMP: 20years old   Pregnancy history: 3 children, vaginal   Urinary symptoms: No urinary symptoms   Sexually active: Yes, one male partner  Safety: Yes currently feels safe

## 2022-12-30 NOTE — ASSESSMENT & PLAN NOTE
Present along urogenital area and thigh creases     Likely cause of smell patient is concerned about  Using Nystatin powder right     PLAN:   - Will switch Nystatin to Ketoconazole daily for 2 weeks  - Keep area clean and dry, avoid more than one shower daily   - Consider oral antifungal if continued to be issue

## 2023-01-02 DIAGNOSIS — E78.5 HYPERLIPIDEMIA, UNSPECIFIED HYPERLIPIDEMIA TYPE: ICD-10-CM

## 2023-01-03 RX ORDER — ATORVASTATIN CALCIUM 40 MG/1
TABLET, FILM COATED ORAL
Qty: 90 TABLET | Refills: 3 | Status: SHIPPED | OUTPATIENT
Start: 2023-01-03

## 2023-01-10 ENCOUNTER — HOSPITAL ENCOUNTER (OUTPATIENT)
Dept: CT IMAGING | Facility: HOSPITAL | Age: 63
Discharge: HOME | End: 2023-01-10
Attending: INTERNAL MEDICINE

## 2023-01-10 DIAGNOSIS — F17.211 NICOTINE DEPENDENCE, CIGARETTES, IN REMISSION: ICD-10-CM

## 2023-02-13 ENCOUNTER — CONSULT (OUTPATIENT)
Dept: PAIN MEDICINE | Facility: MEDICAL CENTER | Age: 63
End: 2023-02-13

## 2023-02-13 VITALS
HEART RATE: 64 BPM | BODY MASS INDEX: 53.2 KG/M2 | DIASTOLIC BLOOD PRESSURE: 71 MMHG | SYSTOLIC BLOOD PRESSURE: 132 MMHG | HEIGHT: 60 IN | WEIGHT: 271 LBS

## 2023-02-13 DIAGNOSIS — G89.29 CHRONIC BILATERAL LOW BACK PAIN WITHOUT SCIATICA: Primary | ICD-10-CM

## 2023-02-13 DIAGNOSIS — J30.89 SEASONAL ALLERGIC RHINITIS DUE TO OTHER ALLERGIC TRIGGER: ICD-10-CM

## 2023-02-13 DIAGNOSIS — M47.816 LUMBAR SPONDYLOSIS: ICD-10-CM

## 2023-02-13 DIAGNOSIS — M54.50 CHRONIC BILATERAL LOW BACK PAIN WITHOUT SCIATICA: Primary | ICD-10-CM

## 2023-02-13 DIAGNOSIS — M43.16 ANTEROLISTHESIS OF LUMBAR SPINE: ICD-10-CM

## 2023-02-13 NOTE — PATIENT INSTRUCTIONS
Arthritis   WHAT YOU NEED TO KNOW:   Arthritis is pain or disease in one or more joints  There are many types of arthritis  Types such as rheumatoid arthritis cause inflammation in the joints  Other types wear away the cartilage between joints, such as osteoarthritis  This makes the bones of the joint rub together when you move the joint  An infection from bacteria, a virus, or a fungus can also cause arthritis  Your symptoms may be constant, or symptoms may come and go  Arthritis often gets worse over time and can cause permanent joint damage  DISCHARGE INSTRUCTIONS:   Call your doctor or rheumatologist if:   You have a fever and severe joint pain or swelling  You cannot move the affected joint  You have severe joint pain you cannot tolerate  You have a new or worsening rash  Your pain or swelling does not get better with treatment  You have questions or concerns about your condition or care  Medicines:   Acetaminophen  decreases pain and fever  It is available without a doctor's order  Ask how much to take and how often to take it  Follow directions  Read the labels of all other medicines you are using to see if they also contain acetaminophen, or ask your doctor or pharmacist  Acetaminophen can cause liver damage if not taken correctly  Do not use more than 4 grams (4,000 milligrams) total of acetaminophen in one day  NSAIDs , such as ibuprofen, help decrease swelling, pain, and fever  This medicine is available with or without a doctor's order  NSAIDs can cause stomach bleeding or kidney problems in certain people  If you take blood thinner medicine, always ask your healthcare provider if NSAIDs are safe for you  Always read the medicine label and follow directions  Steroids  reduce swelling and pain  Prescription pain medicine  may be given  Ask your healthcare provider how to take this medicine safely  Some prescription pain medicines contain acetaminophen   Do not take other medicines that contain acetaminophen without talking to your healthcare provider  Too much acetaminophen may cause liver damage  Prescription pain medicine may cause constipation  Ask your healthcare provider how to prevent or treat constipation  Take your medicine as directed  Contact your healthcare provider if you think your medicine is not helping or if you have side effects  Tell him of her if you are allergic to any medicine  Keep a list of the medicines, vitamins, and herbs you take  Include the amounts, and when and why you take them  Bring the list or the pill bottles to follow-up visits  Carry your medicine list with you in case of an emergency  Manage your symptoms:   Rest your painful joint so it can heal   Your healthcare provider may recommend crutches or a walker if the affected joint is in a leg  Apply ice or heat to the joint  Both can help decrease swelling and pain  Ice may also help prevent tissue damage  Use an ice pack, or put crushed ice in a plastic bag  Cover it with a towel and place it on your joint for 15 to 20 minutes every hour or as directed  You can apply heat for 20 minutes every 2 hours  Heat treatment includes hot packs or heat lamps  Elevate your joint  Elevation helps reduce swelling and pain  Raise your joint above the level of your heart as often as you can  Prop your painful joint on pillows to keep it above your heart comfortably  Manage arthritis:   Talk to your healthcare providers about your arthritis medicines  Some medicines may only be needed when you have arthritis pain  You may need to take other medicines every day to prevent arthritis from getting worse  Your healthcare providers will help you understand all your medicines and when to take them  It is important to take the medicines as directed, even if you start to feel better  You can continue to have joint damage and inflammation even if you do not feel it      Eat a variety of healthy foods   Healthy foods include fruits, vegetables, whole-grain breads, low-fat dairy products, beans, lean meats, and fish  Ask if you need to be on a special diet  A diet rich in calcium and vitamin D may decrease your risk of osteoporosis  Foods high in calcium include milk, cheese, broccoli, and tofu  Vitamin D may be found in meat, fish, fortified milk, cereal and bread  Ask if you need calcium or vitamin D supplements  Go to physical or occupational therapy as directed  A physical therapist can teach you exercises to improve flexibility and range of motion  You may also be shown non-weight-bearing exercises that are safe for your joints, such as swimming  Exercise can help keep your joints flexible and reduce pain  An occupational therapist can help you learn to do your daily activities when your joints are stiff or sore  Maintain a healthy weight  Extra weight puts increased pressure on your joints  Ask your healthcare provider what you should weigh  If you need to lose weight, he or she can help you create a weight loss program  Weight loss can help reduce pain and increase your ability to do your activities  Wear flat or low-heeled shoes  This will help decrease pain and reduce pressure on your ankle, knee, and hip joints  Do not smoke  Nicotine and other chemicals in cigarettes and cigars can damage your bones and joints  Ask your healthcare provider for information if you currently smoke and need help to quit  E-cigarettes or smokeless tobacco still contain nicotine  Talk to your healthcare provider before you use these products  Support devices:   Orthotic shoes or insoles  help support your feet when you walk  Crutches, a cane, or a walker  may help decrease your risk for falling  They also decrease stress on affected joints  Devices to prevent falls  include raised toilet seats and bathtub bars to help you get up from sitting   Handrails can be placed in areas where you need balance and support  Devices to help with support and rest  include splints to wear on your hands and a firm pillow while you sleep  Use a pillow that is firm enough to support your neck and head  Follow up with your healthcare provider or rheumatologist as directed:  Write down your questions so you remember to ask them during your visits  © Copyright [employer] 2022 Information is for End User's use only and may not be sold, redistributed or otherwise used for commercial purposes  All illustrations and images included in CareNotes® are the copyrighted property of [employer]  or [facility] [employer]   The above information is an  only  It is not intended as medical advice for individual conditions or treatments  Talk to your doctor, nurse or pharmacist before following any medical regimen to see if it is safe and effective for you

## 2023-02-13 NOTE — PROGRESS NOTES
Assessment  1  Chronic bilateral low back pain without sciatica    2  Lumbar spondylosis    3  Anterolisthesis of lumbar spine        Plan  1  We will schedule the patient for bilateral L3-5 medial branch nerve blocks with intention of moving forward towards radiofrequency ablation if there is an appropriate diagnostic response  The initial blocks will be performed with 2% lidocaine and if an appropriate response is obtained upon review of the patient's pain diary, a confirmatory block will be scheduled with 0 5% bupivacaine  In the office today, we reviewed the nature of facet joint pathology in depth using a spine model  We discussed the approach we would use for the injections and provided literature for home review  The patient understands the risks associated with the procedure including bleeding, infection, tissue injury, and allergic reaction and provided verbal informed consent in the office today  My impressions and treatment recommendations were discussed in detail with the patient who verbalized understanding and had no further questions  Discharge instructions were provided  I personally saw and examined the patient and I agree with the above discussed plan of care  Orders Placed This Encounter   Procedures   • FL spine and pain procedure     Standing Status:   Future     Standing Expiration Date:   2/13/2024     Order Specific Question:   Reason for Exam:     Answer:   (B) L3-5 MBB#1     Order Specific Question:   Anticoagulant hold needed? Answer:   no     No orders of the defined types were placed in this encounter  History of Present Illness    Glenys Liz is a 57 y o  female seen in consultation at the request of Dr Brennen Bee regarding chronic lower back pain  The patient had previously visited with Dr Aleida France who has moved to [place]  She was undergoing what sounds to be epidural steroid injections with minimal benefit    She is currently describing axial mechanical lower back pain worse with activities  This is localized to the axial lumbar spine and posterior thighs  She denies radiation of pain below the knees  She describes moderate to severe pain currently rated as a 7/10 which is nearly constant without any typical pattern characterized as sharp and shooting  She does describe some lower extremity weakness and ambulates with a cane  Aggravating factors include standing bending walking  Alleviating factors include lying down sitting and relaxation  Diagnostic studies include MRI of the lumbar spine which was reviewed with the patient  This does reveal grade 1 L4 on L5 anterolisthesis as well as facet arthropathy at L4-5 and L5-S1  She has tried injections physical therapy and a TENS unit all without relief  Social history negative for tobacco marijuana and alcohol use  Currently is on gabapentin for pain relief but without significant benefit  I have personally reviewed and/or updated the patient's past medical history, past surgical history, family history, social history, current medications, allergies, and vital signs today  Review of Systems   Respiratory: Positive for wheezing  Musculoskeletal: Positive for back pain  Neurological: Positive for dizziness         Patient Active Problem List   Diagnosis   • Obstructive sleep apnea treated with continuous positive airway pressure (CPAP)   • Restless leg syndrome   • Allergic rhinitis   • Depression   • Neurogenic claudication (HCC)   • Essential tremor   • Anxiety   • Hyperlipidemia   • History of tobacco abuse   • Low serum ferritin level   • COPD (chronic obstructive pulmonary disease) ([facility])   • GERD (gastroesophageal reflux disease)   • Edema   • Major depressive disorder, recurrent episode, moderate ([facility])   • Primary osteoarthritis of both knees   • Morbid obesity with BMI of 50 0-59 9, adult ([facility] )   • Prediabetes   • Stress incontinence   • Lung nodules   • Pedal edema   • Diarrhea • Cutaneous fungal infection   • Encounter for gynecological examination without abnormal finding       Past Medical History:   Diagnosis Date   • Allergic rhinitis    • Anemia    • Anxiety    • Arthritis    • Back pain    • Cancer (HCC)    • Chronic pain disorder     back   • COPD (chronic obstructive pulmonary disease) ([facility])    • CPAP (continuous positive airway pressure) dependence    • Depression    • Diabetes mellitus (HCC)     Pre- Diabetic   • Dyslipidemia    • Essential tremor    • Excessive daytime sleepiness    • GERD (gastroesophageal reflux disease)    • History of uterine cancer    • Hyperglycemia    • Hyperlipidemia    • Hypertension    • Hypovitaminosis D    • Iron deficiency anemia    • Joint pain    • Mood disorder ([address] )    • Obesity    • Obstructive sleep apnea    • Ringing in ears    • RLS (restless legs syndrome)    • Snoring    • Uterine cancer ([facility])     age 20   • Vitamin D deficiency    • Witnessed episode of apnea        Past Surgical History:   Procedure Laterality Date   • APPENDECTOMY     • HYSTERECTOMY      age 20   • KNEE ARTHROSCOPY     • KNEE SURGERY      Meniscus tear   • TONSILLECTOMY     • TUBAL LIGATION         Family History   Problem Relation Age of Onset   • Diabetes Mother    • Asthma Mother    • Hypertension Mother    • Heart disease Mother    • No Known Problems Father    • No Known Problems Sister    • No Known Problems Daughter    • No Known Problems Maternal Grandmother    • No Known Problems Paternal Grandmother    • No Known Problems Sister    • No Known Problems Sister    • No Known Problems Daughter    • No Known Problems Maternal Grandfather    • No Known Problems Paternal Grandfather        Social History     Occupational History   • Not on file   Tobacco Use   • Smoking status: Former     Packs/day: 1 50     Years: 47 00     Pack years: 70 50     Types: Cigarettes     Start date: 63     Quit date: 2021     Years since quittin 3   • Smokeless tobacco: Never   Vaping Use   • Vaping Use: Never used   Substance and Sexual Activity   • Alcohol use: Not Currently     Comment: very seldom   • Drug use: Not Currently     Comment: pt use at age [age]   • Sexual activity: Not on file       Current Outpatient Medications on File Prior to Visit   Medication Sig   • acetaminophen (TYLENOL) 650 mg CR tablet Take 1 tablet (650 mg total) by mouth every 8 (eight) hours as needed for mild pain   • acetaminophen (TYLENOL) 650 mg CR tablet Take 1 tablet (650 mg total) by mouth every 8 (eight) hours as needed for mild pain (Patient not taking: Reported on 5/4/2022)   • albuterol (Ventolin HFA) 90 mcg/act inhaler Inhale 2 puffs every 6 (six) hours as needed for wheezing   • Ascorbic Acid (vitamin C) 100 MG tablet Take 100 mg by mouth daily   • aspirin (Aspirin Low Dose) 81 mg EC tablet Take 1 tablet (81 mg total) by mouth daily   • atorvastatin (LIPITOR) 40 mg tablet take 1 tablet by mouth once daily   • Azelastine HCl 137 MCG/SPRAY SOLN INSTILL 1 SPRAY INTO EACH NOSTRIL 2 TIMES A DAY AS DIRECTED   • budesonide-formoterol (SYMBICORT) 160-4 5 mcg/act inhaler Inhale 1 puff 2 (two) times a day Rinse mouth after use     • cetirizine (ZyrTEC) 10 mg tablet Take 10 mg by mouth daily (Patient not taking: Reported on 12/20/2022)   • cholecalciferol (VITAMIN D3) 1,000 units tablet Take 1 tablet (1,000 Units total) by mouth daily   • Diclofenac Sodium (VOLTAREN) 1 % Apply 2 g topically 4 (four) times a day   • fluticasone (FLONASE) 50 mcg/act nasal spray 1 spray into each nostril daily   • gabapentin (NEURONTIN) 300 mg capsule Take 1 capsule (300 mg total) by mouth 3 (three) times a day Take 1 tablet 3 times a day   • guaiFENesin (MUCINEX) 600 mg 12 hr tablet Take 1 tablet (600 mg total) by mouth 2 (two) times a day as needed for cough (Patient not taking: Reported on 12/20/2022)   • ketoconazole (NIZORAL) 2 % cream Apply topically daily for 14 days   • liraglutide (SAXENDA) injection Inject 0 1 mL (0 6 mg total) under the skin daily for 7 days, THEN 0 2 mL (1 2 mg total) daily for 7 days, THEN 0 3 mL (1 8 mg total) daily for 7 days, THEN 0 4 mL (2 4 mg total) daily for 7 days, THEN 0 5 mL (3 mg total) daily  (Patient not taking: Reported on 11/17/2022)   • montelukast (SINGULAIR) 10 mg tablet take 1 tablet by mouth at bedtime   • propranolol (INDERAL) 40 mg tablet take 1 tablet by mouth every 12 hours   • terbinafine (LamISIL) 250 mg tablet terbinafine HCl 250 mg tablet   take 1 tablet by mouth once daily (Patient not taking: Reported on 11/17/2022)     No current facility-administered medications on file prior to visit  Allergies   Allergen Reactions   • Aspirin GI Intolerance     Can only take baby aspirin    • Latex Itching       Physical Exam    /71   Pulse 64   Ht 5' (1 524 m)   Wt 123 kg (271 lb)   LMP  (LMP Unknown)   BMI 52 93 kg/m²     Constitutional: normal, well developed, well nourished, alert, in no distress and non-toxic and no overt pain behavior    Eyes: anicteric  HEENT: grossly intact  Neck: symmetric, trachea midline and no masses   Pulmonary:even and unlabored  Cardiovascular:No edema or pitting edema present  Psychiatric:Mood and affect appropriate  Neurologic:Cranial Nerves II-XII grossly intact  Musculoskeletal:normal, patient does demonstrate significant pain with lumbar extension as well as tenderness to palpation over the lumbar facet joints    Imaging

## 2023-02-15 RX ORDER — AZELASTINE HYDROCHLORIDE 137 UG/1
SPRAY, METERED NASAL
Qty: 30 ML | Refills: 1 | Status: SHIPPED | OUTPATIENT
Start: 2023-02-15

## 2023-03-14 ENCOUNTER — HOSPITAL ENCOUNTER (OUTPATIENT)
Dept: RADIOLOGY | Facility: MEDICAL CENTER | Age: 63
Discharge: HOME | End: 2023-03-14
Admitting: PHYSICAL MEDICINE & REHABILITATION

## 2023-03-14 VITALS
RESPIRATION RATE: 20 BRPM | SYSTOLIC BLOOD PRESSURE: 156 MMHG | TEMPERATURE: 97.3 F | OXYGEN SATURATION: 97 % | DIASTOLIC BLOOD PRESSURE: 90 MMHG | HEART RATE: 66 BPM

## 2023-03-14 DIAGNOSIS — M47.816 LUMBAR SPONDYLOSIS: ICD-10-CM

## 2023-03-14 RX ADMIN — Medication 3 ML: at 14:23

## 2023-03-14 NOTE — DISCHARGE INSTRUCTIONS

## 2023-03-14 NOTE — H&P
History of Present Illness:  The patient is a 57 y o  female who presents with complaints of bilateral lower back pain    Past Medical History:   Diagnosis Date   • Allergic rhinitis    • Anemia    • Anxiety    • Arthritis    • Back pain    • Cancer ([facility])    • Chronic pain disorder     back   • COPD (chronic obstructive pulmonary disease) ([facility])    • CPAP (continuous positive airway pressure) dependence    • Depression    • Diabetes mellitus ([facility])     Pre- Diabetic   • Dyslipidemia    • Essential tremor    • Excessive daytime sleepiness    • GERD (gastroesophageal reflux disease)    • History of uterine cancer    • Hyperglycemia    • Hyperlipidemia    • Hypertension    • Hypovitaminosis D    • Iron deficiency anemia    • Joint pain    • Mood disorder ([facility] )    • Obesity    • Obstructive sleep apnea    • Ringing in ears    • RLS (restless legs syndrome)    • Snoring    • Uterine cancer ([facility])     age 20   • Vitamin D deficiency    • Witnessed episode of apnea        Past Surgical History:   Procedure Laterality Date   • APPENDECTOMY     • HYSTERECTOMY      age 20   • KNEE ARTHROSCOPY     • KNEE SURGERY      Meniscus tear   • TONSILLECTOMY     • TUBAL LIGATION           Current Outpatient Medications:   •  acetaminophen (TYLENOL) 650 mg CR tablet, Take 1 tablet (650 mg total) by mouth every 8 (eight) hours as needed for mild pain, Disp: 60 tablet, Rfl: 3  •  acetaminophen (TYLENOL) 650 mg CR tablet, Take 1 tablet (650 mg total) by mouth every 8 (eight) hours as needed for mild pain (Patient not taking: Reported on 5/4/2022), Disp: 30 tablet, Rfl: 0  •  albuterol (Ventolin HFA) 90 mcg/act inhaler, Inhale 2 puffs every 6 (six) hours as needed for wheezing, Disp: 18 g, Rfl: 1  •  Ascorbic Acid (vitamin C) 100 MG tablet, Take 100 mg by mouth daily, Disp: , Rfl:   •  aspirin (Aspirin Low Dose) 81 mg EC tablet, Take 1 tablet (81 mg total) by mouth daily, Disp: 30 tablet, Rfl: 3  •  atorvastatin (LIPITOR) 40 mg tablet, take 1 tablet by mouth once daily, Disp: 90 tablet, Rfl: 3  •  Azelastine HCl 137 MCG/SPRAY SOLN, INSTILL 1 SPRAY INTO EACH NOSTRIL 2 TIMES A DAY AS DIRECTED, Disp: 30 mL, Rfl: 1  •  budesonide-formoterol (SYMBICORT) 160-4 5 mcg/act inhaler, Inhale 1 puff 2 (two) times a day Rinse mouth after use , Disp: 10 2 g, Rfl: 5  •  cetirizine (ZyrTEC) 10 mg tablet, Take 10 mg by mouth daily (Patient not taking: Reported on 12/20/2022), Disp: , Rfl:   •  cholecalciferol (VITAMIN D3) 1,000 units tablet, Take 1 tablet (1,000 Units total) by mouth daily, Disp: 30 tablet, Rfl: 5  •  Diclofenac Sodium (VOLTAREN) 1 %, Apply 2 g topically 4 (four) times a day, Disp: 100 g, Rfl: 0  •  fluticasone (FLONASE) 50 mcg/act nasal spray, 1 spray into each nostril daily, Disp: 1 Bottle, Rfl: 3  •  gabapentin (NEURONTIN) 300 mg capsule, Take 1 capsule (300 mg total) by mouth 3 (three) times a day Take 1 tablet 3 times a day, Disp: 90 capsule, Rfl: 5  •  guaiFENesin (MUCINEX) 600 mg 12 hr tablet, Take 1 tablet (600 mg total) by mouth 2 (two) times a day as needed for cough (Patient not taking: Reported on 12/20/2022), Disp: 60 tablet, Rfl: 0  •  ketoconazole (NIZORAL) 2 % cream, Apply topically daily for 14 days, Disp: 15 g, Rfl: 0  •  liraglutide (SAXENDA) injection, Inject 0 1 mL (0 6 mg total) under the skin daily for 7 days, THEN 0 2 mL (1 2 mg total) daily for 7 days, THEN 0 3 mL (1 8 mg total) daily for 7 days, THEN 0 4 mL (2 4 mg total) daily for 7 days, THEN 0 5 mL (3 mg total) daily   (Patient not taking: Reported on 11/17/2022), Disp: 35 mL, Rfl: 0  •  montelukast (SINGULAIR) 10 mg tablet, take 1 tablet by mouth at bedtime, Disp: 30 tablet, Rfl: 3  •  propranolol (INDERAL) 40 mg tablet, take 1 tablet by mouth every 12 hours, Disp: 180 tablet, Rfl: 5  •  terbinafine (LamISIL) 250 mg tablet, terbinafine HCl 250 mg tablet  take 1 tablet by mouth once daily (Patient not taking: Reported on 11/17/2022), Disp: , Rfl:     Current Facility-Administered Medications:   •  lidocaine (PF) (XYLOCAINE-MPF) 2 % injection 3 mL, 3 mL, Perineural, Once, Vanna Baxter, DO    Allergies   Allergen Reactions   • Aspirin GI Intolerance     Can only take baby aspirin    • Latex Itching       Physical Exam:   Vitals:    03/14/23 1414   BP: 139/82   Pulse: 63   Resp: 20   Temp: (!) 97 3 °F (36 3 °C)   SpO2: 96%     General: Awake, Alert, Oriented x 3, Mood and affect appropriate  Respiratory: Respirations even and unlabored  Cardiovascular: Peripheral pulses intact; no edema  Musculoskeletal Exam: bilateral lower back pain    ASA Score: 2    Patient/Chart Verification  Patient ID Verified: Verbal  ID Band Applied: No  Consents Confirmed: Procedural, To be obtained in the Pre-Procedure area  H&P( within 30 days) Verified: To be obtained in the Pre-Procedure area  Allergies Reviewed: Yes  Anticoag/NSAID held?: NA  Currently on antibiotics?: No  Pregnancy denied?: NA    Assessment:   1   Lumbar spondylosis        Plan: (B) L3-5 MBB#1

## 2023-03-16 DIAGNOSIS — J30.9 ALLERGIC RHINITIS, UNSPECIFIED SEASONALITY, UNSPECIFIED TRIGGER: ICD-10-CM

## 2023-03-16 RX ORDER — MONTELUKAST SODIUM 10 MG/1
TABLET ORAL
Qty: 30 TABLET | Refills: 3 | Status: SHIPPED | OUTPATIENT
Start: 2023-03-16

## 2023-03-23 ENCOUNTER — TELEPHONE (OUTPATIENT)
Dept: RADIOLOGY | Facility: MEDICAL CENTER | Age: 63
End: 2023-03-23

## 2023-03-23 NOTE — TELEPHONE ENCOUNTER
Pain diary reviewed by Dr Miguelina Mariscal; proceed with MBB #2; I will call and coordinate       Bilateral L3-5

## 2023-03-30 NOTE — TELEPHONE ENCOUNTER
Returned call to patient and scheduled:     bilateral L3-5 MBB #2 on 4/26    Reviewed instructions: , NPO 1 hour prior, loose-fitting/comfortable clothes, if ill/fever/infx/abx to call and reschedule  Also pain level at leat 5/10 and refrain from PRN, as-needed pain meds 6h prior  Patient stated verbal understanding  Current pain level is 6/10  She continues with home exercises and stretches as shown to her

## 2023-04-24 ENCOUNTER — OFFICE VISIT (OUTPATIENT)
Dept: FAMILY MEDICINE CLINIC | Facility: CLINIC | Age: 63
End: 2023-04-24

## 2023-04-24 VITALS
HEIGHT: 60 IN | RESPIRATION RATE: 16 BRPM | BODY MASS INDEX: 54.58 KG/M2 | HEART RATE: 75 BPM | WEIGHT: 278 LBS | TEMPERATURE: 98.7 F | SYSTOLIC BLOOD PRESSURE: 126 MMHG | DIASTOLIC BLOOD PRESSURE: 70 MMHG | OXYGEN SATURATION: 96 %

## 2023-04-24 DIAGNOSIS — K21.9 GASTROESOPHAGEAL REFLUX DISEASE WITHOUT ESOPHAGITIS: Primary | ICD-10-CM

## 2023-04-24 DIAGNOSIS — Z13.820 SCREENING FOR OSTEOPOROSIS: ICD-10-CM

## 2023-04-24 DIAGNOSIS — Z00.00 HEALTHCARE MAINTENANCE: ICD-10-CM

## 2023-04-24 DIAGNOSIS — J44.9 CHRONIC OBSTRUCTIVE PULMONARY DISEASE, UNSPECIFIED COPD TYPE (HCC): ICD-10-CM

## 2023-04-24 DIAGNOSIS — R73.03 PREDIABETES: ICD-10-CM

## 2023-04-24 DIAGNOSIS — Z12.31 ENCOUNTER FOR SCREENING MAMMOGRAM FOR MALIGNANT NEOPLASM OF BREAST: ICD-10-CM

## 2023-04-24 DIAGNOSIS — R15.1 FECAL SMEARING: ICD-10-CM

## 2023-04-24 PROBLEM — B36.9 CUTANEOUS FUNGAL INFECTION: Status: RESOLVED | Noted: 2022-12-28 | Resolved: 2023-04-24

## 2023-04-24 NOTE — ASSESSMENT & PLAN NOTE
Symptoms improved with increased milk intake however possible lactose intolerance and associated fecal incontinence  Recommended discontinuation of dairy foods, vitamin C and continue with GERD appropriate diet with avoidance of tomato-based foods, spicy or excessive greasy foods  Making sure she remains upright 1 to 2 hours after consuming a meal   Small regular meals  Positive symptoms despite persistence of symptoms  Weight loss encouraged  Elevation of head of bed recommended  Review in 1 month  With addition of antacid therapy if symptoms worsen

## 2023-04-24 NOTE — PROGRESS NOTES
Name: Alexandra Montana      :       MRN: [id number]  Encounter Provider: Katya Campbell MD  Encounter Date: 2023   Encounter department: [facility]     1  Gastroesophageal reflux disease without esophagitis  Assessment & Plan:  Symptoms improved with increased milk intake however possible lactose intolerance and associated fecal incontinence  Recommended discontinuation of dairy foods, vitamin C and continue with GERD appropriate diet with avoidance of tomato-based foods, spicy or excessive greasy foods  Making sure she remains upright 1 to 2 hours after consuming a meal   Small regular meals  Positive symptoms despite persistence of symptoms  Weight loss encouraged  Elevation of head of bed recommended  Review in 1 month  With addition of antacid therapy if symptoms worsen  2  Screening for osteoporosis  -     DXA bone density spine hip and pelvis; Future; Expected date: 2023    3  Prediabetes  Assessment & Plan:  Hemoglobin A1c  Orders:  -     HEMOGLOBIN A1C W/ EAG ESTIMATION; Future    4  Healthcare maintenance  Assessment & Plan:  - DEXA scan ordered  Patient reminded of outstanding labs to have completed  - Up-to-date with Pap smear  - Mammogram completed May 2022 and therefore will order for repeat given increased risk factors of obesity   -Urine incontinence questions asked with no evidence of abnormalities at this time   - PHQ-9 follow-up score of 3 indicating none or minimal depression  5  Encounter for screening mammogram for malignant neoplasm of breast  -     Mammo screening bilateral w 3d & cad; Future; Expected date: 2023    6  Chronic obstructive pulmonary disease, unspecified COPD type ([facility] )  Assessment & Plan:  Continue management with Symbicort inhaler 1 puff twice daily  Follows with pulmonology last seen 2022        7  Fecal smearing  Assessment & Plan:  Previously improved with cessation of chocolate however now consumes milk for relief of GERD symptoms and symptoms recurred  May be related to lactose intolerance and therefore recommended cessation of all dairy products  We will review in 4 weeks to assess improvement with dietary changes  Denies urinary incontinence or paresthesia/weakness to lower extremities  BMI Counseling: Body mass index is 54 29 kg/m²  The BMI is above normal  Nutrition recommendations include decreasing portion sizes and encouraging healthy choices of fruits and vegetables  Exercise recommendations include exercising 3-5 times per week  Rationale for BMI follow-up plan is due to patient being overweight or obese  Patient unable to afford weight management clinic follow-up  We will continue to manage in our office  Depression Screening Follow-up Plan: Patient's depression screening was positive with a PHQ-2 score of   Their PHQ-9 score was 3  Clinically patient does not have depression  No treatment is required  Subjective      Juan Conroy is a very pleasant 70 y o  female with a past medical history of GERD, COPD, lung nodules, essential tremor, anxiety and depression who presents today to review chronic diseases  Patient report full compliance on medications prescribed with no side effects experienced at this time  She has had no admissions to hospital since the last visit to our office  Today, she reports pain to back unchanged after recent spinal injection of lidocaine with relief less than 6 hours  Patient is following up with spinal orthopedics and planning for receive of spinal injection for nerve impingement  Plan for repeat injection this week and patient encouraged to increase physical activity with just walking even 5-10 minutes additional daily  Reports haVING SOME TOMATO BASED FOOD in the past week but generally does her best to avoid these foods  However drinks a lot more milk to ease symptoms of GERD      She also reports having a cold last week which has now improved but resulted in increased wheezing and coughing over the last week  Ill contact at home -   Denies any fever  Review of Systems   Constitutional: Negative for chills and fever  Respiratory: Positive for cough and wheezing  Negative for shortness of breath  Cardiovascular: Negative for chest pain, palpitations and leg swelling  Gastrointestinal: Positive for diarrhea  Negative for abdominal pain, blood in stool, constipation and vomiting  Genitourinary: Negative for difficulty urinating, dysuria and hematuria  Musculoskeletal: Positive for back pain  Negative for arthralgias  Skin: Negative for color change and rash  Neurological: Negative for syncope and headaches  All other systems reviewed and are negative  Current Outpatient Medications on File Prior to Visit   Medication Sig   • acetaminophen (TYLENOL) 650 mg CR tablet Take 1 tablet (650 mg total) by mouth every 8 (eight) hours as needed for mild pain (Patient not taking: Reported on 5/4/2022)   • albuterol (Ventolin HFA) 90 mcg/act inhaler Inhale 2 puffs every 6 (six) hours as needed for wheezing   • aspirin (Aspirin Low Dose) 81 mg EC tablet Take 1 tablet (81 mg total) by mouth daily   • atorvastatin (LIPITOR) 40 mg tablet take 1 tablet by mouth once daily   • Azelastine HCl 137 MCG/SPRAY SOLN INSTILL 1 SPRAY INTO EACH NOSTRIL 2 TIMES A DAY AS DIRECTED   • budesonide-formoterol (SYMBICORT) 160-4 5 mcg/act inhaler Inhale 1 puff 2 (two) times a day Rinse mouth after use     • cholecalciferol (VITAMIN D3) 1,000 units tablet Take 1 tablet (1,000 Units total) by mouth daily   • Diclofenac Sodium (VOLTAREN) 1 % Apply 2 g topically 4 (four) times a day   • fluticasone (FLONASE) 50 mcg/act nasal spray 1 spray into each nostril daily   • gabapentin (NEURONTIN) 300 mg capsule Take 1 capsule (300 mg total) by mouth 3 (three) times a day Take 1 tablet 3 times a day   • ketoconazole (NIZORAL) 2 % cream Apply topically daily for 14 days   • liraglutide (SAXENDA) injection Inject 0 1 mL (0 6 mg total) under the skin daily for 7 days, THEN 0 2 mL (1 2 mg total) daily for 7 days, THEN 0 3 mL (1 8 mg total) daily for 7 days, THEN 0 4 mL (2 4 mg total) daily for 7 days, THEN 0 5 mL (3 mg total) daily  (Patient not taking: Reported on 11/17/2022)   • montelukast (SINGULAIR) 10 mg tablet take 1 tablet by mouth at bedtime   • propranolol (INDERAL) 40 mg tablet take 1 tablet by mouth every 12 hours   • terbinafine (LamISIL) 250 mg tablet terbinafine HCl 250 mg tablet   take 1 tablet by mouth once daily (Patient not taking: Reported on 11/17/2022)   • [DISCONTINUED] acetaminophen (TYLENOL) 650 mg CR tablet Take 1 tablet (650 mg total) by mouth every 8 (eight) hours as needed for mild pain   • [DISCONTINUED] Ascorbic Acid (vitamin C) 100 MG tablet Take 100 mg by mouth daily   • [DISCONTINUED] cetirizine (ZyrTEC) 10 mg tablet Take 10 mg by mouth daily (Patient not taking: Reported on 12/20/2022)   • [DISCONTINUED] guaiFENesin (MUCINEX) 600 mg 12 hr tablet Take 1 tablet (600 mg total) by mouth 2 (two) times a day as needed for cough (Patient not taking: Reported on 12/20/2022)       Objective     /70 (BP Location: Right arm, Patient Position: Sitting, Cuff Size: Large)   Pulse 75   Temp 98 7 °F (37 1 °C) (Temporal)   Resp 16   Ht 5' (1 524 m)   Wt 126 kg (278 lb)   LMP  (LMP Unknown)   SpO2 96%   BMI 54 29 kg/m²     Physical Exam  Vitals reviewed  Constitutional:       General: She is not in acute distress  Appearance: She is obese  HENT:      Head: Atraumatic  Eyes:      Conjunctiva/sclera: Conjunctivae normal    Cardiovascular:      Rate and Rhythm: Normal rate and regular rhythm  Pulses: Normal pulses  Heart sounds: Normal heart sounds  No murmur heard  Pulmonary:      Effort: Pulmonary effort is normal  No respiratory distress  Breath sounds: Wheezing present   No rhonchi or rales  Abdominal:      General: Abdomen is flat  Bowel sounds are normal  There is no distension  Palpations: Abdomen is soft  Tenderness: There is no abdominal tenderness  There is no guarding or rebound  Musculoskeletal:         General: Normal range of motion  Cervical back: Normal range of motion  Right lower leg: No edema  Left lower leg: No edema  Skin:     General: Skin is warm and dry  Findings: No rash  Neurological:      General: No focal deficit present  Mental Status: She is alert     Psychiatric:         Behavior: Behavior normal        Francie Martinez MD

## 2023-04-24 NOTE — ASSESSMENT & PLAN NOTE
- DEXA scan ordered  Patient reminded of outstanding labs to have completed  - Up-to-date with Pap smear  - Mammogram completed May 2022 and therefore will order for repeat given increased risk factors of obesity   -Urine incontinence questions asked with no evidence of abnormalities at this time   - PHQ-9 follow-up score of 3 indicating none or minimal depression

## 2023-04-24 NOTE — ASSESSMENT & PLAN NOTE
Continue management with Symbicort inhaler 1 puff twice daily  Follows with pulmonology last seen November 2022

## 2023-04-24 NOTE — ASSESSMENT & PLAN NOTE
Encouraged with recent 3 lob weight loss  However minimal efforts in this vein  Patient encouraged to increase physical activity and will review in 1 month to continue weight loss management discussions

## 2023-04-24 NOTE — ASSESSMENT & PLAN NOTE
Previously improved with cessation of chocolate however now consumes milk for relief of GERD symptoms and symptoms recurred  May be related to lactose intolerance and therefore recommended cessation of all dairy products  We will review in 4 weeks to assess improvement with dietary changes  Denies urinary incontinence or paresthesia/weakness to lower extremities

## 2023-04-24 NOTE — ASSESSMENT & PLAN NOTE
Last scan in January 2023 demonstrating insignificant change to nodules  Recommended to take Lasix 1 week from now

## 2023-05-02 ENCOUNTER — APPOINTMENT (OUTPATIENT)
Dept: LAB | Facility: CLINIC | Age: 63
End: 2023-05-02

## 2023-05-02 DIAGNOSIS — R73.03 PREDIABETES: ICD-10-CM

## 2023-05-04 LAB
EST. AVERAGE GLUCOSE BLD GHB EST-MCNC: 134 MG/DL
HBA1C MFR BLD: 6.3 %

## 2023-05-10 ENCOUNTER — TELEPHONE (OUTPATIENT)
Dept: NEUROLOGY | Facility: CLINIC | Age: 63
End: 2023-05-10

## 2023-05-10 NOTE — TELEPHONE ENCOUNTER
Called and spoke to patient - confirmed upcoming appointment with Rafia Lynn on 05/16/23 3:00 [facility]  Provided patient with apt date, time and location  Informed patient that check in is at least 15 minutes prior to apt time  The patient is not  having any issues or concerns at this time

## 2023-05-16 ENCOUNTER — OFFICE VISIT (OUTPATIENT)
Dept: NEUROLOGY | Facility: CLINIC | Age: 63
End: 2023-05-16

## 2023-05-16 VITALS
TEMPERATURE: 98 F | SYSTOLIC BLOOD PRESSURE: 138 MMHG | WEIGHT: 279.2 LBS | HEART RATE: 56 BPM | OXYGEN SATURATION: 96 % | HEIGHT: 60 IN | DIASTOLIC BLOOD PRESSURE: 78 MMHG | BODY MASS INDEX: 54.81 KG/M2 | RESPIRATION RATE: 18 BRPM

## 2023-05-16 DIAGNOSIS — G25.0 BENIGN ESSENTIAL TREMOR: ICD-10-CM

## 2023-05-16 DIAGNOSIS — M54.16 RADICULOPATHY, LUMBAR REGION: ICD-10-CM

## 2023-05-16 DIAGNOSIS — G25.0 BENIGN ESSENTIAL TREMOR: Primary | ICD-10-CM

## 2023-05-16 RX ORDER — GABAPENTIN 300 MG/1
CAPSULE ORAL
Qty: 150 CAPSULE | Refills: 5 | Status: SHIPPED | OUTPATIENT
Start: 2023-05-16

## 2023-05-16 RX ORDER — ASCORBIC ACID 500 MG
500 TABLET ORAL DAILY
COMMUNITY

## 2023-05-16 NOTE — PATIENT INSTRUCTIONS
Keep propranolol the same  Increase the gabapentin for better back pain and tremor control  Do 1 capsule in am, 2 in afternoon, and 1 at bedtime  If in 2 weeks this is not helpful increase to 1 cap in am 2 in afternoon and 2 at bedtime  Continue regular use of cpap  Get repeat bmp in about 2 months time  Let me know in 4 weeks how you are doing; follow up in 6 months time

## 2023-05-16 NOTE — PROGRESS NOTES
Patient ID: Libia Huynh is a 70 y o  female  Assessment/Plan:  Patient Instructions:  Keep propranolol the same  Increase the gabapentin for better back pain and tremor control  Do 1 capsule in am, 2 in afternoon, and 1 at bedtime  If in 2 weeks this is not helpful increase to 1 cap in am 2 in afternoon and 2 at bedtime  Continue regular use of cpap  Get repeat bmp in about 2 months time  Let me know in 4 weeks how you are doing; follow up in 6 months time  Diagnoses and all orders for this visit:    Essential tremor  -     gabapentin (NEURONTIN) 300 mg capsule; Take 1 capsule in am, 2 capsules in afternoon, and 1 capsule at bedtime  If not helpful in 2 weeks may increase to 1 capsule in am 2 capsules in afternoon and 2 capsules at bedtime   -     Basic metabolic panel; Future    Benign essential tremor  -     gabapentin (NEURONTIN) 300 mg capsule; Take 1 capsule in am, 2 capsules in afternoon, and 1 capsule at bedtime  If not helpful in 2 weeks may increase to 1 capsule in am 2 capsules in afternoon and 2 capsules at bedtime   -     Basic metabolic panel; Future    Radiculopathy, lumbar region  -     Basic metabolic panel; Future          Subjective:    TEJAS Arevalo is a 70year old female with past medical history of lumbar radiculopathy, copd, hyperlipidemia, HTN, obesity, arthritis, YUE, essential tremor who presents for tremor/back pain follow up  She has been seen in this office for many years; she has failed even low dose primidone in the past and higher doses of propranolol - greater than 40mg BID- has caused bradycardia  Last visit her 2nd propranolol dose was changed to an earlier time and she states this was only somewhat helpful; she still notices worsening tremor in the evening hour-she especially notices this when she is trying to eat  She has not tried weighted utensils, so this was recommended today   She has continued follow up with pain management team; she had 1 MBB and it provided relief only for a very short time; she is scheduled to get a second injection  She states back pain is still limiting her ability to move since it worsens with activity  She is tolerating 300mg TID gabapentin without difficulty and is agreeable to increasing the dose; last creatinine was 0 75, gfr 84 in June 2022; recent a1c 6 3  She is sleeping well with her CPAP; her  states he can hear her snoring at times through the machine, but she is happy with its use  MRI L spine 11/26/2022:  IMPRESSION:  Grade 1 anterolisthesis of L4 on L5 with moderate to severe facet arthropathy and mild to moderate spinal stenosis  Previously noted right L4-5 foraminal and central to left central L5-S1 disc herniations have improved    Incompletely visualized left paracentral and foraminal disc herniation at T10-T11, correlate for ipsilateral T10 radiculitis  The following portions of the patient's history were reviewed and updated as appropriate: allergies, current medications, past family history, past medical history, past social history, past surgical history and problem list          Objective:    Blood pressure 138/78, pulse 56, temperature 98 °F (36 7 °C), temperature source Tympanic, resp  rate 18, height 5' (1 524 m), weight 127 kg (279 lb 3 2 oz), SpO2 96 %, not currently breastfeeding  Physical Exam  Vitals reviewed  Constitutional:       General: She is not in acute distress  Appearance: She is obese  HENT:      Head: Normocephalic  Right Ear: External ear normal       Left Ear: External ear normal       Nose: Nose normal       Mouth/Throat:      Pharynx: Oropharynx is clear  Eyes:      General: Lids are normal          Right eye: No discharge  Left eye: No discharge  Extraocular Movements: Extraocular movements intact  Pupils: Pupils are equal, round, and reactive to light  Cardiovascular:      Rate and Rhythm: Regular rhythm  Bradycardia present  Pulses: Normal pulses  Heart sounds: Normal heart sounds  Pulmonary:      Effort: Pulmonary effort is normal       Breath sounds: Normal breath sounds  Abdominal:      General: There is no distension  Musculoskeletal:      Cervical back: No rigidity  Right lower leg: No edema  Left lower leg: No edema  Skin:     General: Skin is warm  Capillary Refill: Capillary refill takes less than 2 seconds  Neurological:      Mental Status: She is alert  Mental status is at baseline  Motor: Motor strength is normal       Deep Tendon Reflexes:      Reflex Scores:       Brachioradialis reflexes are 1+ on the right side and 1+ on the left side  Patellar reflexes are 1+ on the right side and 1+ on the left side  Psychiatric:         Mood and Affect: Mood normal          Speech: Speech normal          Behavior: Behavior normal          Neurological Exam  Mental Status  Alert  Oriented to person, place and time  Speech is normal  Language is fluent with no aphasia  Cranial Nerves  CN II: Visual acuity is normal  Visual fields full to confrontation  CN III, IV, VI: Extraocular movements intact bilaterally  Normal lids and orbits bilaterally  Pupils equal round and reactive to light bilaterally  CN V: Facial sensation is normal   CN VII: Full and symmetric facial movement  CN VIII: Hearing is normal   CN IX, X: Palate elevates symmetrically  Normal gag reflex  CN XI: Shoulder shrug strength is normal   CN XII: Tongue midline without atrophy or fasciculations  Motor   The following abnormal movements were seen: Bilateral mild action hand tremor, high frequency/low amplitude; able to draw a spiral/able to transfer water from cup to cup without spilling  Strength is 5/5 throughout all four extremities  Sensory  Light touch is normal in upper and lower extremities       Reflexes                                            Right                      Left  Brachioradialis 1+                         1+  Patellar                                1+                         1+    Coordination  Right: Finger-to-nose normal Left: Finger-to-nose normal     Gait  Casual gait: Antalgic gait  Unable to rise from chair without using arms  Has to stop after about 50-75 feet of walking, uses cane  ROS:    Review of Systems   Constitutional: Negative  Negative for appetite change and fever  HENT: Negative  Negative for hearing loss, tinnitus, trouble swallowing and voice change  Eyes: Negative  Negative for photophobia, pain and visual disturbance  Respiratory: Negative  Negative for shortness of breath (at times)  Cardiovascular: Negative  Negative for palpitations  Gastrointestinal: Negative  Negative for nausea and vomiting  Endocrine: Negative  Negative for cold intolerance  Genitourinary: Positive for frequency and urgency  Negative for dysuria  Musculoskeletal: Positive for back pain and gait problem (gait dysturbance - balance issues)  Negative for myalgias and neck pain  Skin: Negative  Negative for rash  Allergic/Immunologic: Negative  Neurological: Negative for dizziness, tremors (hands), seizures, syncope, facial asymmetry, speech difficulty, weakness, light-headedness, numbness and headaches  Hematological: Negative  Does not bruise/bleed easily  Psychiatric/Behavioral: Negative  Negative for confusion, hallucinations and sleep disturbance     ROS was reviewed and updated as appropriate

## 2023-05-23 ENCOUNTER — TELEPHONE (OUTPATIENT)
Dept: PAIN MEDICINE | Facility: MEDICAL CENTER | Age: 63
End: 2023-05-23

## 2023-05-23 NOTE — TELEPHONE ENCOUNTER
Caller: Shruti Kaplan    Doctor: Dr Wesley     Reason for call: Patient calling to reschedule procedure unable to get transportation please call patient to reschedule. TY    Call back#: [phone]

## 2023-05-24 NOTE — TELEPHONE ENCOUNTER
Called patient to reschedule.  Pt having current transport issues-due to car needing some work.  Will call to reschedule when she is able to get here

## 2023-05-26 ENCOUNTER — APPOINTMENT (OUTPATIENT)
Dept: LAB | Facility: CLINIC | Age: 63
End: 2023-05-26

## 2023-05-26 ENCOUNTER — OFFICE VISIT (OUTPATIENT)
Dept: PULMONOLOGY | Facility: CLINIC | Age: 63
End: 2023-05-26

## 2023-05-26 VITALS
SYSTOLIC BLOOD PRESSURE: 118 MMHG | HEART RATE: 62 BPM | BODY MASS INDEX: 53.05 KG/M2 | WEIGHT: 270.2 LBS | HEIGHT: 60 IN | DIASTOLIC BLOOD PRESSURE: 68 MMHG | OXYGEN SATURATION: 95 %

## 2023-05-26 DIAGNOSIS — G47.33 OSA (OBSTRUCTIVE SLEEP APNEA): ICD-10-CM

## 2023-05-26 DIAGNOSIS — J44.9 CHRONIC OBSTRUCTIVE PULMONARY DISEASE, UNSPECIFIED COPD TYPE (HCC): Primary | ICD-10-CM

## 2023-05-26 DIAGNOSIS — E66.01 MORBID OBESITY (HCC): ICD-10-CM

## 2023-05-26 DIAGNOSIS — R06.00 DYSPNEA, UNSPECIFIED TYPE: ICD-10-CM

## 2023-05-26 DIAGNOSIS — R06.09 DOE (DYSPNEA ON EXERTION): ICD-10-CM

## 2023-05-26 DIAGNOSIS — J30.89 NON-SEASONAL ALLERGIC RHINITIS, UNSPECIFIED TRIGGER: ICD-10-CM

## 2023-05-26 LAB — BNP SERPL-MCNC: 31 PG/ML (ref 0–100)

## 2023-05-26 RX ORDER — BUDESONIDE AND FORMOTEROL FUMARATE DIHYDRATE 160; 4.5 UG/1; UG/1
1 AEROSOL RESPIRATORY (INHALATION) 2 TIMES DAILY
Qty: 10.2 G | Refills: 5 | Status: SHIPPED | OUTPATIENT
Start: 2023-05-26 | End: 2023-06-01

## 2023-05-26 NOTE — PROGRESS NOTES
Office Progress Note - Pulmonary    Chriss Lin 70 y o  female MRN: [id number]    Encounter: [id number]      Assessment:  • Chronic obstructive pulmonary disease  • Dyspnea on exertion  • Dyspnea on exertion  • Allergic rhinitis  • Morbid obesity  Plan:   • BNP  • 2D echo  • Symbicort 160/4 5, 2 inhalations twice a day  • Albuterol rescue inhaler 2 inhalations 4 times a day as needed  • Montelukast 10 mg once a day  • Astelin nasal spray 2 sprays to each nostril 2 times a day  • Fluticasone nasal spray 2 sprays to each nostril once a day  • Weight loss  • Follow-up in 6 weeks  Discussion:   The patient's worsening dyspnea on exertion is concerning for possible cardiac cause  For this reason I have ordered BNP as well as 2D echo  Based on the results we will decide if she needs to be on diuretics and if she needs further cardiac work-up  Her COPD is in remission  I have maintained her on the Symbicort 160/4 5, 2 inhalations twice a day and albuterol rescue inhaler 2 inhalations 4 times a day as needed  I have renewed her prescriptions  Her allergic rhinitis is well treated  I have maintained her on the fluticasone and Astelin nasal sprays as well as montelukast 10 mg once a day  I will see her in 6 weeks  Subjective: The patient is here for a follow-up visit  She has worsening shortness of breath on exertion especially over the last few days  She noticed this when she is walking the dog  She denied any cough, chest pain or chest tightness  No palpitations  However she has developed wheezing when she is exerting herself  She also noticed lower extremity edema  She is taken the Symbicort 160/4 5, 2 inhalations twice a day  Sometimes she needs to use her rescue inhaler  She is using the fluticasone and Astelin nasal sprays as well as montelukast   No significant postnasal dripping      Review of systems:  A 12 point system review is done and aside from what is stated above the rest of the review of systems is negative  Family history and social history are reviewed  Medications list is reviewed  Vitals: Blood pressure 118/68, pulse 62, height 5' (1 524 m), weight 123 kg (270 lb 3 2 oz), SpO2 95 %, not currently breastfeeding ,     Physical Exam  Gen: Awake, alert, oriented x 3, no acute distress  HEENT: Mucous membranes moist, no oral lesions, no thrush  NECK: No accessory muscle use, JVP not elevated  Cardiac: Regular, single S1, single S2, no murmurs, no rubs, no gallops  Lungs: Decreased breath sounds  No wheezing or rhonchi  Abdomen: normoactive bowel sounds, soft nontender, nondistended, no rebound or rigidity, no guarding  Extremities: 2+ edema  Neuro:  Grossly nonfocal   Skin:  No rash

## 2023-06-01 ENCOUNTER — OFFICE VISIT (OUTPATIENT)
Dept: FAMILY MEDICINE CLINIC | Facility: CLINIC | Age: 63
End: 2023-06-01

## 2023-06-01 VITALS
BODY MASS INDEX: 54.76 KG/M2 | DIASTOLIC BLOOD PRESSURE: 76 MMHG | HEART RATE: 75 BPM | WEIGHT: 278.9 LBS | HEIGHT: 60 IN | RESPIRATION RATE: 18 BRPM | SYSTOLIC BLOOD PRESSURE: 120 MMHG | TEMPERATURE: 97.8 F

## 2023-06-01 DIAGNOSIS — J44.9 CHRONIC OBSTRUCTIVE PULMONARY DISEASE, UNSPECIFIED COPD TYPE (HCC): ICD-10-CM

## 2023-06-01 DIAGNOSIS — R15.1 FECAL SMEARING: ICD-10-CM

## 2023-06-01 DIAGNOSIS — E66.01 MORBID OBESITY WITH BMI OF 50.0-59.9, ADULT (HCC): ICD-10-CM

## 2023-06-01 DIAGNOSIS — R06.00 DYSPNEA, UNSPECIFIED TYPE: ICD-10-CM

## 2023-06-01 DIAGNOSIS — K21.9 GASTROESOPHAGEAL REFLUX DISEASE WITHOUT ESOPHAGITIS: Primary | ICD-10-CM

## 2023-06-01 PROBLEM — N39.3 STRESS INCONTINENCE: Status: RESOLVED | Noted: 2021-08-06 | Resolved: 2023-06-01

## 2023-06-01 PROBLEM — R79.0 LOW SERUM FERRITIN LEVEL: Status: RESOLVED | Noted: 2018-09-10 | Resolved: 2023-06-01

## 2023-06-01 RX ORDER — BUDESONIDE AND FORMOTEROL FUMARATE DIHYDRATE 160; 4.5 UG/1; UG/1
2 AEROSOL RESPIRATORY (INHALATION) 2 TIMES DAILY
Qty: 10.2 G | Refills: 5
Start: 2023-06-01

## 2023-06-01 RX ORDER — LOPERAMIDE HYDROCHLORIDE 2 MG/1
2 CAPSULE ORAL 4 TIMES DAILY PRN
Qty: 30 CAPSULE | Refills: 0 | Status: CANCELLED | OUTPATIENT
Start: 2023-06-01

## 2023-06-01 NOTE — PROGRESS NOTES
Name: Shree Franco      :       MRN: [id number]  Encounter Provider: Pierce Palm MD  Encounter Date: 2023   Encounter department: [address] [place] [address]     1  Gastroesophageal reflux disease without esophagitis  Assessment & Plan:  Stable  Continue lifestyle modification  2  Dyspnea, unspecified type  -     budesonide-formoterol (SYMBICORT) 160-4 5 mcg/act inhaler; Inhale 2 puffs 2 (two) times a day Rinse mouth after use  3  Chronic obstructive pulmonary disease, unspecified COPD type [facility])  Assessment & Plan:  Follows with pulmonology last seen May 2023 with documented not to change dose of symbicort from 2 inhalations bid of 160 mcg however previously prescribed 1 inhalation  Patient informed to adjust dosing  Evaluation ordered for cardiac causes of worsening dyspnea negative however echo remains to be done  - continue management  4  Morbid obesity with BMI of 50 0-59 9, adult ([facility] [address] )  Assessment & Plan:  Gained weight since last visit - 8lbs  Discussed meal substitution and encouraged to try daily oatmeal and 2 fruit servings for breakfast to minimize hunger with increased fiber intake and good carbohydrate load to start the day  Increase water intake to 5 - 12 ounce bottles or more daily  Continue exercise as tolerated and will review in 3 weeks for assessment of measurements and tolerance to these changes  5  Fecal smearing  Assessment & Plan:  Associated with milk and chocolate intake previously  Reportedly ceased with dairy avoidance  Encouraged to continue with this lifestyle change and substitute alternatives tolerated - oat milk with no sugar added  BMI Counseling: There is no height or weight on file to calculate BMI  The BMI is above normal  Nutrition recommendations include decreasing portion sizes and encouraging healthy choices of fruits and vegetables   Exercise recommendations include exercising 3-5 times per week  Rationale for BMI follow-up plan is due to patient being overweight or obese  Patient unable to afford weight management clinic follow-up  We will continue to manage in our office  [place] Plan of Care: referral to physical therapy and balance, strength, and gait training instructions were provided  Depression Screening Follow-up Plan: Patient's depression screening was positive with a PHQ-2 score of   Their PHQ-9 score was 3  Clinically patient does not have depression  No treatment is required  Michel Sandoval is a very pleasant 70 y o  female with a past medical history of GERD, COPD, lung nodules, essential tremor, anxiety and depression who presents today to review chronic diseases  Patient report full compliance on medications prescribed with no side effects experienced at this time  She has had no admissions to hospital since the last visit to our office  Patient has no further complaints other than what is mentioned below and in the ROS  Review of Systems   Constitutional: Negative for chills and fever  Respiratory: Positive for shortness of breath  Negative for cough and wheezing  Cardiovascular: Negative for chest pain, palpitations and leg swelling  Gastrointestinal: Negative for abdominal pain, blood in stool, constipation, diarrhea and vomiting  Genitourinary: Negative for difficulty urinating, dysuria and hematuria  Musculoskeletal: Negative for arthralgias  Skin: Negative for color change and rash  Neurological: Negative for syncope and headaches  All other systems reviewed and are negative        Current Outpatient Medications on File Prior to Visit   Medication Sig   • acetaminophen (TYLENOL) 650 mg CR tablet Take 1 tablet (650 mg total) by mouth every 8 (eight) hours as needed for mild pain   • albuterol (Ventolin HFA) 90 mcg/act inhaler Inhale 2 puffs every 6 (six) hours as needed for wheezing   • ascorbic acid (VITAMIN C) 500 mg tablet Take 500 mg by mouth daily   • aspirin (Aspirin Low Dose) 81 mg EC tablet Take 1 tablet (81 mg total) by mouth daily   • atorvastatin (LIPITOR) 40 mg tablet take 1 tablet by mouth once daily   • Azelastine HCl 137 MCG/SPRAY SOLN INSTILL 1 SPRAY INTO EACH NOSTRIL 2 TIMES A DAY AS DIRECTED   • cholecalciferol (VITAMIN D3) 1,000 units tablet Take 1 tablet (1,000 Units total) by mouth daily   • Diclofenac Sodium (VOLTAREN) 1 % Apply 2 g topically 4 (four) times a day   • fluticasone (FLONASE) 50 mcg/act nasal spray 1 spray into each nostril daily   • gabapentin (NEURONTIN) 300 mg capsule Take 1 capsule in am, 2 capsules in afternoon, and 1 capsule at bedtime  If not helpful in 2 weeks may increase to 1 capsule in am 2 capsules in afternoon and 2 capsules at bedtime  • ketoconazole (NIZORAL) 2 % cream Apply topically daily for 14 days   • montelukast (SINGULAIR) 10 mg tablet take 1 tablet by mouth at bedtime   • propranolol (INDERAL) 40 mg tablet take 1 tablet by mouth every 12 hours   • terbinafine (LamISIL) 250 mg tablet terbinafine HCl 250 mg tablet   take 1 tablet by mouth once daily (Patient not taking: Reported on 11/17/2022)   • [DISCONTINUED] budesonide-formoterol (SYMBICORT) 160-4 5 mcg/act inhaler Inhale 1 puff 2 (two) times a day Rinse mouth after use  • [DISCONTINUED] liraglutide (SAXENDA) injection Inject 0 1 mL (0 6 mg total) under the skin daily for 7 days, THEN 0 2 mL (1 2 mg total) daily for 7 days, THEN 0 3 mL (1 8 mg total) daily for 7 days, THEN 0 4 mL (2 4 mg total) daily for 7 days, THEN 0 5 mL (3 mg total) daily  (Patient not taking: Reported on 11/17/2022)       Objective     /76 (BP Location: Left arm, Patient Position: Sitting, Cuff Size: Standard)   Pulse 75   Temp 97 8 °F (36 6 °C) (Temporal)   Resp 18   Ht 5' (1 524 m)   Wt 127 kg (278 lb 14 4 oz)   LMP  (LMP Unknown)   BMI 54 47 kg/m²     Physical Exam  Vitals reviewed     Constitutional:       General: She is not in acute distress  Appearance: She is obese  HENT:      Head: Atraumatic  Eyes:      Conjunctiva/sclera: Conjunctivae normal    Cardiovascular:      Rate and Rhythm: Normal rate and regular rhythm  Pulses: Normal pulses  Heart sounds: Normal heart sounds  No murmur heard  Pulmonary:      Effort: Pulmonary effort is normal  No respiratory distress  Breath sounds: No wheezing, rhonchi or rales  Abdominal:      General: Abdomen is flat  Bowel sounds are normal  There is no distension  Palpations: Abdomen is soft  Tenderness: There is no abdominal tenderness  There is no guarding or rebound  Musculoskeletal:         General: Normal range of motion  Cervical back: Normal range of motion  Right lower leg: No edema  Left lower leg: No edema  Skin:     General: Skin is warm and dry  Findings: No rash  Neurological:      General: No focal deficit present  Mental Status: She is alert     Psychiatric:         Behavior: Behavior normal        Connie Maxwell MD

## 2023-06-01 NOTE — ASSESSMENT & PLAN NOTE
Follows with pulmonology last seen May 2023 with documented not to change dose of symbicort from 2 inhalations bid of 160 mcg however previously prescribed 1 inhalation  Patient informed to adjust dosing  Evaluation ordered for cardiac causes of worsening dyspnea negative however echo remains to be done  - continue management

## 2023-06-01 NOTE — ASSESSMENT & PLAN NOTE
Gained weight since last visit - 8lbs  Discussed meal substitution and encouraged to try daily oatmeal and 2 fruit servings for breakfast to minimize hunger with increased fiber intake and good carbohydrate load to start the day  Increase water intake to 5 - 12 ounce bottles or more daily  Continue exercise as tolerated and will review in 3 weeks for assessment of measurements and tolerance to these changes

## 2023-06-01 NOTE — ASSESSMENT & PLAN NOTE
Associated with milk and chocolate intake previously  Reportedly ceased with dairy avoidance  Encouraged to continue with this lifestyle change and substitute alternatives tolerated - oat milk with no sugar added

## 2023-06-07 ENCOUNTER — HOSPITAL ENCOUNTER (OUTPATIENT)
Dept: MAMMOGRAPHY | Facility: CLINIC | Age: 63
Discharge: HOME | End: 2023-06-07
Payer: MEDICARE

## 2023-06-07 VITALS — HEIGHT: 60 IN | BODY MASS INDEX: 54.58 KG/M2 | WEIGHT: 278 LBS

## 2023-06-07 DIAGNOSIS — Z12.31 ENCOUNTER FOR SCREENING MAMMOGRAM FOR MALIGNANT NEOPLASM OF BREAST: ICD-10-CM

## 2023-06-07 PROCEDURE — 77063 BREAST TOMOSYNTHESIS BI: CPT

## 2023-06-07 PROCEDURE — 77067 SCR MAMMO BI INCL CAD: CPT

## 2023-06-08 DIAGNOSIS — J45.909 UNCOMPLICATED ASTHMA, UNSPECIFIED ASTHMA SEVERITY, UNSPECIFIED WHETHER PERSISTENT: ICD-10-CM

## 2023-06-08 RX ORDER — ALBUTEROL SULFATE 90 UG/1
AEROSOL, METERED RESPIRATORY (INHALATION)
Qty: 18 G | Refills: 1 | Status: SHIPPED | OUTPATIENT
Start: 2023-06-08

## 2023-06-16 ENCOUNTER — HOSPITAL ENCOUNTER (OUTPATIENT)
Dept: BONE DENSITY | Facility: CLINIC | Age: 63
End: 2023-06-16
Payer: MEDICARE

## 2023-06-16 DIAGNOSIS — Z13.820 SCREENING FOR OSTEOPOROSIS: ICD-10-CM

## 2023-06-16 PROCEDURE — 77080 DXA BONE DENSITY AXIAL: CPT

## 2023-06-19 DIAGNOSIS — M85.852 OSTEOPENIA OF NECK OF LEFT FEMUR: Primary | ICD-10-CM

## 2023-06-19 DIAGNOSIS — E55.9 VITAMIN D DEFICIENCY: ICD-10-CM

## 2023-06-19 RX ORDER — MELATONIN
1000 DAILY
Qty: 30 TABLET | Refills: 5 | Status: SHIPPED | OUTPATIENT
Start: 2023-06-19

## 2023-06-19 RX ORDER — UREA 10 %
1 LOTION (ML) TOPICAL DAILY
Qty: 90 TABLET | Refills: 1 | Status: SHIPPED | OUTPATIENT
Start: 2023-06-19 | End: 2023-09-17

## 2023-06-22 ENCOUNTER — OFFICE VISIT (OUTPATIENT)
Dept: FAMILY MEDICINE CLINIC | Facility: CLINIC | Age: 63
End: 2023-06-22

## 2023-06-22 VITALS
RESPIRATION RATE: 18 BRPM | TEMPERATURE: 98.2 F | SYSTOLIC BLOOD PRESSURE: 118 MMHG | BODY MASS INDEX: 54.22 KG/M2 | HEIGHT: 60 IN | DIASTOLIC BLOOD PRESSURE: 72 MMHG | HEART RATE: 85 BPM | OXYGEN SATURATION: 96 % | WEIGHT: 276.2 LBS

## 2023-06-22 DIAGNOSIS — R07.89 CHEST DISCOMFORT: ICD-10-CM

## 2023-06-22 DIAGNOSIS — G25.0 BENIGN ESSENTIAL TREMOR: ICD-10-CM

## 2023-06-22 DIAGNOSIS — E66.01 MORBID OBESITY WITH BMI OF 50.0-59.9, ADULT (HCC): ICD-10-CM

## 2023-06-22 DIAGNOSIS — R42 DIZZINESS: Primary | ICD-10-CM

## 2023-06-22 PROCEDURE — 3077F SYST BP >= 140 MM HG: CPT | Performed by: FAMILY MEDICINE

## 2023-06-22 PROCEDURE — 99213 OFFICE O/P EST LOW 20 MIN: CPT | Performed by: FAMILY MEDICINE

## 2023-06-22 PROCEDURE — 3080F DIAST BP >= 90 MM HG: CPT | Performed by: FAMILY MEDICINE

## 2023-06-23 ENCOUNTER — HOSPITAL ENCOUNTER (OUTPATIENT)
Dept: RADIOLOGY | Facility: MEDICAL CENTER | Age: 63
Discharge: HOME | End: 2023-06-23
Payer: MEDICARE

## 2023-06-23 VITALS
TEMPERATURE: 97.8 F | RESPIRATION RATE: 20 BRPM | HEART RATE: 56 BPM | DIASTOLIC BLOOD PRESSURE: 95 MMHG | OXYGEN SATURATION: 94 % | SYSTOLIC BLOOD PRESSURE: 167 MMHG

## 2023-06-23 DIAGNOSIS — M47.816 LUMBAR SPONDYLOSIS: ICD-10-CM

## 2023-06-23 PROBLEM — Z00.00 HEALTHCARE MAINTENANCE: Status: RESOLVED | Noted: 2022-12-28 | Resolved: 2023-06-23

## 2023-06-23 PROBLEM — R42 DIZZINESS: Status: ACTIVE | Noted: 2023-06-23

## 2023-06-23 LAB
HEART RATE: 46 BPM
PR INTERVAL: 164 MS
QRS AXIS: 8 DEGREES
QRSD INTERVAL: 68 MS
QTC INTERVAL: 364 MS

## 2023-06-23 PROCEDURE — 64493 INJ PARAVERT F JNT L/S 1 LEV: CPT | Performed by: PHYSICAL MEDICINE & REHABILITATION

## 2023-06-23 PROCEDURE — 93000 ELECTROCARDIOGRAM COMPLETE: CPT | Performed by: FAMILY MEDICINE

## 2023-06-23 PROCEDURE — 64494 INJ PARAVERT F JNT L/S 2 LEV: CPT | Performed by: PHYSICAL MEDICINE & REHABILITATION

## 2023-06-23 RX ORDER — BUPIVACAINE HYDROCHLORIDE 5 MG/ML
3 INJECTION, SOLUTION EPIDURAL; INTRACAUDAL ONCE
Status: COMPLETED | OUTPATIENT
Start: 2023-06-23 | End: 2023-06-23

## 2023-06-23 RX ORDER — PROPRANOLOL HYDROCHLORIDE 40 MG/1
20 TABLET ORAL EVERY 12 HOURS
Qty: 180 TABLET | Refills: 5 | Status: SHIPPED | OUTPATIENT
Start: 2023-06-23

## 2023-06-23 RX ADMIN — BUPIVACAINE HYDROCHLORIDE 3 ML: 5 INJECTION, SOLUTION EPIDURAL; INTRACAUDAL at 08:31

## 2023-06-23 NOTE — PROGRESS NOTES
Name: Cong Lima      :       MRN: [id number]  Encounter Provider: Terri Montiel MD  Encounter Date: 2023   Encounter department: [facility]     1  Dizziness  -     POCT ECG    2  Chest discomfort  Assessment & Plan:  Atypical   POC EKG demonstrates sinus bradycardia  Vitals remained stable with heart rate 85  - Will decrease propranolol to 20 mg twice daily  - ED precautions given    Orders:  -     POCT ECG    3  Morbid obesity with BMI of 50 0-59 9, adult [facility])  Assessment & Plan:  2 pound weight loss since last visit 2023  Patient improving and tolerating dietary changes of daily oatmeal and 2 fruit servings for breakfast to minimize hunger with increased fiber intake and increased water intake to 5 - 12 ounce bottles or more daily  No significant change in exercise as tolerated  - Patient recommended to increase exercise by 5 minutes or walking an extra block  - Patient encouraged to make similar changes of increase fiber, fruit and vegetable for lunch  Solid recommended with 4 ounce/hand full serving of meat lean  - Given cardiac symptoms assessed today we will discuss at review appointment starting medications  Would consider Contrave  4  Benign essential tremor  -     propranolol (INDERAL) 40 mg tablet; Take 0 5 tablets (20 mg total) by mouth every 12 (twelve) hours         Subjective      Cong Lima is a very pleasant 70 y o  female who has a PMH of morbid obesity, GERD, COPD, YUE, hyperlipidemia, restless leg syndrome and presents today to review chronic diseases however patient has complaints of dizziness and chest pain on and off over the past few days  Patient denies any nausea, vomiting, diaphoresis, syncope, headache  Patient's chronic medical conditions are stable unless noted otherwise above   Patient reports compliance on medications prescribed with no side effects experienced at this time  This patient has had no admissions to hospital or medical emergencies since the last visit to our office  Patient has no further complaints other than what is mentioned below and in the ROS  Review of Systems   Constitutional: Negative for chills and fever  Respiratory: Positive for shortness of breath  Negative for cough  Cardiovascular: Positive for chest pain  Negative for palpitations  Gastrointestinal: Negative for abdominal pain, constipation, diarrhea, nausea and vomiting  Genitourinary: Negative for dysuria and hematuria  Musculoskeletal: Positive for back pain  Negative for arthralgias  Skin: Negative for color change and rash  Neurological: Positive for dizziness  Negative for seizures, syncope and headaches  All other systems reviewed and are negative  Current Outpatient Medications on File Prior to Visit   Medication Sig   • acetaminophen (TYLENOL) 650 mg CR tablet Take 1 tablet (650 mg total) by mouth every 8 (eight) hours as needed for mild pain   • albuterol (PROVENTIL HFA,VENTOLIN HFA) 90 mcg/act inhaler inhale 2 puffs by mouth every 6 hours if needed for wheezing   • ascorbic acid (VITAMIN C) 500 mg tablet Take 500 mg by mouth daily   • aspirin (Aspirin Low Dose) 81 mg EC tablet Take 1 tablet (81 mg total) by mouth daily   • atorvastatin (LIPITOR) 40 mg tablet take 1 tablet by mouth once daily   • Azelastine HCl 137 MCG/SPRAY SOLN INSTILL 1 SPRAY INTO EACH NOSTRIL 2 TIMES A DAY AS DIRECTED   • budesonide-formoterol (SYMBICORT) 160-4 5 mcg/act inhaler Inhale 2 puffs 2 (two) times a day Rinse mouth after use     • calcium carbonate (OS-ROSCOE) 1250 (500 Ca) MG chewable tablet Chew 1 tablet (1,250 mg total) daily   • cholecalciferol (VITAMIN D3) 1,000 units tablet Take 1 tablet (1,000 Units total) by mouth daily   • Diclofenac Sodium (VOLTAREN) 1 % Apply 2 g topically 4 (four) times a day   • fluticasone (FLONASE) 50 mcg/act nasal spray 1 spray into each nostril daily • gabapentin (NEURONTIN) 300 mg capsule Take 1 capsule in am, 2 capsules in afternoon, and 1 capsule at bedtime  If not helpful in 2 weeks may increase to 1 capsule in am 2 capsules in afternoon and 2 capsules at bedtime  • ketoconazole (NIZORAL) 2 % cream Apply topically daily for 14 days   • montelukast (SINGULAIR) 10 mg tablet take 1 tablet by mouth at bedtime   • terbinafine (LamISIL) 250 mg tablet terbinafine HCl 250 mg tablet   take 1 tablet by mouth once daily (Patient not taking: Reported on 11/17/2022)   • [DISCONTINUED] propranolol (INDERAL) 40 mg tablet take 1 tablet by mouth every 12 hours       Objective     /72 (BP Location: Left arm, Patient Position: Sitting, Cuff Size: Standard)   Pulse 85   Temp 98 2 °F (36 8 °C) (Temporal)   Resp 18   Ht 5' (1 524 m)   Wt 125 kg (276 lb 3 2 oz)   LMP  (LMP Unknown)   SpO2 96%   BMI 53 94 kg/m²     Physical Exam  Vitals reviewed  Constitutional:       General: She is not in acute distress  Appearance: She is obese  HENT:      Head: Atraumatic  Eyes:      Conjunctiva/sclera: Conjunctivae normal    Cardiovascular:      Rate and Rhythm: Normal rate and regular rhythm  Pulses: Normal pulses  Heart sounds: Normal heart sounds  No murmur heard  No gallop  Pulmonary:      Effort: Pulmonary effort is normal       Breath sounds: Normal breath sounds  No wheezing, rhonchi or rales  Abdominal:      General: Abdomen is flat  Bowel sounds are normal  There is no distension  Palpations: Abdomen is soft  Tenderness: There is no abdominal tenderness  Musculoskeletal:         General: Normal range of motion  Cervical back: Normal range of motion  Right lower leg: No edema  Left lower leg: No edema  Skin:     General: Skin is warm and dry  Findings: No rash  Neurological:      General: No focal deficit present  Mental Status: She is alert     Psychiatric:         Behavior: Behavior normal  Gale Thibodeaux MD

## 2023-06-23 NOTE — ASSESSMENT & PLAN NOTE
2 pound weight loss since last visit June 1, 2023  Patient improving and tolerating dietary changes of daily oatmeal and 2 fruit servings for breakfast to minimize hunger with increased fiber intake and increased water intake to 5 - 12 ounce bottles or more daily  No significant change in exercise as tolerated  - Patient recommended to increase exercise by 5 minutes or walking an extra block  - Patient encouraged to make similar changes of increase fiber, fruit and vegetable for lunch  Solid recommended with 4 ounce/hand full serving of meat lean  - Given cardiac symptoms assessed today we will discuss at review appointment starting medications  Would consider Contrave

## 2023-06-23 NOTE — ASSESSMENT & PLAN NOTE
Atypical   POC EKG demonstrates sinus bradycardia  Vitals remained stable with heart rate 85    - Will decrease propranolol to 20 mg twice daily  - ED precautions given

## 2023-06-23 NOTE — H&P
History of Present Illness:  The patient is a 70 y o  female who presents with complaints of bilateral lower back pain    Past Medical History:   Diagnosis Date   • Allergic rhinitis    • Anemia    • Anxiety    • Arthritis    • Back pain    • Cancer ([facility])    • Chronic pain disorder     back   • COPD (chronic obstructive pulmonary disease) ([facility])    • CPAP (continuous positive airway pressure) dependence    • Depression    • Diabetes mellitus ([facility])     Pre- Diabetic   • Dyslipidemia    • Essential tremor    • Excessive daytime sleepiness    • GERD (gastroesophageal reflux disease)    • History of uterine cancer    • Hyperglycemia    • Hyperlipidemia    • Hypertension    • Hypovitaminosis D    • Iron deficiency anemia    • Joint pain    • Mood disorder ([facility] [address] )    • Obesity    • Obstructive sleep apnea    • Ringing in ears    • RLS (restless legs syndrome)    • Snoring    • Uterine cancer ([facility])     age 20   • Vitamin D deficiency    • Witnessed episode of apnea        Past Surgical History:   Procedure Laterality Date   • APPENDECTOMY     • HYSTERECTOMY      age 20   • KNEE ARTHROSCOPY     • KNEE SURGERY      Meniscus tear   • TONSILLECTOMY     • TUBAL LIGATION           Current Outpatient Medications:   •  acetaminophen (TYLENOL) 650 mg CR tablet, Take 1 tablet (650 mg total) by mouth every 8 (eight) hours as needed for mild pain, Disp: 30 tablet, Rfl: 0  •  albuterol (PROVENTIL HFA,VENTOLIN HFA) 90 mcg/act inhaler, inhale 2 puffs by mouth every 6 hours if needed for wheezing, Disp: 18 g, Rfl: 1  •  ascorbic acid (VITAMIN C) 500 mg tablet, Take 500 mg by mouth daily, Disp: , Rfl:   •  aspirin (Aspirin Low Dose) 81 mg EC tablet, Take 1 tablet (81 mg total) by mouth daily, Disp: 30 tablet, Rfl: 3  •  atorvastatin (LIPITOR) 40 mg tablet, take 1 tablet by mouth once daily, Disp: 90 tablet, Rfl: 3  •  Azelastine HCl 137 MCG/SPRAY SOLN, INSTILL 1 SPRAY INTO EACH NOSTRIL 2 TIMES A DAY AS DIRECTED, Disp: 30 mL, Rfl: 1  • budesonide-formoterol (SYMBICORT) 160-4 5 mcg/act inhaler, Inhale 2 puffs 2 (two) times a day Rinse mouth after use , Disp: 10 2 g, Rfl: 5  •  calcium carbonate (OS-ROSCOE) 1250 (500 Ca) MG chewable tablet, Chew 1 tablet (1,250 mg total) daily, Disp: 90 tablet, Rfl: 1  •  cholecalciferol (VITAMIN D3) 1,000 units tablet, Take 1 tablet (1,000 Units total) by mouth daily, Disp: 30 tablet, Rfl: 5  •  Diclofenac Sodium (VOLTAREN) 1 %, Apply 2 g topically 4 (four) times a day, Disp: 100 g, Rfl: 0  •  fluticasone (FLONASE) 50 mcg/act nasal spray, 1 spray into each nostril daily, Disp: 1 Bottle, Rfl: 3  •  gabapentin (NEURONTIN) 300 mg capsule, Take 1 capsule in am, 2 capsules in afternoon, and 1 capsule at bedtime  If not helpful in 2 weeks may increase to 1 capsule in am 2 capsules in afternoon and 2 capsules at bedtime  , Disp: 150 capsule, Rfl: 5  •  ketoconazole (NIZORAL) 2 % cream, Apply topically daily for 14 days, Disp: 15 g, Rfl: 0  •  montelukast (SINGULAIR) 10 mg tablet, take 1 tablet by mouth at bedtime, Disp: 30 tablet, Rfl: 3  •  propranolol (INDERAL) 40 mg tablet, take 1 tablet by mouth every 12 hours, Disp: 180 tablet, Rfl: 5  •  terbinafine (LamISIL) 250 mg tablet, terbinafine HCl 250 mg tablet  take 1 tablet by mouth once daily (Patient not taking: Reported on 11/17/2022), Disp: , Rfl:     Current Facility-Administered Medications:   •  lidocaine (PF) (XYLOCAINE-MPF) 2 % injection 3 mL, 3 mL, Perineural, Once, Chino Clarke, DO    Allergies   Allergen Reactions   • Aspirin GI Intolerance     Can only take baby aspirin    • Latex Itching       Physical Exam:   Vitals:    06/23/23 0820   BP: 136/86   Pulse: 55   Resp: 18   Temp: 97 8 °F (36 6 °C)   SpO2: 96%     General: Awake, Alert, Oriented x 3, Mood and affect appropriate  Respiratory: Respirations even and unlabored  Cardiovascular: Peripheral pulses intact; no edema  Musculoskeletal Exam: bilateral lower back pain    ASA Score: 2    Patient/Chart Verification  Patient ID Verified: Verbal  ID Band Applied: No  Consents Confirmed: Procedural, To be obtained in the Pre-Procedure area  H&P( within 30 days) Verified: To be obtained in the Pre-Procedure area  Interval H&P(within 24 hr) Complete (required for Outpatients and Surgery Admit only): To be obtained in the Pre-Procedure area  Allergies Reviewed: Yes  Anticoag/NSAID held?: NA  Currently on antibiotics?: No  Pregnancy denied?: NA    Assessment:   1   Lumbar spondylosis        Plan: LUIS L3-5 MBB #2

## 2023-06-23 NOTE — DISCHARGE INSTRUCTIONS

## 2023-06-26 ENCOUNTER — TELEPHONE (OUTPATIENT)
Dept: RADIOLOGY | Facility: MEDICAL CENTER | Age: 63
End: 2023-06-26

## 2023-06-26 NOTE — TELEPHONE ENCOUNTER
Returned call to patient and scheduled:     Right L3-5 RFA on 7/18  Left L3-5 RFA on 8/8    Reviewed instructions: , NPO 1 hour prior, loose-fitting/comfortable clothing, if ill/fever/infx/abx to call and reschedule  No need to hold any meds prior  Patient stated verbal understanding

## 2023-06-26 NOTE — TELEPHONE ENCOUNTER
Pain diary reviewed by Dr Florencia Rocha; proceed with RFA and f/u; I will call and coordinate       Bilateral L3-5

## 2023-06-29 ENCOUNTER — HOSPITAL ENCOUNTER (OUTPATIENT)
Dept: NON INVASIVE DIAGNOSTICS | Facility: HOSPITAL | Age: 63
Discharge: HOME | End: 2023-06-29
Attending: INTERNAL MEDICINE
Payer: MEDICARE

## 2023-06-29 VITALS
DIASTOLIC BLOOD PRESSURE: 95 MMHG | WEIGHT: 278 LBS | HEART RATE: 68 BPM | HEIGHT: 60 IN | BODY MASS INDEX: 54.58 KG/M2 | SYSTOLIC BLOOD PRESSURE: 167 MMHG

## 2023-06-29 DIAGNOSIS — R06.09 DOE (DYSPNEA ON EXERTION): ICD-10-CM

## 2023-06-29 LAB
AORTIC ROOT: 3 CM
APICAL FOUR CHAMBER EJECTION FRACTION: 66 %
E WAVE DECELERATION TIME: 190 MS
FRACTIONAL SHORTENING: 31 (ref 28–44)
INTERVENTRICULAR SEPTUM IN DIASTOLE (PARASTERNAL SHORT AXIS VIEW): 0.9 CM
INTERVENTRICULAR SEPTUM: 0.9 CM (ref 0.6–1.1)
LAAS-AP2: 15.1 CM2
LAAS-AP4: 16.5 CM2
LEFT ATRIUM SIZE: 4.3 CM
LEFT ATRIUM VOLUME (MOD BIPLANE): 43 ML
LEFT INTERNAL DIMENSION IN SYSTOLE: 3.1 CM (ref 2.1–4)
LEFT VENTRICULAR INTERNAL DIMENSION IN DIASTOLE: 4.5 CM (ref 3.5–6)
LEFT VENTRICULAR POSTERIOR WALL IN END DIASTOLE: 0.9 CM
LEFT VENTRICULAR STROKE VOLUME: 55 ML
LVSV (TEICH): 55 ML
MV E'TISSUE VEL-SEP: 11 CM/S
MV PEAK A VEL: 0.71 M/S
MV PEAK E VEL: 87 CM/S
MV STENOSIS PRESSURE HALF TIME: 55 MS
MV VALVE AREA P 1/2 METHOD: 4
SL CV LEFT ATRIUM LENGTH A2C: 4.8 CM
SL CV LV EF: 60
SL CV PED ECHO LEFT VENTRICLE DIASTOLIC VOLUME (MOD BIPLANE) 2D: 93 ML
SL CV PED ECHO LEFT VENTRICLE SYSTOLIC VOLUME (MOD BIPLANE) 2D: 37 ML
TR MAX PG: 23 MMHG
TR PEAK VELOCITY: 2.4 M/S
TRICUSPID ANNULAR PLANE SYSTOLIC EXCURSION: 2.5 CM
TRICUSPID VALVE PEAK REGURGITATION VELOCITY: 2.42 M/S

## 2023-06-29 PROCEDURE — 93306 TTE W/DOPPLER COMPLETE: CPT | Performed by: INTERNAL MEDICINE

## 2023-06-29 PROCEDURE — 93306 TTE W/DOPPLER COMPLETE: CPT

## 2023-07-12 DIAGNOSIS — J30.9 ALLERGIC RHINITIS, UNSPECIFIED SEASONALITY, UNSPECIFIED TRIGGER: ICD-10-CM

## 2023-07-16 RX ORDER — MONTELUKAST SODIUM 10 MG/1
TABLET ORAL
Qty: 30 TABLET | Refills: 3 | Status: SHIPPED | OUTPATIENT
Start: 2023-07-16

## 2023-07-18 ENCOUNTER — HOSPITAL ENCOUNTER (OUTPATIENT)
Dept: RADIOLOGY | Facility: MEDICAL CENTER | Age: 63
Discharge: HOME | End: 2023-07-18
Payer: MEDICARE

## 2023-07-18 ENCOUNTER — TELEPHONE (OUTPATIENT)
Dept: PAIN MEDICINE | Facility: MEDICAL CENTER | Age: 63
End: 2023-07-18

## 2023-07-18 VITALS
RESPIRATION RATE: 18 BRPM | DIASTOLIC BLOOD PRESSURE: 83 MMHG | HEART RATE: 59 BPM | OXYGEN SATURATION: 93 % | SYSTOLIC BLOOD PRESSURE: 139 MMHG | TEMPERATURE: 97.8 F

## 2023-07-18 DIAGNOSIS — M47.816 LUMBAR SPONDYLOSIS: ICD-10-CM

## 2023-07-18 PROCEDURE — 64636 DESTROY L/S FACET JNT ADDL: CPT | Performed by: PHYSICAL MEDICINE & REHABILITATION

## 2023-07-18 PROCEDURE — 64635 DESTROY LUMB/SAC FACET JNT: CPT | Performed by: PHYSICAL MEDICINE & REHABILITATION

## 2023-07-18 RX ORDER — BUPIVACAINE HCL/PF 2.5 MG/ML
5 VIAL (ML) INJECTION ONCE
Status: COMPLETED | OUTPATIENT
Start: 2023-07-18 | End: 2023-07-18

## 2023-07-18 RX ADMIN — Medication 5 ML: at 11:36

## 2023-07-18 NOTE — H&P
History of Present Illness:  The patient is a 70 y.o. female who presents with complaints of right low back pain    Past Medical History:   Diagnosis Date   • Allergic rhinitis    • Anemia    • Anxiety    • Arthritis    • Back pain    • Cancer (HCC)    • Chronic pain disorder     back   • COPD (chronic obstructive pulmonary disease) ([facility])    • CPAP (continuous positive airway pressure) dependence    • Depression    • Diabetes mellitus ([facility])     Pre- Diabetic   • Dyslipidemia    • Essential tremor    • Excessive daytime sleepiness    • GERD (gastroesophageal reflux disease)    • History of uterine cancer    • Hyperglycemia    • Hyperlipidemia    • Hypertension    • Hypovitaminosis D    • Iron deficiency anemia    • Joint pain    • Mood disorder ([address])    • Obesity    • Obstructive sleep apnea    • Ringing in ears    • RLS (restless legs syndrome)    • Snoring    • Uterine cancer ([facility])     age 20   • Vitamin D deficiency    • Witnessed episode of apnea        Past Surgical History:   Procedure Laterality Date   • APPENDECTOMY     • HYSTERECTOMY      age 20   • KNEE ARTHROSCOPY     • KNEE SURGERY      Meniscus tear   • TONSILLECTOMY     • TUBAL LIGATION           Current Outpatient Medications:   •  acetaminophen (TYLENOL) 650 mg CR tablet, Take 1 tablet (650 mg total) by mouth every 8 (eight) hours as needed for mild pain, Disp: 30 tablet, Rfl: 0  •  albuterol (PROVENTIL HFA,VENTOLIN HFA) 90 mcg/act inhaler, inhale 2 puffs by mouth every 6 hours if needed for wheezing, Disp: 18 g, Rfl: 1  •  ascorbic acid (VITAMIN C) 500 mg tablet, Take 500 mg by mouth daily, Disp: , Rfl:   •  aspirin (Aspirin Low Dose) 81 mg EC tablet, Take 1 tablet (81 mg total) by mouth daily, Disp: 30 tablet, Rfl: 3  •  atorvastatin (LIPITOR) 40 mg tablet, take 1 tablet by mouth once daily, Disp: 90 tablet, Rfl: 3  •  Azelastine HCl 137 MCG/SPRAY SOLN, INSTILL 1 SPRAY INTO EACH NOSTRIL 2 TIMES A DAY AS DIRECTED, Disp: 30 mL, Rfl: 1  • budesonide-formoterol (SYMBICORT) 160-4.5 mcg/act inhaler, Inhale 2 puffs 2 (two) times a day Rinse mouth after use., Disp: 10.2 g, Rfl: 5  •  calcium carbonate (OS-ROSCOE) 1250 (500 Ca) MG chewable tablet, Chew 1 tablet (1,250 mg total) daily, Disp: 90 tablet, Rfl: 1  •  cholecalciferol (VITAMIN D3) 1,000 units tablet, Take 1 tablet (1,000 Units total) by mouth daily, Disp: 30 tablet, Rfl: 5  •  Diclofenac Sodium (VOLTAREN) 1 %, Apply 2 g topically 4 (four) times a day, Disp: 100 g, Rfl: 0  •  fluticasone (FLONASE) 50 mcg/act nasal spray, 1 spray into each nostril daily, Disp: 1 Bottle, Rfl: 3  •  gabapentin (NEURONTIN) 300 mg capsule, Take 1 capsule in am, 2 capsules in afternoon, and 1 capsule at bedtime. If not helpful in 2 weeks may increase to 1 capsule in am 2 capsules in afternoon and 2 capsules at bedtime. , Disp: 150 capsule, Rfl: 5  •  ketoconazole (NIZORAL) 2 % cream, Apply topically daily for 14 days, Disp: 15 g, Rfl: 0  •  montelukast (SINGULAIR) 10 mg tablet, take 1 tablet by mouth at bedtime, Disp: 30 tablet, Rfl: 3  •  propranolol (INDERAL) 40 mg tablet, Take 0.5 tablets (20 mg total) by mouth every 12 (twelve) hours, Disp: 180 tablet, Rfl: 5  •  terbinafine (LamISIL) 250 mg tablet, terbinafine HCl 250 mg tablet  take 1 tablet by mouth once daily (Patient not taking: Reported on 11/17/2022), Disp: , Rfl:     Allergies   Allergen Reactions   • Aspirin GI Intolerance     Can only take baby aspirin    • Latex Itching       Physical Exam: There were no vitals filed for this visit. General: Awake, Alert, Oriented x 3, Mood and affect appropriate  Respiratory: Respirations even and unlabored  Cardiovascular: Peripheral pulses intact; no edema  Musculoskeletal Exam: right low back pain    ASA Score: 4         Assessment: No diagnosis found.     Plan: RT L3-5 RFA Implemented All Fall with Harm Risk Interventions:  [place] to call system. Call bell, personal items and telephone within reach. Instruct patient to call for assistance. Room bathroom lighting operational. Non-slip footwear when patient is off stretcher. Physically safe environment: no spills, clutter or unnecessary equipment. Stretcher in lowest position, wheels locked, appropriate side rails in place. Provide visual cue, wrist band, yellow gown, etc. Monitor gait and stability. Monitor for mental status changes and reorient to person, place, and time. Review medications for side effects contributing to fall risk. Reinforce activity limits and safety measures with patient and family. Provide visual clues: red socks.

## 2023-07-18 NOTE — DISCHARGE INSTRUCTIONS

## 2023-07-19 NOTE — TELEPHONE ENCOUNTER
Caller: rachel     Doctor: Deric Briones    Reason for call: patient returning nurses phone call     Transferred to nurse

## 2023-07-19 NOTE — TELEPHONE ENCOUNTER
FYI-    Pt did well over night no s/s of infection or sunburn sensation. Aware it takes 2 weeks to notice pain relief and 4-6 weeks for full pain effect to be achieved. Aware to medicate as previous for discomfort and may use ice or heat. Confirmed next appt. Call if any questions or concerns prior to next appt.

## 2023-08-03 DIAGNOSIS — J45.909 UNCOMPLICATED ASTHMA, UNSPECIFIED ASTHMA SEVERITY, UNSPECIFIED WHETHER PERSISTENT: ICD-10-CM

## 2023-08-03 RX ORDER — ALBUTEROL SULFATE 90 UG/1
AEROSOL, METERED RESPIRATORY (INHALATION)
Qty: 8.5 G | Refills: 3 | Status: SHIPPED | OUTPATIENT
Start: 2023-08-03

## 2023-08-08 ENCOUNTER — TELEPHONE (OUTPATIENT)
Dept: PAIN MEDICINE | Facility: CLINIC | Age: 63
End: 2023-08-08

## 2023-08-08 ENCOUNTER — HOSPITAL ENCOUNTER (OUTPATIENT)
Dept: RADIOLOGY | Facility: MEDICAL CENTER | Age: 63
Discharge: HOME | End: 2023-08-08

## 2023-08-08 NOTE — TELEPHONE ENCOUNTER
Caller: William Watkins    Doctor: Breanna Payne     Reason for call: patient gets ride from her insurance but they did not show up calling to reschedule procedure today     Transferred to

## 2023-08-16 ENCOUNTER — TELEPHONE (OUTPATIENT)
Dept: PAIN MEDICINE | Facility: MEDICAL CENTER | Age: 63
End: 2023-08-16

## 2023-08-16 ENCOUNTER — HOSPITAL ENCOUNTER (OUTPATIENT)
Dept: RADIOLOGY | Facility: MEDICAL CENTER | Age: 63
Discharge: HOME | End: 2023-08-16
Payer: MEDICARE

## 2023-08-16 VITALS
OXYGEN SATURATION: 95 % | HEART RATE: 74 BPM | DIASTOLIC BLOOD PRESSURE: 78 MMHG | SYSTOLIC BLOOD PRESSURE: 141 MMHG | RESPIRATION RATE: 20 BRPM | TEMPERATURE: 98 F

## 2023-08-16 DIAGNOSIS — M47.816 LUMBAR SPONDYLOSIS: ICD-10-CM

## 2023-08-16 PROCEDURE — 64636 DESTROY L/S FACET JNT ADDL: CPT | Performed by: PHYSICAL MEDICINE & REHABILITATION

## 2023-08-16 PROCEDURE — 64635 DESTROY LUMB/SAC FACET JNT: CPT | Performed by: PHYSICAL MEDICINE & REHABILITATION

## 2023-08-16 RX ORDER — BUPIVACAINE HCL/PF 2.5 MG/ML
5 VIAL (ML) INJECTION ONCE
Status: COMPLETED | OUTPATIENT
Start: 2023-08-16 | End: 2023-08-16

## 2023-08-16 RX ADMIN — Medication 5 ML: at 09:23

## 2023-08-16 NOTE — DISCHARGE INSTRUCTIONS

## 2023-08-16 NOTE — H&P
History of Present Illness:  The patient is a 70 y.o. female who presents with complaints of left low back pain    Past Medical History:   Diagnosis Date   • Allergic rhinitis    • Anemia    • Anxiety    • Arthritis    • Back pain    • Cancer (HCC)    • Chronic pain disorder     back   • COPD (chronic obstructive pulmonary disease) ([facility])    • CPAP (continuous positive airway pressure) dependence    • Depression    • Diabetes mellitus ([facility])     Pre- Diabetic   • Dyslipidemia    • Essential tremor    • Excessive daytime sleepiness    • GERD (gastroesophageal reflux disease)    • History of uterine cancer    • Hyperglycemia    • Hyperlipidemia    • Hypertension    • Hypovitaminosis D    • Iron deficiency anemia    • Joint pain    • Mood disorder ([address])    • Obesity    • Obstructive sleep apnea    • Ringing in ears    • RLS (restless legs syndrome)    • Snoring    • Uterine cancer ([facility])     age 20   • Vitamin D deficiency    • Witnessed episode of apnea        Past Surgical History:   Procedure Laterality Date   • APPENDECTOMY     • HYSTERECTOMY      age 20   • KNEE ARTHROSCOPY     • KNEE SURGERY      Meniscus tear   • TONSILLECTOMY     • TUBAL LIGATION           Current Outpatient Medications:   •  acetaminophen (TYLENOL) 650 mg CR tablet, Take 1 tablet (650 mg total) by mouth every 8 (eight) hours as needed for mild pain, Disp: 30 tablet, Rfl: 0  •  albuterol (PROVENTIL HFA,VENTOLIN HFA) 90 mcg/act inhaler, inhale 2 puffs by mouth every 6 hours if needed for wheezing, Disp: 8.5 g, Rfl: 3  •  ascorbic acid (VITAMIN C) 500 mg tablet, Take 500 mg by mouth daily, Disp: , Rfl:   •  aspirin (Aspirin Low Dose) 81 mg EC tablet, Take 1 tablet (81 mg total) by mouth daily, Disp: 30 tablet, Rfl: 3  •  atorvastatin (LIPITOR) 40 mg tablet, take 1 tablet by mouth once daily, Disp: 90 tablet, Rfl: 3  •  Azelastine HCl 137 MCG/SPRAY SOLN, INSTILL 1 SPRAY INTO EACH NOSTRIL 2 TIMES A DAY AS DIRECTED, Disp: 30 mL, Rfl: 1  • budesonide-formoterol (SYMBICORT) 160-4.5 mcg/act inhaler, Inhale 2 puffs 2 (two) times a day Rinse mouth after use., Disp: 10.2 g, Rfl: 5  •  calcium carbonate (OS-ROSCOE) 1250 (500 Ca) MG chewable tablet, Chew 1 tablet (1,250 mg total) daily, Disp: 90 tablet, Rfl: 1  •  cholecalciferol (VITAMIN D3) 1,000 units tablet, Take 1 tablet (1,000 Units total) by mouth daily, Disp: 30 tablet, Rfl: 5  •  Diclofenac Sodium (VOLTAREN) 1 %, Apply 2 g topically 4 (four) times a day, Disp: 100 g, Rfl: 0  •  fluticasone (FLONASE) 50 mcg/act nasal spray, 1 spray into each nostril daily, Disp: 1 Bottle, Rfl: 3  •  gabapentin (NEURONTIN) 300 mg capsule, Take 1 capsule in am, 2 capsules in afternoon, and 1 capsule at bedtime. If not helpful in 2 weeks may increase to 1 capsule in am 2 capsules in afternoon and 2 capsules at bedtime. , Disp: 150 capsule, Rfl: 5  •  ketoconazole (NIZORAL) 2 % cream, Apply topically daily for 14 days, Disp: 15 g, Rfl: 0  •  montelukast (SINGULAIR) 10 mg tablet, take 1 tablet by mouth at bedtime, Disp: 30 tablet, Rfl: 3  •  propranolol (INDERAL) 40 mg tablet, Take 0.5 tablets (20 mg total) by mouth every 12 (twelve) hours, Disp: 180 tablet, Rfl: 5  •  terbinafine (LamISIL) 250 mg tablet, terbinafine HCl 250 mg tablet  take 1 tablet by mouth once daily (Patient not taking: Reported on 11/17/2022), Disp: , Rfl:     Allergies   Allergen Reactions   • Aspirin GI Intolerance     Can only take baby aspirin    • Latex Itching       Physical Exam:   Vitals:    08/16/23 0856   BP: 131/88   Pulse: 65   Resp: 18   Temp: 98 °F (36.7 °C)   SpO2: 93%     General: Awake, Alert, Oriented x 3, Mood and affect appropriate  Respiratory: Respirations even and unlabored  Cardiovascular: Peripheral pulses intact; no edema  Musculoskeletal Exam: left low back pain    ASA Score: 3    Patient/Chart Verification  Patient ID Verified: Verbal  ID Band Applied: No  Consents Confirmed: Procedural, To be obtained in the Pre-Procedure area  H&P( within 30 days) Verified: To be obtained in the Pre-Procedure area  Interval H&P(within 24 hr) Complete (required for Outpatients and Surgery Admit only): To be obtained in the Pre-Procedure area  Allergies Reviewed: Yes  Anticoag/NSAID held?: NA  Currently on antibiotics?: No  Pregnancy denied?: NA    Assessment:   1.  Lumbar spondylosis        Plan: LT L3-5 RFA

## 2023-08-17 NOTE — TELEPHONE ENCOUNTER
S/W pt. Pt stated current pain level is 2/10. Pt fell twice yesterday after the procedure. Her left leg gave out. Both knees hurt- left knee is worse b/c landing on that knee. Left knee is swollen since she fell. Pt stated needle sites look good, denies S&S of infection, denies fevers, a little soreness and denies sun burn like sensation. Advised pt if she does get pain to take her prescribed or OTC pain medications and/or use ice/heat and that it takes 4 to 6 weeks to see the full effect. Confirmed next appt w/ pt on 9/20. Pt verbalized understanding.

## 2023-09-12 ENCOUNTER — TELEPHONE (OUTPATIENT)
Dept: FAMILY MEDICINE CLINIC | Facility: CLINIC | Age: 63
End: 2023-09-12

## 2023-09-12 NOTE — TELEPHONE ENCOUNTER
PCP SIGNATURE NEEDED FOR [facility] FORM RECEIVED VIA FAX AND PLACED IN PCP FOLDER TO BE DELIVERED AT ASSIGNED TIMES.       Prescriber Response

## 2023-09-20 ENCOUNTER — OFFICE VISIT (OUTPATIENT)
Dept: PAIN MEDICINE | Facility: MEDICAL CENTER | Age: 63
End: 2023-09-20
Payer: MEDICARE

## 2023-09-20 VITALS
DIASTOLIC BLOOD PRESSURE: 70 MMHG | BODY MASS INDEX: 53.4 KG/M2 | HEIGHT: 60 IN | WEIGHT: 272 LBS | SYSTOLIC BLOOD PRESSURE: 136 MMHG | OXYGEN SATURATION: 95 % | HEART RATE: 67 BPM

## 2023-09-20 DIAGNOSIS — M47.816 LUMBAR SPONDYLOSIS: Primary | ICD-10-CM

## 2023-09-20 DIAGNOSIS — M46.1 SACROILIITIS (HCC): ICD-10-CM

## 2023-09-20 DIAGNOSIS — M54.50 CHRONIC BILATERAL LOW BACK PAIN WITHOUT SCIATICA: ICD-10-CM

## 2023-09-20 DIAGNOSIS — G89.29 CHRONIC BILATERAL LOW BACK PAIN WITHOUT SCIATICA: ICD-10-CM

## 2023-09-20 PROCEDURE — 99214 OFFICE O/P EST MOD 30 MIN: CPT | Performed by: PHYSICIAN ASSISTANT

## 2023-09-20 RX ORDER — CALCIUM CARBONATE 160(400)MG
TABLET,CHEWABLE ORAL DAILY
Qty: 1 EACH | Refills: 0 | Status: SHIPPED | OUTPATIENT
Start: 2023-09-20

## 2023-09-20 RX ORDER — MELOXICAM 7.5 MG/1
7.5 TABLET ORAL DAILY
Qty: 30 TABLET | Refills: 0 | Status: SHIPPED | OUTPATIENT
Start: 2023-09-20

## 2023-09-20 NOTE — PROGRESS NOTES
Assessment:  1. Lumbar spondylosis    2. Sacroiliitis ([address])    3. Chronic bilateral low back pain without sciatica        Plan:  While the patient was in the office today, I did have a thorough conversation regarding their chronic pain syndrome, medication management, and treatment plan options. Unfortunately the patient is unable to report any significant improvement following the lumbar RFA procedure that was done on the left side on 8/16/2023 preceded by the right side on 7/18/2023. She does have some localized pain to the sacroiliac joints and we did discuss the possibility of performing an injection in this region. For now I have recommended a trial of meloxicam 15 mg daily for inflammation. She states she has a GI sensitivity to aspirin but not a true allergy. I have reminded her to eat with the meloxicam.  I have also placed a referral for her to see one of our chiropractors for an evaluation to see if this modality of treatment may provide any benefit. I did provide her with a prescription for a rollator but advised her she would have to contact her insurance company to see where she would have to go for this. Follow-up in 3 months or sooner if needed. My impressions and treatment recommendations were discussed in detail with the patient who verbalized understanding and had no further questions. Discharge instructions were provided. I personally saw and examined the patient and I agree with the above discussed plan of care.     Orders Placed This Encounter   Procedures   • Ambulatory referral to Chiropractic     Standing Status:   Future     Standing Expiration Date:   9/20/2024     Referral Priority:   Routine     Referral Type:   Chiropractic     Referral Reason:   Specialty Services Required     Referred to Provider:   Santana Mckeon DC     Requested Specialty:   Chiropractic Medicine     Number of Visits Requested:   1     Expiration Date:   9/20/2024     New Medications Ordered This Visit Medications   • Misc. Devices (Rollator Ultra-Light) MISC     Sig: Use daily     Dispense:  1 each     Refill:  0   • meloxicam (Mobic) 7.5 mg tablet     Sig: Take 1 tablet (7.5 mg total) by mouth daily     Dispense:  30 tablet     Refill:  0       History of Present Illness:  Kathy Alston is a 70 y.o. female who presents for a follow up office visit in regards to low back pain. The patient’s current symptoms include chronic low back pain that she presently rates a 4 out of 10 on the scale describes as a constant sharp and shooting pain that does not radiate. The pain is much worse with prolonged standing, walking and bending. She finds relief with sitting or laying down. She recently underwent left L3-5 RFA on 8/16/2023 preceded by the right side on 7/18/2023. Unfortunately she is unable to note any benefit. She has previously been to physical therapy which she states did not provide any benefit. Patient is having significant issues with mobility and is inquiring about a rollator. I have personally reviewed and/or updated the patient's past medical history, past surgical history, family history, social history, current medications, allergies, and vital signs today. Review of Systems   Respiratory: Positive for shortness of breath. Cardiovascular: Negative for chest pain. Gastrointestinal: Negative for constipation, diarrhea, nausea and vomiting. Musculoskeletal: Positive for back pain, gait problem, joint swelling and myalgias. Negative for arthralgias. Skin: Negative for rash. Neurological: Negative for dizziness, seizures and weakness. All other systems reviewed and are negative.       Patient Active Problem List   Diagnosis   • Obstructive sleep apnea treated with continuous positive airway pressure (CPAP)   • Restless leg syndrome   • Allergic rhinitis   • Depression   • Neurogenic claudication (HCC)   • Essential tremor   • Anxiety   • Hyperlipidemia   • History of tobacco abuse   • Chest discomfort   • COPD (chronic obstructive pulmonary disease) ([facility])   • GERD (gastroesophageal reflux disease)   • Edema   • Major depressive disorder, recurrent episode, moderate ([facility])   • Primary osteoarthritis of both knees   • Morbid obesity with BMI of 50.0-59.9, adult ([facility])   • Prediabetes   • Lung nodules   • Pedal edema   • Fecal incontinence   • Fecal smearing   • Lumbar spondylosis   • Dizziness       Past Medical History:   Diagnosis Date   • Allergic rhinitis    • Anemia    • Anxiety    • Arthritis    • Back pain    • Cancer ([facility])    • Chronic pain disorder     back   • COPD (chronic obstructive pulmonary disease) ([facility])    • CPAP (continuous positive airway pressure) dependence    • Depression    • Diabetes mellitus ([address])     Pre- Diabetic   • Dyslipidemia    • Essential tremor    • Excessive daytime sleepiness    • GERD (gastroesophageal reflux disease)    • History of uterine cancer    • Hyperglycemia    • Hyperlipidemia    • Hypertension    • Hypovitaminosis D    • Iron deficiency anemia    • Joint pain    • Mood disorder ([address])    • Obesity    • Obstructive sleep apnea    • Ringing in ears    • RLS (restless legs syndrome)    • Snoring    • Uterine cancer ([address])     age 20   • Vitamin D deficiency    • Witnessed episode of apnea        Past Surgical History:   Procedure Laterality Date   • APPENDECTOMY     • EPIDURAL BLOCK INJECTION     • HYSTERECTOMY      age 20   • KNEE ARTHROSCOPY     • KNEE SURGERY      Meniscus tear   • TONSILLECTOMY     • TUBAL LIGATION         Family History   Problem Relation Age of Onset   • Diabetes Mother    • Asthma Mother    • Hypertension Mother    • Heart disease Mother    • No Known Problems Father    • No Known Problems Sister    • No Known Problems Daughter    • No Known Problems Maternal Grandmother    • No Known Problems Paternal Grandmother    • No Known Problems Sister    • No Known Problems Sister    • No Known Problems Daughter    • No Known Problems Maternal Grandfather    • No Known Problems Paternal Grandfather        Social History     Occupational History   • Not on file   Tobacco Use   • Smoking status: Former     Packs/day: 1.50     Years: 47.00     Total pack years: 70.50     Types: Cigarettes     Start date: 1974     Quit date: 2021     Years since quittin.9   • Smokeless tobacco: Never   Vaping Use   • Vaping Use: Never used   Substance and Sexual Activity   • Alcohol use: Not Currently     Comment: very seldom   • Drug use: Not Currently     Comment: pt use at age [age]   • Sexual activity: Not on file       Current Outpatient Medications on File Prior to Visit   Medication Sig   • acetaminophen (TYLENOL) 650 mg CR tablet Take 1 tablet (650 mg total) by mouth every 8 (eight) hours as needed for mild pain   • albuterol (PROVENTIL HFA,VENTOLIN HFA) 90 mcg/act inhaler inhale 2 puffs by mouth every 6 hours if needed for wheezing   • ascorbic acid (VITAMIN C) 500 mg tablet Take 500 mg by mouth daily   • aspirin (Aspirin Low Dose) 81 mg EC tablet Take 1 tablet (81 mg total) by mouth daily   • atorvastatin (LIPITOR) 40 mg tablet take 1 tablet by mouth once daily   • Azelastine HCl 137 MCG/SPRAY SOLN INSTILL 1 SPRAY INTO EACH NOSTRIL 2 TIMES A DAY AS DIRECTED   • budesonide-formoterol (SYMBICORT) 160-4.5 mcg/act inhaler Inhale 2 puffs 2 (two) times a day Rinse mouth after use. • calcium carbonate (OS-ROSCOE) 1250 (500 Ca) MG chewable tablet Chew 1 tablet (1,250 mg total) daily   • cholecalciferol (VITAMIN D3) 1,000 units tablet Take 1 tablet (1,000 Units total) by mouth daily   • Diclofenac Sodium (VOLTAREN) 1 % Apply 2 g topically 4 (four) times a day   • fluticasone (FLONASE) 50 mcg/act nasal spray 1 spray into each nostril daily   • gabapentin (NEURONTIN) 300 mg capsule Take 1 capsule in am, 2 capsules in afternoon, and 1 capsule at bedtime.  If not helpful in 2 weeks may increase to 1 capsule in am 2 capsules in afternoon and 2 capsules at bedtime. • montelukast (SINGULAIR) 10 mg tablet take 1 tablet by mouth at bedtime   • propranolol (INDERAL) 40 mg tablet Take 0.5 tablets (20 mg total) by mouth every 12 (twelve) hours   • ketoconazole (NIZORAL) 2 % cream Apply topically daily for 14 days   • terbinafine (LamISIL) 250 mg tablet terbinafine HCl 250 mg tablet   take 1 tablet by mouth once daily (Patient not taking: Reported on 11/17/2022)     No current facility-administered medications on file prior to visit. Allergies   Allergen Reactions   • Aspirin GI Intolerance     Can only take baby aspirin    • Latex Itching       Physical Exam:    /70   Pulse 67   Ht 5' (1.524 m)   Wt 123 kg (272 lb)   LMP  (LMP Unknown)   SpO2 95%   BMI 53.12 kg/m²     Constitutional:normal, well developed, well nourished, alert, in no distress and non-toxic and no overt pain behavior.  and obese  Eyes:anicteric  HEENT:grossly intact  Neck:supple, symmetric, trachea midline and no masses   Pulmonary:even and unlabored  Cardiovascular:No edema or pitting edema present  Skin:Normal without rashes or lesions and well hydrated  Psychiatric:Mood and affect appropriate  Neurologic:Cranial Nerves II-XII grossly intact  Musculoskeletal: Bilateral sacroiliac joint tenderness, left greater than right.  + Wing finger sign + Patricks test +Gaenslen's test+ Posterior pelvic pain provocation    Imaging

## 2023-09-22 ENCOUNTER — OFFICE VISIT (OUTPATIENT)
Dept: PULMONOLOGY | Facility: CLINIC | Age: 63
End: 2023-09-22
Payer: MEDICARE

## 2023-09-22 ENCOUNTER — TELEPHONE (OUTPATIENT)
Dept: FAMILY MEDICINE CLINIC | Facility: CLINIC | Age: 63
End: 2023-09-22

## 2023-09-22 ENCOUNTER — TELEPHONE (OUTPATIENT)
Dept: PULMONOLOGY | Facility: CLINIC | Age: 63
End: 2023-09-22

## 2023-09-22 VITALS
HEART RATE: 57 BPM | SYSTOLIC BLOOD PRESSURE: 134 MMHG | BODY MASS INDEX: 53.6 KG/M2 | OXYGEN SATURATION: 97 % | DIASTOLIC BLOOD PRESSURE: 72 MMHG | HEIGHT: 60 IN | TEMPERATURE: 97.8 F | WEIGHT: 273 LBS

## 2023-09-22 DIAGNOSIS — R06.09 DOE (DYSPNEA ON EXERTION): Primary | ICD-10-CM

## 2023-09-22 DIAGNOSIS — E66.01 MORBID OBESITY (HCC): ICD-10-CM

## 2023-09-22 DIAGNOSIS — G47.33 OSA (OBSTRUCTIVE SLEEP APNEA): ICD-10-CM

## 2023-09-22 DIAGNOSIS — F17.210 NICOTINE DEPENDENCE, CIGARETTES, UNCOMPLICATED: ICD-10-CM

## 2023-09-22 DIAGNOSIS — J30.89 NON-SEASONAL ALLERGIC RHINITIS, UNSPECIFIED TRIGGER: ICD-10-CM

## 2023-09-22 PROCEDURE — 99214 OFFICE O/P EST MOD 30 MIN: CPT | Performed by: INTERNAL MEDICINE

## 2023-09-22 NOTE — PROGRESS NOTES
Office Progress Note - Pulmonary    Lizzy Chavez 70 y.o. female MRN: [id number]    Encounter: [id number]      Assessment:  • Dyspnea on exertion. • Obstructive sleep apnea. • History of tobacco use. • Morbid obesity. • Allergic rhinitis. Plan:   • Weight loss. • Hold the Symbicort. • Albuterol rescue inhaler 2 inhalations 4 times a day as needed. • Fluticasone nasal spray 2 sprays to each nostril once a day. • Azelastine nasal spray 2 sprays to each nostril once a day. • Nocturnal CPAP. • Changed the CPAP settings to auto CPAP 7 to 15 cm water. • Follow-up in 3 months    Discussion:   The patient's dyspnea on exertion is mainly due to her morbid obesity. She is going to be evaluated at the weight management program. She will use the albuterol rescue inhaler as needed. I told her to hold the Symbicort since her spirometry did not show airflow limitation. She will use the albuterol rescue inhaler 2 inhalations 4 times a day as needed. Her allergic rhinitis is well treated. She will use the fluticasone and azelastine nasal sprays daily. Her obstructive sleep apnea is undertreated. I have changed the CPAP settings to auto CPAP 7 to 15 cm water. I will see her in 3 months. Subjective: The patient is here for a follow-up visit. She has dyspnea on exertion. She has gained weight. No significant cough, wheezing or sputum production. She is using the Symbicort twice a day. Sometimes uses her albuterol rescue inhaler. She is struggling with the CPAP machine and feels that she is not getting enough pressure. Her CPAP is set at 5 cm water. Review of systems:  A 12 point system review is done and aside from what is stated above the rest of the review of systems is negative. Family history and social history are reviewed. Medications list is reviewed.       Vitals: Blood pressure 134/72, pulse 57, temperature 97.8 °F (36.6 °C), temperature source Tympanic, height 5' (1.524 m), weight 124 kg (273 lb), SpO2 97 %, not currently breastfeeding.,     Physical Exam  Gen: Awake, alert, oriented x 3, no acute distress  HEENT: Mucous membranes moist, no oral lesions, no thrush  NECK: No accessory muscle use, JVP not elevated  Cardiac: Regular, single S1, single S2, no murmurs, no rubs, no gallops  Lungs: Decreased breath sounds. No wheezing or rhonchi  Abdomen: normoactive bowel sounds, soft nontender, nondistended, no rebound or rigidity, no guarding  Extremities: no cyanosis, no clubbing, no edema  Neuro:  Grossly nonfocal.  Skin:  No rash.

## 2023-09-22 NOTE — TELEPHONE ENCOUNTER
----- Message from Constantine Pereira MD sent at 9/22/2023  3:24 PM EDT -----  Regarding: Appt for weight management intake  Ximena Albright,  Could you please call and schedule this patient with me for a weight management intake. Thank you!

## 2023-10-12 ENCOUNTER — TELEPHONE (OUTPATIENT)
Dept: FAMILY MEDICINE CLINIC | Facility: CLINIC | Age: 63
End: 2023-10-12

## 2023-10-12 NOTE — TELEPHONE ENCOUNTER
Csl plasma form received on 10/12/23  to be completed by PCP. Copy made and placed in PCP folder. Forms to be delivered to PCP mailbox at assigned time.

## 2023-10-18 ENCOUNTER — OFFICE VISIT (OUTPATIENT)
Dept: FAMILY MEDICINE CLINIC | Facility: CLINIC | Age: 63
End: 2023-10-18

## 2023-10-18 VITALS
SYSTOLIC BLOOD PRESSURE: 122 MMHG | DIASTOLIC BLOOD PRESSURE: 78 MMHG | HEIGHT: 60 IN | RESPIRATION RATE: 18 BRPM | WEIGHT: 274 LBS | HEART RATE: 61 BPM | BODY MASS INDEX: 53.79 KG/M2 | TEMPERATURE: 97.6 F | OXYGEN SATURATION: 98 %

## 2023-10-18 DIAGNOSIS — J30.1 ACUTE SEASONAL ALLERGIC RHINITIS DUE TO POLLEN: ICD-10-CM

## 2023-10-18 DIAGNOSIS — E66.01 MORBID OBESITY WITH BMI OF 50.0-59.9, ADULT (HCC): Primary | ICD-10-CM

## 2023-10-18 PROCEDURE — 3078F DIAST BP <80 MM HG: CPT | Performed by: STUDENT IN AN ORGANIZED HEALTH CARE EDUCATION/TRAINING PROGRAM

## 2023-10-18 PROCEDURE — 99214 OFFICE O/P EST MOD 30 MIN: CPT | Performed by: STUDENT IN AN ORGANIZED HEALTH CARE EDUCATION/TRAINING PROGRAM

## 2023-10-18 PROCEDURE — 3074F SYST BP LT 130 MM HG: CPT | Performed by: STUDENT IN AN ORGANIZED HEALTH CARE EDUCATION/TRAINING PROGRAM

## 2023-10-18 RX ORDER — LORATADINE 10 MG/1
10 TABLET ORAL DAILY
Qty: 30 TABLET | Refills: 2 | Status: SHIPPED | OUTPATIENT
Start: 2023-10-18 | End: 2024-01-16

## 2023-10-18 NOTE — PATIENT INSTRUCTIONS
Join group nutrition class: Dates Nov 14 at 2pm, Dec 13 at [id number] [facility], Jan 17 at 2pm, Feb 16 at 10 am at your clinic - [facility] can make your own individual Nutrition appointment at [phone] but also please join the Group session are however encourage as well. Exercise goal:  Increase physical activity by 10 minutes every day.   Check your steps on your phone and aim to increase it daily   Join group step challenge: Download christiano  "Challenges": Join challenge: sruthi Gilmore the free  at the [facility] given: every Tuesday and Thursday from 4:30pm to 6:30pm

## 2023-10-18 NOTE — PROGRESS NOTES
Name: Tee Carrillo      : [birth year]      MRN: [id number]  Encounter Provider: Maxx Boucher MD  Encounter Date: 10/18/2023   Encounter department: [address] [place] [address]                 Weight management project   Assessment & Plan     1. Morbid obesity with BMI of 50.0-59.9, adult [facility])  Assessment & Plan:  Patient consented to join [facility] clinic at [address] [place] [address] for follow up with dietician and gym provided. Will monitor over next 3-6 months for weight loss goal of 5-10% and commence medications affordable and accepted by insurance at that time. Orders:  -     Hemoglobin A1C; Future  -     Lipid panel; Future  -     TSH, 3rd generation; Future  -     Comprehensive metabolic panel; Future    2. Acute seasonal allergic rhinitis due to pollen  -     loratadine (CLARITIN) 10 mg tablet; Take 1 tablet (10 mg total) by mouth daily           Informed consent received and signed. Tee Carrillo is a 70 y.o. female  patient presenting for assistance with weight management. Limitations to weight reduction:   Inability to walk without pain. Feeling hungry all the time but eats small amounts because food is limited and fear of running out. Types of food to make trejo    Current Status:   Current Weight :   Weight (last 2 days)       Date/Time Weight    10/18/23 [id number] (274)          BMI: Body mass index is 53.51 kg/m². Neck Circumference: 38cm / 15"  Waist Circumference: 141cm / 55.25"      Goals of Management: to lose weight    Patient interested in : lifestyle management plus medication    What motivates you to lose weight:  Ability to do what she needs to do - walking dogs etc.   Ability to do surgery to relieve her pain - they won't do surgery until she loses weight  To minimize her pain overall    Weight goal: 12 lbs.  (reduction of 5-10%) in 6months     Nutritional goals:  Ensure adequate water intake of 64oz daily  Encouraged to maintain healthy calorie restricted diet of 4219-2904 kcal/day  Protein: 120g (1-1.2g/kg/d)  Consume less than 10% of daily kcal in healthy fats  Together we downloaded the [employer] christiano and demonstrated how to log her food intake and calorie tracking  Information provided to join group nutrition class: Reminded patient of dates (October 3rd at 2pm, Nov 14 at 2pm, Dec 13 at 10am, Jan 17 at 2pm, Feb 16 at 10 am at [facility]). Patient can make their own individual nutrition appointment at [phone] ext:[phone]. Group session are however encouraged. Exercise goal:  Increase physical activity by 10 minutes daily. Check your steps on your phone and aim to increase it daily   Join group step challenge: downloaded christiano during visit: "Challenges": Patient ok to join group challenge and given instructions and aided to do so during visit  Information regarding the free  at the [facility] given: every Tuesday and Thursday from 4:30pm to 6:30pm     Medication Therapy:  if interested discussed after 3 -6 months of participating if no changes are seen       1. Obesity:  Patient has set new goals for weight management. A.  Patient plans to improve diet by reducing calories, increasing fruit and vegetable intacke, increasing fiber intake, and eating breakfast everyday  B. Patient plans to increase physical activity by walking more, doing chair exercises, and joining a fitness center  C. Patient plans to exercise for 30 minutes 7 times per week  D. Patient to keep food and exercise diary through [employer] christiano      2. Will evaluate with labs: CMP, Fasting Lipid Panel, HbA1c, TSH      Patient recommended to make above changes for purpose of lifelong lifestyle and involve family members to increase consistency and motivation.     Follow up in approximately  2 weeks with weight management team.          Subjective     TEJAS  Lizzy Chavez is a 70 y.o. female patient who present to the office for assistance with weight management. How do you feel about your weight not happy    Weight range: 272-278lbs  Age of overweight onset:  mid adult years   Any inciting life occurrences: others - back pain  Contributing factors: others:lack of exercise, financial constraints  Associated symptoms:comorbid conditions, increased joint pain, and decreased physcial activity    Patient denies personal history of pancreatitis. Patient also denies personal and family history of thyroid cancer and multiple endocrine neoplasia type 2 (MEN 2 tumor). Review of Systems   Constitutional:  Negative for chills and fever. Eyes:  Negative for pain and visual disturbance. Respiratory:  Negative for cough and shortness of breath. Cardiovascular:  Negative for chest pain and palpitations. Gastrointestinal:  Negative for abdominal pain, constipation, diarrhea and vomiting. Musculoskeletal:  Positive for back pain. Skin:  Negative for color change and rash. All other systems reviewed and are negative.       Past Medical History:   Diagnosis Date    Allergic rhinitis     Anemia     Anxiety     Arthritis     Back pain     Cancer (HCC)     Chronic pain disorder     back    COPD (chronic obstructive pulmonary disease) (HCC)     CPAP (continuous positive airway pressure) dependence     Depression     Diabetes mellitus (HCC)     Pre- Diabetic    Dyslipidemia     Essential tremor     Excessive daytime sleepiness     GERD (gastroesophageal reflux disease)     History of uterine cancer     Hyperglycemia     Hyperlipidemia     Hypertension     Hypovitaminosis D     Iron deficiency anemia     Joint pain     Mood disorder (HCC)     Obesity     Obstructive sleep apnea     Ringing in ears     RLS (restless legs syndrome)     Snoring     Uterine cancer ([address])     age 20    Vitamin D deficiency     Witnessed episode of apnea      Past Surgical History:   Procedure Laterality Date    APPENDECTOMY      EPIDURAL BLOCK INJECTION      HYSTERECTOMY      age 20 KNEE ARTHROSCOPY      KNEE SURGERY      Meniscus tear    TONSILLECTOMY      TUBAL LIGATION       Family History   Problem Relation Age of Onset    Diabetes Mother     Asthma Mother     Hypertension Mother     Heart disease Mother     No Known Problems Father     No Known Problems Sister     No Known Problems Daughter     No Known Problems Maternal Grandmother     No Known Problems Paternal Grandmother     No Known Problems Sister     No Known Problems Sister     No Known Problems Daughter     No Known Problems Maternal Grandfather     No Known Problems Paternal Grandfather      Social History     Socioeconomic History    Marital status:      Spouse name: None    Number of children: None    Years of education: None    Highest education level: None   Occupational History    None   Tobacco Use    Smoking status: Former     Packs/day: 1.50     Years: 47.00     Total pack years: 70.50     Types: Cigarettes     Start date: 1974     Quit date: 2021     Years since quittin.0    Smokeless tobacco: Never   Vaping Use    Vaping Use: Never used   Substance and Sexual Activity    Alcohol use: Not Currently     Comment: very seldom    Drug use: Not Currently     Comment: pt use at age [age]    Sexual activity: None   Other Topics Concern    None   Social History Narrative    None     Social Determinants of Health     Financial Resource Strain: High Risk (2023)    Overall Financial Resource Strain (CARDIA)     Difficulty of Paying Living Expenses: Hard   Food Insecurity: Food Insecurity Present (2023)    Hunger Vital Sign     Worried About Running Out of Food in the Last Year: Often true     Ran Out of Food in the Last Year: Often true   Transportation Needs: No Transportation Needs (2023)    PRAPARE - Transportation     Lack of Transportation (Medical): No     Lack of Transportation (Non-Medical):  No   Physical Activity: Not on file   Stress: Not on file   Social Connections: Not on file Intimate Partner Violence: Not on file   Housing Stability: Low Risk  (12/20/2021)    Housing Stability Vital Sign     Unable to Pay for Housing in the Last Year: No     Number of Places Lived in the Last Year: 1     Unstable Housing in the Last Year: No     Current Outpatient Medications on File Prior to Visit   Medication Sig    acetaminophen (TYLENOL) 650 mg CR tablet Take 1 tablet (650 mg total) by mouth every 8 (eight) hours as needed for mild pain    albuterol (PROVENTIL HFA,VENTOLIN HFA) 90 mcg/act inhaler inhale 2 puffs by mouth every 6 hours if needed for wheezing    ascorbic acid (VITAMIN C) 500 mg tablet Take 500 mg by mouth daily    aspirin (Aspirin Low Dose) 81 mg EC tablet Take 1 tablet (81 mg total) by mouth daily    atorvastatin (LIPITOR) 40 mg tablet take 1 tablet by mouth once daily    Azelastine HCl 137 MCG/SPRAY SOLN INSTILL 1 SPRAY INTO EACH NOSTRIL 2 TIMES A DAY AS DIRECTED    budesonide-formoterol (SYMBICORT) 160-4.5 mcg/act inhaler Inhale 2 puffs 2 (two) times a day Rinse mouth after use. calcium carbonate (OS-ROSCOE) 1250 (500 Ca) MG chewable tablet Chew 1 tablet (1,250 mg total) daily    cholecalciferol (VITAMIN D3) 1,000 units tablet Take 1 tablet (1,000 Units total) by mouth daily    Diclofenac Sodium (VOLTAREN) 1 % Apply 2 g topically 4 (four) times a day    fluticasone (FLONASE) 50 mcg/act nasal spray 1 spray into each nostril daily    gabapentin (NEURONTIN) 300 mg capsule Take 1 capsule in am, 2 capsules in afternoon, and 1 capsule at bedtime. If not helpful in 2 weeks may increase to 1 capsule in am 2 capsules in afternoon and 2 capsules at bedtime. ketoconazole (NIZORAL) 2 % cream Apply topically daily for 14 days    meloxicam (Mobic) 7.5 mg tablet Take 1 tablet (7.5 mg total) by mouth daily    Misc.  Devices (Rollator Ultra-Light) MISC Use daily    montelukast (SINGULAIR) 10 mg tablet take 1 tablet by mouth at bedtime    propranolol (INDERAL) 40 mg tablet Take 0.5 tablets (20 mg total) by mouth every 12 (twelve) hours    terbinafine (LamISIL) 250 mg tablet      Allergies   Allergen Reactions    Aspirin GI Intolerance     Can only take baby aspirin     Latex Itching     Immunization History   Administered Date(s) Administered    INFLUENZA 10/30/2013, 10/19/2015, 11/14/2016, 11/21/2017, 10/21/2022    Influenza, recombinant, quadrivalent,injectable, preservative free 10/18/2018, 12/16/2019, 09/28/2020, 10/13/2021, 10/21/2022    MMR 12/15/2020    Pneumococcal Conjugate Vaccine 20-valent (Pcv20), Polysace 05/26/2022    Pneumococcal Polysaccharide PPV23 04/18/2016    Tdap 11/26/2020       Objective     /78 (BP Location: Left arm, Patient Position: Sitting, Cuff Size: Large)   Pulse 61   Temp 97.6 °F (36.4 °C) (Temporal)   Resp 18   Ht 5' (1.524 m)   Wt 124 kg (274 lb)   LMP  (LMP Unknown)   SpO2 98%   BMI 53.51 kg/m²     Physical Exam  Vitals reviewed. Constitutional:       General: She is not in acute distress. Appearance: She is obese. HENT:      Head: Atraumatic. Eyes:      Conjunctiva/sclera: Conjunctivae normal.   Cardiovascular:      Rate and Rhythm: Normal rate and regular rhythm. Pulses: Normal pulses. Heart sounds: Normal heart sounds. No murmur heard. Pulmonary:      Effort: Pulmonary effort is normal.      Breath sounds: Normal breath sounds. No wheezing, rhonchi or rales. Abdominal:      General: Abdomen is flat. Bowel sounds are normal. There is no distension. Palpations: Abdomen is soft. Tenderness: There is no abdominal tenderness. Musculoskeletal:         General: Normal range of motion. Cervical back: Normal range of motion. Right lower leg: No edema. Left lower leg: No edema. Skin:     General: Skin is warm and dry. Neurological:      General: No focal deficit present. Mental Status: She is alert.    Psychiatric:         Behavior: Behavior normal.       Hemant Nice MD

## 2023-10-18 NOTE — ASSESSMENT & PLAN NOTE
Patient consented to join [facility] clinic at [address] [place] [address] for follow up with dietician and gym provided. Will monitor over next 3-6 months for weight loss goal of 5-10% and commence medications affordable and accepted by insurance at that time.

## 2023-10-23 NOTE — TELEPHONE ENCOUNTER
FAXED ON 10/23/23 TO [facility] at [phone]  . FAX CONFIRMATION RECEIVED. SCANNED INTO CHART.

## 2023-10-24 DIAGNOSIS — J30.89 SEASONAL ALLERGIC RHINITIS DUE TO OTHER ALLERGIC TRIGGER: ICD-10-CM

## 2023-10-25 RX ORDER — AZELASTINE HYDROCHLORIDE 137 UG/1
SPRAY, METERED NASAL
Qty: 30 ML | Refills: 1 | Status: SHIPPED | OUTPATIENT
Start: 2023-10-25

## 2023-11-08 ENCOUNTER — TELEPHONE (OUTPATIENT)
Dept: NEUROLOGY | Facility: CLINIC | Age: 63
End: 2023-11-08

## 2023-11-08 NOTE — TELEPHONE ENCOUNTER
Called and spoke to patient - confirmed upcoming appointment with Svetlana Paige on 11/023/23 3:00 pm at the [facility]. Provided patient with apt date, time and location. Informed patient that check in is at least 15 minutes prior to apt time. The patient is not  having any issues or concerns at this time. Reminded of pending labs.

## 2023-11-10 ENCOUNTER — APPOINTMENT (OUTPATIENT)
Dept: LAB | Facility: CLINIC | Age: 63
End: 2023-11-10
Payer: MEDICARE

## 2023-11-10 DIAGNOSIS — M54.16 RADICULOPATHY, LUMBAR REGION: ICD-10-CM

## 2023-11-10 DIAGNOSIS — G25.0 BENIGN ESSENTIAL TREMOR: ICD-10-CM

## 2023-11-10 DIAGNOSIS — E66.01 MORBID OBESITY WITH BMI OF 50.0-59.9, ADULT (HCC): ICD-10-CM

## 2023-11-10 LAB
ALBUMIN SERPL BCP-MCNC: 3.5 G/DL (ref 3.5–5)
ALP SERPL-CCNC: 86 U/L (ref 34–104)
ALT SERPL W P-5'-P-CCNC: 16 U/L (ref 7–52)
ANION GAP SERPL CALCULATED.3IONS-SCNC: 7 MMOL/L
AST SERPL W P-5'-P-CCNC: 17 U/L (ref 13–39)
BILIRUB SERPL-MCNC: 0.44 MG/DL (ref 0.2–1)
BUN SERPL-MCNC: 16 MG/DL (ref 5–25)
CALCIUM SERPL-MCNC: 8.5 MG/DL (ref 8.4–10.2)
CHLORIDE SERPL-SCNC: 105 MMOL/L (ref 96–108)
CHOLEST SERPL-MCNC: 128 MG/DL
CO2 SERPL-SCNC: 29 MMOL/L (ref 21–32)
CREAT SERPL-MCNC: 0.74 MG/DL (ref 0.6–1.3)
GFR SERPL CREATININE-BSD FRML MDRD: 85 ML/MIN/1.73SQ M
GLUCOSE P FAST SERPL-MCNC: 143 MG/DL (ref 65–99)
HDLC SERPL-MCNC: 42 MG/DL
LDLC SERPL CALC-MCNC: 59 MG/DL (ref 0–100)
NONHDLC SERPL-MCNC: 86 MG/DL
POTASSIUM SERPL-SCNC: 4.4 MMOL/L (ref 3.5–5.3)
PROT SERPL-MCNC: 5.7 G/DL (ref 6.4–8.4)
SODIUM SERPL-SCNC: 141 MMOL/L (ref 135–147)
TRIGL SERPL-MCNC: 136 MG/DL
TSH SERPL DL<=0.05 MIU/L-ACNC: 4.53 UIU/ML (ref 0.45–4.5)

## 2023-11-10 PROCEDURE — 80061 LIPID PANEL: CPT

## 2023-11-10 PROCEDURE — 84443 ASSAY THYROID STIM HORMONE: CPT

## 2023-11-10 PROCEDURE — 36415 COLL VENOUS BLD VENIPUNCTURE: CPT

## 2023-11-10 PROCEDURE — 80053 COMPREHEN METABOLIC PANEL: CPT

## 2023-11-10 PROCEDURE — 83036 HEMOGLOBIN GLYCOSYLATED A1C: CPT

## 2023-11-11 LAB
EST. AVERAGE GLUCOSE BLD GHB EST-MCNC: 151 MG/DL
HBA1C MFR BLD: 6.9 %

## 2023-11-13 ENCOUNTER — OFFICE VISIT (OUTPATIENT)
Age: 63
End: 2023-11-13
Payer: MEDICARE

## 2023-11-13 VITALS
BODY MASS INDEX: 53.79 KG/M2 | OXYGEN SATURATION: 98 % | HEIGHT: 60 IN | SYSTOLIC BLOOD PRESSURE: 130 MMHG | DIASTOLIC BLOOD PRESSURE: 78 MMHG | HEART RATE: 61 BPM | WEIGHT: 274 LBS

## 2023-11-13 DIAGNOSIS — M99.01 SEGMENTAL DYSFUNCTION OF CERVICAL REGION: ICD-10-CM

## 2023-11-13 DIAGNOSIS — M47.812 CERVICAL SPONDYLOSIS: ICD-10-CM

## 2023-11-13 DIAGNOSIS — M47.816 LUMBAR SPONDYLOSIS: Primary | ICD-10-CM

## 2023-11-13 DIAGNOSIS — M99.03 SEGMENTAL DYSFUNCTION OF LUMBAR REGION: ICD-10-CM

## 2023-11-13 DIAGNOSIS — M99.02 SEGMENTAL DYSFUNCTION OF THORACIC REGION: ICD-10-CM

## 2023-11-13 DIAGNOSIS — M99.04 SEGMENTAL DYSFUNCTION OF SACRAL REGION: ICD-10-CM

## 2023-11-13 PROCEDURE — 99202 OFFICE O/P NEW SF 15 MIN: CPT | Performed by: CHIROPRACTOR

## 2023-11-13 PROCEDURE — 98942 CHIROPRACTIC MANJ 5 REGIONS: CPT | Performed by: CHIROPRACTOR

## 2023-11-13 NOTE — PROGRESS NOTES
Initial date of service: 11/13/23    Diagnoses and all orders for this visit:    Lumbar spondylosis    Segmental dysfunction of lumbar region    Segmental dysfunction of thoracic region    Segmental dysfunction of sacral region    Segmental dysfunction of cervical region    Cervical spondylosis       ASSESSMENT:  No red flags, radiculopathy or neurologic deficit appreciated clinically. Pt's symptoms and exam findings consistent with lumbar spondylosis and cervical spondylosis  secondary to repetitive st/sp injury, exacerbated by postural/ergonomic stressors. Pt responded well to flexion biased stretches and manual mobilization of the affected spinal and myofascial tissues with increased ROM; trial of conservative tx recommended consisting of stretching, graded mobilization/manipulation of the affected spinal and myofascial jt dysfunction, postural/ergonomic education and take home stretches/exercises. If symptoms fail to improve with short trial of conservative care, appropriate imaging and referral will be coordinated. Spent greater than 29 min c pt discussing hx, pe, ddx, tx options and reviewing notes/imaging    PROCEDURE CODES: [id number], [id number]    TREATMENT:  Fear avoidance behavior discussion; encouraged and reassured pt that natural course of condition is to improve over time with adherence to tx plan and home care strategies. Home care recommendations: avoid bed rest, walk (but avoid trails and uneven surfaces), gradual return to activity to tolerance (avoid anything that peripheralizes symptoms), call if symptoms peripheralize, worsen, or neurologic deficit progresses.  Ther-ex: IASTM; discussed post procedure soreness and/or ecchymosis for up to 36 hrs, applied to affected mm hypertonicities; supine hamstring stretch, supine gluteal stretch, side laying QL stretch, single knee to chest stretch, hip flexor pin-and-stretch, alternating prone hip extension, glute bridge, transitional mvmt education, abdominal bracing; greater than 15 min spent performing above mentioned ther-ex to improve ROM/flexibility. Thoracic mobilization/manipulation: prone P-A mob, supine A-P manip; Lumbar mobilization/manipulation: diversified side laying graded HVLA, flexion-traction; SIJ Manipulation/Mobilization: R/L SIJ HVLA - long axis distraction, hopper drop table maneuver to affected SIJ    HPI:  Reji Brooks is a 70 y.o. female  Chief Complaint   Patient presents with   • Back Pain     Lower back pain-10   • Neck Pain     Neck pain-10     The patient presents to the office with  lower back pain that is chronic in nature, she has had this for 3 years, she has had injections and ablations, She tries to walk her dog to get some exercise, blue . She also mentions having chronic neck pain. The patient was referred to our office for consult and co-management by Dr. Raquel Goldstein. Back Pain  This is a chronic problem. The problem has been waxing and waning since onset. The pain is present in the thoracic spine, lumbar spine, sacro-iliac and gluteal. The quality of the pain is described as aching. The pain is at a severity of 10/10. The pain is severe. The symptoms are aggravated by sitting, standing, twisting, position and bending. Pertinent negatives include no chest pain, fever, headaches, numbness or weakness. Neck Pain   This is a chronic problem. The current episode started more than 1 year ago. The problem has been waxing and waning. The pain is present in the left side and right side. The quality of the pain is described as aching. The pain is at a severity of 10/10. The pain is severe. Pertinent negatives include no chest pain, fever, headaches, numbness or weakness. She has tried heat and home exercises for the symptoms. The treatment provided mild relief.      Past Medical History:   Diagnosis Date   • Allergic rhinitis    • Anemia    • Anxiety    • Arthritis    • Back pain    • Cancer (HCC)    • Chronic pain disorder     back   • COPD (chronic obstructive pulmonary disease) ([facility])    • CPAP (continuous positive airway pressure) dependence    • Depression    • Diabetes mellitus ([facility])     Pre- Diabetic   • Dyslipidemia    • Essential tremor    • Excessive daytime sleepiness    • GERD (gastroesophageal reflux disease)    • History of uterine cancer    • Hyperglycemia    • Hyperlipidemia    • Hypertension    • Hypovitaminosis D    • Iron deficiency anemia    • Joint pain    • Mood disorder ([address])    • Obesity    • Obstructive sleep apnea    • Ringing in ears    • RLS (restless legs syndrome)    • Snoring    • Uterine cancer [facility])     age 20   • Vitamin D deficiency    • Witnessed episode of apnea       Past Surgical History:   Procedure Laterality Date   • APPENDECTOMY     • EPIDURAL BLOCK INJECTION     • HYSTERECTOMY      age 20   • KNEE ARTHROSCOPY     • KNEE SURGERY      Meniscus tear   • TONSILLECTOMY     • TUBAL LIGATION       The following portions of the patient's history were reviewed and updated as appropriate: allergies, past family history, past medical history, past social history, past surgical history, and problem list.  Review of Systems   Constitutional:  Negative for activity change, fatigue, fever and unexpected weight change. HENT:  Negative for ear pain, hearing loss, sinus pressure, sinus pain, sore throat and tinnitus. Respiratory:  Negative for chest tightness, shortness of breath, wheezing and stridor. Cardiovascular:  Negative for chest pain. Genitourinary:  Negative for flank pain and frequency. Musculoskeletal:  Positive for arthralgias, back pain, myalgias, neck pain and neck stiffness. Negative for joint swelling. Skin:  Negative for color change and pallor. Neurological:  Negative for dizziness, speech difficulty, weakness, numbness and headaches. Psychiatric/Behavioral:  Negative for agitation and sleep disturbance. The patient is not nervous/anxious.       Physical Exam  Constitutional:       General: She is not in acute distress. Appearance: Normal appearance. HENT:      Head: Normocephalic. Mouth/Throat:      Mouth: Mucous membranes are moist.   Eyes:      Extraocular Movements: Extraocular movements intact. Conjunctiva/sclera: Conjunctivae normal.      Pupils: Pupils are equal, round, and reactive to light. Neck:      Vascular: No carotid bruit. Pulmonary:      Effort: Pulmonary effort is normal.   Chest:      Chest wall: No tenderness. Abdominal:      General: Abdomen is flat. Palpations: Abdomen is soft. Musculoskeletal:         General: Tenderness present. No swelling, deformity or signs of injury. Cervical back: Rigidity, spasms, tenderness and crepitus present. No swelling, edema, deformity, erythema, signs of trauma, lacerations, torticollis or bony tenderness. Pain with movement present. Decreased range of motion. Thoracic back: Spasms and tenderness present. No swelling, edema, deformity, signs of trauma, lacerations or bony tenderness. Decreased range of motion. Lumbar back: Spasms and tenderness present. No swelling, edema, deformity, signs of trauma, lacerations or bony tenderness. Decreased range of motion. Back:       Right lower leg: No edema. Left lower leg: No edema. Lymphadenopathy:      Cervical: No cervical adenopathy. Skin:     General: Skin is warm. Coloration: Skin is not jaundiced or pale. Findings: No bruising or erythema. Neurological:      Mental Status: She is alert and oriented to person, place, and time. Cranial Nerves: No cranial nerve deficit. Sensory: No sensory deficit. Motor: No weakness. Gait: Gait is intact. Deep Tendon Reflexes: Reflexes are normal and symmetric.    Psychiatric:         Attention and Perception: Attention normal.         Mood and Affect: Mood and affect normal.         Speech: Speech normal.         Behavior: Behavior normal. Behavior is cooperative. Thought Content: Thought content normal.         Cognition and Memory: Cognition normal.         Judgment: Judgment normal.       SOFT TISSUE ASSESSMENT Hypertonicity and tenderness palpated B C3-T3, Upper trap Levator scap, erector spinae, T10-S1 erector spinae, hip flexor, glute med/min, QL, hamstring JOINT RESTRICTIONS: C4-T3, T10-S1 and R/L SIJ ORTHO: SI jt point tenderness: +; Vanessa unremarkable for centralization/peripheralization; ike's, iliac compression, thigh thrust elicit lbp in R/L SIJ; prone femoral nerve stretch neg for upper lumbar neural tension, elicits R/L SIJ stiffness; sitting root elicits no lbp on R/L; slump test elicits no neural tension R/L, Cervical distraction- neg, Maximal foraminal compression- neg but some local pain noted, Spurlings neg    Return in about 1 week (around 11/20/2023) for Recheck.

## 2023-11-16 ENCOUNTER — TELEPHONE (OUTPATIENT)
Dept: NEUROLOGY | Facility: CLINIC | Age: 63
End: 2023-11-16

## 2023-11-17 ENCOUNTER — HOSPITAL ENCOUNTER (EMERGENCY)
Facility: HOSPITAL | Age: 63
Discharge: HOME | End: 2023-11-17
Attending: EMERGENCY MEDICINE
Payer: MEDICARE

## 2023-11-17 VITALS
HEART RATE: 74 BPM | DIASTOLIC BLOOD PRESSURE: 71 MMHG | TEMPERATURE: 97.5 F | RESPIRATION RATE: 18 BRPM | OXYGEN SATURATION: 97 % | SYSTOLIC BLOOD PRESSURE: 174 MMHG | WEIGHT: 276.9 LBS | BODY MASS INDEX: 54.08 KG/M2

## 2023-11-17 DIAGNOSIS — L03.90 CELLULITIS: Primary | ICD-10-CM

## 2023-11-17 DIAGNOSIS — L30.4 INTERTRIGO: ICD-10-CM

## 2023-11-17 PROCEDURE — 99282 EMERGENCY DEPT VISIT SF MDM: CPT

## 2023-11-17 PROCEDURE — 99284 EMERGENCY DEPT VISIT MOD MDM: CPT

## 2023-11-17 RX ORDER — KETOCONAZOLE 20 MG/G
CREAM TOPICAL DAILY
Qty: 15 G | Refills: 0 | Status: SHIPPED | OUTPATIENT
Start: 2023-11-17

## 2023-11-17 RX ORDER — CEPHALEXIN 250 MG/1
500 CAPSULE ORAL ONCE
Status: COMPLETED | OUTPATIENT
Start: 2023-11-17 | End: 2023-11-17

## 2023-11-17 RX ORDER — CEPHALEXIN 500 MG/1
500 CAPSULE ORAL EVERY 6 HOURS SCHEDULED
Qty: 19 CAPSULE | Refills: 0 | Status: SHIPPED | OUTPATIENT
Start: 2023-11-17 | End: 2023-11-22

## 2023-11-17 RX ADMIN — CEPHALEXIN 500 MG: 250 CAPSULE ORAL at 04:24

## 2023-11-17 NOTE — DISCHARGE INSTRUCTIONS
Use ketoconazole to rash daily x 2 weeks    Take Keflex four times a day for 5 days    Follow up with your PCP     Return for pain, fever, pus drainage, or any other new/concerning symptoms

## 2023-11-17 NOTE — ED PROVIDER NOTES
History  Chief Complaint   Patient presents with    Medical Problem     Pt reports bleeding under abdominal fold. The patient is a 67-year-old female with history of pre-diabetes, COPD, obesity, hypertension, hyperlipidemia who presents to the ED for evaluation of bleeding under her pannus that began just prior to arrival.  She reports recent irritation to the skin of this area, and was concerned as to what the cause of the bleeding was. She otherwise denies fever, chills, abdominal pain, pelvic pain. Prior to Admission Medications   Prescriptions Last Dose Informant Patient Reported? Taking? Azelastine HCl 137 MCG/SPRAY SOLN   No No   Sig: INSTILL 1 SPRAY INTO EACH NOSTRIL 2 TIMES A DAY AS DIRECTED   Diclofenac Sodium (VOLTAREN) 1 %  Self No No   Sig: Apply 2 g topically 4 (four) times a day   Misc. Devices (Rollator Ultra-Light) MISC  Self No No   Sig: Use daily   acetaminophen (TYLENOL) 650 mg CR tablet  Self No No   Sig: Take 1 tablet (650 mg total) by mouth every 8 (eight) hours as needed for mild pain   albuterol (PROVENTIL HFA,VENTOLIN HFA) 90 mcg/act inhaler  Self No No   Sig: inhale 2 puffs by mouth every 6 hours if needed for wheezing   ascorbic acid (VITAMIN C) 500 mg tablet  Self Yes No   Sig: Take 500 mg by mouth daily   aspirin (Aspirin Low Dose) 81 mg EC tablet  Self No No   Sig: Take 1 tablet (81 mg total) by mouth daily   atorvastatin (LIPITOR) 40 mg tablet  Self No No   Sig: take 1 tablet by mouth once daily   budesonide-formoterol (SYMBICORT) 160-4.5 mcg/act inhaler  Self No No   Sig: Inhale 2 puffs 2 (two) times a day Rinse mouth after use.    calcium carbonate (OS-ROSCOE) 1250 (500 Ca) MG chewable tablet  Self No No   Sig: Chew 1 tablet (1,250 mg total) daily   cholecalciferol (VITAMIN D3) 1,000 units tablet  Self No No   Sig: Take 1 tablet (1,000 Units total) by mouth daily   fluticasone (FLONASE) 50 mcg/act nasal spray  Self No No   Si spray into each nostril daily   gabapentin (NEURONTIN) 300 mg capsule  Self No No   Sig: Take 1 capsule in am, 2 capsules in afternoon, and 1 capsule at bedtime. If not helpful in 2 weeks may increase to 1 capsule in am 2 capsules in afternoon and 2 capsules at bedtime.    ketoconazole (NIZORAL) 2 % cream  Self No No   Sig: Apply topically daily for 14 days   Patient not taking: Reported on 11/13/2023   loratadine (CLARITIN) 10 mg tablet   No No   Sig: Take 1 tablet (10 mg total) by mouth daily   meloxicam (Mobic) 7.5 mg tablet  Self No No   Sig: Take 1 tablet (7.5 mg total) by mouth daily   montelukast (SINGULAIR) 10 mg tablet  Self No No   Sig: take 1 tablet by mouth at bedtime   propranolol (INDERAL) 40 mg tablet  Self No No   Sig: Take 0.5 tablets (20 mg total) by mouth every 12 (twelve) hours   terbinafine (LamISIL) 250 mg tablet  Self Yes No      Facility-Administered Medications: None       Past Medical History:   Diagnosis Date    Allergic rhinitis     Anemia     Anxiety     Arthritis     Back pain     Cancer ([facility])     Chronic pain disorder     back    COPD (chronic obstructive pulmonary disease) ([facility])     CPAP (continuous positive airway pressure) dependence     Depression     Diabetes mellitus (HCC)     Pre- Diabetic    Dyslipidemia     Essential tremor     Excessive daytime sleepiness     GERD (gastroesophageal reflux disease)     History of uterine cancer     Hyperglycemia     Hyperlipidemia     Hypertension     Hypovitaminosis D     Iron deficiency anemia     Joint pain     Mood disorder ([facility])     Obesity     Obstructive sleep apnea     Ringing in ears     RLS (restless legs syndrome)     Snoring     Uterine cancer ([facility])     age 20    Vitamin D deficiency     Witnessed episode of apnea        Past Surgical History:   Procedure Laterality Date    APPENDECTOMY      EPIDURAL BLOCK INJECTION      HYSTERECTOMY      age 20    KNEE ARTHROSCOPY      KNEE SURGERY      Meniscus tear    TONSILLECTOMY      TUBAL LIGATION         Family History   Problem Relation Age of Onset    Diabetes Mother     Asthma Mother     Hypertension Mother     Heart disease Mother     No Known Problems Father     No Known Problems Sister     No Known Problems Daughter     No Known Problems Maternal Grandmother     No Known Problems Paternal Grandmother     No Known Problems Sister     No Known Problems Sister     No Known Problems Daughter     No Known Problems Maternal Grandfather     No Known Problems Paternal Grandfather      I have reviewed and agree with the history as documented. E-Cigarette/Vaping    E-Cigarette Use Never User      E-Cigarette/Vaping Substances    Nicotine No     THC No     CBD No     Flavoring No     Other No     Unknown No      Social History     Tobacco Use    Smoking status: Former     Packs/day: 1.50     Years: 47.00     Total pack years: 70.50     Types: Cigarettes     Start date: 1974     Quit date: 2021     Years since quittin.1    Smokeless tobacco: Never   Vaping Use    Vaping Use: Never used   Substance Use Topics    Alcohol use: Not Currently     Comment: very seldom    Drug use: Not Currently     Comment: pt use at age [age]       Review of Systems   Constitutional:  Negative for chills and fever. Respiratory:  Negative for cough and shortness of breath. Cardiovascular:  Negative for chest pain and leg swelling. Gastrointestinal:  Negative for abdominal pain, constipation, diarrhea, nausea and vomiting. Musculoskeletal:  Negative for arthralgias and myalgias. Skin:  Positive for rash and wound. Neurological:  Negative for dizziness and weakness. Physical Exam  Physical Exam  Vitals and nursing note reviewed. Constitutional:       General: She is not in acute distress. Appearance: She is well-developed. She is obese. She is not toxic-appearing. HENT:      Head: Normocephalic and atraumatic. Eyes:      Conjunctiva/sclera: Conjunctivae normal.   Cardiovascular:      Rate and Rhythm: Normal rate.    Pulmonary: Effort: Pulmonary effort is normal. No respiratory distress. Abdominal:      General: Abdomen is flat. Palpations: Abdomen is soft. Tenderness: There is no abdominal tenderness. Musculoskeletal:         General: No swelling. Cervical back: Neck supple. Skin:     General: Skin is warm and dry. Capillary Refill: Capillary refill takes less than 2 seconds. Comments: Erythema and warmth below panus with 0.25 cm linear area of superficial excoriation with minimal oozing bleeding. No crepitus. No pus drainage   Neurological:      Mental Status: She is alert. Psychiatric:         Mood and Affect: Mood normal.         Vital Signs  ED Triage Vitals [11/17/23 0358]   Temperature Pulse Respirations Blood Pressure SpO2   97.5 °F (36.4 °C) 74 18 (!) 174/71 97 %      Temp Source Heart Rate Source Patient Position - Orthostatic VS BP Location FiO2 (%)   Oral Monitor Sitting Right arm --      Pain Score       No Pain           Vitals:    11/17/23 0358   BP: (!) 174/71   Pulse: 74   Patient Position - Orthostatic VS: Sitting         Visual Acuity      ED Medications  Medications   cephalexin (KEFLEX) capsule 500 mg (has no administration in time range)       Diagnostic Studies  Results Reviewed       None                   No orders to display              Procedures  Procedures         ED Course         Medical Decision Making  DDx including but not limited to: Wound check, cellulitis, abscess, bleeding, seroma. Will treat as cellulitis as well as intertrigo     At the time of discharge, the patient is in no acute distress. I discussed with the patient the diagnosis, treatment plan, follow-up, return precautions, and discharge instructions; they were given the opportunity to ask questions and verbalized understanding.         Problems Addressed:  Cellulitis: acute illness or injury  Intertrigo: acute illness or injury    Amount and/or Complexity of Data Reviewed  External Data Reviewed: notes.    Risk  Prescription drug management. Disposition  Final diagnoses:   Cellulitis   Intertrigo     Time reflects when diagnosis was documented in both MDM as applicable and the Disposition within this note       Time User Action Codes Description Comment    11/17/2023  4:19 AM Ivan Heal Add [L03.90] Cellulitis     11/17/2023  4:19 AM Ivan Heal Add [L30.4] Intertrigo           ED Disposition       ED Disposition   Discharge    Condition   Stable    Date/Time   Fri Nov 17, 2023  4:19 AM    Comment   Lizzy Griffithfariha discharge to home/self care. Follow-up Information       Follow up With Specialties Details Why Contact Info    Chelo Washington MD Family Medicine   [address] [place] [address]  [phone]              Patient's Medications   Discharge Prescriptions    CEPHALEXIN (KEFLEX) 500 MG CAPSULE    Take 1 capsule (500 mg total) by mouth every 6 (six) hours for 5 days       Start Date: 11/17/2023End Date: 11/22/2023       Order Dose: 500 mg       Quantity: 19 capsule    Refills: 0    KETOCONAZOLE (NIZORAL) 2 % CREAM    Apply topically daily       Start Date: 11/17/2023End Date: --       Order Dose: --       Quantity: 15 g    Refills: 0       No discharge procedures on file.     PDMP Review       None            ED Provider  Electronically Signed by             Jaylon Murphy PA-C  11/17/23 2898

## 2023-11-20 ENCOUNTER — PATIENT OUTREACH (OUTPATIENT)
Dept: FAMILY MEDICINE CLINIC | Facility: CLINIC | Age: 63
End: 2023-11-20

## 2023-11-20 ENCOUNTER — VBI (OUTPATIENT)
Dept: FAMILY MEDICINE CLINIC | Facility: CLINIC | Age: 63
End: 2023-11-20

## 2023-11-20 DIAGNOSIS — Z71.89 COMPLEX CARE COORDINATION: Primary | ICD-10-CM

## 2023-11-20 NOTE — TELEPHONE ENCOUNTER
11/20/23 9:05 AM    Patient contacted post ED visit, VBI department spoke with patient/caregiver and outreach was successful. Thank you.   AGNES PALMA  [facility]

## 2023-11-20 NOTE — PROGRESS NOTES
referral.    I called the patient but received voicemail. Message was left asking for a return call. I will continue further outreach. Chart reviewed. The patient returned my call. The patient states she is taking her antibiotic as ordered on a recent ED visit and the cellulitis is improving. The patient denies any needs or concerns at this time. I asked her to call me in the future if she has any questions or needs assistance. Case is being closed.

## 2023-11-20 NOTE — TELEPHONE ENCOUNTER
11/20/23 8:46 AM    Patient contacted post ED visit, first outreach attempt made. Message was left for patient to return a call to the [facility] Department at [facility]: Phone [phone]. Thank you.   AGNES PALMA  [facility]

## 2023-11-21 ENCOUNTER — RA CDI HCC (OUTPATIENT)
Dept: OTHER | Facility: HOSPITAL | Age: 63
End: 2023-11-21

## 2023-11-24 ENCOUNTER — PROCEDURE VISIT (OUTPATIENT)
Age: 63
End: 2023-11-24
Payer: MEDICARE

## 2023-11-24 VITALS
OXYGEN SATURATION: 96 % | BODY MASS INDEX: 54.19 KG/M2 | HEIGHT: 60 IN | SYSTOLIC BLOOD PRESSURE: 136 MMHG | DIASTOLIC BLOOD PRESSURE: 76 MMHG | WEIGHT: 276 LBS | HEART RATE: 50 BPM

## 2023-11-24 DIAGNOSIS — M99.01 SEGMENTAL DYSFUNCTION OF CERVICAL REGION: ICD-10-CM

## 2023-11-24 DIAGNOSIS — M99.02 SEGMENTAL DYSFUNCTION OF THORACIC REGION: ICD-10-CM

## 2023-11-24 DIAGNOSIS — M47.812 CERVICAL SPONDYLOSIS: ICD-10-CM

## 2023-11-24 DIAGNOSIS — M47.816 LUMBAR SPONDYLOSIS: Primary | ICD-10-CM

## 2023-11-24 DIAGNOSIS — M99.03 SEGMENTAL DYSFUNCTION OF LUMBAR REGION: ICD-10-CM

## 2023-11-24 DIAGNOSIS — M99.04 SEGMENTAL DYSFUNCTION OF SACRAL REGION: ICD-10-CM

## 2023-11-24 PROCEDURE — 98941 CHIROPRACT MANJ 3-4 REGIONS: CPT | Performed by: CHIROPRACTOR

## 2023-11-24 NOTE — PROGRESS NOTES
Initial date of service: 11/13/23    Diagnoses and all orders for this visit:    Lumbar spondylosis    Segmental dysfunction of lumbar region    Segmental dysfunction of thoracic region    Segmental dysfunction of sacral region    Segmental dysfunction of cervical region    Cervical spondylosis       ASSESSMENT:  Pt's symptoms and exam findings consistent with lumbar spondylosis and cervical spondylosis  secondary to repetitive st/sp injury, exacerbated by postural/ergonomic stressors. Pt responded well to flexion biased stretches and manual mobilization of the affected spinal and myofascial tissues with increased ROM; trial of conservative tx recommended consisting of stretching, graded mobilization/manipulation of the affected spinal and myofascial jt dysfunction, postural/ergonomic education and take home stretches/exercises. If symptoms fail to improve with short trial of conservative care, appropriate imaging and referral will be coordinated. -Pt tolerated treatment well with a decrease in tenderness to palpation and mm spasm    PROCEDURE CODES: 16431-TN    TREATMENT:  Fear avoidance behavior discussion; encouraged and reassured pt that natural course of condition is to improve over time with adherence to tx plan and home care strategies. Home care recommendations: avoid bed rest, walk (but avoid trails and uneven surfaces), gradual return to activity to tolerance (avoid anything that peripheralizes symptoms), call if symptoms peripheralize, worsen, or neurologic deficit progresses.  Ther-ex: IASTM; discussed post procedure soreness and/or ecchymosis for up to 36 hrs, applied to affected mm hypertonicities; supine hamstring stretch, supine gluteal stretch, side laying QL stretch, single knee to chest stretch, hip flexor pin-and-stretch, alternating prone hip extension, glute bridge, transitional mvmt education, abdominal bracing; greater than 15 min spent performing above mentioned ther-ex to improve ROM/flexibility. Thoracic mobilization/manipulation: prone P-A mob, supine A-P manip; Lumbar mobilization/manipulation: diversified side laying graded HVLA, flexion-traction; SIJ Manipulation/Mobilization: R/L SIJ HVLA - long axis distraction, hopper drop table maneuver to affected SIJ    HPI:  Michelle Duncan is a 70 y.o. female  Chief Complaint   Patient presents with   • Back Pain     Lower back pain-4   • Neck Pain     Neck pain-4     The patient presents to the office with  lower back pain that is chronic in nature, she has had this for 3 years, she has had injections and ablations, She tries to walk her dog to get some exercise, blue . She also mentions having chronic neck pain. The patient was referred to our office for consult and co-management by Dr. Zoya Marquez. 11/24- The patient reports she is sore and achy today from all he cooking yesterday.,    Back Pain  This is a chronic problem. The problem has been waxing and waning since onset. The pain is present in the thoracic spine, lumbar spine, sacro-iliac and gluteal. The quality of the pain is described as aching. The pain is at a severity of 10/10. The pain is severe. The symptoms are aggravated by sitting, standing, twisting, position and bending. Neck Pain   This is a chronic problem. The current episode started more than 1 year ago. The problem has been waxing and waning. The pain is present in the left side and right side. The quality of the pain is described as aching. The pain is at a severity of 10/10. The pain is severe. She has tried heat and home exercises for the symptoms. The treatment provided mild relief.      Past Medical History:   Diagnosis Date   • Allergic rhinitis    • Anemia    • Anxiety    • Arthritis    • Back pain    • Cancer (HCC)    • Chronic pain disorder     back   • COPD (chronic obstructive pulmonary disease) ([facility])    • CPAP (continuous positive airway pressure) dependence    • Depression    • Diabetes mellitus ([address])     Pre- Diabetic   • Dyslipidemia    • Essential tremor    • Excessive daytime sleepiness    • GERD (gastroesophageal reflux disease)    • History of uterine cancer    • Hyperglycemia    • Hyperlipidemia    • Hypertension    • Hypovitaminosis D    • Iron deficiency anemia    • Joint pain    • Mood disorder ([address])    • Obesity    • Obstructive sleep apnea    • Ringing in ears    • RLS (restless legs syndrome)    • Snoring    • Uterine cancer [facility])     age 20   • Vitamin D deficiency    • Witnessed episode of apnea       Past Surgical History:   Procedure Laterality Date   • APPENDECTOMY     • EPIDURAL BLOCK INJECTION     • HYSTERECTOMY      age 20   • KNEE ARTHROSCOPY     • KNEE SURGERY      Meniscus tear   • TONSILLECTOMY     • TUBAL LIGATION       The following portions of the patient's history were reviewed and updated as appropriate: allergies, past family history, past medical history, past social history, past surgical history, and problem list.  Review of Systems   Musculoskeletal:  Positive for arthralgias, back pain, myalgias, neck pain and neck stiffness. Physical Exam  Musculoskeletal:         General: Tenderness present. Cervical back: Rigidity, spasms, tenderness and crepitus present. No swelling, edema, deformity, erythema, signs of trauma, lacerations, torticollis or bony tenderness. Pain with movement present. Decreased range of motion. Thoracic back: Spasms and tenderness present. No swelling, edema, deformity, signs of trauma, lacerations or bony tenderness. Decreased range of motion. Lumbar back: Spasms and tenderness present. No swelling, edema, deformity, signs of trauma, lacerations or bony tenderness. Decreased range of motion.         Back:        SOFT TISSUE ASSESSMENT Hypertonicity and tenderness palpated B C3-T3, Upper trap Levator scap, erector spinae, T10-S1 erector spinae, hip flexor, glute med/min, QL, hamstring JOINT RESTRICTIONS: C4-T3, T10-S1 and R/L SIJ ORTHO: SI jt point tenderness: +; Vanessa unremarkable for centralization/peripheralization; ike's, iliac compression, thigh thrust elicit lbp in R/L SIJ; prone femoral nerve stretch neg for upper lumbar neural tension, elicits R/L SIJ stiffness; sitting root elicits no lbp on R/L; slump test elicits no neural tension R/L, Cervical distraction- neg, Maximal foraminal compression- neg but some local pain noted, Spurlings neg    Return in about 1 week (around 12/1/2023) for Recheck.

## 2023-11-29 ENCOUNTER — OFFICE VISIT (OUTPATIENT)
Dept: FAMILY MEDICINE CLINIC | Facility: CLINIC | Age: 63
End: 2023-11-29

## 2023-11-29 VITALS
HEART RATE: 76 BPM | RESPIRATION RATE: 16 BRPM | TEMPERATURE: 97.7 F | HEIGHT: 60 IN | SYSTOLIC BLOOD PRESSURE: 112 MMHG | BODY MASS INDEX: 54.19 KG/M2 | DIASTOLIC BLOOD PRESSURE: 70 MMHG | OXYGEN SATURATION: 96 % | WEIGHT: 276 LBS

## 2023-11-29 DIAGNOSIS — E66.01 MORBID OBESITY WITH BMI OF 50.0-59.9, ADULT (HCC): ICD-10-CM

## 2023-11-29 DIAGNOSIS — E11.9 TYPE 2 DIABETES MELLITUS WITHOUT COMPLICATION, WITHOUT LONG-TERM CURRENT USE OF INSULIN (HCC): ICD-10-CM

## 2023-11-29 DIAGNOSIS — Z23 ENCOUNTER FOR IMMUNIZATION: Primary | ICD-10-CM

## 2023-11-29 PROCEDURE — 99213 OFFICE O/P EST LOW 20 MIN: CPT | Performed by: STUDENT IN AN ORGANIZED HEALTH CARE EDUCATION/TRAINING PROGRAM

## 2023-11-29 PROCEDURE — 3074F SYST BP LT 130 MM HG: CPT | Performed by: STUDENT IN AN ORGANIZED HEALTH CARE EDUCATION/TRAINING PROGRAM

## 2023-11-29 PROCEDURE — 3078F DIAST BP <80 MM HG: CPT | Performed by: STUDENT IN AN ORGANIZED HEALTH CARE EDUCATION/TRAINING PROGRAM

## 2023-11-29 PROCEDURE — G0008 ADMIN INFLUENZA VIRUS VAC: HCPCS | Performed by: STUDENT IN AN ORGANIZED HEALTH CARE EDUCATION/TRAINING PROGRAM

## 2023-11-29 PROCEDURE — 90662 IIV NO PRSV INCREASED AG IM: CPT | Performed by: STUDENT IN AN ORGANIZED HEALTH CARE EDUCATION/TRAINING PROGRAM

## 2023-11-29 NOTE — PROGRESS NOTES
Assessment/Plan:     1. Encounter for immunization  -     influenza vaccine, high-dose, PF 0.7 mL (FLUZONE HIGH-DOSE)    2. Morbid obesity with BMI of 50.0-59.9, adult [facility])  Assessment & Plan:  Patient remains motivated and encouraged to continue with weight loss measures. Labs completed and demonstrated hypothyroidism mild and overt DM new onset. Weight remains stable, inch loss of neck and inch gain in waist circumference. Has not yet engaged in nutrition classes or gym and encouraged to do so. Reminded of respective dates and times for these sessions - handout with information provided  Reviewed use of FindPro[employer]ometer christiano, steps in phone and Challenges christiano. Google fit not set up on phone and therefore steps not previously logged. Will complete sign up and then continue increased exercise efforts  Will see in 2 weeks for close follow up to maintain progress. 3. Type 2 diabetes mellitus without complication, without long-term current use of insulin (HCC)  Assessment & Plan:    Lab Results   Component Value Date    HGBA1C 6.9 (H) 11/10/2023   Disease advance to overt DM - patient counselled however at goal. Will repeat in another 3 months and have patient started on statin therapy if remains above diagnostic criteria. Advised continue on lifestyle management and consider GLP 1 if conservative weight management proves unsuccessful. BMI Counseling: Body mass index is 53.9 kg/m². The BMI is above normal. Nutrition recommendations include decreasing portion sizes, encouraging healthy choices of fruits and vegetables, decreasing fast food intake, limiting drinks that contain sugar, moderation in carbohydrate intake, increasing intake of lean protein, reducing intake of saturated and trans fat and reducing intake of cholesterol. Exercise recommendations include moderate physical activity 150 minutes/week, exercising 3-5 times per week and strength training exercises.  Rationale for BMI follow-up plan is due to patient being overweight or obese. Raven Banuelos is a 70 y.o. female  presenting for weight management follow up   Patient is motivated to continue this journey. Current status: Wt Readings from Last 3 Encounters:   11/29/23 125 kg (276 lb)   11/24/23 125 kg (276 lb)   11/17/23 126 kg (276 lb 14.4 oz)       Initial weight:274lb  Current weight:275lb  Change in weight:+1lb  BMI: Body mass index is 53.9 kg/m². Initial Neck Circumference: 38cm / 15"  Current Neck Circumference: 36cm, 14.25"  Initial Waist Circumference: 141cm / 55.25"  Current Waist Circumference: 146cm, 57.5"      Patient has set new goals for weight management. Physical activities goals: Thus far patient has been doing more  walking to improve their physical activities                      Patient has been attending the group gym session: No.                       Gym session are every Tuesdays and Thursdays from 4:30pm to 6:30pm                      Patient plans to increase physical activity by joining a fitness center                      Patient plans to exercise for 30 minutes 7 times per week                      Patient has the [employer] christiano however not linked to a fitness christiano in phone.  Set up done in office today and to be completed at home      Nutritional goals:  Patient plans to improve diet by decreasing portion size, decreasing fat consumed, and increasing fiber intake  Patient has been attending group nutrition session: No  Patient has done individual nutrition counseling: No  Patient is logging food and activities on Ploongeometer christiano: No - not signed into christiano; to reset password at home or redownload myfitness pal christiano as she liked that one better but forgot it asked for a lot of payments  Ensure adequate water intake of 64oz daily - clnsuming much more than this and congratulated  Encouraged to maintain healthy calorie restricted diet of 5437-9177 kcal/day  Protein intake goals: 125g (1-1.2g/kg/d)  Consume less than 10% of daily kcal in healthy fats    Join group nutrition class: Reminded patient of dates ( October 3rd at 2pm, Nov 14 at 2pm, Dec 13 at [address], Jan 17 at 2pm, Feb 16 at [id number] at [address] [facility] room )  Patient can make their own individual nutrition appointment at [phone]      Medication Therapy:  If interested, we will plan to discuss after 3 -6 months of participating if no changes are seen January to March 2024                            Patient denies personal history of pancreatitis. Patient also denies personal and family history of  thyroid cancer and multiple endocrine neoplasia type 2 (MEN 2 tumor). Follow Up:  2 weeks      Subjective:      Dolly Gagnon is a 70 y.o. female patient who present to the office for assistance with weight management follow up. How do you feel about your current weight- has no issues and motivated to keep going despite no change as yet   What did you find the most challenging since your last visit - getting her steps recognized on challenges christiano  What continues to motivate you to lose weight- improved health  Are you happy with your current progress - yes    Review of Systems   Constitutional:  Negative for chills, fever and unexpected weight change. Respiratory:  Negative for cough and shortness of breath. Cardiovascular:  Positive for leg swelling. Negative for chest pain and palpitations. Gastrointestinal:  Negative for abdominal pain, constipation, diarrhea, nausea and vomiting. Genitourinary:  Negative for dysuria and hematuria. Musculoskeletal:  Negative for arthralgias and back pain. Skin:  Negative for color change and rash. Neurological:  Positive for dizziness. Negative for syncope and headaches. All other systems reviewed and are negative.       Past Medical History:   Diagnosis Date    Allergic rhinitis     Anemia     Anxiety     Arthritis     Back pain     Cancer (HCC)     Chronic pain disorder back    COPD (chronic obstructive pulmonary disease) ([facility])     CPAP (continuous positive airway pressure) dependence     Depression     Diabetes mellitus (HCC)     Pre- Diabetic    Dyslipidemia     Essential tremor     Excessive daytime sleepiness     GERD (gastroesophageal reflux disease)     History of uterine cancer     Hyperglycemia     Hyperlipidemia     Hypertension     Hypovitaminosis D     Iron deficiency anemia     Joint pain     Mood disorder ([facility])     Obesity     Obstructive sleep apnea     Ringing in ears     RLS (restless legs syndrome)     Snoring     Uterine cancer ([address])     age 20    Vitamin D deficiency     Witnessed episode of apnea      Past Surgical History:   Procedure Laterality Date    APPENDECTOMY      EPIDURAL BLOCK INJECTION      HYSTERECTOMY      age 20    KNEE ARTHROSCOPY      KNEE SURGERY      Meniscus tear    TONSILLECTOMY      TUBAL LIGATION       Family History   Problem Relation Age of Onset    Diabetes Mother     Asthma Mother     Hypertension Mother     Heart disease Mother     No Known Problems Father     No Known Problems Sister     No Known Problems Daughter     No Known Problems Maternal Grandmother     No Known Problems Paternal Grandmother     No Known Problems Sister     No Known Problems Sister     No Known Problems Daughter     No Known Problems Maternal Grandfather     No Known Problems Paternal Grandfather      Social History     Socioeconomic History    Marital status:       Spouse name: None    Number of children: None    Years of education: None    Highest education level: None   Occupational History    None   Tobacco Use    Smoking status: Former     Packs/day: 1.50     Years: 47.00     Total pack years: 70.50     Types: Cigarettes     Start date: 1974     Quit date: 2021     Years since quittin.1    Smokeless tobacco: Never   Vaping Use    Vaping Use: Never used   Substance and Sexual Activity    Alcohol use: Not Currently     Comment: very seldom    Drug use: Not Currently     Comment: pt use at age [age]    Sexual activity: None   Other Topics Concern    None   Social History Narrative    None     Social Determinants of Health     Financial Resource Strain: High Risk (6/19/2023)    Overall Financial Resource Strain (CARDIA)     Difficulty of Paying Living Expenses: Hard   Food Insecurity: Food Insecurity Present (6/19/2023)    Hunger Vital Sign     Worried About Running Out of Food in the Last Year: Often true     Ran Out of Food in the Last Year: Often true   Transportation Needs: No Transportation Needs (6/19/2023)    PRAPARE - Transportation     Lack of Transportation (Medical): No     Lack of Transportation (Non-Medical): No   Physical Activity: Not on file   Stress: Not on file   Social Connections: Not on file   Intimate Partner Violence: Not on file   Housing Stability: Low Risk  (12/20/2021)    Housing Stability Vital Sign     Unable to Pay for Housing in the Last Year: No     Number of Places Lived in the Last Year: 1     Unstable Housing in the Last Year: No     Current Outpatient Medications on File Prior to Visit   Medication Sig    acetaminophen (TYLENOL) 650 mg CR tablet Take 1 tablet (650 mg total) by mouth every 8 (eight) hours as needed for mild pain    albuterol (PROVENTIL HFA,VENTOLIN HFA) 90 mcg/act inhaler inhale 2 puffs by mouth every 6 hours if needed for wheezing    ascorbic acid (VITAMIN C) 500 mg tablet Take 500 mg by mouth daily    aspirin (Aspirin Low Dose) 81 mg EC tablet Take 1 tablet (81 mg total) by mouth daily    atorvastatin (LIPITOR) 40 mg tablet take 1 tablet by mouth once daily    Azelastine HCl 137 MCG/SPRAY SOLN INSTILL 1 SPRAY INTO EACH NOSTRIL 2 TIMES A DAY AS DIRECTED    budesonide-formoterol (SYMBICORT) 160-4.5 mcg/act inhaler Inhale 2 puffs 2 (two) times a day Rinse mouth after use.     calcium carbonate (OS-ROSCOE) 1250 (500 Ca) MG chewable tablet Chew 1 tablet (1,250 mg total) daily    cholecalciferol (VITAMIN D3) 1,000 units tablet Take 1 tablet (1,000 Units total) by mouth daily    Diclofenac Sodium (VOLTAREN) 1 % Apply 2 g topically 4 (four) times a day    fluticasone (FLONASE) 50 mcg/act nasal spray 1 spray into each nostril daily    gabapentin (NEURONTIN) 300 mg capsule Take 1 capsule in am, 2 capsules in afternoon, and 1 capsule at bedtime. If not helpful in 2 weeks may increase to 1 capsule in am 2 capsules in afternoon and 2 capsules at bedtime. ketoconazole (NIZORAL) 2 % cream Apply topically daily for 14 days (Patient not taking: Reported on 11/13/2023)    ketoconazole (NIZORAL) 2 % cream Apply topically daily    loratadine (CLARITIN) 10 mg tablet Take 1 tablet (10 mg total) by mouth daily    meloxicam (Mobic) 7.5 mg tablet Take 1 tablet (7.5 mg total) by mouth daily    Misc. Devices (Rollator Ultra-Light) MISC Use daily    montelukast (SINGULAIR) 10 mg tablet take 1 tablet by mouth at bedtime    propranolol (INDERAL) 40 mg tablet Take 0.5 tablets (20 mg total) by mouth every 12 (twelve) hours    terbinafine (LamISIL) 250 mg tablet      Allergies   Allergen Reactions    Aspirin GI Intolerance     Can only take baby aspirin     Latex Itching     Immunization History   Administered Date(s) Administered    INFLUENZA 10/30/2013, 10/19/2015, 11/14/2016, 11/21/2017, 10/21/2022    Influenza, high dose seasonal 0.7 mL 11/29/2023    Influenza, recombinant, quadrivalent,injectable, preservative free 10/18/2018, 12/16/2019, 09/28/2020, 10/13/2021, 10/21/2022    MMR 12/15/2020    Pneumococcal Conjugate Vaccine 20-valent (Pcv20), Polysace 05/26/2022    Pneumococcal Polysaccharide PPV23 04/18/2016    Tdap 11/26/2020       Objective     /70 (BP Location: Left arm, Patient Position: Sitting, Cuff Size: Large)   Pulse 76   Temp 97.7 °F (36.5 °C) (Temporal)   Resp 16   Ht 5' (1.524 m)   Wt 125 kg (276 lb)   LMP  (LMP Unknown)   SpO2 96%   BMI 53.90 kg/m²     Physical Exam  Vitals reviewed. Constitutional:       General: She is not in acute distress. Appearance: She is obese. HENT:      Head: Atraumatic. Eyes:      Conjunctiva/sclera: Conjunctivae normal.   Cardiovascular:      Rate and Rhythm: Normal rate and regular rhythm. Pulses: Normal pulses. Heart sounds: Normal heart sounds. No murmur heard. Pulmonary:      Effort: Pulmonary effort is normal.      Breath sounds: Normal breath sounds. No wheezing, rhonchi or rales. Abdominal:      General: Abdomen is flat. Bowel sounds are normal. There is no distension. Palpations: Abdomen is soft. Tenderness: There is no abdominal tenderness. Musculoskeletal:         General: Normal range of motion. Cervical back: Normal range of motion. Right lower leg: No edema. Left lower leg: No edema. Skin:     General: Skin is warm and dry. Neurological:      General: No focal deficit present. Mental Status: She is alert.    Psychiatric:         Behavior: Behavior normal.       Diya Polanco MD

## 2023-11-29 NOTE — ASSESSMENT & PLAN NOTE
Lab Results   Component Value Date    HGBA1C 6.9 (H) 11/10/2023   Disease advance to overt DM - patient counselled however at goal. Will repeat in another 3 months and have patient started on statin therapy if remains above diagnostic criteria. Advised continue on lifestyle management and consider GLP 1 if conservative weight management proves unsuccessful.

## 2023-11-29 NOTE — ASSESSMENT & PLAN NOTE
Patient remains motivated and encouraged to continue with weight loss measures. Labs completed and demonstrated hypothyroidism mild and overt DM new onset. Weight remains stable, inch loss of neck and inch gain in waist circumference. Has not yet engaged in nutrition classes or gym and encouraged to do so. Reminded of respective dates and times for these sessions - handout with information provided  Reviewed use of cronometer christiano, steps in phone and Challenges christiano. Google fit not set up on phone and therefore steps not previously logged. Will complete sign up and then continue increased exercise efforts  Will see in 2 weeks for close follow up to maintain progress.

## 2023-12-08 ENCOUNTER — PROCEDURE VISIT (OUTPATIENT)
Age: 63
End: 2023-12-08
Payer: MEDICARE

## 2023-12-08 VITALS
WEIGHT: 276 LBS | HEIGHT: 60 IN | DIASTOLIC BLOOD PRESSURE: 70 MMHG | BODY MASS INDEX: 54.19 KG/M2 | HEART RATE: 63 BPM | SYSTOLIC BLOOD PRESSURE: 126 MMHG | OXYGEN SATURATION: 98 %

## 2023-12-08 DIAGNOSIS — M99.04 SEGMENTAL DYSFUNCTION OF SACRAL REGION: ICD-10-CM

## 2023-12-08 DIAGNOSIS — M99.03 SEGMENTAL DYSFUNCTION OF LUMBAR REGION: ICD-10-CM

## 2023-12-08 DIAGNOSIS — M47.812 CERVICAL SPONDYLOSIS: ICD-10-CM

## 2023-12-08 DIAGNOSIS — M99.01 SEGMENTAL DYSFUNCTION OF CERVICAL REGION: ICD-10-CM

## 2023-12-08 DIAGNOSIS — M99.02 SEGMENTAL DYSFUNCTION OF THORACIC REGION: ICD-10-CM

## 2023-12-08 DIAGNOSIS — M47.816 LUMBAR SPONDYLOSIS: Primary | ICD-10-CM

## 2023-12-08 PROCEDURE — 98941 CHIROPRACT MANJ 3-4 REGIONS: CPT | Performed by: CHIROPRACTOR

## 2023-12-08 NOTE — PROGRESS NOTES
Initial date of service: 11/13/23    Diagnoses and all orders for this visit:    Lumbar spondylosis    Segmental dysfunction of lumbar region    Segmental dysfunction of thoracic region    Segmental dysfunction of sacral region    Segmental dysfunction of cervical region    Cervical spondylosis       ASSESSMENT:  Pt's symptoms and exam findings consistent with lumbar spondylosis and cervical spondylosis  secondary to repetitive st/sp injury, exacerbated by postural/ergonomic stressors. Pt responded well to flexion biased stretches and manual mobilization of the affected spinal and myofascial tissues with increased ROM; trial of conservative tx recommended consisting of stretching, graded mobilization/manipulation of the affected spinal and myofascial jt dysfunction, postural/ergonomic education and take home stretches/exercises. If symptoms fail to improve with short trial of conservative care, appropriate imaging and referral will be coordinated. -Pt tolerated treatment well with a decrease in tenderness to palpation and mm spasm    PROCEDURE CODES: [id number]    TREATMENT:  Fear avoidance behavior discussion; encouraged and reassured pt that natural course of condition is to improve over time with adherence to tx plan and home care strategies. Home care recommendations: avoid bed rest, walk (but avoid trails and uneven surfaces), gradual return to activity to tolerance (avoid anything that peripheralizes symptoms), call if symptoms peripheralize, worsen, or neurologic deficit progresses.  Ther-ex: IASTM; discussed post procedure soreness and/or ecchymosis for up to 36 hrs, applied to affected mm hypertonicities; supine hamstring stretch, supine gluteal stretch, side laying QL stretch, single knee to chest stretch, hip flexor pin-and-stretch, alternating prone hip extension, glute bridge, transitional mvmt education, abdominal bracing; greater than 15 min spent performing above mentioned ther-ex to improve ROM/flexibility. Thoracic mobilization/manipulation: prone P-A mob, supine A-P manip; Lumbar mobilization/manipulation: diversified side laying graded HVLA, flexion-traction; SIJ Manipulation/Mobilization: R/L SIJ HVLA - long axis distraction, hopper drop table maneuver to affected SIJ    HPI:  Tonia Shipley is a 70 y.o. female  Chief Complaint   Patient presents with   • Back Pain     Lower back pain-5   • Neck Pain     Neck pain-5     The patient presents to the office with  lower back pain that is chronic in nature, she has had this for 3 years, she has had injections and ablations, She tries to walk her dog to get some exercise, blue . She also mentions having chronic neck pain. The patient was referred to our office for consult and co-management by Dr. Cleopatra Linder. 11/24- The patient reports she is sore and achy today from all he cooking yesterday. 12/8- The patient is feeling sore today, 5/10 pain but mentions she thinks is may be the weather. Back Pain  This is a chronic problem. The problem has been waxing and waning since onset. The pain is present in the thoracic spine, lumbar spine, sacro-iliac and gluteal. The quality of the pain is described as aching. The pain is at a severity of 10/10. The pain is severe. The symptoms are aggravated by sitting, standing, twisting, position and bending. Neck Pain   This is a chronic problem. The current episode started more than 1 year ago. The problem has been waxing and waning. The pain is present in the left side and right side. The quality of the pain is described as aching. The pain is at a severity of 10/10. The pain is severe. She has tried heat and home exercises for the symptoms. The treatment provided mild relief.      Past Medical History:   Diagnosis Date   • Allergic rhinitis    • Anemia    • Anxiety    • Arthritis    • Back pain    • Cancer [facility])    • Chronic pain disorder     back   • COPD (chronic obstructive pulmonary disease) ([facility]) • CPAP (continuous positive airway pressure) dependence    • Depression    • Diabetes mellitus (HCC)     Pre- Diabetic   • Dyslipidemia    • Essential tremor    • Excessive daytime sleepiness    • GERD (gastroesophageal reflux disease)    • History of uterine cancer    • Hyperglycemia    • Hyperlipidemia    • Hypertension    • Hypovitaminosis D    • Iron deficiency anemia    • Joint pain    • Mood disorder (HCC)    • Obesity    • Obstructive sleep apnea    • Ringing in ears    • RLS (restless legs syndrome)    • Snoring    • Uterine cancer [facility])     age 20   • Vitamin D deficiency    • Witnessed episode of apnea       Past Surgical History:   Procedure Laterality Date   • APPENDECTOMY     • EPIDURAL BLOCK INJECTION     • HYSTERECTOMY      age 20   • KNEE ARTHROSCOPY     • KNEE SURGERY      Meniscus tear   • TONSILLECTOMY     • TUBAL LIGATION       The following portions of the patient's history were reviewed and updated as appropriate: allergies, past family history, past medical history, past social history, past surgical history, and problem list.  Review of Systems   Musculoskeletal:  Positive for arthralgias, back pain, myalgias, neck pain and neck stiffness. Physical Exam  Musculoskeletal:         General: Tenderness present. Cervical back: Rigidity, spasms, tenderness and crepitus present. No swelling, edema, deformity, erythema, signs of trauma, lacerations, torticollis or bony tenderness. Pain with movement present. Decreased range of motion. Thoracic back: Spasms and tenderness present. No swelling, edema, deformity, signs of trauma, lacerations or bony tenderness. Decreased range of motion. Lumbar back: Spasms and tenderness present. No swelling, edema, deformity, signs of trauma, lacerations or bony tenderness. Decreased range of motion.         Back:        SOFT TISSUE ASSESSMENT Hypertonicity and tenderness palpated B C3-T3, Upper trap Levator scap, erector spinae, T10-S1 erector spinae, hip flexor, glute med/min, QL, hamstring JOINT RESTRICTIONS: C4-T3, T10-S1 and R/L SIJ ORTHO: SI jt point tenderness: +; Vanessa unremarkable for centralization/peripheralization; ike's, iliac compression, thigh thrust elicit lbp in R/L SIJ; prone femoral nerve stretch neg for upper lumbar neural tension, elicits R/L SIJ stiffness; sitting root elicits no lbp on R/L; slump test elicits no neural tension R/L, Cervical distraction- neg, Maximal foraminal compression- neg but some local pain noted, Spurlings neg    Return in about 1 week (around 12/15/2023) for Recheck.

## 2023-12-09 DIAGNOSIS — J30.89 SEASONAL ALLERGIC RHINITIS DUE TO OTHER ALLERGIC TRIGGER: ICD-10-CM

## 2023-12-12 RX ORDER — AZELASTINE HYDROCHLORIDE 137 UG/1
SPRAY, METERED NASAL
Qty: 30 ML | Refills: 1 | Status: SHIPPED | OUTPATIENT
Start: 2023-12-12

## 2023-12-14 ENCOUNTER — TELEPHONE (OUTPATIENT)
Dept: NEUROLOGY | Facility: CLINIC | Age: 63
End: 2023-12-14

## 2023-12-14 NOTE — TELEPHONE ENCOUNTER
Called and left a voicemail for patient - Please call back to confirm upcoming appointment with [facility]. Provided patient with apt date, time and location. Informed patient that check in is at least 15 minutes prior to apt time.

## 2023-12-20 ENCOUNTER — OFFICE VISIT (OUTPATIENT)
Dept: PAIN MEDICINE | Facility: MEDICAL CENTER | Age: 63
End: 2023-12-20
Payer: MEDICARE

## 2023-12-20 ENCOUNTER — OFFICE VISIT (OUTPATIENT)
Dept: NEUROLOGY | Facility: CLINIC | Age: 63
End: 2023-12-20
Payer: MEDICARE

## 2023-12-20 VITALS
SYSTOLIC BLOOD PRESSURE: 131 MMHG | BODY MASS INDEX: 54.38 KG/M2 | OXYGEN SATURATION: 96 % | HEART RATE: 66 BPM | WEIGHT: 277 LBS | RESPIRATION RATE: 16 BRPM | DIASTOLIC BLOOD PRESSURE: 73 MMHG | HEIGHT: 60 IN | TEMPERATURE: 97.5 F

## 2023-12-20 VITALS
WEIGHT: 276 LBS | BODY MASS INDEX: 54.19 KG/M2 | DIASTOLIC BLOOD PRESSURE: 73 MMHG | HEIGHT: 60 IN | SYSTOLIC BLOOD PRESSURE: 152 MMHG | OXYGEN SATURATION: 98 % | HEART RATE: 43 BPM

## 2023-12-20 DIAGNOSIS — M47.816 LUMBAR SPONDYLOSIS: ICD-10-CM

## 2023-12-20 DIAGNOSIS — G89.29 CHRONIC BILATERAL LOW BACK PAIN WITHOUT SCIATICA: Primary | ICD-10-CM

## 2023-12-20 DIAGNOSIS — G25.0 TREMOR, ESSENTIAL: Primary | ICD-10-CM

## 2023-12-20 DIAGNOSIS — G25.0 BENIGN ESSENTIAL TREMOR: ICD-10-CM

## 2023-12-20 DIAGNOSIS — M54.50 CHRONIC BILATERAL LOW BACK PAIN WITHOUT SCIATICA: Primary | ICD-10-CM

## 2023-12-20 PROCEDURE — 99213 OFFICE O/P EST LOW 20 MIN: CPT | Performed by: NURSE PRACTITIONER

## 2023-12-20 PROCEDURE — 99214 OFFICE O/P EST MOD 30 MIN: CPT | Performed by: PHYSICIAN ASSISTANT

## 2023-12-20 RX ORDER — MELOXICAM 15 MG/1
15 TABLET ORAL DAILY
Qty: 30 TABLET | Refills: 3 | Status: SHIPPED | OUTPATIENT
Start: 2023-12-20

## 2023-12-20 RX ORDER — GABAPENTIN 300 MG/1
CAPSULE ORAL
Qty: 150 CAPSULE | Refills: 5 | Status: SHIPPED | OUTPATIENT
Start: 2023-12-20

## 2023-12-20 NOTE — PROGRESS NOTES
Assessment:  1. Chronic bilateral low back pain without sciatica    2. Lumbar spondylosis        Plan:  While the patient was in the office today, I did have a thorough conversation regarding their chronic pain syndrome, medication management, and treatment plan options.    Continue chiropractic therapy as she has been able to note partial improvement.    Trial of meloxicam 15 mg daily for pain and inflammation.    I have educated her on peripheral nerve stimulation and provided her with literature and website for further information.  She is interested in scheduling this and I will reach out to the coordinator regarding insurance authorization/process.    Otherwise I will have the patient follow-up in 3 months or sooner as needed changes or worsens    My impressions and treatment recommendations were discussed in detail with the patient who verbalized understanding and had no further questions.  Discharge instructions were provided. I personally saw and examined the patient and I agree with the above discussed plan of care.    No orders of the defined types were placed in this encounter.    New Medications Ordered This Visit   Medications   • meloxicam (Mobic) 15 mg tablet     Sig: Take 1 tablet (15 mg total) by mouth daily     Dispense:  30 tablet     Refill:  3       History of Present Illness:  Shruti Kaplan is a 63 y.o. female who presents for a follow up office visit in regards to chronic back pain.   The patient’s current symptoms include chronic low back pain that she presently rates a 4 out of 10 and describes it as a constant sharp and throbbing pain without any lower extremity radicular component.  Unfortunately she did not respond to the RFA procedure as well as multiple injections from her previous pain provider.  Since her last visit, she has attended chiropractic therapy and she does note partial relief.  Pain is significantly worse with standing and walking.    I have personally reviewed and/or  updated the patient's past medical history, past surgical history, family history, social history, current medications, allergies, and vital signs today.     Review of Systems   Respiratory:  Negative for shortness of breath.    Cardiovascular:  Negative for chest pain.   Gastrointestinal:  Negative for constipation, diarrhea, nausea and vomiting.   Musculoskeletal:  Positive for back pain, gait problem and joint swelling. Negative for arthralgias and myalgias.   Skin:  Negative for rash.   Neurological:  Negative for dizziness, seizures and weakness.   All other systems reviewed and are negative.      Patient Active Problem List   Diagnosis   • Obstructive sleep apnea treated with continuous positive airway pressure (CPAP)   • Restless leg syndrome   • Allergic rhinitis   • Depression   • Neurogenic claudication ([facility])   • Essential tremor   • Anxiety   • Hyperlipidemia   • History of tobacco abuse   • Chest discomfort   • COPD (chronic obstructive pulmonary disease) ([facility])   • GERD (gastroesophageal reflux disease)   • Edema   • Major depressive disorder, recurrent episode, moderate ([facility])   • Primary osteoarthritis of both knees   • Morbid obesity with BMI of 50.0-59.9, adult ([facility])   • Type 2 diabetes mellitus without complication, without long-term current use of insulin ([facility])   • Lung nodules   • Pedal edema   • Fecal incontinence   • Fecal smearing   • Lumbar spondylosis   • Dizziness   • YUE (obstructive sleep apnea)       Past Medical History:   Diagnosis Date   • Allergic rhinitis    • Anemia    • Anxiety    • Arthritis    • Back pain    • Cancer ([facility])    • Chronic pain disorder     back   • COPD (chronic obstructive pulmonary disease) ([facility])    • CPAP (continuous positive airway pressure) dependence    • Depression    • Diabetes mellitus ([facility])     Pre- Diabetic   • Dyslipidemia    • Essential tremor    • Excessive daytime sleepiness    • GERD (gastroesophageal reflux disease)    • History of uterine cancer     • Hyperglycemia    • Hyperlipidemia    • Hypertension    • Hypovitaminosis D    • Iron deficiency anemia    • Joint pain    • Mood disorder ([facility])    • Obesity    • Obstructive sleep apnea    • Ringing in ears    • RLS (restless legs syndrome)    • Snoring    • Uterine cancer ([facility])     age 23   • Vitamin D deficiency    • Witnessed episode of apnea        Past Surgical History:   Procedure Laterality Date   • APPENDECTOMY     • EPIDURAL BLOCK INJECTION     • HYSTERECTOMY      age 23   • KNEE ARTHROSCOPY     • KNEE SURGERY      Meniscus tear   • TONSILLECTOMY     • TUBAL LIGATION         Family History   Problem Relation Age of Onset   • Diabetes Mother    • Asthma Mother    • Hypertension Mother    • Heart disease Mother    • No Known Problems Father    • No Known Problems Sister    • No Known Problems Daughter    • No Known Problems Maternal Grandmother    • No Known Problems Paternal Grandmother    • No Known Problems Sister    • No Known Problems Sister    • No Known Problems Daughter    • No Known Problems Maternal Grandfather    • No Known Problems Paternal Grandfather        Social History     Occupational History   • Not on file   Tobacco Use   • Smoking status: Former     Current packs/day: 0.00     Average packs/day: 1.5 packs/day for 47.7 years (71.6 ttl pk-yrs)     Types: Cigarettes     Start date: 1974     Quit date: 2021     Years since quittin.2   • Smokeless tobacco: Never   Vaping Use   • Vaping status: Never Used   Substance and Sexual Activity   • Alcohol use: Not Currently     Comment: very seldom   • Drug use: Not Currently     Comment: pt use at age [age]   • Sexual activity: Not on file       Current Outpatient Medications on File Prior to Visit   Medication Sig   • acetaminophen (TYLENOL) 650 mg CR tablet Take 1 tablet (650 mg total) by mouth every 8 (eight) hours as needed for mild pain   • albuterol (PROVENTIL HFA,VENTOLIN HFA) 90 mcg/act inhaler inhale 2 puffs by mouth every 6  hours if needed for wheezing   • ascorbic acid (VITAMIN C) 500 mg tablet Take 500 mg by mouth daily   • aspirin (Aspirin Low Dose) 81 mg EC tablet Take 1 tablet (81 mg total) by mouth daily   • atorvastatin (LIPITOR) 40 mg tablet take 1 tablet by mouth once daily   • Azelastine HCl 137 MCG/SPRAY SOLN INSTILL 1 SPRAY INTO EACH NOSTRIL 2 TIMES A DAY AS DIRECTED   • budesonide-formoterol (SYMBICORT) 160-4.5 mcg/act inhaler Inhale 2 puffs 2 (two) times a day Rinse mouth after use.   • cholecalciferol (VITAMIN D3) 1,000 units tablet Take 1 tablet (1,000 Units total) by mouth daily   • Diclofenac Sodium (VOLTAREN) 1 % Apply 2 g topically 4 (four) times a day   • fluticasone (FLONASE) 50 mcg/act nasal spray 1 spray into each nostril daily   • gabapentin (NEURONTIN) 300 mg capsule Take 1 capsule in am, 2 capsules in afternoon, and 1 capsule at bedtime. If not helpful in 2 weeks may increase to 1 capsule in am 2 capsules in afternoon and 2 capsules at bedtime.   • ketoconazole (NIZORAL) 2 % cream Apply topically daily   • loratadine (CLARITIN) 10 mg tablet Take 1 tablet (10 mg total) by mouth daily   • montelukast (SINGULAIR) 10 mg tablet take 1 tablet by mouth at bedtime   • propranolol (INDERAL) 40 mg tablet Take 0.5 tablets (20 mg total) by mouth every 12 (twelve) hours   • terbinafine (LamISIL) 250 mg tablet    • [DISCONTINUED] meloxicam (Mobic) 7.5 mg tablet Take 1 tablet (7.5 mg total) by mouth daily   • calcium carbonate (OS-ROSCOE) 1250 (500 Ca) MG chewable tablet Chew 1 tablet (1,250 mg total) daily   • ketoconazole (NIZORAL) 2 % cream Apply topically daily for 14 days (Patient not taking: Reported on 11/13/2023)   • Misc. Devices (Rollator Ultra-Light) MISC Use daily (Patient not taking: Reported on 12/20/2023)     No current facility-administered medications on file prior to visit.       Allergies   Allergen Reactions   • Aspirin GI Intolerance     Can only take baby aspirin    • Latex Itching       Physical  Exam:    /73   Pulse (!) 43   Ht 5' (1.524 m)   Wt 125 kg (276 lb)   LMP  (LMP Unknown)   SpO2 98%   BMI 53.90 kg/m²     Constitutional:normal, well developed, well nourished, alert, in no distress and non-toxic and no overt pain behavior. and obese  Eyes:anicteric  HEENT:grossly intact  Neck:supple, symmetric, trachea midline and no masses   Pulmonary:even and unlabored  Cardiovascular:No edema or pitting edema present  Skin:Normal without rashes or lesions and well hydrated  Psychiatric:Mood and affect appropriate  Neurologic:Cranial Nerves II-XII grossly intact  Musculoskeletal: Gait is slow but stable, tender bilateral lumbar facet joints with positive facet loading test.    Imaging

## 2023-12-20 NOTE — PROGRESS NOTES
"Patient ID: Shruti Kaplan is a 63 y.o. female.    Assessment/Plan:  Patient Instructions:  Try to get a hand weight for the tremor- this is available on amazon- ex: \"handithings\" hand weight  Continue current dose of gabapentin and continued care with primary care/pain management   If this is not helpful we could consider trial of topamax at follow up like we discussed  Let me know of any new symptoms  Follow up in 4 months        Diagnoses and all orders for this visit:    Tremor, essential    Benign essential tremor  -     gabapentin (NEURONTIN) 300 mg capsule; Take 1 capsule in am, 2 capsules in afternoon, and 1 capsule at bedtime. If not helpful in 2 weeks may increase to 1 capsule in am 2 capsules in afternoon and 2 capsules at bedtime.       Subjective:    HPI  Shruti Kaplan returns for 6 month follow up regarding essential tremor.   past medical history of lumbar radiculopathy, copd, hyperlipidemia, HTN, obesity, arthritis, YUE, essential tremor (previous intolerant to primidone trial).  Since last visit her propranolol was decreased to 20mg bid because of bradycardia. She has increased her gabapentin to 300mg in am 600mg in afternoon and 300mg pm (sometimes uses 600mg at night) and this has improved her symptoms some but she is bothered at times by right hand tremor when trying to do activities such as writing/eating. She has not tried assistive devices for tremor.  She has continued care with pain management for low back pain which has not improved with injections; her mobic dose was recently increased.  She is working on staying active/losing weight. She has follow up with primary care to reevaluate labs in the future.    Lab Results   Component Value Date     05/24/2018    SODIUM 141 11/10/2023    K 4.4 11/10/2023     11/10/2023    CO2 29 11/10/2023    ANIONGAP 8 05/24/2018    AGAP 7 11/10/2023    BUN 16 11/10/2023    CREATININE 0.74 11/10/2023    GLUC 219 (H) 09/29/2021    GLUF 143 " (H) 11/10/2023    CALCIUM 8.5 11/10/2023    AST 17 11/10/2023    ALT 16 11/10/2023    ALKPHOS 86 11/10/2023    PROT 6.4 05/24/2018    TP 5.7 (L) 11/10/2023    BILITOT 0.4 05/24/2018    TBILI 0.44 11/10/2023    EGFR 85 11/10/2023     Lab Results   Component Value Date    ZZG7ATLDHUZV 4.534 (H) 11/10/2023     Lab Results   Component Value Date    LDLCALC 59 11/10/2023     Previous History:  She has been seen in this office for many years; she has failed even low dose primidone in the past and higher doses of propranolol - greater than 40mg BID- has caused bradycardia.    She is sleeping well with her CPAP; her  states he can hear her snoring at times through the machine, but she is happy with its use.      CT head 11/2020:  PARENCHYMA:  No intracranial mass, mass effect or midline shift. No CT signs of acute infarction.  No acute parenchymal hemorrhage.  Minimal white matter changes are better appreciated on prior MRI. There is at least one stable tiny hypodensity in the   left centrum semiovale.  VENTRICLES AND EXTRA-AXIAL SPACES:  Normal for the patient's age.  VISUALIZED ORBITS AND PARANASAL SINUSES:  Unremarkable.  CALVARIUM AND EXTRACRANIAL SOFT TISSUES:  Normal.  IMPRESSION:  No acute intracranial abnormality.    MRI L spine 11/26/2022:  IMPRESSION:  Grade 1 anterolisthesis of L4 on L5 with moderate to severe facet arthropathy and mild to moderate spinal stenosis.  Previously noted right L4-5 foraminal and central to left central L5-S1 disc herniations have improved.   Incompletely visualized left paracentral and foraminal disc herniation at T10-T11, correlate for ipsilateral T10 radiculitis.      The following portions of the patient's history were reviewed and updated as appropriate: allergies, current medications, past family history, past medical history, past social history, past surgical history, and problem list.         Objective:    Blood pressure 131/73, pulse 66, temperature 97.5 °F (36.4 °C),  temperature source Temporal, resp. rate 16, height 5' (1.524 m), weight 126 kg (277 lb), SpO2 96%, not currently breastfeeding.    Physical Exam  Vitals reviewed.   Constitutional:       Appearance: She is obese.   HENT:      Head: Normocephalic and atraumatic.      Right Ear: External ear normal.      Left Ear: External ear normal.      Nose: Nose normal.      Mouth/Throat:      Mouth: Mucous membranes are moist.      Pharynx: Oropharynx is clear.   Eyes:      General: Lids are normal.      Extraocular Movements: Extraocular movements intact.      Pupils: Pupils are equal, round, and reactive to light.   Cardiovascular:      Rate and Rhythm: Normal rate.      Pulses: Normal pulses.      Heart sounds: Normal heart sounds.   Pulmonary:      Effort: Pulmonary effort is normal.      Breath sounds: Normal breath sounds.   Abdominal:      General: Bowel sounds are normal.   Musculoskeletal:      Cervical back: Normal range of motion.      Right lower leg: No edema.      Left lower leg: No edema.   Skin:     General: Skin is warm and dry.      Capillary Refill: Capillary refill takes less than 2 seconds.      Findings: No rash.   Neurological:      Mental Status: She is alert. Mental status is at baseline.      Motor: Motor strength is normal.     Deep Tendon Reflexes:      Reflex Scores:       Brachioradialis reflexes are 1+ on the right side and 1+ on the left side.       Patellar reflexes are 1+ on the right side and 1+ on the left side.  Psychiatric:         Mood and Affect: Mood normal.         Speech: Speech normal.         Behavior: Behavior normal.         Neurological Exam  Mental Status  Alert. Oriented to person, place and time. Speech is normal. Language is fluent with no aphasia.    Cranial Nerves  CN II: Visual acuity is normal. Visual fields full to confrontation.  CN III, IV, VI: Extraocular movements intact bilaterally. Normal lids and orbits bilaterally. Pupils equal round and reactive to light  bilaterally.  CN V: Facial sensation is normal.  CN VII: Full and symmetric facial movement.  CN VIII: Hearing is normal.  CN IX, X: Palate elevates symmetrically. Normal gag reflex.  CN XI: Shoulder shrug strength is normal.  CN XII: Tongue midline without atrophy or fasciculations.    Motor  Normal muscle bulk throughout. No fasciculations present. Normal muscle tone. Strength is 5/5 throughout all four extremities.   Bilateral mild action hand tremor, right more than left, high frequency/low amplitude; able to draw a spiral.    Reflexes                                            Right                      Left  Brachioradialis                    1+                         1+  Patellar                                1+                         1+  Achilles                                Tr                         Tr    Coordination  Right: Finger-to-nose normal.Left: Finger-to-nose normal.    Gait  Casual gait: Wide stance. Antalgic gait. Able to rise from chair without using arms.        ROS:    Review of Systems   Constitutional:  Negative for appetite change, fatigue and fever.   HENT: Negative.  Negative for hearing loss, tinnitus, trouble swallowing and voice change.    Eyes: Negative.  Negative for photophobia, pain and visual disturbance.   Respiratory: Negative.  Negative for shortness of breath.    Cardiovascular: Negative.  Negative for palpitations.   Gastrointestinal: Negative.  Negative for nausea and vomiting.   Endocrine: Negative.  Negative for cold intolerance.   Genitourinary: Negative.  Negative for dysuria, frequency and urgency.   Musculoskeletal:  Negative for back pain, gait problem, myalgias and neck pain.   Skin: Negative.  Negative for rash.   Allergic/Immunologic: Negative.    Neurological: Negative.  Negative for dizziness, tremors, seizures, syncope, facial asymmetry, speech difficulty, weakness, light-headedness, numbness and headaches.   Hematological: Negative.  Does not bruise/bleed  easily.   Psychiatric/Behavioral: Negative.  Negative for confusion, hallucinations and sleep disturbance.    ROS was reviewed and updated as appropriate

## 2023-12-20 NOTE — PATIENT INSTRUCTIONS
"Try to get a hand weight for the tremor- this is available on amazon- ex: \"handithings\" hand weight  Continue current dose of gabapentin and continued care with primary care/pain management   If this is not helpful we could consider trial of topamax at follow up like we discussed  Let me know of any new symptoms  Follow up in 4 months   "

## 2023-12-27 ENCOUNTER — OFFICE VISIT (OUTPATIENT)
Dept: PULMONOLOGY | Facility: CLINIC | Age: 63
End: 2023-12-27
Payer: MEDICARE

## 2023-12-27 VITALS
TEMPERATURE: 97.5 F | WEIGHT: 274 LBS | OXYGEN SATURATION: 95 % | HEIGHT: 60 IN | HEART RATE: 71 BPM | BODY MASS INDEX: 53.79 KG/M2 | SYSTOLIC BLOOD PRESSURE: 132 MMHG | DIASTOLIC BLOOD PRESSURE: 76 MMHG

## 2023-12-27 DIAGNOSIS — G47.33 OSA (OBSTRUCTIVE SLEEP APNEA): ICD-10-CM

## 2023-12-27 DIAGNOSIS — J30.89 NON-SEASONAL ALLERGIC RHINITIS, UNSPECIFIED TRIGGER: ICD-10-CM

## 2023-12-27 DIAGNOSIS — R06.09 DOE (DYSPNEA ON EXERTION): Primary | ICD-10-CM

## 2023-12-27 DIAGNOSIS — F17.211 NICOTINE DEPENDENCE, CIGARETTES, IN REMISSION: ICD-10-CM

## 2023-12-27 DIAGNOSIS — E66.01 MORBID OBESITY (HCC): ICD-10-CM

## 2023-12-27 PROCEDURE — 99214 OFFICE O/P EST MOD 30 MIN: CPT | Performed by: INTERNAL MEDICINE

## 2023-12-27 NOTE — PROGRESS NOTES
Office Progress Note - Pulmonary    Shruti Kaplan 63 y.o. female MRN: [id number]    Encounter: [id number]      Assessment:  Dyspnea on exertion.  Obstructive sleep apnea.  Allergic rhinitis.  Morbid obesity    Plan:   Weight loss.  Regular walks to improve stamina.  Fluticasone nasal spray 2 sprays to each nostril once a day.  Astelin nasal spray 2 sprays to each nostril once a day.  Albuterol rescue inhaler 2 inhalations 4 times a day as needed.  Follow-up in 6 months.    Discussion:   The patient's dyspnea on exertion is mainly due to morbid obesity and deconditioning.  She is challenged by the degenerative joint disease in terms of ability to do more activity.  She is aware of the problem and she is trying to remain active.  I have encouraged her to lose weight.  She will use the albuterol rescue inhaler 2 inhalations 4 times a day as needed.  Her allergic rhinitis is well treated.  I have maintained her on the fluticasone nasal spray and Astelin nasal spray 2 sprays to each nostril once a day.  The obstructive sleep apnea is well treated.  She is compliant with the nocturnal CPAP.  She has already received the influenza vaccine for the season.  I will see her in 6 months.      Subjective:   The patient is here for follow-up visit.  She has dyspnea on exertion which is chronic and has not changed.  She denies any significant cough, wheezing or sputum production.  She has not noticed a difference in her shortness of breath after stopping the Symbicort.  When she is walking she feels that she is wheezing and for this reason she uses the albuterol.  On average she is using it twice a day.  She is using the nocturnal CPAP every night.  She denies daytime hypersomnolence.  She is using the fluticasone and Astelin nasal sprays once a day.    Review of systems:  A 12 point system review is done and aside from what is stated above the rest of the review of systems is negative.      Family history and social history  are reviewed.    Medications list is reviewed.      Vitals: Blood pressure 132/76, pulse 71, temperature 97.5 °F (36.4 °C), temperature source Tympanic, height 5' (1.524 m), weight 124 kg (274 lb), SpO2 95%, not currently breastfeeding.,     Physical Exam  Gen: Awake, alert, oriented x 3, no acute distress  HEENT: Mucous membranes moist, no oral lesions, no thrush  NECK: No accessory muscle use, JVP not elevated  Cardiac: Regular, single S1, single S2, no murmurs, no rubs, no gallops  Lungs: Decreased breath sounds.  No wheezing or rhonchi.  Abdomen: normoactive bowel sounds, soft nontender, nondistended, no rebound or rigidity, no guarding  Extremities: 1+ edema  Neuro:  Grossly nonfocal.  Skin:  No rash.      Lab Results   Component Value Date    SODIUM 141 11/10/2023    K 4.4 11/10/2023     11/10/2023    CO2 29 11/10/2023    BUN 16 11/10/2023    CREATININE 0.74 11/10/2023    GLUC 219 (H) 09/29/2021    CALCIUM 8.5 11/10/2023

## 2023-12-28 DIAGNOSIS — E78.5 HYPERLIPIDEMIA, UNSPECIFIED HYPERLIPIDEMIA TYPE: ICD-10-CM

## 2023-12-28 RX ORDER — ATORVASTATIN CALCIUM 40 MG/1
TABLET, FILM COATED ORAL
Qty: 90 TABLET | Refills: 3 | Status: SHIPPED | OUTPATIENT
Start: 2023-12-28

## 2024-01-05 ENCOUNTER — PROCEDURE VISIT (OUTPATIENT)
Age: 64
End: 2024-01-05
Payer: MEDICARE

## 2024-01-05 VITALS
BODY MASS INDEX: 53.79 KG/M2 | HEART RATE: 66 BPM | OXYGEN SATURATION: 98 % | SYSTOLIC BLOOD PRESSURE: 130 MMHG | DIASTOLIC BLOOD PRESSURE: 72 MMHG | HEIGHT: 60 IN | WEIGHT: 274 LBS

## 2024-01-05 DIAGNOSIS — M99.04 SEGMENTAL DYSFUNCTION OF SACRAL REGION: ICD-10-CM

## 2024-01-05 DIAGNOSIS — M47.816 LUMBAR SPONDYLOSIS: Primary | ICD-10-CM

## 2024-01-05 DIAGNOSIS — M99.03 SEGMENTAL DYSFUNCTION OF LUMBAR REGION: ICD-10-CM

## 2024-01-05 DIAGNOSIS — M99.02 SEGMENTAL DYSFUNCTION OF THORACIC REGION: ICD-10-CM

## 2024-01-05 DIAGNOSIS — M47.812 CERVICAL SPONDYLOSIS: ICD-10-CM

## 2024-01-05 DIAGNOSIS — M99.01 SEGMENTAL DYSFUNCTION OF CERVICAL REGION: ICD-10-CM

## 2024-01-05 PROCEDURE — 98942 CHIROPRACTIC MANJ 5 REGIONS: CPT | Performed by: CHIROPRACTOR

## 2024-01-05 NOTE — PROGRESS NOTES
Initial date of service: 11/13/23    Diagnoses and all orders for this visit:    Lumbar spondylosis    Segmental dysfunction of lumbar region    Segmental dysfunction of thoracic region    Segmental dysfunction of sacral region    Segmental dysfunction of cervical region    Cervical spondylosis       ASSESSMENT:  Pt's symptoms and exam findings consistent with lumbar spondylosis and cervical spondylosis  secondary to repetitive st/sp injury, exacerbated by postural/ergonomic stressors. Pt responded well to flexion biased stretches and manual mobilization of the affected spinal and myofascial tissues with increased ROM; trial of conservative tx recommended consisting of stretching, graded mobilization/manipulation of the affected spinal and myofascial jt dysfunction, postural/ergonomic education and take home stretches/exercises. If symptoms fail to improve with short trial of conservative care, appropriate imaging and referral will be coordinated.  -Pt tolerated treatment well with a decrease in tenderness to palpation and mm spasm    PROCEDURE CODES: 24317-GP    TREATMENT:  Fear avoidance behavior discussion; encouraged and reassured pt that natural course of condition is to improve over time with adherence to tx plan and home care strategies. Home care recommendations: avoid bed rest, walk (but avoid trails and uneven surfaces), gradual return to activity to tolerance (avoid anything that peripheralizes symptoms), call if symptoms peripheralize, worsen, or neurologic deficit progresses. Ther-ex: IASTM; discussed post procedure soreness and/or ecchymosis for up to 36 hrs, applied to affected mm hypertonicities; supine hamstring stretch, supine gluteal stretch, side laying QL stretch, single knee to chest stretch, hip flexor pin-and-stretch, alternating prone hip extension, glute bridge, transitional mvmt education, abdominal bracing; greater than 15 min spent performing above mentioned ther-ex to improve  ROM/flexibility. Thoracic mobilization/manipulation: prone P-A mob, supine A-P manip; Lumbar mobilization/manipulation: diversified side laying graded HVLA, flexion-traction; SIJ Manipulation/Mobilization: R/L SIJ HVLA - long axis distraction, hopper drop table maneuver to affected SIJ    HPI:  Shruti Kaplan is a 63 y.o. female  Chief Complaint   Patient presents with   • Back Pain     Lower back pain-7   • Sciatica     The patient presents to the office with  lower back pain that is chronic in nature, she has had this for 3 years, she has had injections and ablations, She tries to walk her dog to get some exercise, blue . She also mentions having chronic neck pain. The patient was referred to our office for consult and co-management by Dr. Simon.  11/24- The patient reports she is sore and achy today from all he cooking yesterday.  12/8- The patient is feeling sore today, 5/10 pain but mentions she thinks is may be the weather.   1/5 The patient is feeling sore today, same pain 7/10    Back Pain  This is a chronic problem. The problem has been waxing and waning since onset. The pain is present in the thoracic spine, lumbar spine, sacro-iliac and gluteal. The quality of the pain is described as aching. The pain is at a severity of 10/10. The pain is severe. The symptoms are aggravated by sitting, standing, twisting, position and bending.   Neck Pain   This is a chronic problem. The current episode started more than 1 year ago. The problem has been waxing and waning. The pain is present in the left side and right side. The quality of the pain is described as aching. The pain is at a severity of 10/10. The pain is severe. She has tried heat and home exercises for the symptoms. The treatment provided mild relief.     Past Medical History:   Diagnosis Date   • Allergic rhinitis    • Anemia    • Anxiety    • Arthritis    • Back pain    • Cancer (HCC)    • Chronic pain disorder     back   • COPD (chronic  obstructive pulmonary disease) ([facility])    • CPAP (continuous positive airway pressure) dependence    • Depression    • Diabetes mellitus ([facility])     Pre- Diabetic   • Dyslipidemia    • Essential tremor    • Excessive daytime sleepiness    • GERD (gastroesophageal reflux disease)    • History of uterine cancer    • Hyperglycemia    • Hyperlipidemia    • Hypertension    • Hypovitaminosis D    • Iron deficiency anemia    • Joint pain    • Mood disorder ([facility])    • Obesity    • Obstructive sleep apnea    • Ringing in ears    • RLS (restless legs syndrome)    • Snoring    • Uterine cancer ([facility])     age 23   • Vitamin D deficiency    • Witnessed episode of apnea       Past Surgical History:   Procedure Laterality Date   • APPENDECTOMY     • EPIDURAL BLOCK INJECTION     • HYSTERECTOMY      age 23   • KNEE ARTHROSCOPY     • KNEE SURGERY      Meniscus tear   • TONSILLECTOMY     • TUBAL LIGATION       The following portions of the patient's history were reviewed and updated as appropriate: allergies, past family history, past medical history, past social history, past surgical history, and problem list.  Review of Systems   Musculoskeletal:  Positive for arthralgias, back pain, myalgias, neck pain and neck stiffness.     Physical Exam  Musculoskeletal:         General: Tenderness present.      Cervical back: Rigidity, spasms, tenderness and crepitus present. No swelling, edema, deformity, erythema, signs of trauma, lacerations, torticollis or bony tenderness. Pain with movement present. Decreased range of motion.      Thoracic back: Spasms and tenderness present. No swelling, edema, deformity, signs of trauma, lacerations or bony tenderness. Decreased range of motion.      Lumbar back: Spasms and tenderness present. No swelling, edema, deformity, signs of trauma, lacerations or bony tenderness. Decreased range of motion.        Back:        SOFT TISSUE ASSESSMENT Hypertonicity and tenderness palpated B C3-T3, Upper trap Levator  scap, erector spinae, T10-S1 erector spinae, hip flexor, glute med/min, QL, hamstring JOINT RESTRICTIONS: C4-T3, T10-S1 and R/L SIJ ORTHO: SI jt point tenderness: +; Vanessa unremarkable for centralization/peripheralization; ike's, iliac compression, thigh thrust elicit lbp in R/L SIJ; prone femoral nerve stretch neg for upper lumbar neural tension, elicits R/L SIJ stiffness; sitting root elicits no lbp on R/L; slump test elicits no neural tension R/L, Cervical distraction- neg, Maximal foraminal compression- neg but some local pain noted, Spurlings neg    Return in about 1 week (around 1/12/2024) for Recheck.

## 2024-01-10 ENCOUNTER — OFFICE VISIT (OUTPATIENT)
Dept: FAMILY MEDICINE CLINIC | Facility: CLINIC | Age: 64
End: 2024-01-10

## 2024-01-10 VITALS
WEIGHT: 281.2 LBS | SYSTOLIC BLOOD PRESSURE: 120 MMHG | TEMPERATURE: 96.9 F | BODY MASS INDEX: 54.92 KG/M2 | DIASTOLIC BLOOD PRESSURE: 70 MMHG | OXYGEN SATURATION: 95 % | HEART RATE: 65 BPM

## 2024-01-10 DIAGNOSIS — E66.01 MORBID OBESITY WITH BMI OF 50.0-59.9, ADULT (HCC): ICD-10-CM

## 2024-01-10 DIAGNOSIS — E11.9 TYPE 2 DIABETES MELLITUS WITHOUT COMPLICATION, WITHOUT LONG-TERM CURRENT USE OF INSULIN (HCC): Primary | ICD-10-CM

## 2024-01-10 PROCEDURE — 3074F SYST BP LT 130 MM HG: CPT | Performed by: STUDENT IN AN ORGANIZED HEALTH CARE EDUCATION/TRAINING PROGRAM

## 2024-01-10 PROCEDURE — 99213 OFFICE O/P EST LOW 20 MIN: CPT | Performed by: STUDENT IN AN ORGANIZED HEALTH CARE EDUCATION/TRAINING PROGRAM

## 2024-01-10 PROCEDURE — 3078F DIAST BP <80 MM HG: CPT | Performed by: STUDENT IN AN ORGANIZED HEALTH CARE EDUCATION/TRAINING PROGRAM

## 2024-01-10 NOTE — PROGRESS NOTES
Assessment/Plan:     1. Type 2 diabetes mellitus without complication, without long-term current use of insulin ([facility])  Assessment & Plan:    Lab Results   Component Value Date    HGBA1C 6.9 (H) 11/10/2023   Diet and exercise appropriate however will opt to start GLP-1 given significant obesity and related morbidity.  Repeat A1c in 1 month.    Orders:  -     semaglutide, 0.25 or 0.5 mg/dose, (Ozempic, 0.25 or 0.5 MG/DOSE,) 2 mg/3 mL injection pen; 0.25 mg under the skin every 7 days for 4 doses (28 days), THEN 0.5 mg under the skin every 7 days    2. Morbid obesity with BMI of 50.0-59.9, adult ([facility])  Assessment & Plan:  Patient remains motivated and encouraged to continue with weight loss measures.   Weight and inches increased since last visit. Engaged in gym but not yet with nutritional services but has made significant changes in diet in keeping with calorie restricted diet.  Reviewed use of Movebubbleometer christiano, steps in phone and Challenges christiano.  Unable to join current challenge due to android type phone however refocused to fit christiano on her phone and maintaining step goals of 200 per day and increasing daily by 100 (phone obviously not tracking accurately but worth the try):  - Will add pharmaceutical medication at this time - Semaglutide 0.25mg weekly x 4 weeks  - Will review in 4 weeks for progress and medication tolerance and up titration    Orders:  -     semaglutide, 0.25 or 0.5 mg/dose, (Ozempic, 0.25 or 0.5 MG/DOSE,) 2 mg/3 mL injection pen; 0.25 mg under the skin every 7 days for 4 doses (28 days), THEN 0.5 mg under the skin every 7 days      BMI Counseling: Body mass index is 54.92 kg/m². The BMI is above normal. Nutrition recommendations include decreasing portion sizes, encouraging healthy choices of fruits and vegetables, decreasing fast food intake, limiting drinks that contain sugar, moderation in carbohydrate intake, increasing intake of lean protein, reducing intake of saturated and trans fat and  "reducing intake of cholesterol. Exercise recommendations include moderate physical activity 150 minutes/week, exercising 3-5 times per week and strength training exercises. Rationale for BMI follow-up plan is due to patient being overweight or obese.         Shruti Kaplan is a 63 y.o. female  presenting for weight management follow up   Patient is motivated to continue this journey.     Current status:  Wt Readings from Last 3 Encounters:   01/10/24 128 kg (281 lb 3.2 oz)   01/05/24 124 kg (274 lb)   12/27/23 124 kg (274 lb)       Initial weight:274lb  Last weight:275 lb  Current Weight: 281 lbs  Change in weight:+6 lb    BMI: Body mass index is 54.92 kg/m².    Initial Neck Circumference: 38cm / 15\"  Last Neck Circumference: 36cm, 14.25\"  Current Neck Circumference: 37.5 cm    Initial Waist Circumference: 141cm / 55.25\"  Last Waist Circumference: 146cm, 57.5\"  Current waist circumference: 147.5 cm      Patient has set new goals for weight management.    Physical activities goals:                      Thus far patient has been doing more  walking to improve their physical activities                      Patient has been attending the group gym session: Yes                      Patient plans to increase physical activity by going back in at fitness center (closed over holidays) and increasing daily step count                      Patient plans to exercise for 30 minutes 7 times per week  Patient has the [employer] christiano however current challenge not compatible with her android phone and therefore will continue to monitor through google fit christiano - last month 1800+ steps achieved and encouraged to increase daily steps by a 100. Current goal at least 200 steps (current daily max on her phone - although phone does not appear to be tracking accurately will continue to utilize as a motivational marker)    Nutritional goals:  Patient plans to improve diet by decreasing portion size, decreasing fat consumed, and increasing " fiber intake - has been doing well with this per report. Drinking only glass of low fat milk in the morning and otherwise water.  Patient has been attending group nutrition session: No  Patient has done individual nutrition counseling: No  Patient is logging food and activities on Fan[employer] christiano: No - reported average meal intake verbally but will focus on caloric recording at follow up visit  Ensure adequate water intake of 64oz daily - consuming much more than this and congratulated  Encouraged to maintain healthy calorie restricted diet of 2192-5791 kcal/day  Protein intake goals: 125g  (1-1.2g/kg/d)  Consume less than 10% of daily kcal in healthy fats    Join group nutrition class: Reminded patient of dates ( October 3rd at 2pm, Nov 14 at 2pm, Dec 13 at 10am, Jan 17 at 2pm, Feb 16 at 10am at [facility] conference room )  Patient can make their own individual nutrition appointment at [phone]      Medication Therapy:    Will start at this time given patient has failed dietary and exercise therapy over the past 1 year since we have personally been following in this lifestyle weight management and in the last 3 months since joining this weight management project.  Patient denies personal history of pancreatitis. Patient also denies personal and family history of  thyroid cancer and multiple endocrine neoplasia type 2 (MEN 2 tumor).    Follow Up:  1 month      Subjective:      Shruti Kaplan is a 63 y.o. female patient who present to the office for assistance with weight management follow up.   How do you feel about your current weight- frustrated and discouraged due to no weight loss despite intentional efforts  What did you find the most challenging since your last visit - still, getting her steps recognized on [employer] christiano - now we realize because her phone is not compatible with the current challenge code  What continues to motivate you to lose weight- improved health and discussions with my  weight management team  Are you happy with your current progress - no!    Review of Systems   Constitutional:  Positive for unexpected weight change (increased despite efforts consistent for weight loss). Negative for chills and fever.   Respiratory:  Positive for shortness of breath and wheezing. Negative for cough.    Cardiovascular:  Positive for leg swelling. Negative for chest pain and palpitations.   Gastrointestinal:  Negative for abdominal pain, constipation, diarrhea, nausea and vomiting.   Genitourinary:  Negative for dysuria and hematuria.   Musculoskeletal:  Positive for arthralgias. Negative for back pain.   Skin:  Negative for color change and rash.   Neurological:  Negative for dizziness, syncope and headaches.   All other systems reviewed and are negative.      Past Medical History:   Diagnosis Date    Allergic     Allergic rhinitis     Anemia     Anxiety     Arthritis     Back pain     Cancer ([facility])     Chronic pain disorder     back    COPD (chronic obstructive pulmonary disease) ([facility])     CPAP (continuous positive airway pressure) dependence     Depression     Diabetes mellitus (HCC)     Pre- Diabetic    Dyslipidemia     Essential tremor     Excessive daytime sleepiness     GERD (gastroesophageal reflux disease)     Headache(784.0) 1 week ago    History of uterine cancer     Hyperglycemia     Hyperlipidemia     Hypertension     Hypovitaminosis D     Iron deficiency anemia     Joint pain     Mood disorder (HCC)     Obesity     Obstructive sleep apnea     Ringing in ears     RLS (restless legs syndrome)     Snoring     Uterine cancer ([facility])     age 23    Vitamin D deficiency     Witnessed episode of apnea      Past Surgical History:   Procedure Laterality Date    APPENDECTOMY      EPIDURAL BLOCK INJECTION      HYSTERECTOMY      age 23    KNEE ARTHROSCOPY      KNEE SURGERY      Meniscus tear    TONSILLECTOMY      TUBAL LIGATION       Family History   Problem Relation Age of Onset    Diabetes Mother      Asthma Mother     Hypertension Mother     Heart disease Mother     No Known Problems Father     No Known Problems Sister     No Known Problems Daughter     No Known Problems Maternal Grandmother     No Known Problems Paternal Grandmother     No Known Problems Sister     No Known Problems Sister     No Known Problems Daughter     No Known Problems Maternal Grandfather     No Known Problems Paternal Grandfather      Social History     Socioeconomic History    Marital status:      Spouse name: None    Number of children: None    Years of education: None    Highest education level: None   Occupational History    None   Tobacco Use    Smoking status: Former     Current packs/day: 0.00     Average packs/day: 1.5 packs/day for 47.7 years (71.6 ttl pk-yrs)     Types: Cigarettes     Start date: 1974     Quit date: 2021     Years since quittin.2    Smokeless tobacco: Never   Vaping Use    Vaping status: Never Used   Substance and Sexual Activity    Alcohol use: Not Currently     Comment: very seldom    Drug use: Not Currently     Comment: pt use at age [age]    Sexual activity: Yes     Partners: Male   Other Topics Concern    None   Social History Narrative    None     Social Determinants of Health     Financial Resource Strain: High Risk (2023)    Overall Financial Resource Strain (CARDIA)     Difficulty of Paying Living Expenses: Hard   Food Insecurity: Food Insecurity Present (2023)    Hunger Vital Sign     Worried About Running Out of Food in the Last Year: Often true     Ran Out of Food in the Last Year: Often true   Transportation Needs: No Transportation Needs (2023)    PRAPARE - Transportation     Lack of Transportation (Medical): No     Lack of Transportation (Non-Medical): No   Physical Activity: Not on file   Stress: Not on file   Social Connections: Not on file   Intimate Partner Violence: Not on file   Housing Stability: Low Risk  (2021)    Housing Stability Vital Sign      Unable to Pay for Housing in the Last Year: No     Number of Places Lived in the Last Year: 1     Unstable Housing in the Last Year: No     Current Outpatient Medications on File Prior to Visit   Medication Sig    acetaminophen (TYLENOL) 650 mg CR tablet Take 1 tablet (650 mg total) by mouth every 8 (eight) hours as needed for mild pain    albuterol (PROVENTIL HFA,VENTOLIN HFA) 90 mcg/act inhaler inhale 2 puffs by mouth every 6 hours if needed for wheezing    ascorbic acid (VITAMIN C) 500 mg tablet Take 500 mg by mouth daily    aspirin (Aspirin Low Dose) 81 mg EC tablet Take 1 tablet (81 mg total) by mouth daily    atorvastatin (LIPITOR) 40 mg tablet take 1 tablet by mouth once daily    Azelastine HCl 137 MCG/SPRAY SOLN INSTILL 1 SPRAY INTO EACH NOSTRIL 2 TIMES A DAY AS DIRECTED    budesonide-formoterol (SYMBICORT) 160-4.5 mcg/act inhaler Inhale 2 puffs 2 (two) times a day Rinse mouth after use.    cholecalciferol (VITAMIN D3) 1,000 units tablet Take 1 tablet (1,000 Units total) by mouth daily    Diclofenac Sodium (VOLTAREN) 1 % Apply 2 g topically 4 (four) times a day    fluticasone (FLONASE) 50 mcg/act nasal spray 1 spray into each nostril daily    gabapentin (NEURONTIN) 300 mg capsule Take 1 capsule in am, 2 capsules in afternoon, and 1 capsule at bedtime. If not helpful in 2 weeks may increase to 1 capsule in am 2 capsules in afternoon and 2 capsules at bedtime.    ketoconazole (NIZORAL) 2 % cream Apply topically daily    loratadine (CLARITIN) 10 mg tablet Take 1 tablet (10 mg total) by mouth daily    meloxicam (Mobic) 15 mg tablet Take 1 tablet (15 mg total) by mouth daily    Misc. Devices (Rollator Ultra-Light) MISC Use daily    montelukast (SINGULAIR) 10 mg tablet take 1 tablet by mouth at bedtime    propranolol (INDERAL) 40 mg tablet Take 0.5 tablets (20 mg total) by mouth every 12 (twelve) hours    terbinafine (LamISIL) 250 mg tablet     calcium carbonate (OS-ROSCOE) 1250 (500 Ca) MG chewable tablet Chew 1  tablet (1,250 mg total) daily    ketoconazole (NIZORAL) 2 % cream Apply topically daily for 14 days (Patient not taking: Reported on 11/13/2023)     Allergies   Allergen Reactions    Aspirin GI Intolerance     Can only take baby aspirin     Latex Itching     Immunization History   Administered Date(s) Administered    INFLUENZA 10/30/2013, 10/19/2015, 11/14/2016, 11/21/2017, 10/21/2022    Influenza, high dose seasonal 0.7 mL 11/29/2023    Influenza, recombinant, quadrivalent,injectable, preservative free 10/18/2018, 12/16/2019, 09/28/2020, 10/13/2021, 10/21/2022    MMR 12/15/2020    Pneumococcal Conjugate Vaccine 20-valent (Pcv20), Polysace 05/26/2022    Pneumococcal Polysaccharide PPV23 04/18/2016    Tdap 11/26/2020       Objective     /70 (BP Location: Right arm, Patient Position: Sitting, Cuff Size: Standard)   Pulse 65   Temp (!) 96.9 °F (36.1 °C) (Temporal)   Wt 128 kg (281 lb 3.2 oz)   LMP  (LMP Unknown)   SpO2 95%   BMI 54.92 kg/m²     Physical Exam  Vitals reviewed.   Constitutional:       General: She is not in acute distress.     Appearance: She is obese.   Eyes:      Conjunctiva/sclera: Conjunctivae normal.   Cardiovascular:      Rate and Rhythm: Normal rate.      Pulses: Normal pulses.      Heart sounds: Normal heart sounds. No murmur heard.  Pulmonary:      Effort: Pulmonary effort is normal.      Breath sounds: No wheezing, rhonchi or rales.   Musculoskeletal:         General: Normal range of motion.      Cervical back: Normal range of motion.   Skin:     General: Skin is dry.   Neurological:      General: No focal deficit present.      Mental Status: She is alert.   Psychiatric:         Behavior: Behavior normal.       Violeta Troy MD

## 2024-01-10 NOTE — ASSESSMENT & PLAN NOTE
Lab Results   Component Value Date    HGBA1C 6.9 (H) 11/10/2023   Diet and exercise appropriate however will opt to start GLP-1 given significant obesity and related morbidity.  Repeat A1c in 1 month.

## 2024-01-10 NOTE — ASSESSMENT & PLAN NOTE
Patient remains motivated and encouraged to continue with weight loss measures.   Weight and inches increased since last visit. Engaged in gym but not yet with nutritional services but has made significant changes in diet in keeping with calorie restricted diet.  Reviewed use of cronometer christiano, steps in phone and Challenges christiano.  Unable to join current challenge due to android type phone however refocused to fit christiano on her phone and maintaining step goals of 200 per day and increasing daily by 100 (phone obviously not tracking accurately but worth the try):  - Will add pharmaceutical medication at this time - Semaglutide 0.25mg weekly x 4 weeks  - Will review in 4 weeks for progress and medication tolerance and up titration

## 2024-01-10 NOTE — PATIENT INSTRUCTIONS
Heart Healthy Diet   AMBULATORY CARE:   A heart healthy diet  is an eating plan low in unhealthy fats and sodium (salt). The plan is high in healthy fats and fiber. A heart healthy diet helps improve your cholesterol levels and lowers your risk for heart disease and stroke. A dietitian will teach you how to read and understand food labels.  Heart healthy diet guidelines to follow:   Choose foods that contain healthy fats:      Unsaturated fats  include monounsaturated and polyunsaturated fats. Unsaturated fat is found in foods such as soybean, canola, olive, corn, and safflower oils. It is also found in soft tub margarine that is made with liquid vegetable oil.    Omega-3 fat  is found in certain fish, such as salmon, tuna, and trout, and in walnuts and flaxseed. Eat fish high in omega-3 fats at least 2 times a week.       Limit or do not have unhealthy fats:      Cholesterol  is found in animal foods, such as eggs and lobster, and in dairy products made from whole milk. Limit cholesterol to less than 200 mg each day.    Saturated fat  is found in meats, such as urbina and hamburger. It is also found in chicken or turkey skin, whole milk, and butter. Limit saturated fat to less than 7% of your total daily calories.    Trans fat  is found in packaged foods, such as potato chips and cookies. It is also in hard margarine, some fried foods, and shortening. Do not eat foods that contain trans fats.    Get 20 to 30 grams of fiber each day.  Fruits, vegetables, whole-grain foods, and legumes (cooked beans) are good sources of fiber.         Limit sodium as directed.  You may be told to limit sodium, such as to 2,000 mg or less each day. Choose low-sodium or no-salt-added foods. Add little or no salt to food you prepare. Use herbs and spices in place of salt.       Include the following in your heart healthy plan:  Ask your dietitian or healthcare provider how many servings to have each day from the following food  groups:  Grains:      Whole-wheat breads, cereals, and pastas, and brown rice    Low-fat, low-sodium crackers and chips    Vegetables:      Broccoli, green beans, green peas, and spinach    Collards, kale, and lima beans    Carrots, sweet potatoes, tomatoes, and peppers    Canned vegetables with no salt added    Fruits:      Bananas, peaches, pears, and pineapple    Grapes, raisins, and dates    Oranges, tangerines, grapefruit, orange juice, and grapefruit juice    Apricots, mangoes, melons, and papaya    Raspberries and strawberries    Canned fruit with no added sugar    Low-fat dairy:      Nonfat (skim) milk, 1% milk, and low-fat almond, cashew, or soy milks fortified with calcium    Low-fat cheese, regular or frozen yogurt, and cottage cheese    Meats and proteins:      Lean cuts of beef and pork (loin, leg, round), skinless chicken and turkey    Legumes, soy products, egg whites, or nuts    Limit or do not include the following in your heart healthy plan:   Foods and liquids that contain unhealthy fats and oils:      Whole or 2% milk, cream cheese, sour cream, or cheese    High-fat cuts of beef (T-bone steaks, ribs), chicken or turkey with skin, and organ meats such as liver    Butter, stick margarine, shortening, and cooking oils such as coconut or palm oil    Foods and liquids high in sodium:      Packaged foods, such as frozen dinners, cookies, macaroni and cheese, and cereals with more than 300 mg of sodium per serving    Vegetables with added sodium, such as instant potatoes, vegetables with added sauces, or regular canned vegetables    Cured or smoked meats, such as hot dogs, urbina, and sausage    High-sodium ketchup, barbecue sauce, salad dressing, pickles, olives, soy sauce, or miso    Foods and liquids high in sugar:      Candy, cake, cookies, pies, or doughnuts    Soft drinks (soda), sports drinks, or sweetened tea    Canned or dry mixes for cakes, soups, sauces, or gravies    Other healthy heart  guidelines:   Do not smoke.  Nicotine and other chemicals in cigarettes and cigars can cause lung and heart damage. Ask your healthcare provider for information if you currently smoke and need help to quit. E-cigarettes or smokeless tobacco still contain nicotine. Talk to your provider before you use these products.    Limit or do not drink alcohol as directed.  Alcohol can damage your heart and raise your blood pressure. Your healthcare provider may give you specific daily and weekly limits. The general recommended limit is 1 drink a day for women 21 or older and for men 65 or older. Do not have more than 3 drinks within 24 hours or 7 within a week. The recommended limit is 2 drinks a day for men 21 to 64 years of age. Do not have more than 4 drinks within 24 hours or 14 within a week. A drink of alcohol is 12 ounces of beer, 5 ounces of wine, or 1½ ounces of liquor.    Maintain a healthy weight.  Extra body weight makes your heart work harder. Ask your provider what a healthy weight is for you. He or she can help you create a safe weight loss plan, if needed.    Exercise regularly.  Exercise can help you maintain a healthy weight and improve your blood pressure and cholesterol levels. Regular exercise can also decrease your risk for heart problems. Ask your provider about the best exercise plan for you. Do not start an exercise program without asking your provider.          Follow up with your doctor or cardiologist as directed:  Write down your questions so you remember to ask them during your visits.  © Copyright [employer] 2023 Information is for End User's use only and may not be sold, redistributed or otherwise used for commercial purposes.  The above information is an  only. It is not intended as medical advice for individual conditions or treatments. Talk to your doctor, nurse or pharmacist before following any medical regimen to see if it is safe and effective for you.

## 2024-01-12 ENCOUNTER — TELEPHONE (OUTPATIENT)
Dept: FAMILY MEDICINE CLINIC | Facility: CLINIC | Age: 64
End: 2024-01-12

## 2024-01-12 DIAGNOSIS — J30.89 SEASONAL ALLERGIC RHINITIS DUE TO OTHER ALLERGIC TRIGGER: ICD-10-CM

## 2024-01-12 DIAGNOSIS — E11.9 TYPE 2 DIABETES MELLITUS WITHOUT COMPLICATION, WITHOUT LONG-TERM CURRENT USE OF INSULIN (HCC): ICD-10-CM

## 2024-01-12 DIAGNOSIS — E66.01 MORBID OBESITY WITH BMI OF 50.0-59.9, ADULT (HCC): ICD-10-CM

## 2024-01-12 NOTE — TELEPHONE ENCOUNTER
Hi, this is Estefany at [facility] calling. I am calling regarding Shruti Kaplan. I have an order here from looks like actually I have two orders from Doctor Torsten Welch. Suyapa Welch. I guess she got her state license. It is not downloaded or uploaded into [employer]. That's the national database for doctors with the insurance companies Check. So the insurance for Shruti Kaplan is declining the prescription. It's saying prescriber state license is inactive because her resident one is inactive and she has not or anybody not updated her regular state license for her for her being a doctor. So I need to either have that rectified or I need another doctor to send these orders over. For Shruti Kaplan, date of birth is [birth year]. It is for the Ozempic and the azelastine nasal spray she's looking to have refilled. If you have any questions, phone number is [phone]. Thank you.  You received a voice mail from [facility].

## 2024-01-16 RX ORDER — AZELASTINE HYDROCHLORIDE 137 UG/1
1 SPRAY, METERED NASAL AS NEEDED
Qty: 30 ML | Refills: 1 | Status: SHIPPED | OUTPATIENT
Start: 2024-01-16

## 2024-01-26 ENCOUNTER — PROCEDURE VISIT (OUTPATIENT)
Age: 64
End: 2024-01-26
Payer: MEDICARE

## 2024-01-26 VITALS
OXYGEN SATURATION: 97 % | SYSTOLIC BLOOD PRESSURE: 128 MMHG | DIASTOLIC BLOOD PRESSURE: 82 MMHG | BODY MASS INDEX: 55.17 KG/M2 | HEART RATE: 68 BPM | WEIGHT: 281 LBS | HEIGHT: 60 IN

## 2024-01-26 DIAGNOSIS — M99.02 SEGMENTAL DYSFUNCTION OF THORACIC REGION: ICD-10-CM

## 2024-01-26 DIAGNOSIS — M47.812 CERVICAL SPONDYLOSIS: ICD-10-CM

## 2024-01-26 DIAGNOSIS — M99.01 SEGMENTAL DYSFUNCTION OF CERVICAL REGION: ICD-10-CM

## 2024-01-26 DIAGNOSIS — M99.03 SEGMENTAL DYSFUNCTION OF LUMBAR REGION: ICD-10-CM

## 2024-01-26 DIAGNOSIS — M47.816 LUMBAR SPONDYLOSIS: Primary | ICD-10-CM

## 2024-01-26 DIAGNOSIS — M99.04 SEGMENTAL DYSFUNCTION OF SACRAL REGION: ICD-10-CM

## 2024-01-26 PROCEDURE — 98942 CHIROPRACTIC MANJ 5 REGIONS: CPT | Performed by: CHIROPRACTOR

## 2024-01-26 NOTE — PROGRESS NOTES
Initial date of service: 11/13/23    Diagnoses and all orders for this visit:    Lumbar spondylosis    Segmental dysfunction of lumbar region    Segmental dysfunction of thoracic region    Segmental dysfunction of sacral region    Segmental dysfunction of cervical region    Cervical spondylosis       ASSESSMENT:  Pt's symptoms and exam findings consistent with lumbar spondylosis and cervical spondylosis  secondary to repetitive st/sp injury, exacerbated by postural/ergonomic stressors. Pt responded well to flexion biased stretches and manual mobilization of the affected spinal and myofascial tissues with increased ROM; trial of conservative tx recommended consisting of stretching, graded mobilization/manipulation of the affected spinal and myofascial jt dysfunction, postural/ergonomic education and take home stretches/exercises. If symptoms fail to improve with short trial of conservative care, appropriate imaging and referral will be coordinated.  -Pt tolerated treatment well with a decrease in tenderness to palpation and mm spasm    PROCEDURE CODES: 30608-JS    TREATMENT:  Fear avoidance behavior discussion; encouraged and reassured pt that natural course of condition is to improve over time with adherence to tx plan and home care strategies. Home care recommendations: avoid bed rest, walk (but avoid trails and uneven surfaces), gradual return to activity to tolerance (avoid anything that peripheralizes symptoms), call if symptoms peripheralize, worsen, or neurologic deficit progresses. Ther-ex: IASTM; discussed post procedure soreness and/or ecchymosis for up to 36 hrs, applied to affected mm hypertonicities; supine hamstring stretch, supine gluteal stretch, side laying QL stretch, single knee to chest stretch, hip flexor pin-and-stretch, alternating prone hip extension, glute bridge, transitional mvmt education, abdominal bracing; greater than 15 min spent performing above mentioned ther-ex to improve  ROM/flexibility. Thoracic mobilization/manipulation: prone P-A mob, supine A-P manip; Lumbar mobilization/manipulation: diversified side laying graded HVLA, flexion-traction; SIJ Manipulation/Mobilization: R/L SIJ HVLA - long axis distraction, hopper drop table maneuver to affected SIJ    HPI:  Shruti Kaplan is a 63 y.o. female  Chief Complaint   Patient presents with   • Back Pain     Lower back pain-7   • Sciatica     The patient presents to the office with  lower back pain that is chronic in nature, she has had this for 3 years, she has had injections and ablations, She tries to walk her dog to get some exercise, blue . She also mentions having chronic neck pain. The patient was referred to our office for consult and co-management by Dr. Simon.  11/24- The patient reports she is sore and achy today from all he cooking yesterday.  12/8- The patient is feeling sore today, 5/10 pain but mentions she thinks is may be the weather.   1/5 The patient is feeling sore today, same pain 7/10  1/26- Worse in lower back with the weather    Back Pain  This is a chronic problem. The problem has been waxing and waning since onset. The pain is present in the thoracic spine, lumbar spine, sacro-iliac and gluteal. The quality of the pain is described as aching. The pain is at a severity of 10/10. The pain is severe. The symptoms are aggravated by sitting, standing, twisting, position and bending.   Neck Pain   This is a chronic problem. The current episode started more than 1 year ago. The problem has been waxing and waning. The pain is present in the left side and right side. The quality of the pain is described as aching. The pain is at a severity of 10/10. The pain is severe. She has tried heat and home exercises for the symptoms. The treatment provided mild relief.     Past Medical History:   Diagnosis Date   • Allergic    • Allergic rhinitis    • Anemia    • Anxiety    • Arthritis    • Back pain    • Cancer  ([facility])    • Chronic pain disorder     back   • COPD (chronic obstructive pulmonary disease) ([facility])    • CPAP (continuous positive airway pressure) dependence    • Depression    • Diabetes mellitus ([facility])     Pre- Diabetic   • Dyslipidemia    • Essential tremor    • Excessive daytime sleepiness    • GERD (gastroesophageal reflux disease)    • Headache(784.0) 1 week ago   • History of uterine cancer    • Hyperglycemia    • Hyperlipidemia    • Hypertension    • Hypovitaminosis D    • Iron deficiency anemia    • Joint pain    • Mood disorder ([facility])    • Obesity    • Obstructive sleep apnea    • Ringing in ears    • RLS (restless legs syndrome)    • Snoring    • Uterine cancer ([facility])     age 23   • Vitamin D deficiency    • Witnessed episode of apnea       Past Surgical History:   Procedure Laterality Date   • APPENDECTOMY     • EPIDURAL BLOCK INJECTION     • HYSTERECTOMY      age 23   • KNEE ARTHROSCOPY     • KNEE SURGERY      Meniscus tear   • TONSILLECTOMY     • TUBAL LIGATION       The following portions of the patient's history were reviewed and updated as appropriate: allergies, past family history, past medical history, past social history, past surgical history, and problem list.  Review of Systems   Musculoskeletal:  Positive for arthralgias, back pain, myalgias, neck pain and neck stiffness.     Physical Exam  Musculoskeletal:         General: Tenderness present.      Cervical back: Rigidity, spasms, tenderness and crepitus present. No swelling, edema, deformity, erythema, signs of trauma, lacerations, torticollis or bony tenderness. Pain with movement present. Decreased range of motion.      Thoracic back: Spasms and tenderness present. No swelling, edema, deformity, signs of trauma, lacerations or bony tenderness. Decreased range of motion.      Lumbar back: Spasms and tenderness present. No swelling, edema, deformity, signs of trauma, lacerations or bony tenderness. Decreased range of motion.         Back:        SOFT TISSUE ASSESSMENT Hypertonicity and tenderness palpated B C3-T3, Upper trap Levator scap, erector spinae, T10-S1 erector spinae, hip flexor, glute med/min, QL, hamstring JOINT RESTRICTIONS: C4-T3, T10-S1 and R/L SIJ ORTHO: SI jt point tenderness: +; Vanessa unremarkable for centralization/peripheralization; ike's, iliac compression, thigh thrust elicit lbp in R/L SIJ; prone femoral nerve stretch neg for upper lumbar neural tension, elicits R/L SIJ stiffness; sitting root elicits no lbp on R/L; slump test elicits no neural tension R/L, Cervical distraction- neg, Maximal foraminal compression- neg but some local pain noted, Spurlings neg    Return in about 1 week (around 2/2/2024) for Recheck.

## 2024-02-07 ENCOUNTER — OFFICE VISIT (OUTPATIENT)
Dept: FAMILY MEDICINE CLINIC | Facility: CLINIC | Age: 64
End: 2024-02-07

## 2024-02-07 VITALS
TEMPERATURE: 97.9 F | HEART RATE: 67 BPM | RESPIRATION RATE: 18 BRPM | BODY MASS INDEX: 54.58 KG/M2 | OXYGEN SATURATION: 97 % | SYSTOLIC BLOOD PRESSURE: 118 MMHG | WEIGHT: 278 LBS | HEIGHT: 60 IN | DIASTOLIC BLOOD PRESSURE: 86 MMHG

## 2024-02-07 DIAGNOSIS — G25.0 BENIGN ESSENTIAL TREMOR: ICD-10-CM

## 2024-02-07 DIAGNOSIS — E11.9 TYPE 2 DIABETES MELLITUS WITHOUT COMPLICATION, WITHOUT LONG-TERM CURRENT USE OF INSULIN (HCC): ICD-10-CM

## 2024-02-07 DIAGNOSIS — Z87.891 PERSONAL HISTORY OF NICOTINE DEPENDENCE: ICD-10-CM

## 2024-02-07 DIAGNOSIS — R91.8 LUNG NODULES: ICD-10-CM

## 2024-02-07 DIAGNOSIS — Z23 ENCOUNTER FOR IMMUNIZATION: Primary | ICD-10-CM

## 2024-02-07 DIAGNOSIS — E66.01 MORBID OBESITY WITH BMI OF 50.0-59.9, ADULT (HCC): ICD-10-CM

## 2024-02-07 PROCEDURE — 3074F SYST BP LT 130 MM HG: CPT | Performed by: STUDENT IN AN ORGANIZED HEALTH CARE EDUCATION/TRAINING PROGRAM

## 2024-02-07 PROCEDURE — 99214 OFFICE O/P EST MOD 30 MIN: CPT | Performed by: STUDENT IN AN ORGANIZED HEALTH CARE EDUCATION/TRAINING PROGRAM

## 2024-02-07 PROCEDURE — 3079F DIAST BP 80-89 MM HG: CPT | Performed by: STUDENT IN AN ORGANIZED HEALTH CARE EDUCATION/TRAINING PROGRAM

## 2024-02-07 RX ORDER — ZOSTER VACCINE RECOMBINANT, ADJUVANTED 50 MCG/0.5
0.5 KIT INTRAMUSCULAR ONCE
Qty: 1 EACH | Refills: 1 | Status: SHIPPED | OUTPATIENT
Start: 2024-02-07 | End: 2024-02-07

## 2024-02-07 RX ORDER — INSULIN PMP CART,AUT,G6/7,CNTR
EACH SUBCUTANEOUS
Status: CANCELLED | OUTPATIENT
Start: 2024-02-07

## 2024-02-07 NOTE — PROGRESS NOTES
Assessment/Plan:     1. Encounter for immunization  -     Zoster Vac Recomb Adjuvanted (Shingrix) 50 MCG/0.5ML SUSR; Inject 0.5 mL into a muscle once for 1 dose Repeat dose in 2 to 6 months    2. Type 2 diabetes mellitus without complication, without long-term current use of insulin (HCC)  -     Microalbumin,Urine; Future  -     Lipid panel; Future  -     Hemoglobin A1C; Future  -     Albumin / creatinine urine ratio; Future    3. Morbid obesity with BMI of 50.0-59.9, adult ([facility])  Assessment & Plan:  Patient remains motivated and encouraged to continue with weight loss measures.   Weight and inches increased since last visit. Engaged in gym but not yet with nutritional services but has made significant changes in diet in keeping with calorie restricted diet.  - Continue Semaglutide 0.5mg weekly x 4 weeks  - Will review in 4 weeks    Orders:  -     Lipid panel; Future  -     Hemoglobin A1C; Future  -     TSH + Free T4; Future    4. Lung nodules  -     CT lung screening program    5. Essential tremor    6. Personal history of nicotine dependence  -     CT lung screening program      BMI Counseling: Body mass index is 54.29 kg/m². The BMI is above normal. Nutrition recommendations include decreasing portion sizes, encouraging healthy choices of fruits and vegetables, decreasing fast food intake, limiting drinks that contain sugar, moderation in carbohydrate intake, increasing intake of lean protein, reducing intake of saturated and trans fat and reducing intake of cholesterol. Exercise recommendations include moderate physical activity 150 minutes/week, exercising 3-5 times per week and strength training exercises. Rationale for BMI follow-up plan is due to patient being overweight or obese.     Depression Screening and Follow-up Plan: Patient's depression screening was positive with a PHQ-9 score of 5.         Shruti Kaplan is a 63 y.o. female  presenting for weight management follow up   Patient is motivated to  "continue this journey.     Current status:  Wt Readings from Last 3 Encounters:   02/07/24 126 kg (278 lb)   01/26/24 127 kg (281 lb)   01/10/24 128 kg (281 lb 3.2 oz)       Initial weight:274lb  Last weight:281 lb  Current Weight: 278 lbs  Change in weight:-3 lb    BMI: Body mass index is 54.29 kg/m².    Initial Neck Circumference: 38cm / 15\"  Last Neck Circumference: 37.5 cm  Current Neck Circumference: 37 cm    Initial Waist Circumference: 141cm / 55.25\"  Last Waist Circumference: 147.5cm  Current waist circumference: 145 cm      Patient has the same goals and inspiration for weight management.    Physical activities goals:                      Thus far patient has been doing more  walking to improve their physical activities                      Patient has been attending the group gym session: Yes however sessions closed. Informed of other  gym options or daytime Cardiac rehab options                      Patient plans to increase physical activity by doing home video exercises and continue daily walking with her pet.                      Patient plans to exercise for 30 minutes 7 times per week and has been doing well with this thus far  Patient has the [employer] christiano however current challenge not compatible with her android phone and therefore will continue to monitor through google fit christiano - last month 1800+ steps achieved and encouraged to increase daily steps by a 100.    Nutritional goals:  Patient plans to improve diet by decreasing portion size, decreasing fat consumed, and increasing fiber intake - has been doing well with this per report. Drinking only glass of low fat milk in the morning and otherwise water.  Patient has been attending group nutrition session: No - missed last session due to snow on the ground however will try to make next session in Feb 2024  Patient has done individual nutrition counseling: No but reminded of number available to call for appointment and encouraged strongly to do " so  Patient is logging food and activities on [employer] christiano: No   Ensure adequate water intake of 64oz daily - continues to consume much more than this  Encouraged to maintain healthy calorie restricted diet of 1771-5089 kcal/day  Protein intake goals: 125g  (1-1.2g/kg/d)  Consume less than 10% of daily kcal in healthy fats    Join group nutrition class: Reminded patient of dates ( Feb 16 at 10am at [facility] conference room )  Patient can make their own individual nutrition appointment at [phone] ext:[phone]      Medication Therapy:    Started on Ozempic given failed dietary and exercise therapy over the past 1 year since we have personally been following in this lifestyle weight management and in the first 3 months since joining this weight management project.  Patient denied personal history of pancreatitis. Patient also denied personal and family history of  thyroid cancer and multiple endocrine neoplasia type 2 (MEN 2 tumor).    Follow Up:  1 month      Subjective:      Shruti Kaplan is a 63 y.o. female patient who present to the office for assistance with weight management follow up.   How do you feel about your current weight- happier with progress. Clothes are fitting looser.  What did you find the most challenging since your last visit - pain to back however improved at night after lying on her side and allowing  to run analgesic cream and getting off of her feet.  What continues to motivate you to lose weight- improved health and discussions with my weight management team  Are you happy with your current progress - yes.    Review of Systems   Constitutional:  Negative for chills, fever and unexpected weight change.   Respiratory:  Negative for cough, shortness of breath and wheezing.    Cardiovascular:  Positive for leg swelling. Negative for chest pain and palpitations.   Gastrointestinal:  Negative for abdominal pain, constipation, diarrhea, nausea and vomiting.   Genitourinary:  Negative for  dysuria and hematuria.   Musculoskeletal:  Positive for arthralgias and back pain.   Skin:  Negative for color change and rash.   Neurological:  Negative for dizziness, syncope and headaches.   All other systems reviewed and are negative.      Past Medical History:   Diagnosis Date    Allergic     Allergic rhinitis     Anemia     Anxiety     Arthritis     Back pain     Cancer ([facility])     Chronic pain disorder     back    COPD (chronic obstructive pulmonary disease) ([facility])     CPAP (continuous positive airway pressure) dependence     Depression     Diabetes mellitus ([facility])     Pre- Diabetic    Dyslipidemia     Essential tremor     Excessive daytime sleepiness     GERD (gastroesophageal reflux disease)     Headache(784.0) 1 week ago    History of uterine cancer     Hyperglycemia     Hyperlipidemia     Hypertension     Hypovitaminosis D     Iron deficiency anemia     Joint pain     Mood disorder ([facility])     Obesity     Obstructive sleep apnea     Ringing in ears     RLS (restless legs syndrome)     Snoring     Uterine cancer ([facility])     age 23    Vitamin D deficiency     Witnessed episode of apnea      Past Surgical History:   Procedure Laterality Date    APPENDECTOMY      EPIDURAL BLOCK INJECTION      HYSTERECTOMY      age 23    KNEE ARTHROSCOPY      KNEE SURGERY      Meniscus tear    TONSILLECTOMY      TUBAL LIGATION       Family History   Problem Relation Age of Onset    Diabetes Mother     Asthma Mother     Hypertension Mother     Heart disease Mother     No Known Problems Father     No Known Problems Sister     No Known Problems Daughter     No Known Problems Maternal Grandmother     No Known Problems Paternal Grandmother     No Known Problems Sister     No Known Problems Sister     No Known Problems Daughter     No Known Problems Maternal Grandfather     No Known Problems Paternal Grandfather      Social History     Socioeconomic History    Marital status:      Spouse name: None    Number of children: None     Years of education: None    Highest education level: None   Occupational History    None   Tobacco Use    Smoking status: Former     Current packs/day: 0.00     Average packs/day: 1.5 packs/day for 47.7 years (71.6 ttl pk-yrs)     Types: Cigarettes     Start date: 1974     Quit date: 2021     Years since quittin.3     Passive exposure: Past    Smokeless tobacco: Never   Vaping Use    Vaping status: Never Used   Substance and Sexual Activity    Alcohol use: Not Currently     Comment: very seldom    Drug use: Not Currently     Comment: pt use at age [age]    Sexual activity: Yes     Partners: Male   Other Topics Concern    None   Social History Narrative    None     Social Determinants of Health     Financial Resource Strain: High Risk (2023)    Overall Financial Resource Strain (CARDIA)     Difficulty of Paying Living Expenses: Hard   Food Insecurity: Food Insecurity Present (2023)    Hunger Vital Sign     Worried About Running Out of Food in the Last Year: Often true     Ran Out of Food in the Last Year: Often true   Transportation Needs: No Transportation Needs (2023)    PRAPARE - Transportation     Lack of Transportation (Medical): No     Lack of Transportation (Non-Medical): No   Physical Activity: Not on file   Stress: Not on file   Social Connections: Not on file   Intimate Partner Violence: Not on file   Housing Stability: Low Risk  (2021)    Housing Stability Vital Sign     Unable to Pay for Housing in the Last Year: No     Number of Places Lived in the Last Year: 1     Unstable Housing in the Last Year: No     Current Outpatient Medications on File Prior to Visit   Medication Sig    acetaminophen (TYLENOL) 650 mg CR tablet Take 1 tablet (650 mg total) by mouth every 8 (eight) hours as needed for mild pain    albuterol (PROVENTIL HFA,VENTOLIN HFA) 90 mcg/act inhaler inhale 2 puffs by mouth every 6 hours if needed for wheezing    ascorbic acid (VITAMIN C) 500 mg tablet Take  500 mg by mouth daily    aspirin (Aspirin Low Dose) 81 mg EC tablet Take 1 tablet (81 mg total) by mouth daily    atorvastatin (LIPITOR) 40 mg tablet take 1 tablet by mouth once daily    Azelastine HCl 137 MCG/SPRAY SOLN 1 spray by Each Nare route if needed (allergies)    budesonide-formoterol (SYMBICORT) 160-4.5 mcg/act inhaler Inhale 2 puffs 2 (two) times a day Rinse mouth after use.    calcium carbonate (OS-ROSCOE) 1250 (500 Ca) MG chewable tablet Chew 1 tablet (1,250 mg total) daily    cholecalciferol (VITAMIN D3) 1,000 units tablet Take 1 tablet (1,000 Units total) by mouth daily    Diclofenac Sodium (VOLTAREN) 1 % Apply 2 g topically 4 (four) times a day    fluticasone (FLONASE) 50 mcg/act nasal spray 1 spray into each nostril daily    gabapentin (NEURONTIN) 300 mg capsule Take 1 capsule in am, 2 capsules in afternoon, and 1 capsule at bedtime. If not helpful in 2 weeks may increase to 1 capsule in am 2 capsules in afternoon and 2 capsules at bedtime.    ketoconazole (NIZORAL) 2 % cream Apply topically daily for 14 days (Patient not taking: Reported on 11/13/2023)    ketoconazole (NIZORAL) 2 % cream Apply topically daily    loratadine (CLARITIN) 10 mg tablet Take 1 tablet (10 mg total) by mouth daily    meloxicam (Mobic) 15 mg tablet Take 1 tablet (15 mg total) by mouth daily    Misc. Devices (Rollator Ultra-Light) MISC Use daily    montelukast (SINGULAIR) 10 mg tablet take 1 tablet by mouth at bedtime    propranolol (INDERAL) 40 mg tablet Take 0.5 tablets (20 mg total) by mouth every 12 (twelve) hours    semaglutide, 0.25 or 0.5 mg/dose, (Ozempic, 0.25 or 0.5 MG/DOSE,) 2 mg/3 mL injection pen 0.25 mg under the skin every 7 days for 4 doses (28 days), THEN 0.5 mg under the skin every 7 days    terbinafine (LamISIL) 250 mg tablet      Allergies   Allergen Reactions    Aspirin GI Intolerance     Can only take baby aspirin     Latex Itching     Immunization History   Administered Date(s) Administered    INFLUENZA  10/30/2013, 10/19/2015, 11/14/2016, 11/21/2017, 10/21/2022    Influenza, high dose seasonal 0.7 mL 11/29/2023    Influenza, recombinant, quadrivalent,injectable, preservative free 10/18/2018, 12/16/2019, 09/28/2020, 10/13/2021, 10/21/2022    MMR 12/15/2020    Pneumococcal Conjugate Vaccine 20-valent (Pcv20), Polysace 05/26/2022    Pneumococcal Polysaccharide PPV23 04/18/2016    Tdap 11/26/2020       Objective     /86 (BP Location: Right arm, Patient Position: Sitting, Cuff Size: Large)   Pulse 67   Temp 97.9 °F (36.6 °C) (Temporal)   Resp 18   Ht 5' (1.524 m)   Wt 126 kg (278 lb)   LMP  (LMP Unknown)   SpO2 97%   BMI 54.29 kg/m²     Physical Exam  Vitals reviewed.   Constitutional:       General: She is not in acute distress.     Appearance: She is obese.   Eyes:      Conjunctiva/sclera: Conjunctivae normal.   Cardiovascular:      Rate and Rhythm: Normal rate.   Pulmonary:      Effort: Pulmonary effort is normal.   Musculoskeletal:         General: Normal range of motion.   Skin:     General: Skin is dry.   Neurological:      General: No focal deficit present.      Mental Status: She is alert.   Psychiatric:         Behavior: Behavior normal.       Violeta Troy MD

## 2024-02-07 NOTE — ASSESSMENT & PLAN NOTE
Patient remains motivated and encouraged to continue with weight loss measures.   Weight and inches increased since last visit. Engaged in gym but not yet with nutritional services but has made significant changes in diet in keeping with calorie restricted diet.  - Continue Semaglutide 0.5mg weekly x 4 weeks  - Will review in 4 weeks

## 2024-02-09 ENCOUNTER — APPOINTMENT (OUTPATIENT)
Dept: LAB | Facility: CLINIC | Age: 64
End: 2024-02-09
Payer: MEDICARE

## 2024-02-09 DIAGNOSIS — E66.01 MORBID OBESITY WITH BMI OF 50.0-59.9, ADULT (HCC): ICD-10-CM

## 2024-02-09 DIAGNOSIS — E11.9 TYPE 2 DIABETES MELLITUS WITHOUT COMPLICATION, WITHOUT LONG-TERM CURRENT USE OF INSULIN (HCC): ICD-10-CM

## 2024-02-09 LAB
BILIRUB UR QL STRIP: NEGATIVE
CHOLEST SERPL-MCNC: 134 MG/DL
CLARITY UR: NORMAL
COLOR UR: NORMAL
CREAT UR-MCNC: 107.4 MG/DL
GLUCOSE UR STRIP-MCNC: NEGATIVE MG/DL
HDLC SERPL-MCNC: 42 MG/DL
HGB UR QL STRIP.AUTO: NEGATIVE
KETONES UR STRIP-MCNC: NEGATIVE MG/DL
LDLC SERPL CALC-MCNC: 62 MG/DL (ref 0–100)
LEUKOCYTE ESTERASE UR QL STRIP: NEGATIVE
MICROALBUMIN UR-MCNC: <7 MG/L
MICROALBUMIN/CREAT 24H UR: <7 MG/G CREATININE (ref 0–30)
NITRITE UR QL STRIP: NEGATIVE
NONHDLC SERPL-MCNC: 92 MG/DL
PH UR STRIP.AUTO: 6 [PH]
PROT UR STRIP-MCNC: NEGATIVE MG/DL
SP GR UR STRIP.AUTO: 1.02 (ref 1–1.03)
T4 FREE SERPL-MCNC: 0.97 NG/DL (ref 0.61–1.12)
TRIGL SERPL-MCNC: 148 MG/DL
TSH SERPL DL<=0.05 MIU/L-ACNC: 3.52 UIU/ML (ref 0.45–4.5)
UROBILINOGEN UR STRIP-ACNC: <2 MG/DL

## 2024-02-09 PROCEDURE — 82570 ASSAY OF URINE CREATININE: CPT

## 2024-02-09 PROCEDURE — 82043 UR ALBUMIN QUANTITATIVE: CPT

## 2024-02-09 PROCEDURE — 84443 ASSAY THYROID STIM HORMONE: CPT

## 2024-02-09 PROCEDURE — 84439 ASSAY OF FREE THYROXINE: CPT

## 2024-02-09 PROCEDURE — 36415 COLL VENOUS BLD VENIPUNCTURE: CPT

## 2024-02-09 PROCEDURE — 83036 HEMOGLOBIN GLYCOSYLATED A1C: CPT

## 2024-02-09 PROCEDURE — 80061 LIPID PANEL: CPT

## 2024-02-10 LAB
EST. AVERAGE GLUCOSE BLD GHB EST-MCNC: 148 MG/DL
HBA1C MFR BLD: 6.8 %

## 2024-02-15 ENCOUNTER — TELEPHONE (OUTPATIENT)
Dept: NEUROLOGY | Facility: CLINIC | Age: 64
End: 2024-02-15

## 2024-02-15 NOTE — TELEPHONE ENCOUNTER
received vm from 2/13 at 12:11pm- this is rachel avery calling. Um, I wanted to have my propranolol filled at the pharmacy and they told me that my, my prescriptions were canceled. And I want to know why I need my propranolol.  please call me back. [phone]. Thank you. Gian.  ----------------------------------------  Propranolol prescribed by PCP    Called pt and made her of above,  she will contact pcp

## 2024-02-16 DIAGNOSIS — G25.0 BENIGN ESSENTIAL TREMOR: ICD-10-CM

## 2024-02-16 RX ORDER — PROPRANOLOL HYDROCHLORIDE 40 MG/1
20 TABLET ORAL EVERY 12 HOURS
Qty: 180 TABLET | Refills: 5 | Status: SHIPPED | OUTPATIENT
Start: 2024-02-16

## 2024-03-11 ENCOUNTER — TELEPHONE (OUTPATIENT)
Dept: FAMILY MEDICINE CLINIC | Facility: CLINIC | Age: 64
End: 2024-03-11

## 2024-03-11 NOTE — TELEPHONE ENCOUNTER
PCP SIGNATURE NEEDED FOR [facility] FORM RECEIVED VIA FAX AND PLACED IN PCP FOLDER TO BE DELIVERED AT ASSIGNED TIMES.         Meloxicam and Propranolol HCL

## 2024-03-13 ENCOUNTER — OFFICE VISIT (OUTPATIENT)
Dept: PAIN MEDICINE | Facility: MEDICAL CENTER | Age: 64
End: 2024-03-13
Payer: MEDICARE

## 2024-03-13 VITALS
WEIGHT: 273 LBS | HEIGHT: 60 IN | OXYGEN SATURATION: 96 % | DIASTOLIC BLOOD PRESSURE: 82 MMHG | SYSTOLIC BLOOD PRESSURE: 143 MMHG | HEART RATE: 73 BPM | BODY MASS INDEX: 53.6 KG/M2

## 2024-03-13 DIAGNOSIS — M47.816 LUMBAR SPONDYLOSIS: ICD-10-CM

## 2024-03-13 DIAGNOSIS — M54.50 CHRONIC BILATERAL LOW BACK PAIN WITHOUT SCIATICA: Primary | ICD-10-CM

## 2024-03-13 DIAGNOSIS — G89.29 CHRONIC BILATERAL LOW BACK PAIN WITHOUT SCIATICA: Primary | ICD-10-CM

## 2024-03-13 PROCEDURE — G2211 COMPLEX E/M VISIT ADD ON: HCPCS

## 2024-03-13 PROCEDURE — 99214 OFFICE O/P EST MOD 30 MIN: CPT

## 2024-03-13 RX ORDER — CELECOXIB 200 MG/1
200 CAPSULE ORAL 2 TIMES DAILY
Qty: 60 CAPSULE | Refills: 0 | Status: SHIPPED | OUTPATIENT
Start: 2024-03-13

## 2024-03-13 NOTE — PROGRESS NOTES
Assessment:  1. Chronic bilateral low back pain without sciatica    2. Lumbar spondylosis        Plan:  Discussed Sprint peripheral nerve stimulator with the patient as an option for pain control.  Patient states that she is still unsure if she wants to move forward with this process and needs to talk to her  before making any decisions. She will call the office when she makes a decision regarding this.  Discontinue meloxicam due to lack of therapeutic effect. Initiate Celebrex 200 mg twice daily.  I instructed the patient to take this medication with food to avoid any GI upset and to avoid taking any other NSAIDs while using this medication. She will call the office if she experiences any adverse effects after starting the medication.  Follow up pending PNS decision/response to Celebrex. Patient states that she will call to make her follow up appointment.    My impressions and treatment recommendations were discussed in detail with the patient who verbalized understanding and had no further questions.  Discharge instructions were provided. I personally saw and examined the patient and I agree with the above discussed plan of care.    New Medications Ordered This Visit   Medications    celecoxib (CeleBREX) 200 mg capsule     Sig: Take 1 capsule (200 mg total) by mouth 2 (two) times a day     Dispense:  60 capsule     Refill:  0       History of Present Illness:  Shruti Kaplan is a 63 y.o. female who presents for a follow up office visit in regards to ongoing chronic axial low back pain.  The patient reports that her pain is localized to the low back and does not radiate.  She is currently rating her pain a 4/10 in intensity.  She states that this pain is constant, and typically worse at night.  She describes the quality of her pain as sharp and throbbing.  She denies any lower extremity pain, weakness, or numbness.      At this point, the patient has failed to respond to lumbar radiofrequency ablation  and a variety of procedures provided by her previous pain management provider.  She reports very little relief with chiropractic therapy and has not been attending sessions recently.    I have personally reviewed and/or updated the patient's past medical history, past surgical history, family history, social history, current medications, allergies, and vital signs today.     Review of Systems   Respiratory:  Negative for shortness of breath.    Cardiovascular:  Negative for chest pain.   Gastrointestinal:  Negative for constipation, diarrhea, nausea and vomiting.   Musculoskeletal:  Positive for back pain, gait problem, joint swelling and neck pain. Negative for arthralgias and myalgias.   Skin:  Negative for rash.   Neurological:  Positive for dizziness. Negative for seizures and weakness.   All other systems reviewed and are negative.      Patient Active Problem List   Diagnosis    Obstructive sleep apnea treated with continuous positive airway pressure (CPAP)    Restless leg syndrome    Allergic rhinitis    Depression    Neurogenic claudication ([facility])    Essential tremor    Anxiety    Hyperlipidemia    History of tobacco abuse    Chest discomfort    COPD (chronic obstructive pulmonary disease) ([facility])    GERD (gastroesophageal reflux disease)    Edema    Major depressive disorder, recurrent episode, moderate ([facility])    Primary osteoarthritis of both knees    Morbid obesity with BMI of 50.0-59.9, adult ([facility])    Type 2 diabetes mellitus without complication, without long-term current use of insulin ([facility])    Lung nodules    Pedal edema    Fecal incontinence    Fecal smearing    Lumbar spondylosis    Dizziness    YUE (obstructive sleep apnea)       Past Medical History:   Diagnosis Date    Allergic     Allergic rhinitis     Anemia     Anxiety     Arthritis     Back pain     Cancer ([facility])     Chronic pain disorder     back    COPD (chronic obstructive pulmonary disease) ([facility])     CPAP (continuous positive airway pressure)  dependence     Depression     Diabetes mellitus (HCC)     Pre- Diabetic    Dyslipidemia     Essential tremor     Excessive daytime sleepiness     GERD (gastroesophageal reflux disease)     Headache(784.0) 1 week ago    History of uterine cancer     Hyperglycemia     Hyperlipidemia     Hypertension     Hypovitaminosis D     Iron deficiency anemia     Joint pain     Mood disorder (HCC)     Obesity     Obstructive sleep apnea     Osteoarthritis     Ringing in ears     RLS (restless legs syndrome)     Snoring     Uterine cancer (HCC)     age 23    Vitamin D deficiency     Witnessed episode of apnea        Past Surgical History:   Procedure Laterality Date    APPENDECTOMY      EPIDURAL BLOCK INJECTION      HYSTERECTOMY      age 23    KNEE ARTHROSCOPY      KNEE SURGERY      Meniscus tear    TONSILLECTOMY      TUBAL LIGATION         Family History   Problem Relation Age of Onset    Diabetes Mother     Asthma Mother     Hypertension Mother     Heart disease Mother     No Known Problems Father     No Known Problems Sister     No Known Problems Daughter     No Known Problems Maternal Grandmother     No Known Problems Paternal Grandmother     No Known Problems Sister     No Known Problems Sister     No Known Problems Daughter     No Known Problems Maternal Grandfather     No Known Problems Paternal Grandfather        Social History     Occupational History    Not on file   Tobacco Use    Smoking status: Former     Current packs/day: 0.00     Average packs/day: 1.5 packs/day for 47.7 years (71.6 ttl pk-yrs)     Types: Cigarettes     Start date: 1974     Quit date: 2021     Years since quittin.4     Passive exposure: Past    Smokeless tobacco: Never   Vaping Use    Vaping status: Never Used   Substance and Sexual Activity    Alcohol use: Not Currently     Comment: very seldom    Drug use: Not Currently     Comment: pt use at age [age]    Sexual activity: Yes     Partners: Male       Current Outpatient Medications on  File Prior to Visit   Medication Sig    acetaminophen (TYLENOL) 650 mg CR tablet Take 1 tablet (650 mg total) by mouth every 8 (eight) hours as needed for mild pain    albuterol (PROVENTIL HFA,VENTOLIN HFA) 90 mcg/act inhaler inhale 2 puffs by mouth every 6 hours if needed for wheezing    ascorbic acid (VITAMIN C) 500 mg tablet Take 500 mg by mouth daily    aspirin (Aspirin Low Dose) 81 mg EC tablet Take 1 tablet (81 mg total) by mouth daily    atorvastatin (LIPITOR) 40 mg tablet take 1 tablet by mouth once daily    Azelastine HCl 137 MCG/SPRAY SOLN 1 spray by Each Nare route if needed (allergies)    budesonide-formoterol (SYMBICORT) 160-4.5 mcg/act inhaler Inhale 2 puffs 2 (two) times a day Rinse mouth after use.    cholecalciferol (VITAMIN D3) 1,000 units tablet Take 1 tablet (1,000 Units total) by mouth daily    Diclofenac Sodium (VOLTAREN) 1 % Apply 2 g topically 4 (four) times a day    fluticasone (FLONASE) 50 mcg/act nasal spray 1 spray into each nostril daily    gabapentin (NEURONTIN) 300 mg capsule Take 1 capsule in am, 2 capsules in afternoon, and 1 capsule at bedtime. If not helpful in 2 weeks may increase to 1 capsule in am 2 capsules in afternoon and 2 capsules at bedtime.    ketoconazole (NIZORAL) 2 % cream Apply topically daily    montelukast (SINGULAIR) 10 mg tablet take 1 tablet by mouth at bedtime    propranolol (INDERAL) 40 mg tablet Take 0.5 tablets (20 mg total) by mouth every 12 (twelve) hours    semaglutide, 0.25 or 0.5 mg/dose, (Ozempic, 0.25 or 0.5 MG/DOSE,) 2 mg/3 mL injection pen 0.25 mg under the skin every 7 days for 4 doses (28 days), THEN 0.5 mg under the skin every 7 days    terbinafine (LamISIL) 250 mg tablet     [DISCONTINUED] meloxicam (Mobic) 15 mg tablet Take 1 tablet (15 mg total) by mouth daily    calcium carbonate (OS-ROSCOE) 1250 (500 Ca) MG chewable tablet Chew 1 tablet (1,250 mg total) daily    ketoconazole (NIZORAL) 2 % cream Apply topically daily for 14 days (Patient not  taking: Reported on 11/13/2023)    loratadine (CLARITIN) 10 mg tablet Take 1 tablet (10 mg total) by mouth daily    Misc. Devices (Rollator Ultra-Light) MISC Use daily (Patient not taking: Reported on 3/13/2024)     No current facility-administered medications on file prior to visit.       Allergies   Allergen Reactions    Aspirin GI Intolerance     Can only take baby aspirin     Latex Itching       Physical Exam:    /82   Pulse 73   Ht 5' (1.524 m)   Wt 124 kg (273 lb)   LMP  (LMP Unknown)   SpO2 96%   BMI 53.32 kg/m²     Constitutional:normal, well developed, well nourished, alert, in no distress and non-toxic and no overt pain behavior.  Eyes:anicteric  HEENT:grossly intact  Neck:supple, symmetric, trachea midline and no masses   Pulmonary:even and unlabored  Cardiovascular:No edema or pitting edema present  Skin:Normal without rashes or lesions and well hydrated  Psychiatric:Mood and affect appropriate  Neurologic:Cranial Nerves II-XII grossly intact.  Musculoskeletal: Gait is slow and guarded.  Tender to palpation over the bilateral lower lumbar paraspinal musculature and lumbar facet joints.  Increased pain with lumbar extension and rotation.  Lower extremity strength 5/5 bilaterally

## 2024-03-14 NOTE — TELEPHONE ENCOUNTER
FAXED ON 03/14/24 TO [facility]  at [phone]. FAX CONFIRMATION RECEIVED.    Meloxicam and Propranolol HCL

## 2024-04-12 ENCOUNTER — TELEPHONE (OUTPATIENT)
Dept: NEUROLOGY | Facility: CLINIC | Age: 64
End: 2024-04-12

## 2024-04-12 NOTE — TELEPHONE ENCOUNTER
Called patient to confirm upcoming appt with Rico. Spoke to patient and confirmed appt and appt details.

## 2024-05-06 ENCOUNTER — OFFICE VISIT (OUTPATIENT)
Dept: NEUROLOGY | Facility: CLINIC | Age: 64
End: 2024-05-06
Payer: MEDICARE

## 2024-05-06 VITALS
HEART RATE: 75 BPM | RESPIRATION RATE: 16 BRPM | SYSTOLIC BLOOD PRESSURE: 122 MMHG | DIASTOLIC BLOOD PRESSURE: 53 MMHG | TEMPERATURE: 97.2 F | HEIGHT: 61 IN | WEIGHT: 267.2 LBS | BODY MASS INDEX: 50.45 KG/M2 | OXYGEN SATURATION: 95 %

## 2024-05-06 DIAGNOSIS — G25.0 BENIGN ESSENTIAL TREMOR: Primary | ICD-10-CM

## 2024-05-06 PROCEDURE — 99213 OFFICE O/P EST LOW 20 MIN: CPT | Performed by: NURSE PRACTITIONER

## 2024-05-06 RX ORDER — PROPRANOLOL HYDROCHLORIDE 20 MG/1
20 TABLET ORAL 3 TIMES DAILY
Qty: 270 TABLET | Refills: 1 | Status: SHIPPED | OUTPATIENT
Start: 2024-05-06

## 2024-05-06 RX ORDER — GABAPENTIN 300 MG/1
CAPSULE ORAL
Qty: 360 CAPSULE | Refills: 1 | Status: SHIPPED | OUTPATIENT
Start: 2024-05-06

## 2024-05-06 NOTE — PROGRESS NOTES
Patient ID: Shruti Kaplan is a 64 y.o. female.    Assessment/Plan:  Patient Instructions:  Move up the 20mg  propranolol 2nd dose to 4pm; if this doesn't work in 1 week increase propranolol to three times per day dosing (at 0800, 2 pm, 8pm) and update me with blood pressure/pulse readings in 2 weeks time.  Let me know if any side effects  Medications sent to new pharmacy  Continue care with pain management  Continue use of CPAP  Follow up in 5 months    Briefly discussed procedures for Essential tremor such as DBS or focused ultrasound but her tremor on exam is very mild today and I am hopeful we can get better control of symptoms with changing timing/frequency of medications. We could also consider low dose topiramate along with current medication in the future.       Diagnoses and all orders for this visit:    Essential tremor  -     gabapentin (NEURONTIN) 300 mg capsule; Take 1 capsule in am, 2 capsules in afternoon, and 1 capsule at bedtime.  -     propranolol (INDERAL) 20 mg tablet; Take 1 tablet (20 mg total) by mouth 3 (three) times a day       Subjective:    TEJAS Bahena follows up today for 4 month tremor follow up.  Since last office visit she denies change in tremor-she notices it most in right hand and is most annoying to her in the evening time. She uses her gabapentin 300mg at 0800, 600mg at 4pm and 300mg at 10pm (600mg at night time caused side effect). She uses her propranolol 20mg at 0800 and 20mg at 10pm. She states weighted wrist band was not that helpful. She denies recent issue with bradycardia; she denies recent breathing problems on current medication. She states she has a new pharmacy.  She states her back pain continues despite previous trial of injections/RFA. She states recent change in NSAID was not helpful. She notices more pain if standing for a period of time. She was recommended for sprint PNS system but is scared to go through with it at this time.  No change to  sleep/appetite.    Lab Results   Component Value Date    LZO9WRSDUWGN 3.524 02/09/2024     Lab Results   Component Value Date    HGBA1C 6.8 (H) 02/09/2024     Lab Results   Component Value Date    JLTWEUQI02 519 06/17/2022     Lab Results   Component Value Date    LDLCALC 62 02/09/2024     Lab Results   Component Value Date     05/24/2018    SODIUM 141 11/10/2023    K 4.4 11/10/2023     11/10/2023    CO2 29 11/10/2023    ANIONGAP 8 05/24/2018    AGAP 7 11/10/2023    BUN 16 11/10/2023    CREATININE 0.74 11/10/2023    GLUC 219 (H) 09/29/2021    GLUF 143 (H) 11/10/2023    CALCIUM 8.5 11/10/2023    AST 17 11/10/2023    ALT 16 11/10/2023    ALKPHOS 86 11/10/2023    PROT 6.4 05/24/2018    TP 5.7 (L) 11/10/2023    BILITOT 0.4 05/24/2018    TBILI 0.44 11/10/2023    EGFR 85 11/10/2023         Current Outpatient Medications on File Prior to Visit   Medication Sig Dispense Refill    acetaminophen (TYLENOL) 650 mg CR tablet Take 1 tablet (650 mg total) by mouth every 8 (eight) hours as needed for mild pain 30 tablet 0    albuterol (PROVENTIL HFA,VENTOLIN HFA) 90 mcg/act inhaler inhale 2 puffs by mouth every 6 hours if needed for wheezing 8.5 g 3    ascorbic acid (VITAMIN C) 500 mg tablet Take 500 mg by mouth daily      aspirin (Aspirin Low Dose) 81 mg EC tablet Take 1 tablet (81 mg total) by mouth daily 30 tablet 3    atorvastatin (LIPITOR) 40 mg tablet take 1 tablet by mouth once daily 90 tablet 3    Azelastine HCl 137 MCG/SPRAY SOLN 1 spray by Each Nare route if needed (allergies) 30 mL 1    calcium carbonate (OS-ROSCOE) 1250 (500 Ca) MG chewable tablet Chew 1 tablet (1,250 mg total) daily 90 tablet 1    celecoxib (CeleBREX) 200 mg capsule Take 1 capsule (200 mg total) by mouth 2 (two) times a day 60 capsule 0    cholecalciferol (VITAMIN D3) 1,000 units tablet Take 1 tablet (1,000 Units total) by mouth daily 30 tablet 5    Diclofenac Sodium (VOLTAREN) 1 % Apply 2 g topically 4 (four) times a day 100 g 0     fluticasone (FLONASE) 50 mcg/act nasal spray 1 spray into each nostril daily 1 Bottle 3    ketoconazole (NIZORAL) 2 % cream Apply topically daily 15 g 0    loratadine (CLARITIN) 10 mg tablet Take 1 tablet (10 mg total) by mouth daily 30 tablet 2    montelukast (SINGULAIR) 10 mg tablet take 1 tablet by mouth at bedtime 30 tablet 3    semaglutide, 0.25 or 0.5 mg/dose, (Ozempic, 0.25 or 0.5 MG/DOSE,) 2 mg/3 mL injection pen 0.25 mg under the skin every 7 days for 4 doses (28 days), THEN 0.5 mg under the skin every 7 days 9 mL 0    [DISCONTINUED] gabapentin (NEURONTIN) 300 mg capsule Take 1 capsule in am, 2 capsules in afternoon, and 1 capsule at bedtime. If not helpful in 2 weeks may increase to 1 capsule in am 2 capsules in afternoon and 2 capsules at bedtime. 150 capsule 5    [DISCONTINUED] propranolol (INDERAL) 40 mg tablet Take 0.5 tablets (20 mg total) by mouth every 12 (twelve) hours 180 tablet 5    [DISCONTINUED] terbinafine (LamISIL) 250 mg tablet       [DISCONTINUED] budesonide-formoterol (SYMBICORT) 160-4.5 mcg/act inhaler Inhale 2 puffs 2 (two) times a day Rinse mouth after use. 10.2 g 5    [DISCONTINUED] ketoconazole (NIZORAL) 2 % cream Apply topically daily for 14 days (Patient not taking: Reported on 11/13/2023) 15 g 0    [DISCONTINUED] Misc. Devices (Rollator Ultra-Light) MISC Use daily (Patient not taking: Reported on 3/13/2024) 1 each 0     No current facility-administered medications on file prior to visit.     Previous History:  She has been seen in this office for many years; she has failed even low dose primidone in the past and higher doses of propranolol - greater than 40mg BID- has caused bradycardia.    She is sleeping well with her CPAP; her  states he can hear her snoring at times through the machine, but she is happy with its use.       CT head 11/2020:  PARENCHYMA:  No intracranial mass, mass effect or midline shift. No CT signs of acute infarction.  No acute parenchymal hemorrhage.   "Minimal white matter changes are better appreciated on prior MRI. There is at least one stable tiny hypodensity in the   left centrum semiovale.  VENTRICLES AND EXTRA-AXIAL SPACES:  Normal for the patient's age.  VISUALIZED ORBITS AND PARANASAL SINUSES:  Unremarkable.  CALVARIUM AND EXTRACRANIAL SOFT TISSUES:  Normal.  IMPRESSION:  No acute intracranial abnormality.     MRI L spine 11/26/2022:  IMPRESSION:  Grade 1 anterolisthesis of L4 on L5 with moderate to severe facet arthropathy and mild to moderate spinal stenosis.  Previously noted right L4-5 foraminal and central to left central L5-S1 disc herniations have improved.   Incompletely visualized left paracentral and foraminal disc herniation at T10-T11, correlate for ipsilateral T10 radiculitis.      The following portions of the patient's history were reviewed and updated as appropriate: allergies, current medications, past family history, past medical history, past social history, past surgical history, and problem list.         Objective:    Blood pressure 122/53, pulse 75, temperature (!) 97.2 °F (36.2 °C), temperature source Temporal, resp. rate 16, height 5' 1\" (1.549 m), weight 121 kg (267 lb 3.2 oz), SpO2 95%, not currently breastfeeding.    Physical Exam  Vitals reviewed.   Constitutional:       General: She is not in acute distress.     Appearance: She is obese.   HENT:      Head: Normocephalic and atraumatic.      Right Ear: External ear normal.      Left Ear: External ear normal.      Nose: Nose normal.      Mouth/Throat:      Mouth: Mucous membranes are moist.      Pharynx: Oropharynx is clear.   Eyes:      General:         Right eye: No discharge.         Left eye: No discharge.      Extraocular Movements: Extraocular movements intact.      Pupils: Pupils are equal, round, and reactive to light.   Cardiovascular:      Rate and Rhythm: Normal rate and regular rhythm.      Pulses: Normal pulses.      Heart sounds: Normal heart sounds.   Pulmonary:    "   Effort: Pulmonary effort is normal.      Breath sounds: Normal breath sounds.   Abdominal:      General: Bowel sounds are normal. There is no distension.   Musculoskeletal:      Lumbar back: Tenderness present.      Right lower leg: No edema.      Left lower leg: No edema.   Skin:     Capillary Refill: Capillary refill takes less than 2 seconds.      Findings: No rash.   Neurological:      Mental Status: She is alert. Mental status is at baseline.      Motor: Motor strength is normal.     Coordination: Romberg sign negative.      Deep Tendon Reflexes:      Reflex Scores:       Brachioradialis reflexes are 1+ on the right side and 1+ on the left side.       Patellar reflexes are 1+ on the right side and 1+ on the left side.  Psychiatric:         Mood and Affect: Mood normal.         Speech: Speech normal.         Neurological Exam  Mental Status  Alert. Oriented to person, place and time. Speech is normal. Language is fluent with no aphasia.    Cranial Nerves  CN II: Right visual acuity: Counts fingers. Left visual acuity: Counts fingers.  CN III, IV, VI: Extraocular movements intact bilaterally. Pupils equal round and reactive to light bilaterally.  CN V: Facial sensation is normal.  CN VII: Full and symmetric facial movement.  CN VIII: Hearing is normal.  CN IX, X: Palate elevates symmetrically  CN XI: Shoulder shrug strength is normal.  CN XII: Tongue midline without atrophy or fasciculations.    Motor  Normal muscle bulk throughout. The following abnormal movements were seen: With hands outstretched a mild high frequency low amplitude tremor of the right hand is noticed; no other tremor is noted; no rest tremor.   Strength is 5/5 throughout all four extremities.    Sensory  Light touch is normal in upper and lower extremities.     Reflexes                                            Right                      Left  Brachioradialis                    1+                         1+  Patellar                                 1+                         1+    Right pathological reflexes: Cintia's absent.  Left pathological reflexes: Cintia's absent.    Coordination  Right: Finger-to-nose normal. Rapid alternating movement normal.Left: Finger-to-nose normal. Rapid alternating movement normal.    Gait  Casual gait: Wide stance. Antalgic gait. Romberg is absent. Unable to rise from chair without using arms.  Uses cane, slower gait.        ROS:    Review of Systems   Constitutional:  Negative for appetite change, fatigue and fever.   HENT: Negative.  Negative for hearing loss, tinnitus, trouble swallowing and voice change.    Eyes: Negative.  Negative for photophobia, pain and visual disturbance.   Respiratory: Negative.  Negative for shortness of breath.    Cardiovascular: Negative.  Negative for palpitations.   Gastrointestinal: Negative.  Negative for nausea and vomiting.   Endocrine: Negative.  Negative for cold intolerance.   Genitourinary:  Positive for frequency and urgency. Negative for dysuria.   Musculoskeletal:  Positive for back pain and gait problem. Negative for myalgias, neck pain and neck stiffness.   Skin: Negative.  Negative for rash.   Allergic/Immunologic: Negative.    Neurological:  Positive for dizziness, tremors and weakness. Negative for seizures, syncope, facial asymmetry, speech difficulty, light-headedness, numbness and headaches.   Hematological:  Bruises/bleeds easily.   Psychiatric/Behavioral:  Positive for sleep disturbance. Negative for confusion and hallucinations.    All other systems reviewed and are negative.  ROS was reviewed and updated as appropriate

## 2024-05-06 NOTE — PATIENT INSTRUCTIONS
Move up the 20mg  propranolol 2nd dose to 4pm; if this doesn't work in 1 week increase propranolol to three times per day dosing (at 0800, 2 pm, 8pm) and update me with blood pressure/pulse readings in 2 weeks time.  Let me know if any side effects  Medications sent to new pharmacy  Continue care with pain management  Continue use of CPAP  Follow up in 5 months

## 2024-05-22 ENCOUNTER — OFFICE VISIT (OUTPATIENT)
Dept: FAMILY MEDICINE CLINIC | Facility: CLINIC | Age: 64
End: 2024-05-22

## 2024-05-22 ENCOUNTER — TELEPHONE (OUTPATIENT)
Dept: FAMILY MEDICINE CLINIC | Facility: CLINIC | Age: 64
End: 2024-05-22

## 2024-05-22 VITALS
DIASTOLIC BLOOD PRESSURE: 81 MMHG | RESPIRATION RATE: 18 BRPM | WEIGHT: 266 LBS | HEART RATE: 72 BPM | OXYGEN SATURATION: 95 % | BODY MASS INDEX: 50.22 KG/M2 | SYSTOLIC BLOOD PRESSURE: 123 MMHG | HEIGHT: 61 IN | TEMPERATURE: 97.8 F

## 2024-05-22 DIAGNOSIS — R91.8 LUNG NODULES: Primary | ICD-10-CM

## 2024-05-22 DIAGNOSIS — E66.01 MORBID OBESITY WITH BMI OF 50.0-59.9, ADULT (HCC): ICD-10-CM

## 2024-05-22 DIAGNOSIS — E11.9 TYPE 2 DIABETES MELLITUS WITHOUT COMPLICATION, WITHOUT LONG-TERM CURRENT USE OF INSULIN (HCC): ICD-10-CM

## 2024-05-22 DIAGNOSIS — Z12.31 BREAST CANCER SCREENING BY MAMMOGRAM: ICD-10-CM

## 2024-05-22 PROCEDURE — 99214 OFFICE O/P EST MOD 30 MIN: CPT | Performed by: STUDENT IN AN ORGANIZED HEALTH CARE EDUCATION/TRAINING PROGRAM

## 2024-05-22 PROCEDURE — 3079F DIAST BP 80-89 MM HG: CPT | Performed by: STUDENT IN AN ORGANIZED HEALTH CARE EDUCATION/TRAINING PROGRAM

## 2024-05-22 PROCEDURE — G2211 COMPLEX E/M VISIT ADD ON: HCPCS | Performed by: STUDENT IN AN ORGANIZED HEALTH CARE EDUCATION/TRAINING PROGRAM

## 2024-05-22 PROCEDURE — 3074F SYST BP LT 130 MM HG: CPT | Performed by: STUDENT IN AN ORGANIZED HEALTH CARE EDUCATION/TRAINING PROGRAM

## 2024-05-22 RX ORDER — SEMAGLUTIDE 1 MG/.5ML
INJECTION, SOLUTION SUBCUTANEOUS
Qty: 12.2 ML | Refills: 0 | Status: SHIPPED | OUTPATIENT
Start: 2024-05-22 | End: 2024-09-17

## 2024-05-22 NOTE — ASSESSMENT & PLAN NOTE
Patient remains motivated and encouraged to continue with weight loss measures.  Weight change: -12 lbs in 3 months.  - Increase to Semaglutide 1mg weekly x 4 weeks then 1.7mg weekly  - Will review in 12 weeks

## 2024-05-22 NOTE — PROGRESS NOTES
Ambulatory Visit  Name: Shruti Kaplan      : 1960      MRN: [id number]  Encounter Provider: Violeta Troy MD  Encounter Date: 2024   Encounter department: [facility]    Assessment & Plan   1. Lung nodules  Assessment & Plan:  Overdue for lung cancer screening. Patient reminded and order already in place.  2. Morbid obesity with BMI of 50.0-59.9, adult ([facility])  Assessment & Plan:  Patient remains motivated and encouraged to continue with weight loss measures.  Weight change: -12 lbs in 3 months.  - Increase to Semaglutide 1mg weekly x 4 weeks then 1.7mg weekly  - Will review in 12 weeks  Orders:  -     Semaglutide-Weight Management (Wegovy) 1 MG/0.5ML; Inject 0.5 mL (1 mg total) under the skin once a week for 28 days, THEN 0.85 mL (1.7 mg total) once a week. Inject 1 mg under the skin weekly.  3. Type 2 diabetes mellitus without complication, without long-term current use of insulin ([facility])  Assessment & Plan:    Lab Results   Component Value Date    HGBA1C 6.8 (H) 2024   On wegovy, continue management.  Orders:  -     Semaglutide-Weight Management (Wegovy) 1 MG/0.5ML; Inject 0.5 mL (1 mg total) under the skin once a week for 28 days, THEN 0.85 mL (1.7 mg total) once a week. Inject 1 mg under the skin weekly.  4. Breast cancer screening by mammogram  -     Mammo screening bilateral w 3d & cad; Future    BMI Counseling: Body mass index is 50.26 kg/m². The BMI is above normal. Nutrition recommendations include decreasing portion sizes, encouraging healthy choices of fruits and vegetables, decreasing fast food intake, limiting drinks that contain sugar, moderation in carbohydrate intake, increasing intake of lean protein, reducing intake of saturated and trans fat and reducing intake of cholesterol. Exercise recommendations include moderate physical activity 150 minutes/week, exercising 3-5 times per week and strength training exercises. Pharmacotherapy was  "ordered to help aid in weight loss. Rationale for BMI follow-up plan is due to patient being overweight or obese.     History of Present Illness     Shruti Kaplan is a very pleasant 63 y.o. female who has a PMH of morbid obesity, GERD, COPD, YUE, hyperlipidemia, restless leg syndrome and presents today to review chronic diseases.  Patient denies any nausea, vomiting, diaphoresis, syncope, headache.  Patient's chronic medical conditions are stable unless noted otherwise above. Patient reports compliance on medications prescribed with no side effects experienced at this time. This patient has had no admissions to hospital or medical emergencies since the last visit to our office. Patient has no further complaints other than what is mentioned below and in the ROS.    Patient probably acknowledges weight loss noted since last office visit.  Reports GLP-1 injection working well for her.  Has increased exercise slightly and portion size control of decreased as well.  Encouraged to continue these efforts for further weight loss and chronic disease progression.      Review of Systems   Constitutional:  Negative for chills, fever and unexpected weight change.   Respiratory:  Negative for cough, shortness of breath and wheezing.    Cardiovascular:  Positive for leg swelling. Negative for chest pain and palpitations.   Gastrointestinal:  Negative for abdominal pain, constipation, diarrhea, nausea and vomiting.   Genitourinary:  Negative for dysuria and hematuria.   Musculoskeletal:  Positive for arthralgias and back pain.   Skin:  Negative for color change and rash.   Neurological:  Negative for dizziness, syncope and headaches.   All other systems reviewed and are negative.      Objective     /81 (BP Location: Left arm, Patient Position: Sitting, Cuff Size: Large)   Pulse 72   Temp 97.8 °F (36.6 °C) (Temporal)   Resp 18   Ht 5' 1\" (1.549 m)   Wt 121 kg (266 lb)   LMP  (LMP Unknown)   SpO2 95%   BMI 50.26 " kg/m²     Physical Exam  Vitals reviewed.   Constitutional:       Appearance: She is obese.   HENT:      Head: Normocephalic and atraumatic.      Right Ear: Tympanic membrane normal.      Left Ear: Tympanic membrane normal.      Nose: Nose normal.      Mouth/Throat:      Mouth: Mucous membranes are moist.      Pharynx: Oropharynx is clear.   Eyes:      Extraocular Movements: Extraocular movements intact.      Conjunctiva/sclera: Conjunctivae normal.      Pupils: Pupils are equal, round, and reactive to light.   Cardiovascular:      Rate and Rhythm: Normal rate and regular rhythm.      Pulses: Normal pulses.      Heart sounds: Normal heart sounds. No murmur heard.     No friction rub. No gallop.   Pulmonary:      Effort: Pulmonary effort is normal. No respiratory distress.      Breath sounds: Normal breath sounds. No wheezing, rhonchi or rales.   Chest:      Chest wall: No tenderness.   Abdominal:      General: Abdomen is flat. Bowel sounds are normal. There is no distension.      Palpations: Abdomen is soft. There is no mass.      Tenderness: There is no abdominal tenderness.      Hernia: No hernia is present.   Musculoskeletal:         General: Normal range of motion.      Cervical back: Normal range of motion.      Right lower leg: No edema.      Left lower leg: No edema.   Lymphadenopathy:      Cervical: No cervical adenopathy.   Skin:     General: Skin is warm and dry.      Findings: No rash.   Neurological:      General: No focal deficit present.      Mental Status: She is alert and oriented to person, place, and time.      Cranial Nerves: No cranial nerve deficit.      Motor: No weakness.      Coordination: Coordination normal.   Psychiatric:         Mood and Affect: Mood normal.         Behavior: Behavior normal.         Thought Content: Thought content normal.     Administrative Statements

## 2024-05-22 NOTE — ASSESSMENT & PLAN NOTE
Lab Results   Component Value Date    HGBA1C 6.8 (H) 02/09/2024   On wegovy, continue management.

## 2024-05-22 NOTE — PATIENT INSTRUCTIONS
To schedule this appointment (mammogram and CT lung cancer screening tests), please contact Central Scheduling at [phone].

## 2024-05-22 NOTE — TELEPHONE ENCOUNTER
Pharmacy states the pt already did the 0.25 and 0.5 mg , and the pharmacy states if you want to keep her on the     semaglutide, 1 mg/dose, (Ozempic) 4 mg/3 mL injection pen    Or switch to wogovy based on the doses

## 2024-05-28 ENCOUNTER — HOSPITAL ENCOUNTER (OUTPATIENT)
Dept: CT IMAGING | Facility: HOSPITAL | Age: 64
Discharge: HOME | End: 2024-05-28
Attending: STUDENT IN AN ORGANIZED HEALTH CARE EDUCATION/TRAINING PROGRAM
Payer: MEDICARE

## 2024-05-28 PROCEDURE — 71271 CT THORAX LUNG CANCER SCR C-: CPT

## 2024-06-28 ENCOUNTER — TELEPHONE (OUTPATIENT)
Dept: FAMILY MEDICINE CLINIC | Facility: CLINIC | Age: 64
End: 2024-06-28

## 2024-06-28 DIAGNOSIS — E66.01 MORBID OBESITY WITH BMI OF 50.0-59.9, ADULT (HCC): ICD-10-CM

## 2024-06-28 DIAGNOSIS — E11.9 TYPE 2 DIABETES MELLITUS WITHOUT COMPLICATION, WITHOUT LONG-TERM CURRENT USE OF INSULIN (HCC): ICD-10-CM

## 2024-06-28 NOTE — TELEPHONE ENCOUNTER
Good morning Dr. Nelson Welch pt came into office and stated she needs a prior auth for her Wegovy medication that was sent on 5/22/24. Pt has not received her medication due to it needing a prior auth. Pt was taking her ozempic and just finished her last dose yesterday. Pt is requesting if prior auth can be started as soon as possible due to her not having any medication.         Any questions please contact pt, thank you !

## 2024-07-09 NOTE — TELEPHONE ENCOUNTER
Pt called nurse line requesting status of previus message. PA has been started and placed in your bin to be completed.     Please advise, thanks

## 2024-07-10 ENCOUNTER — TELEPHONE (OUTPATIENT)
Dept: FAMILY MEDICINE CLINIC | Facility: CLINIC | Age: 64
End: 2024-07-10

## 2024-07-10 NOTE — TELEPHONE ENCOUNTER
PCP SIGNATURE NEEDED FOR [employer] FORM RECEIVED VIA FAX AND PLACED IN PCP FOLDER TO BE DELIVERED AT ASSIGNED TIMES.

## 2024-07-24 ENCOUNTER — TELEPHONE (OUTPATIENT)
Dept: FAMILY MEDICINE CLINIC | Facility: CLINIC | Age: 64
End: 2024-07-24

## 2024-07-24 NOTE — TELEPHONE ENCOUNTER
Pam, this is Armando with [facility] calling about Shruti Kaplan. This call is referring to her Wegovy injections 1 ML. It looks as if she you put in the authorization on the 11th that was dismissed. I don't know if it was a communication error or not, but either way the member needs another authorization put in for that to be accepted so that she can obtain that medication. We do not cover medications for weight loss. Now. We do cover that for the diabetic side of things, but not weight loss. So if that could just be submitted as soon as possible because she's been a couple weeks without any type of sugar management, I should say she doesn't have any insulin or anything. And our number at [facility] will be [phone]. Thank you.    Please advise, thank you

## 2024-07-29 DIAGNOSIS — E11.9 TYPE 2 DIABETES MELLITUS WITHOUT COMPLICATION, WITHOUT LONG-TERM CURRENT USE OF INSULIN (HCC): ICD-10-CM

## 2024-07-29 DIAGNOSIS — E66.01 MORBID OBESITY WITH BMI OF 50.0-59.9, ADULT (HCC): Primary | ICD-10-CM

## 2024-08-02 ENCOUNTER — TELEPHONE (OUTPATIENT)
Dept: FAMILY MEDICINE CLINIC | Facility: CLINIC | Age: 64
End: 2024-08-02

## 2024-08-02 NOTE — TELEPHONE ENCOUNTER
PCP SIGNATURE NEEDED FOR [facility]  FORM RECEIVED VIA FAX AND PLACED IN PCP FOLDER TO BE DELIVERED AT ASSIGNED TIMES.    MARILOU [employer]

## 2024-08-09 ENCOUNTER — TELEPHONE (OUTPATIENT)
Dept: FAMILY MEDICINE CLINIC | Facility: CLINIC | Age: 64
End: 2024-08-09

## 2024-08-09 NOTE — TELEPHONE ENCOUNTER
Fanny pharmacist discussed and reports no prescription received since May when prescription closed out by wegovy prescription. Informed new script for ozempic sent and is alternative formulary from insurance. Information provided over telephone and should be filled. Will message and inform patient of same to follow up with pharmacy.

## 2024-08-12 ENCOUNTER — RA CDI HCC (OUTPATIENT)
Dept: OTHER | Facility: HOSPITAL | Age: 64
End: 2024-08-12

## 2024-08-12 NOTE — PROGRESS NOTES
HCC coding opportunities          Chart Reviewed number of suggestions sent to Provider: 1     Patients Insurance     Medicare Insurance: [employer]          Per CMS/ICD 10 coding guidelines, to be used when BMI >=40, with BMI code    E66.01: Morbid (severe) obesity due to excess calories (HCC) [2271932]

## 2024-08-12 NOTE — TELEPHONE ENCOUNTER
FAXED ON 08/12/24 TO [employer]  at [phone]. FAX CONFIRMATION RECEIVED.    Scanned into pt chart.

## 2024-08-14 ENCOUNTER — TELEPHONE (OUTPATIENT)
Dept: GASTROENTEROLOGY | Facility: MEDICAL CENTER | Age: 64
End: 2024-08-14

## 2024-08-18 ENCOUNTER — APPOINTMENT (EMERGENCY)
Dept: CT IMAGING | Facility: HOSPITAL | Age: 64
End: 2024-08-18
Payer: MEDICARE

## 2024-08-18 ENCOUNTER — HOSPITAL ENCOUNTER (EMERGENCY)
Facility: HOSPITAL | Age: 64
Discharge: HOME | End: 2024-08-19
Attending: EMERGENCY MEDICINE | Admitting: EMERGENCY MEDICINE
Payer: MEDICARE

## 2024-08-18 DIAGNOSIS — R07.9 CHEST PAIN, UNSPECIFIED: Primary | ICD-10-CM

## 2024-08-18 LAB
ALBUMIN SERPL BCG-MCNC: 3.9 G/DL (ref 3.5–5)
ALP SERPL-CCNC: 98 U/L (ref 34–104)
ALT SERPL W P-5'-P-CCNC: 32 U/L (ref 7–52)
ANION GAP SERPL CALCULATED.3IONS-SCNC: 8 MMOL/L (ref 4–13)
AST SERPL W P-5'-P-CCNC: 56 U/L (ref 13–39)
ATRIAL RATE: 72 BPM
BASOPHILS # BLD AUTO: 0.06 THOUSANDS/ÂΜL (ref 0–0.1)
BASOPHILS NFR BLD AUTO: 1 % (ref 0–1)
BILIRUB SERPL-MCNC: 0.54 MG/DL (ref 0.2–1)
BNP SERPL-MCNC: 28 PG/ML (ref 0–100)
BUN SERPL-MCNC: 22 MG/DL (ref 5–25)
CALCIUM SERPL-MCNC: 9.5 MG/DL (ref 8.4–10.2)
CARDIAC TROPONIN I PNL SERPL HS: 3 NG/L
CHLORIDE SERPL-SCNC: 102 MMOL/L (ref 96–108)
CO2 SERPL-SCNC: 31 MMOL/L (ref 21–32)
CREAT SERPL-MCNC: 0.77 MG/DL (ref 0.6–1.3)
D DIMER PPP FEU-MCNC: 0.65 UG/ML FEU
EOSINOPHIL # BLD AUTO: 0.23 THOUSAND/ÂΜL (ref 0–0.61)
EOSINOPHIL NFR BLD AUTO: 2 % (ref 0–6)
ERYTHROCYTE [DISTWIDTH] IN BLOOD BY AUTOMATED COUNT: 13.4 % (ref 11.6–15.1)
GFR SERPL CREATININE-BSD FRML MDRD: 80 ML/MIN/1.73SQ M
GLUCOSE SERPL-MCNC: 141 MG/DL (ref 65–140)
GLUCOSE SERPL-MCNC: 157 MG/DL (ref 65–140)
HCT VFR BLD AUTO: 46.9 % (ref 34.8–46.1)
HGB BLD-MCNC: 14.5 G/DL (ref 11.5–15.4)
IMM GRANULOCYTES # BLD AUTO: 0.07 THOUSAND/UL (ref 0–0.2)
IMM GRANULOCYTES NFR BLD AUTO: 1 % (ref 0–2)
LYMPHOCYTES # BLD AUTO: 1.32 THOUSANDS/ÂΜL (ref 0.6–4.47)
LYMPHOCYTES NFR BLD AUTO: 13 % (ref 14–44)
MCH RBC QN AUTO: 29.8 PG (ref 26.8–34.3)
MCHC RBC AUTO-ENTMCNC: 30.9 G/DL (ref 31.4–37.4)
MCV RBC AUTO: 96 FL (ref 82–98)
MONOCYTES # BLD AUTO: 0.79 THOUSAND/ÂΜL (ref 0.17–1.22)
MONOCYTES NFR BLD AUTO: 8 % (ref 4–12)
NEUTROPHILS # BLD AUTO: 7.5 THOUSANDS/ÂΜL (ref 1.85–7.62)
NEUTS SEG NFR BLD AUTO: 75 % (ref 43–75)
NRBC BLD AUTO-RTO: 0 /100 WBCS
P AXIS: 53 DEGREES
PLATELET # BLD AUTO: 256 THOUSANDS/UL (ref 149–390)
PMV BLD AUTO: 11 FL (ref 8.9–12.7)
POTASSIUM SERPL-SCNC: 4.3 MMOL/L (ref 3.5–5.3)
PR INTERVAL: 182 MS
PROT SERPL-MCNC: 6.9 G/DL (ref 6.4–8.4)
QRS AXIS: 18 DEGREES
QRSD INTERVAL: 74 MS
QT INTERVAL: 380 MS
QTC INTERVAL: 416 MS
RBC # BLD AUTO: 4.87 MILLION/UL (ref 3.81–5.12)
SODIUM SERPL-SCNC: 141 MMOL/L (ref 135–147)
T WAVE AXIS: 27 DEGREES
VENTRICULAR RATE: 72 BPM
WBC # BLD AUTO: 9.97 THOUSAND/UL (ref 4.31–10.16)

## 2024-08-18 PROCEDURE — 99285 EMERGENCY DEPT VISIT HI MDM: CPT

## 2024-08-18 PROCEDURE — 85379 FIBRIN DEGRADATION QUANT: CPT | Performed by: PHYSICIAN ASSISTANT

## 2024-08-18 PROCEDURE — 99285 EMERGENCY DEPT VISIT HI MDM: CPT | Performed by: PHYSICIAN ASSISTANT

## 2024-08-18 PROCEDURE — 36415 COLL VENOUS BLD VENIPUNCTURE: CPT | Performed by: PHYSICIAN ASSISTANT

## 2024-08-18 PROCEDURE — 85025 COMPLETE CBC W/AUTO DIFF WBC: CPT | Performed by: PHYSICIAN ASSISTANT

## 2024-08-18 PROCEDURE — 84484 ASSAY OF TROPONIN QUANT: CPT | Performed by: PHYSICIAN ASSISTANT

## 2024-08-18 PROCEDURE — 83880 ASSAY OF NATRIURETIC PEPTIDE: CPT | Performed by: PHYSICIAN ASSISTANT

## 2024-08-18 PROCEDURE — 71275 CT ANGIOGRAPHY CHEST: CPT

## 2024-08-18 PROCEDURE — 93010 ELECTROCARDIOGRAM REPORT: CPT

## 2024-08-18 PROCEDURE — 82948 REAGENT STRIP/BLOOD GLUCOSE: CPT

## 2024-08-18 PROCEDURE — 93005 ELECTROCARDIOGRAM TRACING: CPT

## 2024-08-18 PROCEDURE — 80053 COMPREHEN METABOLIC PANEL: CPT | Performed by: PHYSICIAN ASSISTANT

## 2024-08-18 RX ADMIN — IOHEXOL 100 ML: 350 INJECTION, SOLUTION INTRAVENOUS at 23:29

## 2024-08-19 VITALS
WEIGHT: 275.8 LBS | TEMPERATURE: 98.8 F | OXYGEN SATURATION: 94 % | BODY MASS INDEX: 52.11 KG/M2 | HEART RATE: 59 BPM | DIASTOLIC BLOOD PRESSURE: 68 MMHG | SYSTOLIC BLOOD PRESSURE: 129 MMHG | RESPIRATION RATE: 20 BRPM

## 2024-08-19 LAB
2HR DELTA HS TROPONIN: 0 NG/L
CARDIAC TROPONIN I PNL SERPL HS: 3 NG/L

## 2024-08-19 NOTE — ED PROVIDER NOTES
History  Chief Complaint   Patient presents with    Chest Pain     Sudden onset reproducible chest pain while sitting on her phone tonight, worse with pressing on her chest and walking, SOB x2 weeks. Also reports not having her insulin shot in a month d/t insurance issues. 324 chewable asa given via ems     64-year-old female with history of diabetes and hypertension as well as hyperlipidemia presents complaining of gradual onset substernal chest pain associated with shortness of breath that began prior to arrival.  Patient states that she was sitting on her phone when she noted some chest pain worse with breathing.  Denies history of similar.  Denies specific inciting event.  Was given 324 mg of aspirin and route by EMS.  Has not taken any other medications for pain.  Denies recent travel or prolonged downtime.  Denies any other complaints.      History provided by:  Patient   used: No        Prior to Admission Medications   Prescriptions Last Dose Informant Patient Reported? Taking?   Azelastine HCl 137 MCG/SPRAY SOLN  Self No No   Si spray by Each Nare route if needed (allergies)   Diclofenac Sodium (VOLTAREN) 1 %  Self No No   Sig: Apply 2 g topically 4 (four) times a day   acetaminophen (TYLENOL) 650 mg CR tablet  Self No No   Sig: Take 1 tablet (650 mg total) by mouth every 8 (eight) hours as needed for mild pain   albuterol (PROVENTIL HFA,VENTOLIN HFA) 90 mcg/act inhaler  Self No No   Sig: inhale 2 puffs by mouth every 6 hours if needed for wheezing   ascorbic acid (VITAMIN C) 500 mg tablet  Self Yes No   Sig: Take 500 mg by mouth daily   aspirin (Aspirin Low Dose) 81 mg EC tablet  Self No No   Sig: Take 1 tablet (81 mg total) by mouth daily   atorvastatin (LIPITOR) 40 mg tablet  Self No No   Sig: take 1 tablet by mouth once daily   calcium carbonate (OS-ROSCOE) 1250 (500 Ca) MG chewable tablet  Self No No   Sig: Chew 1 tablet (1,250 mg total) daily   celecoxib (CeleBREX) 200 mg capsule  Not Taking Self No No   Sig: Take 1 capsule (200 mg total) by mouth 2 (two) times a day   Patient not taking: Reported on 2024   cholecalciferol (VITAMIN D3) 1,000 units tablet  Self No No   Sig: Take 1 tablet (1,000 Units total) by mouth daily   fluticasone (FLONASE) 50 mcg/act nasal spray  Self No No   Si spray into each nostril daily   gabapentin (NEURONTIN) 300 mg capsule   No No   Sig: Take 1 capsule in am, 2 capsules in afternoon, and 1 capsule at bedtime.   ketoconazole (NIZORAL) 2 % cream Not Taking Self No No   Sig: Apply topically daily   Patient not taking: Reported on 2024   loratadine (CLARITIN) 10 mg tablet  Self No No   Sig: Take 1 tablet (10 mg total) by mouth daily   montelukast (SINGULAIR) 10 mg tablet Not Taking Self No No   Sig: take 1 tablet by mouth at bedtime   Patient not taking: Reported on 2024   propranolol (INDERAL) 20 mg tablet   No No   Sig: Take 1 tablet (20 mg total) by mouth 3 (three) times a day   semaglutide, 1 mg/dose, (Ozempic, 1 MG/DOSE,) 2 mg/1.5 mL injection pen More than a month  No No   Sig: Inject 0.75 mL (1 mg total) under the skin every 7 days      Facility-Administered Medications: None       Past Medical History:   Diagnosis Date    Allergic     Allergic rhinitis     Anemia     Anxiety     Arthritis     Back pain     Cancer (HCC)     Chronic pain disorder     back    COPD (chronic obstructive pulmonary disease) ([facility])     CPAP (continuous positive airway pressure) dependence     Depression     Diabetes mellitus ([facility])     Pre- Diabetic    Dyslipidemia     Essential tremor     Excessive daytime sleepiness     GERD (gastroesophageal reflux disease)     Headache(784.0) 1 week ago    History of uterine cancer     Hyperglycemia     Hyperlipidemia     Hypertension     Hypovitaminosis D     Iron deficiency anemia     Joint pain     Mood disorder (HCC)     Obesity     Obstructive sleep apnea     Osteoarthritis     Ringing in ears     RLS (restless legs  syndrome)     Snoring     Uterine cancer (HCC)     age 23    Vitamin D deficiency     Witnessed episode of apnea        Past Surgical History:   Procedure Laterality Date    APPENDECTOMY      EPIDURAL BLOCK INJECTION      HYSTERECTOMY      age 23    KNEE ARTHROSCOPY      KNEE SURGERY      Meniscus tear    TONSILLECTOMY      TUBAL LIGATION         Family History   Problem Relation Age of Onset    Diabetes Mother     Asthma Mother     Hypertension Mother     Heart disease Mother     No Known Problems Father     No Known Problems Sister     No Known Problems Daughter     No Known Problems Maternal Grandmother     No Known Problems Paternal Grandmother     No Known Problems Sister     No Known Problems Sister     No Known Problems Daughter     No Known Problems Maternal Grandfather     No Known Problems Paternal Grandfather      I have reviewed and agree with the history as documented.    E-Cigarette/Vaping    E-Cigarette Use Never User      E-Cigarette/Vaping Substances    Nicotine No     THC No     CBD No     Flavoring No     Other No     Unknown No      Social History     Tobacco Use    Smoking status: Former     Current packs/day: 0.00     Average packs/day: 1.5 packs/day for 47.7 years (71.6 ttl pk-yrs)     Types: Cigarettes     Start date: 1974     Quit date: 2021     Years since quittin.8     Passive exposure: Past    Smokeless tobacco: Never   Vaping Use    Vaping status: Never Used   Substance Use Topics    Alcohol use: Not Currently     Comment: very seldom    Drug use: Not Currently     Comment: pt use at age [age]       Review of Systems   Constitutional: Negative.  Negative for chills and fatigue.   HENT:  Negative for ear pain and sore throat.    Eyes:  Negative for photophobia and redness.   Respiratory:  Positive for shortness of breath. Negative for apnea and cough.    Cardiovascular:  Positive for chest pain.   Gastrointestinal:  Negative for abdominal pain, nausea and vomiting.    Genitourinary:  Negative for dysuria.   Musculoskeletal:  Negative for arthralgias, neck pain and neck stiffness.   Skin:  Negative for rash.   Neurological:  Negative for dizziness, tremors, syncope and weakness.   Psychiatric/Behavioral:  Negative for suicidal ideas.        Physical Exam  Physical Exam  Constitutional:       General: She is not in acute distress.     Appearance: She is well-developed. She is not diaphoretic.   Eyes:      Pupils: Pupils are equal, round, and reactive to light.   Cardiovascular:      Rate and Rhythm: Normal rate and regular rhythm.   Pulmonary:      Effort: Pulmonary effort is normal. No respiratory distress.      Breath sounds: Normal breath sounds.   Abdominal:      General: Bowel sounds are normal. There is no distension.      Palpations: Abdomen is soft.   Musculoskeletal:         General: Normal range of motion.      Cervical back: Normal range of motion and neck supple.   Skin:     General: Skin is warm and dry.   Neurological:      Mental Status: She is alert and oriented to person, place, and time.         Vital Signs  ED Triage Vitals   Temperature Pulse Respirations Blood Pressure SpO2   08/18/24 2131 08/18/24 2131 08/18/24 2131 08/18/24 2131 08/18/24 2131   98.8 °F (37.1 °C) 65 (!) 24 134/55 96 %      Temp Source Heart Rate Source Patient Position - Orthostatic VS BP Location FiO2 (%)   08/18/24 2131 08/18/24 2340 08/18/24 2340 08/18/24 2131 --   Oral Monitor Sitting Right arm       Pain Score       08/18/24 2131       8           Vitals:    08/18/24 2340 08/19/24 0000 08/19/24 0039 08/19/24 0043   BP: 131/72 126/73  129/68   Pulse: 66 65 59    Patient Position - Orthostatic VS: Sitting Lying           Visual Acuity      ED Medications  Medications   iohexol (OMNIPAQUE) 350 MG/ML injection (SINGLE-DOSE) 100 mL (100 mL Intravenous Given 8/18/24 2329)       Diagnostic Studies  Results Reviewed       Procedure Component Value Units Date/Time    HS Troponin I 2hr  [[id number]]  (Normal) Collected: 08/18/24 2348    Lab Status: Final result Specimen: Blood from Arm, Left Updated: 08/19/24 0019     hs TnI 2hr 3 ng/L      Delta 2hr hsTnI 0 ng/L     HS Troponin I 4hr [[id number]]     Lab Status: No result Specimen: Blood     HS Troponin 0hr (reflex protocol) [[id number]]  (Normal) Collected: 08/18/24 2152    Lab Status: Final result Specimen: Blood from Arm, Left Updated: 08/18/24 2231     hs TnI 0hr 3 ng/L     B-Type Natriuretic Peptide(BNP) [[id number]]  (Normal) Collected: 08/18/24 2152    Lab Status: Final result Specimen: Blood from Arm, Left Updated: 08/18/24 2231     BNP 28 pg/mL     Comprehensive metabolic panel [[id number]]  (Abnormal) Collected: 08/18/24 2152    Lab Status: Final result Specimen: Blood from Arm, Left Updated: 08/18/24 2228     Sodium 141 mmol/L      Potassium 4.3 mmol/L      Chloride 102 mmol/L      CO2 31 mmol/L      ANION GAP 8 mmol/L      BUN 22 mg/dL      Creatinine 0.77 mg/dL      Glucose 141 mg/dL      Calcium 9.5 mg/dL      AST 56 U/L      ALT 32 U/L      Alkaline Phosphatase 98 U/L      Total Protein 6.9 g/dL      Albumin 3.9 g/dL      Total Bilirubin 0.54 mg/dL      eGFR 80 ml/min/1.73sq m     Narrative:      National Kid[employer] Disease Foundation guidelines for Chronic Kidney Disease (CKD):     Stage 1 with normal or high GFR (GFR > 90 mL/min/1.73 square meters)    Stage 2 Mild CKD (GFR = 60-89 mL/min/1.73 square meters)    Stage 3A Moderate CKD (GFR = 45-59 mL/min/1.73 square meters)    Stage 3B Moderate CKD (GFR = 30-44 mL/min/1.73 square meters)    Stage 4 Severe CKD (GFR = 15-29 mL/min/1.73 square meters)    Stage 5 End Stage CKD (GFR <15 mL/min/1.73 square meters)  Note: GFR calculation is accurate only with a steady state creatinine    D-dimer, quantitative [[id number]]  (Abnormal) Collected: 08/18/24 2152    Lab Status: Final result Specimen: Blood from Arm, Left Updated: 08/18/24 2220     D-Dimer, Quant 0.65 ug/ml FEU     Narrative:      In the  evaluation for possible pulmonary embolism, in the appropriate (Well's Score of 4 or less) patient, the age adjusted d-dimer cutoff for this patient can be calculated as:    Age x 0.01 (in ug/mL) for Age-adjusted D-dimer exclusion threshold for a patient over 50 years.    CBC and differential [[id number]]  (Abnormal) Collected: 08/18/24 2152    Lab Status: Final result Specimen: Blood from Arm, Left Updated: 08/18/24 2200     WBC 9.97 Thousand/uL      RBC 4.87 Million/uL      Hemoglobin 14.5 g/dL      Hematocrit 46.9 %      MCV 96 fL      MCH 29.8 pg      MCHC 30.9 g/dL      RDW 13.4 %      MPV 11.0 fL      Platelets 256 Thousands/uL      nRBC 0 /100 WBCs      Segmented % 75 %      Immature Grans % 1 %      Lymphocytes % 13 %      Monocytes % 8 %      Eosinophils Relative 2 %      Basophils Relative 1 %      Absolute Neutrophils 7.50 Thousands/µL      Absolute Immature Grans 0.07 Thousand/uL      Absolute Lymphocytes 1.32 Thousands/µL      Absolute Monocytes 0.79 Thousand/µL      Eosinophils Absolute 0.23 Thousand/µL      Basophils Absolute 0.06 Thousands/µL     Fingerstick Glucose (POCT) [[id number]]  (Abnormal) Collected: 08/18/24 2130    Lab Status: Final result Specimen: Blood Updated: 08/18/24 2131     POC Glucose 157 mg/dl                    CTA ED chest PE Study   Final Result by Atul Leblanc DO (08/19 0008)      No pulmonary embolus or other acute abnormality.                  Workstation performed: [id number]                    Procedures  ECG 12 Lead Documentation Only    Date/Time: 8/18/2024 9:33 PM    Performed by: Agueda Sprague PA-C  Authorized by: Agueda Sprague PA-C    Indications / Diagnosis:  Chest pain  ECG reviewed by me, the ED Provider: yes    Patient location:  ED  Interpretation:     Interpretation: normal    Rate:     ECG rate:  72    ECG rate assessment: normal    Rhythm:     Rhythm: sinus rhythm    Ectopy:     Ectopy: none    QRS:     QRS axis:  Normal    QRS intervals:   Normal  Conduction:     Conduction: normal    ST segments:     ST segments:  Normal  T waves:     T waves: normal             ED Course               HEART Risk Score      Flowsheet Row Most Recent Value   Heart Score Risk Calculator    History 1 Filed at: 08/19/2024 0020   ECG 0 Filed at: 08/19/2024 0020   Age 2 Filed at: 08/19/2024 0020   Risk Factors 2 Filed at: 08/19/2024 0020   Troponin 0 Filed at: 08/19/2024 0020   HEART Score 5 Filed at: 08/19/2024 0020                          SBIRT 22yo+      Flowsheet Row Most Recent Value   Initial Alcohol Screen: US AUDIT-C     1. How often do you have a drink containing alcohol? 0 Filed at: 08/18/2024 2131   2. How many drinks containing alcohol do you have on a typical day you are drinking?  0 Filed at: 08/18/2024 2131   3a. Male UNDER 65: How often do you have five or more drinks on one occasion? 0 Filed at: 08/18/2024 2131   3b. FEMALE Any Age, or MALE 65+: How often do you have 4 or more drinks on one occassion? 0 Filed at: 08/18/2024 2131   Audit-C Score 0 Filed at: 08/18/2024 2131   ROLAND: How many times in the past year have you...    Used an illegal drug or used a prescription medication for non-medical reasons? Never Filed at: 08/18/2024 2131                      Medical Decision Making  Patient presenting complaining of gradual onset substernal chest pain associated with shortness of breath and painful breathing.  Initial differential was STEMI, NSTEMI, angina, pulmonary embolus or musculoskeletal pathology.  Laboratory evaluation was completed.  EKG revealed normal sinus rhythm without significant ST segment elevation.  Troponin and delta troponin negative for acute cardiac injury.  D-dimer found to be 0.65.  At this time shared decision-making discussion was had with patient regarding age-adjusted D-dimer as well as CTA for pulmonary embolus.  Patient expresses concern for pulmonary embolus and requests CTA.  CTA was ordered and found to be negative.  On  reevaluation patient states she is feeling much better and that her symptoms have resolved and that she would like to go home.  At this time patient was advised follow-up with cardiology given elevated heart score.  Patient demonstrates understanding and ambulated the department.    Amount and/or Complexity of Data Reviewed  Labs: ordered.  Radiology: ordered.    Risk  Prescription drug management.                 Disposition  Final diagnoses:   Chest pain, unspecified     Time reflects when diagnosis was documented in both MDM as applicable and the Disposition within this note       Time User Action Codes Description Comment    8/19/2024 12:23 AM Agueda Sprague Add [R07.9] Chest pain, unspecified           ED Disposition       ED Disposition   Discharge    Condition   Stable    Date/Time   Mon Aug 19, 2024 0022    Comment   Shruti Kaplan discharge to home/self care.                   Follow-up Information       Follow up With Specialties Details Why Contact Info Additional Information    Violeta Troy MD Family Medicine Call  As needed [address] [address]  [phone]       [facility]   [address] [place] [id number] [phone] [facility], [address] [place] [address], [id number]    [phone]            Discharge Medication List as of 8/19/2024 12:31 AM        CONTINUE these medications which have NOT CHANGED    Details   acetaminophen (TYLENOL) 650 mg CR tablet Take 1 tablet (650 mg total) by mouth every 8 (eight) hours as needed for mild pain, Starting Sun 2/6/2022, Normal      albuterol (PROVENTIL HFA,VENTOLIN HFA) 90 mcg/act inhaler inhale 2 puffs by mouth every 6 hours if needed for wheezing, Normal      ascorbic acid (VITAMIN C) 500 mg tablet Take 500 mg by mouth daily, Historical Med      aspirin (Aspirin Low Dose) 81 mg EC tablet Take 1 tablet (81 mg total) by mouth daily,  Starting Tue 12/15/2020, Normal      atorvastatin (LIPITOR) 40 mg tablet take 1 tablet by mouth once daily, Normal      Azelastine HCl 137 MCG/SPRAY SOLN 1 spray by Each Nare route if needed (allergies), Starting Tue 1/16/2024, Normal      calcium carbonate (OS-ROSCOE) 1250 (500 Ca) MG chewable tablet Chew 1 tablet (1,250 mg total) daily, Starting Mon 6/19/2023, Until Mon 5/6/2024, Normal      celecoxib (CeleBREX) 200 mg capsule Take 1 capsule (200 mg total) by mouth 2 (two) times a day, Starting Wed 3/13/2024, Normal      cholecalciferol (VITAMIN D3) 1,000 units tablet Take 1 tablet (1,000 Units total) by mouth daily, Starting Mon 6/19/2023, Normal      Diclofenac Sodium (VOLTAREN) 1 % Apply 2 g topically 4 (four) times a day, Starting Sun 2/6/2022, Normal      fluticasone (FLONASE) 50 mcg/act nasal spray 1 spray into each nostril daily, Starting Tue 12/15/2020, Normal      gabapentin (NEURONTIN) 300 mg capsule Take 1 capsule in am, 2 capsules in afternoon, and 1 capsule at bedtime., Normal      ketoconazole (NIZORAL) 2 % cream Apply topically daily, Starting Fri 11/17/2023, Normal      loratadine (CLARITIN) 10 mg tablet Take 1 tablet (10 mg total) by mouth daily, Starting Wed 10/18/2023, Until Mon 5/6/2024, Normal      montelukast (SINGULAIR) 10 mg tablet take 1 tablet by mouth at bedtime, Normal      propranolol (INDERAL) 20 mg tablet Take 1 tablet (20 mg total) by mouth 3 (three) times a day, Starting Mon 5/6/2024, Normal      semaglutide, 1 mg/dose, (Ozempic, 1 MG/DOSE,) 2 mg/1.5 mL injection pen Inject 0.75 mL (1 mg total) under the skin every 7 days, Starting Mon 7/29/2024, Until Sun 10/27/2024, Normal                 PDMP Review       None            ED Provider  Electronically Signed by             Agueda Sprague PA-C  08/19/24 4061

## 2024-08-19 NOTE — ED NOTES
Patient does report that she is feeling better. Up to the bathroom with steady gait. No respiratory discomfort or distress.     Latoya Torres RN  08/18/24 8525

## 2024-08-19 NOTE — ED NOTES
Patient seen by the provider with results of testing and discharge instructions reviewed with patient and significant other. Patient reports feeling good upon discharge.     Latoya Torres RN  08/19/24 0039

## 2024-08-19 NOTE — DISCHARGE INSTRUCTIONS
Continue all regular medications.  Return for new or worsening complaints.  Follow-up with cardiology as well as primary care.

## 2024-08-20 ENCOUNTER — VBI (OUTPATIENT)
Dept: FAMILY MEDICINE CLINIC | Facility: CLINIC | Age: 64
End: 2024-08-20

## 2024-08-20 ENCOUNTER — PREP FOR PROCEDURE (OUTPATIENT)
Age: 64
End: 2024-08-20

## 2024-08-20 ENCOUNTER — TELEPHONE (OUTPATIENT)
Age: 64
End: 2024-08-20

## 2024-08-20 DIAGNOSIS — Z86.010 HISTORY OF COLON POLYPS: Primary | ICD-10-CM

## 2024-08-20 NOTE — TELEPHONE ENCOUNTER
08/20/24 9:40 AM    Patient contacted post ED visit, [facility] department spoke with patient/caregiver and outreach was successful.    Thank you.  Elvira Duffy  [facility]

## 2024-08-20 NOTE — TELEPHONE ENCOUNTER
08/20/24  Screened by: Karl Aguillon    Referring Provider     Pre- Screening:     There is no height or weight on file to calculate BMI.  Has patient been referred for a routine screening Colonoscopy? no  Is the patient between 45-75 years old? yes      Previous Colonoscopy yes   If yes:    Date: 2019    Facility:     Reason:     Does the patient want to see a Gastroenterologist prior to their procedure OR are they having any GI symptoms? no    Has the patient been hospitalized or had abdominal surgery in the past 6 months? no    Does the patient use supplemental oxygen? no    Does the patient take Coumadin, Lovenox, Plavix, Elliquis, Xarelto, or other blood thinning medication? no    Has the patient had a stroke, cardiac event, or stent placed in the past year? no    If patient is between 45yrs - 49yrs, please advise patient that we will have to confirm benefits & coverage with their insurance company for a routine screening colonoscopy.

## 2024-08-20 NOTE — TELEPHONE ENCOUNTER
Scheduled date of colonoscopy (as of today):10.22.24    Physician performing colonoscopy:Elba    Location of colonoscopy:    Bowel prep reviewed with patient:Felipe/Alexander    Instructions reviewed with patient by:Lsims and sent to chart

## 2024-08-22 ENCOUNTER — OFFICE VISIT (OUTPATIENT)
Dept: FAMILY MEDICINE CLINIC | Facility: CLINIC | Age: 64
End: 2024-08-22

## 2024-08-22 VITALS
TEMPERATURE: 97.7 F | HEART RATE: 62 BPM | DIASTOLIC BLOOD PRESSURE: 80 MMHG | WEIGHT: 267 LBS | HEIGHT: 61 IN | SYSTOLIC BLOOD PRESSURE: 124 MMHG | BODY MASS INDEX: 50.41 KG/M2 | RESPIRATION RATE: 18 BRPM | OXYGEN SATURATION: 97 %

## 2024-08-22 DIAGNOSIS — E11.9 TYPE 2 DIABETES MELLITUS WITHOUT COMPLICATION, WITHOUT LONG-TERM CURRENT USE OF INSULIN (HCC): ICD-10-CM

## 2024-08-22 DIAGNOSIS — Z00.00 MEDICARE ANNUAL WELLNESS VISIT, SUBSEQUENT: Primary | ICD-10-CM

## 2024-08-22 PROCEDURE — 3074F SYST BP LT 130 MM HG: CPT | Performed by: FAMILY MEDICINE

## 2024-08-22 PROCEDURE — G0439 PPPS, SUBSEQ VISIT: HCPCS | Performed by: FAMILY MEDICINE

## 2024-08-22 PROCEDURE — 3079F DIAST BP 80-89 MM HG: CPT | Performed by: FAMILY MEDICINE

## 2024-08-22 NOTE — PROGRESS NOTES
Ambulatory Visit  Name: Shruti Kaplan      : 1960      MRN: [id number]  Encounter Provider: Isaias Bah MD  Encounter Date: 2024   Encounter department: [facility]    Assessment & Plan   1. Type 2 diabetes mellitus without complication, without long-term current use of insulin (HCC)       Preventive health issues were discussed with patient, and age appropriate screening tests were ordered as noted in patient's After Visit Summary. Personalized health advice and appropriate referrals for health education or preventive services given if needed, as noted in patient's After Visit Summary.    History of Present Illness     HPI   Patient Care Team:  Violeta Troy MD as PCP - General (Family Medicine)  Juliano Yip DO as PCP - PCP-[facility] ([facility])    Review of Systems  Medical History Reviewed by provider this encounter:       Annual Wellness Visit Questionnaire       Health Risk Assessment:   Patient rates overall health as fair. Patient feels that their physical health rating is same. Patient is satisfied with their life. Eyesight was rated as same. Hearing was rated as same. Patient feels that their emotional and mental health rating is same. Patients states they are never, rarely angry. Patient states they are sometimes unusually tired/fatigued. Pain experienced in the last 7 days has been a lot. Patient's pain rating has been 6/10. Patient states that she has experienced weight loss or gain in last 6 months.     Fall Risk Screening:   In the past year, patient has experienced: history of falling in past year    Number of falls: 2 or more  Injured during fall?: No    Feels unsteady when standing or walking?: No    Worried about falling?: No      Urinary Incontinence Screening:   Patient has not leaked urine accidently in the last six months.     Home Safety:  Patient has trouble with stairs inside or outside of their home. Patient has  working smoke alarms and has no working carbon monoxide detector. Home safety hazards include: none.     Nutrition:   Current diet is Diabetic, No Added Salt and Limited junk food.     Medications:   Patient is not currently taking any over-the-counter supplements. Patient is able to manage medications.     Activities of Daily Living (ADLs)/Instrumental Activities of Daily Living (IADLs):   Walk and transfer into and out of bed and chair?: Yes  Dress and groom yourself?: Yes    Bathe or shower yourself?: Yes    Feed yourself? Yes  Do your laundry/housekeeping?: Yes  Manage your money, pay your bills and track your expenses?: Yes  Make your own meals?: Yes    Do your own shopping?: Yes    Durable Medical Equipment Suppliers  [employer] for my cpap machine    Previous Hospitalizations:   Any hospitalizations or ED visits within the last 12 months?: No      Advance Care Planning:   Living will: No    Durable POA for healthcare: No    Advanced directive: No      PREVENTIVE SCREENINGS      Cardiovascular Screening:    General: Screening Not Indicated and History Lipid Disorder      Diabetes Screening:     General: Screening Not Indicated and History Diabetes      Colorectal Cancer Screening:     General: Screening Current      Breast Cancer Screening:     General: Screening Current      Cervical Cancer Screening:    General: Screening Not Indicated      Lung Cancer Screening:     General: Screening Current      Hepatitis C Screening:    General: Screening Current    Screening, Brief Intervention, and Referral to Treatment (SBIRT)    Screening  Typical number of drinks in a day: 0  Typical number of drinks in a week: 0  Interpretation: Low risk drinking behavior.    AUDIT-C Screenin) How often did you have a drink containing alcohol in the past year? never  2) How many drinks did you have on a typical day when you were drinking in the past year? 0  3) How often did you have 6 or more drinks on one occasion in the  "past year? never    AUDIT-C Score: 0  Interpretation: Score 0-2 (female): Negative screen for alcohol misuse    Single Item Drug Screening:  How often have you used an illegal drug (including marijuana) or a prescription medication for non-medical reasons in the past year? never    Single Item Drug Screen Score: 0  Interpretation: Negative screen for possible drug use disorder    SDOH Risk Assessment  Social determinants of health (SDOH) risk assesment tool was completed. The tool at a minimum covered housing stability, food insecurity, transportation needs, and utility difficulty. Patient had at risk responses for the following SDOH domains: food insecurity.     Social Determinants of Health     Financial Resource Strain: Medium Risk (8/15/2024)    Overall Financial Resource Strain (CARDIA)     Difficulty of Paying Living Expenses: Somewhat hard   Food Insecurity: Food Insecurity Present (8/15/2024)    Hunger Vital Sign     Worried About Running Out of Food in the Last Year: Often true     Ran Out of Food in the Last Year: Often true   Transportation Needs: No Transportation Needs (8/15/2024)    PRAPARE - Transportation     Lack of Transportation (Medical): No     Lack of Transportation (Non-Medical): No   Housing Stability: Low Risk  (8/15/2024)    Housing Stability Vital Sign     Unable to Pay for Housing in the Last Year: No     Number of Times Moved in the Last Year: 0     Homeless in the Last Year: No   Utilities: Not At Risk (8/15/2024)    [facility] Utilities     Threatened with loss of utilities: No     No results found.    Objective     /80 (BP Location: Right arm, Patient Position: Sitting, Cuff Size: Large)   Pulse 62   Temp 97.7 °F (36.5 °C) (Temporal)   Resp 18   Ht 5' 1\" (1.549 m)   Wt 121 kg (267 lb)   LMP  (LMP Unknown)   SpO2 97%   BMI 50.45 kg/m²     Physical Exam      "

## 2024-08-22 NOTE — PATIENT INSTRUCTIONS
Medicare Preventive Visit Patient Instructions  Thank you for completing your Welcome to Medicare Visit or Medicare Annual Wellness Visit today. Your next wellness visit will be due in one year (8/23/2025).  The screening/preventive services that you may require over the next 5-10 years are detailed below. Some tests may not apply to you based off risk factors and/or age. Screening tests ordered at today's visit but not completed yet may show as past due. Also, please note that scanned in results may not display below.  Preventive Screenings:  Service Recommendations Previous Testing/Comments   Colorectal Cancer Screening  * Colonoscopy    * Fecal Occult Blood Test (FOBT)/Fecal Immunochemical Test (FIT)  * Fecal DNA/Cologuard Test  * Flexible Sigmoidoscopy Age: 45-75 years old   Colonoscopy: every 10 years (may be performed more frequently if at higher risk)  OR  FOBT/FIT: every 1 year  OR  Cologuard: every 3 years  OR  Sigmoidoscopy: every 5 years  Screening may be recommended earlier than age 45 if at higher risk for colorectal cancer. Also, an individualized decision between you and your healthcare provider will decide whether screening between the ages of 76-85 would be appropriate. Colonoscopy: 10/08/2019  FOBT/FIT: Not on file  Cologuard: Not on file  Sigmoidoscopy: Not on file    Screening Current     Breast Cancer Screening Age: 40+ years old  Frequency: every 1-2 years  Not required if history of left and right mastectomy Mammogram: 06/07/2023    Screening Current   Cervical Cancer Screening Between the ages of 21-29, pap smear recommended once every 3 years.   Between the ages of 30-65, can perform pap smear with HPV co-testing every 5 years.   Recommendations may differ for women with a history of total hysterectomy, cervical cancer, or abnormal pap smears in past. Pap Smear: 12/28/2022    Screening Not Indicated   Hepatitis C Screening Once for adults born between 1945 and 1965  More frequently in  patients at high risk for Hepatitis C Hep C Antibody: 10/28/2018    Screening Current   Diabetes Screening 1-2 times per year if you're at risk for diabetes or have pre-diabetes Fasting glucose: 143 mg/dL (11/10/2023)  A1C: 6.8 % (2/9/2024)  Screening Not Indicated  History Diabetes   Cholesterol Screening Once every 5 years if you don't have a lipid disorder. May order more often based on risk factors. Lipid panel: 02/09/2024    Screening Not Indicated  History Lipid Disorder     Other Preventive Screenings Covered by Medicare:  Abdominal Aortic Aneurysm (AAA) Screening: covered once if your at risk. You're considered to be at risk if you have a family history of AAA.  Lung Cancer Screening: covers low dose CT scan once per year if you meet all of the following conditions: (1) Age 55-77; (2) No signs or symptoms of lung cancer; (3) Current smoker or have quit smoking within the last 15 years; (4) You have a tobacco smoking history of at least 20 pack years (packs per day multiplied by number of years you smoked); (5) You get a written order from a healthcare provider.  Glaucoma Screening: covered annually if you're considered high risk: (1) You have diabetes OR (2) Family history of glaucoma OR (3)  aged 50 and older OR (4)  American aged 65 and older  Osteoporosis Screening: covered every 2 years if you meet one of the following conditions: (1) You're estrogen deficient and at risk for osteoporosis based off medical history and other findings; (2) Have a vertebral abnormality; (3) On glucocorticoid therapy for more than 3 months; (4) Have primary hyperparathyroidism; (5) On osteoporosis medications and need to assess response to drug therapy.   Last bone density test (DXA Scan): 06/16/2023.  HIV Screening: covered annually if you're between the age of 15-65. Also covered annually if you are younger than 15 and older than 65 with risk factors for HIV infection. For pregnant patients, it is  covered up to 3 times per pregnancy.    Immunizations:  Immunization Recommendations   Influenza Vaccine Annual influenza vaccination during flu season is recommended for all persons aged >= 6 months who do not have contraindications   Pneumococcal Vaccine   * Pneumococcal conjugate vaccine = PCV13 (Prevnar 13), PCV15 (Vaxneuvance), PCV20 (Prevnar 20)  * Pneumococcal polysaccharide vaccine = PPSV23 (Pneumovax) Adults 19-63 yo with certain risk factors or if 65+ yo  If never received any pneumonia vaccine: recommend Prevnar 20 (PCV20)  Give PCV20 if previously received 1 dose of PCV13 or PPSV23   Hepatitis B Vaccine 3 dose series if at intermediate or high risk (ex: diabetes, end stage renal disease, liver disease)   Respiratory syncytial virus (RSV) Vaccine - COVERED BY MEDICARE PART D  * RSVPreF3 (Arexvy) CDC recommends that adults 60 years of age and older may receive a single dose of RSV vaccine using shared clinical decision-making (SCDM)   Tetanus (Td) Vaccine - COST NOT COVERED BY MEDICARE PART B Following completion of primary series, a booster dose should be given every 10 years to maintain immunity against tetanus. Td may also be given as tetanus wound prophylaxis.   Tdap Vaccine - COST NOT COVERED BY MEDICARE PART B Recommended at least once for all adults. For pregnant patients, recommended with each pregnancy.   Shingles Vaccine (Shingrix) - COST NOT COVERED BY MEDICARE PART B  2 shot series recommended in those 19 years and older who have or will have weakened immune systems or those 50 years and older     Health Maintenance Due:      Topic Date Due   • Breast Cancer Screening: Mammogram  06/07/2024   • Colorectal Cancer Screening  10/08/2024   • Lung Cancer Screening  08/18/2025   • Hepatitis C Screening  Completed   • Cervical Cancer Screening  Discontinued     Immunizations Due:      Topic Date Due   • Hepatitis A Vaccine (1 of 2 - Risk 2-dose series) Never done   • COVID-19 Vaccine (1 - 2023-24  season) Never done   • Influenza Vaccine (1) 09/01/2024     Advance Directives   What are advance directives?  Advance directives are legal documents that state your wishes and plans for medical care. These plans are made ahead of time in case you lose your ability to make decisions for yourself. Advance directives can apply to any medical decision, such as the treatments you want, and if you want to donate organs.   What are the types of advance directives?  There are many types of advance directives, and each state has rules about how to use them. You may choose a combination of any of the following:  Living will:  This is a written record of the treatment you want. You can also choose which treatments you do not want, which to limit, and which to stop at a certain time. This includes surgery, medicine, IV fluid, and tube feedings.   Durable power of  for healthcare (DPAHC):  This is a written record that states who you want to make healthcare choices for you when you are unable to make them for yourself. This person, called a proxy, is usually a family member or a friend. You may choose more than 1 proxy.  Do not resuscitate (DNR) order:  A DNR order is used in case your heart stops beating or you stop breathing. It is a request not to have certain forms of treatment, such as CPR. A DNR order may be included in other types of advance directives.  Medical directive:  This covers the care that you want if you are in a coma, near death, or unable to make decisions for yourself. You can list the treatments you want for each condition. Treatment may include pain medicine, surgery, blood transfusions, dialysis, IV or tube feedings, and a ventilator (breathing machine).  Values history:  This document has questions about your views, beliefs, and how you feel and think about life. This information can help others choose the care that you would choose.  Why are advance directives important?  An advance directive  helps you control your care. Although spoken wishes may be used, it is better to have your wishes written down. Spoken wishes can be misunderstood, or not followed. Treatments may be given even if you do not want them. An advance directive may make it easier for your family to make difficult choices about your care.   Fall Prevention    Fall prevention  includes ways to make your home and other areas safer. It also includes ways you can move more carefully to prevent a fall. Health conditions that cause changes in your blood pressure, vision, or muscle strength and coordination may increase your risk for falls. Medicines may also increase your risk for falls if they make you dizzy, weak, or sleepy.   Fall prevention tips:   Stand or sit up slowly.    Use assistive devices as directed.    Wear shoes that fit well and have soles that .    Wear a personal alarm.    Stay active.    Manage your medical conditions.    Home Safety Tips:  Add items to prevent falls in the bathroom.    Keep paths clear.    Install bright lights in your home.    Keep items you use often on shelves within reach.    Paint or place reflective tape on the edges of your stairs.    Weight Management   Why it is important to manage your weight:  Being overweight increases your risk of health conditions such as heart disease, high blood pressure, type 2 diabetes, and certain types of cancer. It can also increase your risk for osteoarthritis, sleep apnea, and other respiratory problems. Aim for a slow, steady weight loss. Even a small amount of weight loss can lower your risk of health problems.  How to lose weight safely:  A safe and healthy way to lose weight is to eat fewer calories and get regular exercise. You can lose up about 1 pound a week by decreasing the number of calories you eat by 500 calories each day.   Healthy meal plan for weight management:  A healthy meal plan includes a variety of foods, contains fewer calories, and helps you  stay healthy. A healthy meal plan includes the following:  Eat whole-grain foods more often.  A healthy meal plan should contain fiber. Fiber is the part of grains, fruits, and vegetables that is not broken down by your body. Whole-grain foods are healthy and provide extra fiber in your diet. Some examples of whole-grain foods are whole-wheat breads and pastas, oatmeal, brown rice, and bulgur.  Eat a variety of vegetables every day.  Include dark, leafy greens such as spinach, kale, kenia greens, and mustard greens. Eat yellow and orange vegetables such as carrots, sweet potatoes, and winter squash.   Eat a variety of fruits every day.  Choose fresh or canned fruit (canned in its own juice or light syrup) instead of juice. Fruit juice has very little or no fiber.  Eat low-fat dairy foods.  Drink fat-free (skim) milk or 1% milk. Eat fat-free yogurt and low-fat cottage cheese. Try low-fat cheeses such as mozzarella and other reduced-fat cheeses.  Choose meat and other protein foods that are low in fat.  Choose beans or other legumes such as split peas or lentils. Choose fish, skinless poultry (chicken or turkey), or lean cuts of red meat (beef or pork). Before you cook meat or poultry, cut off any visible fat.   Use less fat and oil.  Try baking foods instead of frying them. Add less fat, such as margarine, sour cream, regular salad dressing and mayonnaise to foods. Eat fewer high-fat foods. Some examples of high-fat foods include french fries, doughnuts, ice cream, and cakes.  Eat fewer sweets.  Limit foods and drinks that are high in sugar. This includes candy, cookies, regular soda, and sweetened drinks.  Exercise:  Exercise at least 30 minutes per day on most days of the week. Some examples of exercise include walking, biking, dancing, and swimming. You can also fit in more physical activity by taking the stairs instead of the elevator or parking farther away from stores. Ask your healthcare provider about the  best exercise plan for you.      © Copyright [employer] 2018 Information is for End User's use only and may not be sold, redistributed or otherwise used for commercial purposes. All illustrations and images included in CareNotes® are the copyrighted property of [employer].A[employer] or [employer]

## 2024-09-18 DIAGNOSIS — E66.01 MORBID OBESITY WITH BMI OF 50.0-59.9, ADULT (HCC): ICD-10-CM

## 2024-09-18 DIAGNOSIS — E11.9 TYPE 2 DIABETES MELLITUS WITHOUT COMPLICATION, WITHOUT LONG-TERM CURRENT USE OF INSULIN (HCC): ICD-10-CM

## 2024-09-20 DIAGNOSIS — E66.01 MORBID OBESITY WITH BMI OF 50.0-59.9, ADULT (HCC): ICD-10-CM

## 2024-09-20 DIAGNOSIS — E11.9 TYPE 2 DIABETES MELLITUS WITHOUT COMPLICATION, WITHOUT LONG-TERM CURRENT USE OF INSULIN (HCC): ICD-10-CM

## 2024-09-23 ENCOUNTER — TELEPHONE (OUTPATIENT)
Dept: GASTROENTEROLOGY | Facility: MEDICAL CENTER | Age: 64
End: 2024-09-23

## 2024-09-23 NOTE — TELEPHONE ENCOUNTER
Procedure Confirmation sent to patient via [employer] will attach prep instructions as well for patient

## 2024-09-30 ENCOUNTER — PATIENT OUTREACH (OUTPATIENT)
Dept: FAMILY MEDICINE CLINIC | Facility: CLINIC | Age: 64
End: 2024-09-30

## 2024-09-30 NOTE — PROGRESS NOTES
Medication Patient Assistance Program (MPAP) Specialist  received In Basket message from clinician requesting outreach to patient regarding her Ozempic. Per IB message patient is having difficulty affording her Ozempic. MPAP specialist reviewed patient chart prior to outreach. MPAP specialist will follow up with patient regarding Renetta Nordisk patient assistance program Eligibility and PACE eligibility. MPAP specialist has placed a personal reminder.

## 2024-10-03 ENCOUNTER — PATIENT OUTREACH (OUTPATIENT)
Dept: FAMILY MEDICINE CLINIC | Facility: CLINIC | Age: 64
End: 2024-10-03

## 2024-10-03 NOTE — PROGRESS NOTES
Medication Patient Assistance Program (MPAP) Specialist  received personal reminder regarding patient's difficulty affording her Ozempic. MPAP specialist reviewed patient chart prior to outreach.  MPAP specialist attempted to reach patient without success.MPAP specialist left voicemail with her name (Crystal Cho), stated she was patient assistance  and number [phone]. MPAP specialist requested a return call. MPAP specialist will follow up with patient if no return call is received. MPAP specialist has placed a personal reminder.

## 2024-10-07 ENCOUNTER — OFFICE VISIT (OUTPATIENT)
Dept: NEUROLOGY | Facility: CLINIC | Age: 64
End: 2024-10-07
Payer: MEDICARE

## 2024-10-07 VITALS
WEIGHT: 260 LBS | DIASTOLIC BLOOD PRESSURE: 70 MMHG | TEMPERATURE: 97.4 F | SYSTOLIC BLOOD PRESSURE: 122 MMHG | BODY MASS INDEX: 49.09 KG/M2 | OXYGEN SATURATION: 92 % | HEIGHT: 61 IN | HEART RATE: 89 BPM

## 2024-10-07 DIAGNOSIS — G25.0 BENIGN ESSENTIAL TREMOR: ICD-10-CM

## 2024-10-07 PROCEDURE — 99213 OFFICE O/P EST LOW 20 MIN: CPT | Performed by: NURSE PRACTITIONER

## 2024-10-07 RX ORDER — PROPRANOLOL HCL 20 MG
20 TABLET ORAL 2 TIMES DAILY
Qty: 180 TABLET | Refills: 1 | Status: SHIPPED | OUTPATIENT
Start: 2024-10-07

## 2024-10-07 RX ORDER — TOPIRAMATE 25 MG/1
25 TABLET, FILM COATED ORAL 2 TIMES DAILY
Qty: 60 TABLET | Refills: 3 | Status: SHIPPED | OUTPATIENT
Start: 2024-10-07

## 2024-10-07 RX ORDER — GABAPENTIN 300 MG/1
CAPSULE ORAL
Qty: 360 CAPSULE | Refills: 1 | Status: SHIPPED | OUTPATIENT
Start: 2024-10-07

## 2024-10-07 NOTE — PATIENT INSTRUCTIONS
Add low dose topamax - start with one tablet at night for 1 week and then increase to 1 tablet twice a day  Continue propranolol-can reduce back to two times/day on this medication as three times per day hasn't been entirely effective for symptom management.  Continue gabapentin  Let us know if any other changes  Make sure to contact the cpap company to make sure the machine is working well  Get labs in 1 month; let me know in 3-4 weeks how you are tolerating the medication  Follow up in 4 months

## 2024-10-07 NOTE — PROGRESS NOTES
Neurology Ambulatory Visit  Name: Shruti Kaplan       : 1960       MRN: [id number]   Encounter Provider: Horace Bianchi MA   Encounter Date: 10/7/2024  Encounter department: [facility] [place]    Assessment and Plan  1. Essential tremor  -     propranolol (INDERAL) 20 mg tablet; Take 1 tablet (20 mg total) by mouth 2 (two) times a day (Patient not taking: Reported on 10/23/2024)  -     topiramate (Topamax) 25 mg tablet; Take 1 tablet (25 mg total) by mouth 2 (two) times a day  -     gabapentin (NEURONTIN) 300 mg capsule; Take 1 capsule in am, 2 capsules in afternoon, and 1 capsule at bedtime.  -     Comprehensive metabolic panel; Future    Patient Instructions/Plan:  Add low dose topamax - start with one tablet at night for 1 week and then increase to 1 tablet twice a day  Continue propranolol-can reduce back to two times/day on this medication as three times per day hasn't been entirely effective for symptom management.  Continue gabapentin  Let us know if any other changes  Make sure to contact the cpap company to make sure the machine is working well  Get labs in 1 month; let me know in 3-4 weeks how you are tolerating the medication  Follow up in 4 months    She will Return in about 4 months (around 2025).    History of Present Illness     HPI   Shruti Kaplan is a 64 y.o. female who presents for 5 month follow up of essential tremor. She states no significant change in tremor despite medication change; she takes her medication 8 am/3pm/9pm. She states she goes to sleep at 11pm; she states she is using her cpap but lately it has made her more dry. No recent hospitalizations.     Previous History:  Briefly discussed procedures for Essential tremor such as DBS or focused ultrasound but her tremor on exam is very mild today and I am hopeful we can get better control of symptoms with changing timing/frequency of medications. We could also consider low dose topiramate  along with current medication in the future   she notices it most in right hand and is most annoying to her in the evening time   She has been seen in this office for many years; she has failed even low dose primidone in the past and higher doses of propranolol - greater than 40mg BID- has caused bradycardia. She states weights for tremor were not helpful.     CT head 11/2020:  PARENCHYMA:  No intracranial mass, mass effect or midline shift. No CT signs of acute infarction.  No acute parenchymal hemorrhage.  Minimal white matter changes are better appreciated on prior MRI. There is at least one stable tiny hypodensity in the   left centrum semiovale.  VENTRICLES AND EXTRA-AXIAL SPACES:  Normal for the patient's age.  VISUALIZED ORBITS AND PARANASAL SINUSES:  Unremarkable.  CALVARIUM AND EXTRACRANIAL SOFT TISSUES:  Normal.  IMPRESSION:  No acute intracranial abnormality.     MRI L spine 11/26/2022:  IMPRESSION:  Grade 1 anterolisthesis of L4 on L5 with moderate to severe facet arthropathy and mild to moderate spinal stenosis.  Previously noted right L4-5 foraminal and central to left central L5-S1 disc herniations have improved.   Incompletely visualized left paracentral and foraminal disc herniation at T10-T11, correlate for ipsilateral T10 radiculitis.    Review of Systems   Constitutional: Negative.    HENT: Negative.     Eyes: Negative.    Respiratory: Negative.     Cardiovascular: Negative.    Gastrointestinal: Negative.    Endocrine: Negative.    Genitourinary: Negative.    Musculoskeletal:  Positive for gait problem (cane).   Skin: Negative.    Allergic/Immunologic: Negative.    Neurological:  Positive for dizziness, tremors (both hands) and headaches.   Hematological: Negative.    Psychiatric/Behavioral: Negative.     ROS was reviewed and updated as appropriate    Current Outpatient Medications on File Prior to Visit   Medication Sig Dispense Refill    acetaminophen (TYLENOL) 650 mg CR tablet Take 1 tablet  (650 mg total) by mouth every 8 (eight) hours as needed for mild pain 30 tablet 0    albuterol (PROVENTIL HFA,VENTOLIN HFA) 90 mcg/act inhaler inhale 2 puffs by mouth every 6 hours if needed for wheezing 8.5 g 3    ascorbic acid (VITAMIN C) 500 mg tablet Take 500 mg by mouth daily      aspirin (Aspirin Low Dose) 81 mg EC tablet Take 1 tablet (81 mg total) by mouth daily 30 tablet 3    atorvastatin (LIPITOR) 40 mg tablet take 1 tablet by mouth once daily 90 tablet 3    Azelastine HCl 137 MCG/SPRAY SOLN 1 spray by Each Nare route if needed (allergies) 30 mL 1    cholecalciferol (VITAMIN D3) 1,000 units tablet Take 1 tablet (1,000 Units total) by mouth daily 30 tablet 5    Diclofenac Sodium (VOLTAREN) 1 % Apply 2 g topically 4 (four) times a day 100 g 0    fluticasone (FLONASE) 50 mcg/act nasal spray 1 spray into each nostril daily 1 Bottle 3    semaglutide, 1 mg/dose, (Ozempic, 1 MG/DOSE,) 2 mg/1.5 mL injection pen Inject 0.75 mL (1 mg total) under the skin every 7 days 9 mL 0    calcium carbonate (OS-ROSCOE) 1250 (500 Ca) MG chewable tablet Chew 1 tablet (1,250 mg total) daily 90 tablet 1    loratadine (CLARITIN) 10 mg tablet Take 1 tablet (10 mg total) by mouth daily 30 tablet 2    semaglutide, 1 mg/dose, (Ozempic, 1 MG/DOSE,) 2 mg/1.5 mL injection pen Inject 0.75 mL (1 mg total) under the skin every 7 days 9 mL 0     No current facility-administered medications on file prior to visit.      Social History     Tobacco Use    Smoking status: Former     Current packs/day: 0.00     Average packs/day: 1.5 packs/day for 47.7 years (71.6 ttl pk-yrs)     Types: Cigarettes     Start date: 1/1/1974     Quit date: 9/26/2021     Years since quitting: 3.1     Passive exposure: Past    Smokeless tobacco: Never   Vaping Use    Vaping status: Never Used   Substance and Sexual Activity    Alcohol use: Not Currently     Comment: very seldom    Drug use: Not Currently     Comment: pt use at age [age]    Sexual activity: Yes     Partners:  "Male       Objective     /70 (BP Location: Left arm, Patient Position: Sitting, Cuff Size: Standard)   Pulse 89   Temp (!) 97.4 °F (36.3 °C) (Temporal)   Ht 5' 1\" (1.549 m)   Wt 118 kg (260 lb)   LMP  (LMP Unknown)   SpO2 92%   BMI 49.13 kg/m²    Physical Exam  Vitals reviewed.   Constitutional:       General: She is not in acute distress.     Appearance: She is obese.   HENT:      Head: Normocephalic and atraumatic.      Right Ear: External ear normal.      Left Ear: External ear normal.      Nose: Nose normal.      Mouth/Throat:      Mouth: Mucous membranes are moist.      Pharynx: Oropharynx is clear.   Eyes:      General: Lids are normal.      Extraocular Movements: Extraocular movements intact.      Pupils: Pupils are equal, round, and reactive to light.   Cardiovascular:      Rate and Rhythm: Normal rate and regular rhythm.      Pulses: Normal pulses.      Heart sounds: Normal heart sounds.   Pulmonary:      Effort: Pulmonary effort is normal.      Breath sounds: Normal breath sounds.   Abdominal:      General: There is no distension.      Tenderness: There is no abdominal tenderness.   Musculoskeletal:      Cervical back: Normal range of motion.      Lumbar back: Tenderness present.      Right lower leg: No edema.      Left lower leg: No edema.   Skin:     Capillary Refill: Capillary refill takes less than 2 seconds.      Findings: No rash.   Neurological:      Mental Status: She is alert. Mental status is at baseline.      Motor: Motor strength is normal.     Coordination: Romberg sign negative.      Deep Tendon Reflexes:      Reflex Scores:       Brachioradialis reflexes are 1+ on the right side and 1+ on the left side.       Patellar reflexes are 1+ on the right side and 1+ on the left side.  Psychiatric:         Mood and Affect: Mood normal.         Speech: Speech normal.       Neurological Exam  Mental Status  Alert. Oriented to person, place and time. Speech is normal. Language is fluent " with no aphasia.    Cranial Nerves  CN II: Visual acuity is normal. Visual fields full to confrontation.  CN III, IV, VI: Extraocular movements intact bilaterally. Normal lids and orbits bilaterally. Pupils equal round and reactive to light bilaterally.  CN V: Facial sensation is normal.  CN VII: Full and symmetric facial movement.  CN VIII: Hearing is normal.  CN IX, X: Palate elevates symmetrically. Normal gag reflex.  CN XI: Shoulder shrug strength is normal.  CN XII: Tongue midline without atrophy or fasciculations.    Motor   Strength is 5/5 throughout all four extremities.  With hands outstretched a mild high frequency low amplitude tremor of the right hand is noticed; no other tremor is noted; no rest tremor..    Sensory  Light touch is normal in upper and lower extremities.     Reflexes                                            Right                      Left  Brachioradialis                    1+                         1+  Patellar                                1+                         1+    Coordination  Right: Finger-to-nose normal.Left: Finger-to-nose normal.    Gait  Casual gait: Wide stance. Antalgic gait. Romberg is absent.  Uses cane.

## 2024-10-15 ENCOUNTER — TELEPHONE (OUTPATIENT)
Age: 64
End: 2024-10-15

## 2024-10-15 DIAGNOSIS — Z86.0100 HISTORY OF COLON POLYPS: Primary | ICD-10-CM

## 2024-10-15 RX ORDER — POLYETHYLENE GLYCOL 3350 17 G/17G
17 POWDER, FOR SOLUTION ORAL DAILY
Qty: 238 G | Refills: 0 | Status: SHIPPED | OUTPATIENT
Start: 2024-10-15

## 2024-10-15 RX ORDER — BISACODYL 5 MG/1
5 TABLET, DELAYED RELEASE ORAL DAILY
Qty: 4 TABLET | Refills: 0 | Status: SHIPPED | OUTPATIENT
Start: 2024-10-15

## 2024-10-15 NOTE — TELEPHONE ENCOUNTER
Spoke with pt, sent scripts to provider to sign and send to pharmacy. Unfortunately if not covered by pt ins she will call back to reschedule.

## 2024-10-15 NOTE — TELEPHONE ENCOUNTER
Patients GI provider:  Dr. Arreola    Number to return call: [phone]    Reason for call: Pt calling stating she doesn't have the funds to purchase the prep and would like to speak with someone regarding other option. Please advise patient will await cb     Scheduled procedure/appointment date if applicable: Colonoscopy 10/22/2024 @

## 2024-10-16 ENCOUNTER — HOSPITAL ENCOUNTER (OUTPATIENT)
Dept: MAMMOGRAPHY | Facility: CLINIC | Age: 64
Discharge: HOME | End: 2024-10-16
Payer: MEDICARE

## 2024-10-16 VITALS — WEIGHT: 260 LBS | HEIGHT: 60 IN | BODY MASS INDEX: 51.04 KG/M2

## 2024-10-16 DIAGNOSIS — Z12.31 BREAST CANCER SCREENING BY MAMMOGRAM: ICD-10-CM

## 2024-10-16 PROCEDURE — 77063 BREAST TOMOSYNTHESIS BI: CPT

## 2024-10-16 PROCEDURE — 77067 SCR MAMMO BI INCL CAD: CPT

## 2024-10-17 ENCOUNTER — PATIENT OUTREACH (OUTPATIENT)
Dept: FAMILY MEDICINE CLINIC | Facility: CLINIC | Age: 64
End: 2024-10-17

## 2024-10-17 NOTE — PROGRESS NOTES
Medication Patient Assistance Program (MPAP) Specialist  received personal reminder regarding patient's difficulty affording her Ozempic. MPAP specialist reviewed patient chart prior to outreach. MPAP specialist attempted to reach patient without success. MPAP specialist left voicemail with her name (Crystal Cho), stated she was patient assistance  and number [phone]. MPAP specialist requested a return call. MPAP specialist will follow up with patient if no return call is received. MPAP specialist has placed a personal reminder.

## 2024-10-21 ENCOUNTER — TELEPHONE (OUTPATIENT)
Age: 64
End: 2024-10-21

## 2024-10-22 NOTE — PROGRESS NOTES
Cardiology Consultation     Shruti Kaplan  [id number]  1960  [facility] CARDIOLOG[facility] [address] [address]      1. Precordial pain  Ambulatory Referral to Cardiology    POCT ECG    NM myocardial perfusion spect (rx stress and/or rest)    CANCELED: NM myocardial perfusion spect (rx stress and/or rest)      2. Essential tremor        3. Mixed hyperlipidemia        4. Morbid obesity with BMI of 50.0-59.9, adult ([facility])        5. Atherosclerosis of arteries of extremities ([facility])            Discussion/Summary:  Precordial chest pain  -Patient to ED visit on 8/18/2024 with sudden onset chest pain, no recurrent episodes since then  - Reports having left heart catheterization over 10 years ago and patient reports it was normal, unable to see records of this  -TTE 6/29/2023 showed EF 60%, no significant valvular disease  - CTA chest 8/18/2024 showed no evidence of PE  - Will check a nuclear pharmacologic stress test to rule out obstructive coronary artery disease as a cause of her symptoms  - Due to back and leg problems, unable to walk on the treadmill, so ordered as a pharmacologic stress test  Hyperlipidemia  - Continue with atorvastatin 40 mg daily  - Also on aspirin 81 mg daily  - Lipid profile 2/9/2024 showed total cholesterol 134, triglycerides 148, HDL 42, LDL 62  Essential tremor  - Currently on propranolol 20 mg twice daily in addition to topiramate and gabapentin  Morbid obesity  - BMI of 50.2  - Currently on Ozempic  - Encouraged healthy diet and exercise  YUE  -Wears CPAP most nights  COPD  Depression  Type 2 diabetes    Follow-up in 3 months.    History of Present Illness:  Shruti Kaplan is a 64 y.o. year old female with a past medical history of hyperlipidemia, essential tremor, COPD, YUE, type 2 diabetes, depression, and morbid obesity.  Patient was in the ED on 8/18/2024 with sudden onset chest pain.  Troponins were negative x 2.  No acute ischemic changes on ECG.   CTA was negative for PE.  Prior to the ED visit, she reports sitting - performed at home watching TV when she developed sudden onset chest pain.  She describes the pain as a pressure-like sensation in the center of her chest that made her short of breath.  After she got pain medication the emergency department, she reports the pain subsided and has not returned since then.  No clear cause for her chest pain at that time.  She reports walking her dog on a daily basis.  She usually takes her dog about half hour walk in the morning and then for more walks in the afternoon and generally walks the dog for over an hour a day.  She also walks around her neighborhood to go shopping.  She is not able to walk quickly due to back and leg problems.  Denies any chest pain, palpitations or other concerning cardiac symptoms with exertion.  She does have some lower extremity edema.  Reports cutting back on her salt intake which has significantly improved them.  She also wears compression stockings regularly.  Former smoker, quit in October 2021.  Denies any alcohol or illicit drug use.    Patient Active Problem List   Diagnosis    Obstructive sleep apnea treated with continuous positive airway pressure (CPAP)    Restless leg syndrome    Allergic rhinitis    Depression    Neurogenic claudication ([facility])    Essential tremor    Anxiety    Hyperlipidemia    History of tobacco abuse    Chest discomfort    COPD (chronic obstructive pulmonary disease) ([facility])    GERD (gastroesophageal reflux disease)    Edema    Major depressive disorder, recurrent episode, moderate ([facility])    Primary osteoarthritis of both knees    Morbid obesity with BMI of 50.0-59.9, adult ([facility])    Type 2 diabetes mellitus without complication, without long-term current use of insulin ([facility])    Lung nodules    Pedal edema    Fecal incontinence    Fecal smearing    Lumbar spondylosis    Dizziness    YUE (obstructive sleep apnea)    Atherosclerosis of arteries of extremities  (HCC)     Past Medical History:   Diagnosis Date    Allergic     Allergic rhinitis     Anemia     Anxiety     Arthritis     Back pain     Cancer (HCC)     Chronic pain disorder     back    COPD (chronic obstructive pulmonary disease) (HCC)     CPAP (continuous positive airway pressure) dependence     Depression     Diabetes mellitus (HCC)     Pre- Diabetic    Dyslipidemia     Essential tremor     Excessive daytime sleepiness     GERD (gastroesophageal reflux disease)     Headache(784.0) 1 week ago    History of uterine cancer     Hyperglycemia     Hyperlipidemia     Hypertension     Hypovitaminosis D     Iron deficiency anemia     Joint pain     Mood disorder (HCC)     Obesity     Obstructive sleep apnea     Osteoarthritis     Ringing in ears     RLS (restless legs syndrome)     Snoring     Uterine cancer (HCC)     age 23    Vitamin D deficiency     Witnessed episode of apnea      Social History     Socioeconomic History    Marital status:      Spouse name: Not on file    Number of children: Not on file    Years of education: Not on file    Highest education level: Not on file   Occupational History    Not on file   Tobacco Use    Smoking status: Former     Current packs/day: 0.00     Average packs/day: 1.5 packs/day for 47.7 years (71.6 ttl pk-yrs)     Types: Cigarettes     Start date: 1/1/1974     Quit date: 9/26/2021     Years since quitting: 3.0     Passive exposure: Past    Smokeless tobacco: Never   Vaping Use    Vaping status: Never Used   Substance and Sexual Activity    Alcohol use: Not Currently     Comment: very seldom    Drug use: Not Currently     Comment: pt use at age [age]    Sexual activity: Yes     Partners: Male   Other Topics Concern    Not on file   Social History Narrative    Not on file     Social Determinants of Health     Financial Resource Strain: Medium Risk (8/15/2024)    Overall Financial Resource Strain (CARDIA)     Difficulty of Paying Living Expenses: Somewhat hard   Food  Insecurity: Food Insecurity Present (8/15/2024)    Nursing - Inadequate Food Risk Classification     Worried About Running Out of Food in the Last Year: Often true     Ran Out of Food in the Last Year: Often true     Ran Out of Food in the Last Year: Not on file   Transportation Needs: No Transportation Needs (8/15/2024)    PRAPARE - Transportation     Lack of Transportation (Medical): No     Lack of Transportation (Non-Medical): No   Physical Activity: Not on file   Stress: Not on file   Social Connections: Not on file   Intimate Partner Violence: Not on file   Housing Stability: Low Risk  (8/15/2024)    Housing Stability Vital Sign     Unable to Pay for Housing in the Last Year: No     Number of Times Moved in the Last Year: 0     Homeless in the Last Year: No      Family History   Problem Relation Age of Onset    Diabetes Mother     Asthma Mother     Hypertension Mother     Heart disease Mother     No Known Problems Father     No Known Problems Sister     No Known Problems Sister     No Known Problems Sister     No Known Problems Daughter     No Known Problems Daughter     No Known Problems Maternal Grandmother     No Known Problems Maternal Grandfather     No Known Problems Paternal Grandmother     No Known Problems Paternal Grandfather      Past Surgical History:   Procedure Laterality Date    APPENDECTOMY      EPIDURAL BLOCK INJECTION      HYSTERECTOMY      age 23    KNEE ARTHROSCOPY      KNEE SURGERY      Meniscus tear    TONSILLECTOMY      TUBAL LIGATION         Current Outpatient Medications:     acetaminophen (TYLENOL) 650 mg CR tablet, Take 1 tablet (650 mg total) by mouth every 8 (eight) hours as needed for mild pain, Disp: 30 tablet, Rfl: 0    albuterol (PROVENTIL HFA,VENTOLIN HFA) 90 mcg/act inhaler, inhale 2 puffs by mouth every 6 hours if needed for wheezing, Disp: 8.5 g, Rfl: 3    ascorbic acid (VITAMIN C) 500 mg tablet, Take 500 mg by mouth daily, Disp: , Rfl:     aspirin (Aspirin Low Dose) 81 mg  EC tablet, Take 1 tablet (81 mg total) by mouth daily, Disp: 30 tablet, Rfl: 3    atorvastatin (LIPITOR) 40 mg tablet, take 1 tablet by mouth once daily, Disp: 90 tablet, Rfl: 3    Azelastine HCl 137 MCG/SPRAY SOLN, 1 spray by Each Nare route if needed (allergies), Disp: 30 mL, Rfl: 1    bisacodyl (DULCOLAX) 5 mg EC tablet, Take 1 tablet (5 mg total) by mouth in the morning Take as directed for colon prep, Disp: 4 tablet, Rfl: 0    cholecalciferol (VITAMIN D3) 1,000 units tablet, Take 1 tablet (1,000 Units total) by mouth daily, Disp: 30 tablet, Rfl: 5    Diclofenac Sodium (VOLTAREN) 1 %, Apply 2 g topically 4 (four) times a day, Disp: 100 g, Rfl: 0    fluticasone (FLONASE) 50 mcg/act nasal spray, 1 spray into each nostril daily, Disp: 1 Bottle, Rfl: 3    gabapentin (NEURONTIN) 300 mg capsule, Take 1 capsule in am, 2 capsules in afternoon, and 1 capsule at bedtime., Disp: 360 capsule, Rfl: 1    polyethylene glycol (MIRALAX) 17 g packet, Take 17 g by mouth daily Take as directed for colon prep, Disp: 238 g, Rfl: 0    semaglutide, 1 mg/dose, (Ozempic, 1 MG/DOSE,) 2 mg/1.5 mL injection pen, Inject 0.75 mL (1 mg total) under the skin every 7 days, Disp: 9 mL, Rfl: 0    semaglutide, 1 mg/dose, (Ozempic, 1 MG/DOSE,) 2 mg/1.5 mL injection pen, Inject 0.75 mL (1 mg total) under the skin every 7 days, Disp: 9 mL, Rfl: 0    topiramate (Topamax) 25 mg tablet, Take 1 tablet (25 mg total) by mouth 2 (two) times a day, Disp: 60 tablet, Rfl: 3    calcium carbonate (OS-ROSCOE) 1250 (500 Ca) MG chewable tablet, Chew 1 tablet (1,250 mg total) daily, Disp: 90 tablet, Rfl: 1    loratadine (CLARITIN) 10 mg tablet, Take 1 tablet (10 mg total) by mouth daily, Disp: 30 tablet, Rfl: 2    propranolol (INDERAL) 20 mg tablet, Take 1 tablet (20 mg total) by mouth 2 (two) times a day (Patient not taking: Reported on 10/23/2024), Disp: 180 tablet, Rfl: 1  Allergies   Allergen Reactions    Aspirin GI Intolerance     Can only take baby aspirin      Latex Itching         Labs:  Lab Results   Component Value Date    ALT 32 08/18/2024    AST 56 (H) 08/18/2024    BUN 22 08/18/2024    CALCIUM 9.5 08/18/2024     08/18/2024    CHOL 174 05/24/2018    CO2 31 08/18/2024    CREATININE 0.77 08/18/2024    CREAT 122.3 05/24/2018    HDL 42 (L) 02/09/2024    HCT 46.9 (H) 08/18/2024    HGB 14.5 08/18/2024    HGBA1C 6.8 (H) 02/09/2024    MG 2.2 09/28/2021     08/18/2024    K 4.3 08/18/2024     05/24/2018    TRIG 148 02/09/2024    WBC 9.97 08/18/2024       Imaging: Mammo screening bilateral w 3d & cad    Result Date: 10/17/2024  Narrative: DIAGNOSIS: Breast cancer screening by mammogram TECHNIQUE: Digital screening mammography was performed. Computer Aided Detection (CAD) analyzed all applicable images. COMPARISONS: Prior breast imaging dated: 06/07/2023, 05/18/2022, 12/16/2020, 08/26/2019, 08/22/2018, 07/24/2017, 07/24/2017, 06/10/2016, and 06/10/2016 RELEVANT HISTORY: Family Breast Cancer History: No known family history of breast cancer. Family Medical History: No known relevant family medical history. Personal History: No known relevant hormone history. Surgical history includes hysterectomy. No known relevant medical history. The patient is scheduled in a reminder system for screening mammography. 8-10% of cancers will be missed on mammography. Management of a palpable abnormality must be based on clinical grounds.  Patients will be notified of their results via letter from our facility. Accredited by [employer] College [employer] and FDA. RISK ASSESSMENT: 5 Year Tyrer-Cuzick: 0.89% 10 Year Tyrer-Cuzick: 1.69% Lifetime Tyrer-Cuzick: 3.41% TISSUE DENSITY: The breasts are almost entirely fatty. INDICATION: Shruti Kaplan is a 64 y.o. female presenting for screening mammography. FINDINGS: There are no suspicious masses, grouped microcalcifications or areas of architectural distortion. The skin and nipple areolar complex are unremarkable.     Impression:  No mammographic evidence of malignancy. ASSESSMENT/BI-RADS CATEGORY: Left: 1 - Negative Right: 1 - Negative Overall: 1 - Negative RECOMMENDATION:      - Routine screening mammogram in 1 year for both breasts. Workstation ID: [id number] Signed by:  Sim Brown MD       ECG: 10/23/2024: Normal sinus rhythm, normal ECG    Review of Systems:  Review of Systems   Constitutional:  Negative for chills, diaphoresis, fatigue and fever.   HENT:  Negative for congestion.    Eyes:  Negative for photophobia and visual disturbance.   Respiratory:  Negative for chest tightness and shortness of breath.    Cardiovascular:  Positive for chest pain. Negative for palpitations and leg swelling.   Gastrointestinal:  Negative for abdominal distention, abdominal pain, diarrhea, nausea and vomiting.   Genitourinary:  Negative for difficulty urinating and dysuria.   Musculoskeletal:  Positive for arthralgias, back pain and gait problem. Negative for joint swelling.   Skin:  Negative for color change, pallor and rash.   Neurological:  Negative for dizziness, syncope, numbness and headaches.   Psychiatric/Behavioral:  Negative for agitation, behavioral problems and confusion.          Vitals:    10/23/24 1039   BP: 118/66   Pulse: 74      Vitals:    10/23/24 1039   Weight: 117 kg (257 lb)     Height: 5' (152.4 cm)     Physical Exam:  General appearance:  Appears stated age, alert, well appearing and in no distress  HEENT:  PERRLA, EOMI, no scleral icterus, no conjunctival pallor  NECK:  Supple, No elevated JVP, no thyromegaly, no carotid bruits  HEART:  Regular rate and rhythm, normal S1/S2, no S3/S4, no murmur or rub  LUNGS:  Clear to auscultation bilaterally  ABDOMEN:  Soft, non-tender, positive bowel sounds, no rebound or guarding, no organomegaly   EXTREMITIES:  No edema  VASCULAR:  Normal pedal pulses   SKIN: No lesions or rashes on exposed skin  NEURO:  CN II-XII intact, no focal deficits

## 2024-10-23 ENCOUNTER — CONSULT (OUTPATIENT)
Dept: CARDIOLOGY CLINIC | Facility: CLINIC | Age: 64
End: 2024-10-23
Payer: MEDICARE

## 2024-10-23 VITALS
DIASTOLIC BLOOD PRESSURE: 66 MMHG | BODY MASS INDEX: 50.45 KG/M2 | SYSTOLIC BLOOD PRESSURE: 118 MMHG | HEIGHT: 60 IN | WEIGHT: 257 LBS | HEART RATE: 74 BPM

## 2024-10-23 DIAGNOSIS — I70.209 ATHEROSCLEROSIS OF ARTERIES OF EXTREMITIES (HCC): ICD-10-CM

## 2024-10-23 DIAGNOSIS — G25.0 BENIGN ESSENTIAL TREMOR: ICD-10-CM

## 2024-10-23 DIAGNOSIS — R07.2 PRECORDIAL PAIN: Primary | ICD-10-CM

## 2024-10-23 DIAGNOSIS — E66.01 MORBID OBESITY WITH BMI OF 50.0-59.9, ADULT (HCC): ICD-10-CM

## 2024-10-23 DIAGNOSIS — E78.2 MIXED HYPERLIPIDEMIA: ICD-10-CM

## 2024-10-23 PROCEDURE — 99204 OFFICE O/P NEW MOD 45 MIN: CPT | Performed by: STUDENT IN AN ORGANIZED HEALTH CARE EDUCATION/TRAINING PROGRAM

## 2024-10-23 PROCEDURE — 93000 ELECTROCARDIOGRAM COMPLETE: CPT | Performed by: STUDENT IN AN ORGANIZED HEALTH CARE EDUCATION/TRAINING PROGRAM

## 2024-10-28 ENCOUNTER — APPOINTMENT (EMERGENCY)
Dept: RADIOLOGY | Facility: HOSPITAL | Age: 64
End: 2024-10-28
Payer: MEDICARE

## 2024-10-28 ENCOUNTER — TELEPHONE (OUTPATIENT)
Dept: NON INVASIVE DIAGNOSTICS | Facility: HOSPITAL | Age: 64
End: 2024-10-28

## 2024-10-28 ENCOUNTER — HOSPITAL ENCOUNTER (EMERGENCY)
Facility: HOSPITAL | Age: 64
Discharge: HOME | End: 2024-10-28
Attending: EMERGENCY MEDICINE | Admitting: EMERGENCY MEDICINE
Payer: MEDICARE

## 2024-10-28 VITALS
SYSTOLIC BLOOD PRESSURE: 135 MMHG | WEIGHT: 257.5 LBS | BODY MASS INDEX: 50.29 KG/M2 | OXYGEN SATURATION: 97 % | DIASTOLIC BLOOD PRESSURE: 82 MMHG | TEMPERATURE: 97.7 F | RESPIRATION RATE: 18 BRPM | HEART RATE: 93 BPM

## 2024-10-28 DIAGNOSIS — J20.9 ACUTE BRONCHITIS: Primary | ICD-10-CM

## 2024-10-28 LAB
FLUAV AG UPPER RESP QL IA.RAPID: NEGATIVE
FLUBV AG UPPER RESP QL IA.RAPID: NEGATIVE
SARS-COV+SARS-COV-2 AG RESP QL IA.RAPID: NEGATIVE

## 2024-10-28 PROCEDURE — 87804 INFLUENZA ASSAY W/OPTIC: CPT

## 2024-10-28 PROCEDURE — 99284 EMERGENCY DEPT VISIT MOD MDM: CPT

## 2024-10-28 PROCEDURE — 71046 X-RAY EXAM CHEST 2 VIEWS: CPT

## 2024-10-28 PROCEDURE — 87811 SARS-COV-2 COVID19 W/OPTIC: CPT

## 2024-10-28 RX ORDER — BENZONATATE 100 MG/1
100 CAPSULE ORAL 3 TIMES DAILY PRN
Qty: 20 CAPSULE | Refills: 0 | Status: SHIPPED | OUTPATIENT
Start: 2024-10-28

## 2024-10-28 RX ORDER — SENNOSIDES 8.6 MG
650 CAPSULE ORAL EVERY 8 HOURS PRN
Qty: 30 TABLET | Refills: 0 | Status: SHIPPED | OUTPATIENT
Start: 2024-10-28

## 2024-10-28 RX ORDER — ACETAMINOPHEN 325 MG/1
650 TABLET ORAL ONCE
Status: COMPLETED | OUTPATIENT
Start: 2024-10-28 | End: 2024-10-28

## 2024-10-28 RX ORDER — IBUPROFEN 400 MG/1
400 TABLET, FILM COATED ORAL EVERY 6 HOURS PRN
Qty: 30 TABLET | Refills: 0 | Status: SHIPPED | OUTPATIENT
Start: 2024-10-28

## 2024-10-28 RX ORDER — IBUPROFEN 400 MG/1
400 TABLET, FILM COATED ORAL ONCE
Status: COMPLETED | OUTPATIENT
Start: 2024-10-28 | End: 2024-10-28

## 2024-10-28 RX ORDER — AZITHROMYCIN 250 MG/1
TABLET, FILM COATED ORAL
Qty: 6 TABLET | Refills: 0 | Status: SHIPPED | OUTPATIENT
Start: 2024-10-28 | End: 2024-11-01

## 2024-10-28 RX ADMIN — ACETAMINOPHEN 650 MG: 325 TABLET ORAL at 19:10

## 2024-10-28 RX ADMIN — IBUPROFEN 400 MG: 400 TABLET, FILM COATED ORAL at 19:10

## 2024-10-29 ENCOUNTER — TELEPHONE (OUTPATIENT)
Dept: GASTROENTEROLOGY | Facility: MEDICAL CENTER | Age: 64
End: 2024-10-29

## 2024-10-29 ENCOUNTER — VBI (OUTPATIENT)
Dept: FAMILY MEDICINE CLINIC | Facility: CLINIC | Age: 64
End: 2024-10-29

## 2024-10-29 NOTE — TELEPHONE ENCOUNTER
Left voicemail and requested call back     Called patient to see if she wanted to reschedule procedure ( Colonoscopy) with Dr. Arreola at [facility] asked to please give us a call to reschedule when she is ready

## 2024-10-29 NOTE — ED PROVIDER NOTES
HPI: Patient is a 64 y.o. female who presents with 1 days of cough and sore throat which the patient describes at mild The patient has had contact with people with similar symptoms.  The patient taken OTC medication with relief of symptoms.    Allergies   Allergen Reactions    Aspirin GI Intolerance     Can only take baby aspirin     Latex Itching       Past Medical History:   Diagnosis Date    Allergic     Allergic rhinitis     Anemia     Anxiety     Arthritis     Back pain     Cancer (HCC)     Chronic pain disorder     back    COPD (chronic obstructive pulmonary disease) ([facility])     CPAP (continuous positive airway pressure) dependence     Depression     Diabetes mellitus (HCC)     Pre- Diabetic    Dyslipidemia     Essential tremor     Excessive daytime sleepiness     GERD (gastroesophageal reflux disease)     Headache(784.0) 1 week ago    History of uterine cancer     Hyperglycemia     Hyperlipidemia     Hypertension     Hypovitaminosis D     Iron deficiency anemia     Joint pain     Mood disorder ([facility])     Obesity     Obstructive sleep apnea     Osteoarthritis     Ringing in ears     RLS (restless legs syndrome)     Snoring     Uterine cancer ([facility])     age 23    Vitamin D deficiency     Witnessed episode of apnea       Past Surgical History:   Procedure Laterality Date    APPENDECTOMY      EPIDURAL BLOCK INJECTION      HYSTERECTOMY      age 23    KNEE ARTHROSCOPY      KNEE SURGERY      Meniscus tear    TONSILLECTOMY      TUBAL LIGATION       Social History     Tobacco Use    Smoking status: Former     Current packs/day: 0.00     Average packs/day: 1.5 packs/day for 47.7 years (71.6 ttl pk-yrs)     Types: Cigarettes     Start date: 1/1/1974     Quit date: 9/26/2021     Years since quitting: 3.0     Passive exposure: Past    Smokeless tobacco: Never   Vaping Use    Vaping status: Never Used   Substance Use Topics    Alcohol use: Not Currently     Comment: very seldom    Drug use: Not Currently     Comment: pt use at  age 14/17       Nursing notes reviewed  Physical Exam:  ED Triage Vitals [10/28/24 1844]   Temperature Pulse Respirations Blood Pressure SpO2   97.7 °F (36.5 °C) (!) 114 20 170/85 95 %      Temp Source Heart Rate Source Patient Position - Orthostatic VS BP Location FiO2 (%)   Oral -- Sitting Left arm --      Pain Score       --           ROS: Positive for cough and sore throat, the remainder of a 10 organ system ROS was otherwise unremarkable.  General: awake, alert, no acute distress    Head: normocephalic, atraumatic    Eyes: no scleral icterus  Ears: external ears normal, hearing grossly intact  Nose: external exam grossly normal, negative nasal discharge  Neck: symmetric, No JVD noted, trachea midline  Pulmonary: no respiratory distress, no tachypnea noted  Cardiovascular: appears well perfused  Abdomen: no distention noted  Musculoskeletal: no deformities noted, tone normal  Neuro: grossly non-focal  Psych: mood and affect appropriate    The patient is stable and has a history and physical exam consistent with a viral illness. COVID19 testing has been performed.  I considered the patient's other medical conditions as applicable/noted above in my medical decision making.  The patient improved upon discharge. The plan is for supportive care at home.    The patient (and any family present) verbalized understanding of the discharge instructions and warnings that would necessitate return to the Emergency Department.  All questions were answered prior to discharge.    Medications   acetaminophen (TYLENOL) tablet 650 mg (650 mg Oral Given 10/28/24 1910)   ibuprofen (MOTRIN) tablet 400 mg (400 mg Oral Given 10/28/24 1910)     Final diagnoses:   Acute bronchitis     Time reflects when diagnosis was documented in both MDM as applicable and the Disposition within this note       Time User Action Codes Description Comment    10/28/2024  7:42 PM Rickie Gilbert Add [J20.9] Acute bronchitis           ED Disposition       ED  Disposition   Discharge    Condition   Stable    Date/Time   Mon Oct 28, 2024  7:42 PM    Comment   Shruti Kaplan discharge to home/self care.                   Follow-up Information       Follow up With Specialties Details Why Contact Info Additional Information    [facility] Emergency Department Emergency Medicine Go to  If symptoms worsen [address]  [facility] [address] [phone] [facility] Emergency Department    Violeta Troy MD Family Medicine Schedule an appointment as soon as possible for a visit  As needed [address]  [facility] [address]  [facility] [address]  [phone]             Discharge Medication List as of 10/28/2024  7:44 PM        START taking these medications    Details   !! acetaminophen (TYLENOL) 650 mg CR tablet Take 1 tablet (650 mg total) by mouth every 8 (eight) hours as needed for mild pain, Starting Mon 10/28/2024, Normal      azithromycin (ZITHROMAX) 250 mg tablet Take 2 tablets today then 1 tablet daily x 4 days, Normal      ibuprofen (MOTRIN) 400 mg tablet Take 1 tablet (400 mg total) by mouth every 6 (six) hours as needed for mild pain, Starting Mon 10/28/2024, Normal       !! - Potential duplicate medications found. Please discuss with provider.        CONTINUE these medications which have NOT CHANGED    Details   !! acetaminophen (TYLENOL) 650 mg CR tablet Take 1 tablet (650 mg total) by mouth every 8 (eight) hours as needed for mild pain, Starting Sun 2/6/2022, Normal      albuterol (PROVENTIL HFA,VENTOLIN HFA) 90 mcg/act inhaler inhale 2 puffs by mouth every 6 hours if needed for wheezing, Normal      ascorbic acid (VITAMIN C) 500 mg tablet Take 500 mg by mouth daily, Historical Med      aspirin (Aspirin Low Dose) 81 mg EC tablet Take 1 tablet (81 mg total) by mouth daily, Starting Tue 12/15/2020, Normal      atorvastatin (LIPITOR) 40 mg tablet take 1 tablet by mouth once daily, Normal      Azelastine  HCl 137 MCG/SPRAY SOLN 1 spray by Each Nare route if needed (allergies), Starting Tue 1/16/2024, Normal      bisacodyl (DULCOLAX) 5 mg EC tablet Take 1 tablet (5 mg total) by mouth in the morning Take as directed for colon prep, Starting Tue 10/15/2024, Normal      calcium carbonate (OS-ROSCOE) 1250 (500 Ca) MG chewable tablet Chew 1 tablet (1,250 mg total) daily, Starting Mon 6/19/2023, Until Mon 5/6/2024, Normal      cholecalciferol (VITAMIN D3) 1,000 units tablet Take 1 tablet (1,000 Units total) by mouth daily, Starting Mon 6/19/2023, Normal      Diclofenac Sodium (VOLTAREN) 1 % Apply 2 g topically 4 (four) times a day, Starting Sun 2/6/2022, Normal      fluticasone (FLONASE) 50 mcg/act nasal spray 1 spray into each nostril daily, Starting Tue 12/15/2020, Normal      gabapentin (NEURONTIN) 300 mg capsule Take 1 capsule in am, 2 capsules in afternoon, and 1 capsule at bedtime., Normal      loratadine (CLARITIN) 10 mg tablet Take 1 tablet (10 mg total) by mouth daily, Starting Wed 10/18/2023, Until Mon 5/6/2024, Normal      polyethylene glycol (MIRALAX) 17 g packet Take 17 g by mouth daily Take as directed for colon prep, Starting Tue 10/15/2024, Normal      propranolol (INDERAL) 20 mg tablet Take 1 tablet (20 mg total) by mouth 2 (two) times a day, Starting Mon 10/7/2024, Normal      !! semaglutide, 1 mg/dose, (Ozempic, 1 MG/DOSE,) 2 mg/1.5 mL injection pen Inject 0.75 mL (1 mg total) under the skin every 7 days, Starting Mon 10/7/2024, Until Sun 1/5/2025, Normal      !! semaglutide, 1 mg/dose, (Ozempic, 1 MG/DOSE,) 2 mg/1.5 mL injection pen Inject 0.75 mL (1 mg total) under the skin every 7 days, Starting Fri 9/20/2024, Until Thu 12/19/2024, Normal      topiramate (Topamax) 25 mg tablet Take 1 tablet (25 mg total) by mouth 2 (two) times a day, Starting Mon 10/7/2024, Normal       !! - Potential duplicate medications found. Please discuss with provider.        No discharge procedures on file.    Electronically  Signed by       Rickie Gilbert PA-C  10/28/24 2019

## 2024-10-29 NOTE — TELEPHONE ENCOUNTER
10/29/24 12:30 PM    Patient contacted post ED visit, [facility] department spoke with patient/caregiver and outreach was successful.    Thank you.  Chirag Alvarenga MA  [facility]

## 2024-10-30 ENCOUNTER — HOSPITAL ENCOUNTER (OUTPATIENT)
Dept: NON INVASIVE DIAGNOSTICS | Facility: HOSPITAL | Age: 64
Discharge: HOME | End: 2024-10-30
Attending: STUDENT IN AN ORGANIZED HEALTH CARE EDUCATION/TRAINING PROGRAM

## 2024-10-31 ENCOUNTER — PATIENT OUTREACH (OUTPATIENT)
Dept: FAMILY MEDICINE CLINIC | Facility: CLINIC | Age: 64
End: 2024-10-31

## 2024-10-31 NOTE — PROGRESS NOTES
Medication Patient Assistance Program (MPAP) Specialist  received personal reminder notification regarding patient's difficulty affording her medications. MPAP specialist reviewed patient chart prior to outreach. MPAP specialist has made several attempts to reach patient without success. MPAP specialist has placed an Unable to reach letter in the outgoing mail. MPAP specialist will close referral if no response is received. MPAP specialist has placed a personal reminder.

## 2024-11-07 DIAGNOSIS — J45.909 UNCOMPLICATED ASTHMA, UNSPECIFIED ASTHMA SEVERITY, UNSPECIFIED WHETHER PERSISTENT: ICD-10-CM

## 2024-11-07 DIAGNOSIS — E66.01 MORBID OBESITY WITH BMI OF 50.0-59.9, ADULT (HCC): ICD-10-CM

## 2024-11-07 DIAGNOSIS — E11.9 TYPE 2 DIABETES MELLITUS WITHOUT COMPLICATION, WITHOUT LONG-TERM CURRENT USE OF INSULIN (HCC): ICD-10-CM

## 2024-11-07 DIAGNOSIS — J30.89 SEASONAL ALLERGIC RHINITIS DUE TO OTHER ALLERGIC TRIGGER: ICD-10-CM

## 2024-11-07 RX ORDER — AZELASTINE HYDROCHLORIDE 137 UG/1
1 SPRAY, METERED NASAL AS NEEDED
Qty: 30 ML | Refills: 0 | Status: SHIPPED | OUTPATIENT
Start: 2024-11-07

## 2024-11-08 RX ORDER — ALBUTEROL SULFATE 90 UG/1
2 INHALANT RESPIRATORY (INHALATION) EVERY 6 HOURS PRN
Qty: 8.5 G | Refills: 0 | Status: SHIPPED | OUTPATIENT
Start: 2024-11-08

## 2024-11-14 ENCOUNTER — PATIENT OUTREACH (OUTPATIENT)
Dept: FAMILY MEDICINE CLINIC | Facility: CLINIC | Age: 64
End: 2024-11-14

## 2024-11-14 NOTE — PROGRESS NOTES
Medication Patient Assistance Program (MPAP) Specialist  received personal reminder regarding patient's difficulty affording her medications. MPAP specialist reviewed patient chart prior to outreach. MPAP specialist has made several attempts to reach patient without success. MPAP specialist sent an unable to reach letter to patient on 10/31/2024. MPAP specialist has not received a response. MPAP specialist is closing referral to Medication Patient Assistant  at this time. MPAP Specialist has referred patient to clinician and clinical staff for further medication questions.

## 2024-11-22 ENCOUNTER — OFFICE VISIT (OUTPATIENT)
Dept: FAMILY MEDICINE CLINIC | Facility: CLINIC | Age: 64
End: 2024-11-22

## 2024-11-22 VITALS
TEMPERATURE: 97.2 F | WEIGHT: 254.8 LBS | OXYGEN SATURATION: 96 % | HEIGHT: 60 IN | RESPIRATION RATE: 16 BRPM | DIASTOLIC BLOOD PRESSURE: 60 MMHG | HEART RATE: 81 BPM | BODY MASS INDEX: 50.03 KG/M2 | SYSTOLIC BLOOD PRESSURE: 110 MMHG

## 2024-11-22 DIAGNOSIS — R26.2 AMBULATORY DYSFUNCTION: ICD-10-CM

## 2024-11-22 DIAGNOSIS — Z23 ENCOUNTER FOR IMMUNIZATION: ICD-10-CM

## 2024-11-22 DIAGNOSIS — R05.2 SUBACUTE COUGH: ICD-10-CM

## 2024-11-22 DIAGNOSIS — E11.9 TYPE 2 DIABETES MELLITUS WITHOUT COMPLICATION, WITHOUT LONG-TERM CURRENT USE OF INSULIN (HCC): Primary | ICD-10-CM

## 2024-11-22 DIAGNOSIS — J30.1 ACUTE SEASONAL ALLERGIC RHINITIS DUE TO POLLEN: ICD-10-CM

## 2024-11-22 DIAGNOSIS — E78.2 MIXED HYPERLIPIDEMIA: ICD-10-CM

## 2024-11-22 DIAGNOSIS — R91.8 LUNG NODULES: ICD-10-CM

## 2024-11-22 DIAGNOSIS — M17.0 PRIMARY OSTEOARTHRITIS OF BOTH KNEES: ICD-10-CM

## 2024-11-22 DIAGNOSIS — E66.01 MORBID OBESITY WITH BMI OF 50.0-59.9, ADULT (HCC): ICD-10-CM

## 2024-11-22 LAB — SL AMB POCT HEMOGLOBIN AIC: 5.8 (ref ?–6.5)

## 2024-11-22 PROCEDURE — G0008 ADMIN INFLUENZA VIRUS VAC: HCPCS | Performed by: STUDENT IN AN ORGANIZED HEALTH CARE EDUCATION/TRAINING PROGRAM

## 2024-11-22 PROCEDURE — 90662 IIV NO PRSV INCREASED AG IM: CPT | Performed by: STUDENT IN AN ORGANIZED HEALTH CARE EDUCATION/TRAINING PROGRAM

## 2024-11-22 PROCEDURE — 83036 HEMOGLOBIN GLYCOSYLATED A1C: CPT | Performed by: STUDENT IN AN ORGANIZED HEALTH CARE EDUCATION/TRAINING PROGRAM

## 2024-11-22 PROCEDURE — 99213 OFFICE O/P EST LOW 20 MIN: CPT | Performed by: STUDENT IN AN ORGANIZED HEALTH CARE EDUCATION/TRAINING PROGRAM

## 2024-11-22 PROCEDURE — 3078F DIAST BP <80 MM HG: CPT | Performed by: STUDENT IN AN ORGANIZED HEALTH CARE EDUCATION/TRAINING PROGRAM

## 2024-11-22 PROCEDURE — 3074F SYST BP LT 130 MM HG: CPT | Performed by: STUDENT IN AN ORGANIZED HEALTH CARE EDUCATION/TRAINING PROGRAM

## 2024-11-22 RX ORDER — GUAIFENESIN 600 MG/1
600 TABLET, EXTENDED RELEASE ORAL EVERY 12 HOURS SCHEDULED
Qty: 60 TABLET | Refills: 0 | Status: SHIPPED | OUTPATIENT
Start: 2024-11-22

## 2024-11-22 RX ORDER — LORATADINE 10 MG/1
10 TABLET ORAL DAILY
Qty: 30 TABLET | Refills: 3 | Status: SHIPPED | OUTPATIENT
Start: 2024-11-22

## 2024-11-22 NOTE — PROGRESS NOTES
Name: Shruti Kaplan      : 1960      MRN: [id number]  Encounter Provider: Rebecca Fay Kab-Perlman, MD  Encounter Date: 2024   Encounter department: [facility]  :  Assessment & Plan  Type 2 diabetes mellitus without complication, without long-term current use of insulin (HCC)    Lab Results   Component Value Date    HGBA1C 5.8 2024     -albumin/creatinine ratio in 2024 unremarkable  -lipid panel in 2024 unremarkable except HDL low@42  -last eye exam: was suppose to do last month however will go make an appointment across the street today   -last foot exam: goes every 9 weeks to the podiatrist, has been there within the last year.    -ASCVD risk score of 9% per lipid panel in 2024  -POCT hemoglobin 5.8, improved from 6.8 9 months ago; patient attributes to starting ozempic this year; at goal of <7%   -home medications: ozempic 1mg weekly (will miss one week's worth due to insurance delay), atorvastatin 40mg    PLAN  -continue with home medications and on follow-up in 3 months increase ozempic for weight loss  -repeat appropriate labs in 2025   -continue to follow with podiatry and encouraged eye exam (states she gets dilated for it)     Orders:    POCT hemoglobin A1c    Acute seasonal allergic rhinitis due to pollen  -requested refill for her seasonal allergies  -has flonase at home recommended consistent use during periods of increased symptoms     Orders:    loratadine (CLARITIN) 10 mg tablet; Take 1 tablet (10 mg total) by mouth daily    Subacute cough  -Had ED visit on 10/28/2024 for acute bronchitis, was given tessalon however states continues to cough and feels like  mucous gets stuck in her throat and she is unable to expectorate; mucous clear in color, no fevers (reviewed CXR from that ED visit)  -In the setting of >20 pack year tobacco use history; currently lives with exposure to second hand smoke in the home   -the coughing  interferes with the use of her CPAP machine at night for her YUE     PLAN  -mucinex to thin secretions and help with expectoration  -education/counseling including ED/return precautions (signs of development into bacterial infection)  -encouraged nasal humidification, honey for help with cough, and options to avoid second hand tobacco smoke exposure    Orders:    guaiFENesin (MUCINEX) 600 mg 12 hr tablet; Take 1 tablet (600 mg total) by mouth every 12 (twelve) hours    Encounter for immunization  -influenza shot administered today     Orders:    influenza vaccine, high-dose, PF 0.5 mL (Fluzone High Dose)    Morbid obesity with BMI of 50.0-59.9, adult ([facility])  Wt Readings from Last 3 Encounters:   11/22/24 116 kg (254 lb 12.8 oz)   10/28/24 117 kg (257 lb 8 oz)   10/23/24 117 kg (257 lb)     -per PCP note from 05/2024 plan was to increae from 1mg to 1.7mg weekly  -states minimal appetite, and continues to lose weight as above  -in May 2024 was 266lbs and now 254 lbs    PLAN  -maintained wegovy at 1mg weekly; would benefit from increase as previously suggested by PCP; increase to 1.7mg on f/u in three months  -encouraged continued lifestyle changes and recommended increased exercise (walking)          Mixed hyperlipidemia  Lab Results   Component Value Date    CHOLESTEROL 134 02/09/2024    CHOLESTEROL 128 11/10/2023    CHOLESTEROL 163 09/28/2020     Lab Results   Component Value Date    HDL 42 (L) 02/09/2024    HDL 42 (L) 11/10/2023    HDL 51 09/28/2020     Lab Results   Component Value Date    TRIG 148 02/09/2024    TRIG 136 11/10/2023    TRIG 128 09/28/2020     Lab Results   Component Value Date    NONHDLC 92 02/09/2024    NONHDLC 86 11/10/2023    NONHDLC 112 09/28/2020     -home medication: atorvastatin 40mg    PLAN  -continue home medication and encouraged increased exercise/walking  -repeat yearly lipid panel on February 2024       Lung nodules  -last CT lung cancer screening in 01/2023 with stable lung nodules  5mm  -is due for repeat CT lung for 2024    PLAN  -recommended  scheduling 2024 CT lung CA screening with education/counseling          Primary osteoarthritis of both knees  -Xray right knee in 2020 with: Small subpatellar joint effusion and mild degenerative changes with small marginal osteophytes and tiny spurs encroaching on the intracondylar notch  -history of left knee meniscal tear repair about 4 years ago per patient   -likely contributing to ambulatory dysfunction    PLAN  -continue with ozempic for weight loss and encourage lifestyle modifications  -discussed steroid option and possibility of hyaluronic acid injections however not enough time today to address fully; would recommend repeat and updated knee xrays in future to evaluate for progression of OA; as well may benefit from ortho referral   -recommended developing surrounding leg muscles including quadriceps strength         Ambulatory dysfunction  -difficulty maintaining balance  -uses cane at baseline  -unable to walk more than two blocks without having to rest due to knee pain     PLAN  -ordered rollator for patient sent to PCP                 History of Present Illness     Shruti Kaplan is a 63 yo female patient presenting today to f/u regarding her diabetes. Would like walker with seat because can walk about 2 blocks because needs to rest. Does not get shortness of breath or palpitations however knees and lower back start to hurt. History of left knee meniscal tear repair about 4 years ago per patient. Xray right knee in 2020 with: Small subpatellar joint effusion and mild OA with bone spurs. Currently walks with cane; today requesting walker with seat. States able to walk about two blocks before knee pain requires her to rest. She doesn't get short of breath however knee and back pain is difficult for her.     Review of Systems   Constitutional:  Negative for fatigue and fever.   HENT:  Positive for congestion and postnasal drip. Negative  for ear discharge, mouth sores, rhinorrhea, sinus pressure, sinus pain, sore throat, trouble swallowing and voice change.    Respiratory:  Positive for cough. Negative for shortness of breath and wheezing.    Cardiovascular:  Negative for chest pain and palpitations.   Skin:  Negative for rash.   Neurological:  Negative for dizziness, weakness and headaches.   Hematological:  Does not bruise/bleed easily.     Pertinent Medical History   Allergic rhinitis  COPD   Lung nodules  Tobacco use history   YUE    Medical History Reviewed by provider this encounter:  Meds     .  Past Medical History   Past Medical History:   Diagnosis Date    Allergic     Allergic rhinitis     Anemia     Anxiety     Arthritis     Back pain     Cancer ([facility])     Chronic pain disorder     back    COPD (chronic obstructive pulmonary disease) ([facility])     CPAP (continuous positive airway pressure) dependence     Depression     Diabetes mellitus ([facility])     Pre- Diabetic    Dyslipidemia     Essential tremor     Excessive daytime sleepiness     GERD (gastroesophageal reflux disease)     Headache(784.0) 1 week ago    History of uterine cancer     Hyperglycemia     Hyperlipidemia     Hypertension     Hypovitaminosis D     Iron deficiency anemia     Joint pain     Mood disorder ([facility])     Obesity     Obstructive sleep apnea     Osteoarthritis     Ringing in ears     RLS (restless legs syndrome)     Snoring     Uterine cancer ([facility])     age 23    Vitamin D deficiency     Witnessed episode of apnea      Past Surgical History:   Procedure Laterality Date    APPENDECTOMY      EPIDURAL BLOCK INJECTION      HYSTERECTOMY      age 23    KNEE ARTHROSCOPY      KNEE SURGERY      Meniscus tear    TONSILLECTOMY      TUBAL LIGATION       Family History   Problem Relation Age of Onset    Diabetes Mother     Asthma Mother     Hypertension Mother     Heart disease Mother     No Known Problems Father     No Known Problems Sister     No Known Problems Sister     No Known  Problems Sister     No Known Problems Daughter     No Known Problems Daughter     No Known Problems Maternal Grandmother     No Known Problems Maternal Grandfather     No Known Problems Paternal Grandmother     No Known Problems Paternal Grandfather       reports that she quit smoking about 3 years ago. Her smoking use included cigarettes. She started smoking about 50 years ago. She has a 71.6 pack-year smoking history. She has been exposed to tobacco smoke. She has never used smokeless tobacco. She reports that she does not currently use alcohol. She reports that she does not currently use drugs.  Current Outpatient Medications on File Prior to Visit   Medication Sig Dispense Refill    acetaminophen (TYLENOL) 650 mg CR tablet Take 1 tablet (650 mg total) by mouth every 8 (eight) hours as needed for mild pain 30 tablet 0    acetaminophen (TYLENOL) 650 mg CR tablet Take 1 tablet (650 mg total) by mouth every 8 (eight) hours as needed for mild pain 30 tablet 0    albuterol (PROVENTIL HFA,VENTOLIN HFA) 90 mcg/act inhaler Inhale 2 puffs every 6 (six) hours as needed for wheezing inhale 2 puffs by mouth every 6 hours if needed for wheezing 8.5 g 0    ascorbic acid (VITAMIN C) 500 mg tablet Take 500 mg by mouth daily      aspirin (Aspirin Low Dose) 81 mg EC tablet Take 1 tablet (81 mg total) by mouth daily 30 tablet 3    atorvastatin (LIPITOR) 40 mg tablet take 1 tablet by mouth once daily 90 tablet 3    Azelastine HCl 137 MCG/SPRAY SOLN 1 spray by Each Nare route if needed (allergies) 30 mL 0    benzonatate (TESSALON PERLES) 100 mg capsule Take 1 capsule (100 mg total) by mouth 3 (three) times a day as needed for cough 20 capsule 0    calcium carbonate (OS-ROSCOE) 1250 (500 Ca) MG chewable tablet Chew 1 tablet (1,250 mg total) daily 90 tablet 1    cholecalciferol (VITAMIN D3) 1,000 units tablet Take 1 tablet (1,000 Units total) by mouth daily 30 tablet 5    Diclofenac Sodium (VOLTAREN) 1 % Apply 2 g topically 4 (four) times  a day 100 g 0    fluticasone (FLONASE) 50 mcg/act nasal spray 1 spray into each nostril daily 1 Bottle 3    gabapentin (NEURONTIN) 300 mg capsule Take 1 capsule in am, 2 capsules in afternoon, and 1 capsule at bedtime. 360 capsule 1    ibuprofen (MOTRIN) 400 mg tablet Take 1 tablet (400 mg total) by mouth every 6 (six) hours as needed for mild pain 30 tablet 0    propranolol (INDERAL) 20 mg tablet Take 1 tablet (20 mg total) by mouth 2 (two) times a day 180 tablet 1    semaglutide, 1 mg/dose, (Ozempic, 1 MG/DOSE,) 2 mg/1.5 mL injection pen Inject 0.75 mL (1 mg total) under the skin every 7 days 9 mL 0    semaglutide, 1 mg/dose, (Ozempic, 1 MG/DOSE,) 2 mg/1.5 mL injection pen Inject 0.75 mL (1 mg total) under the skin every 7 days 9 mL 0    topiramate (Topamax) 25 mg tablet Take 1 tablet (25 mg total) by mouth 2 (two) times a day 60 tablet 3     No current facility-administered medications on file prior to visit.     Allergies   Allergen Reactions    Aspirin GI Intolerance     Can only take baby aspirin     Latex Itching      Current Outpatient Medications on File Prior to Visit   Medication Sig Dispense Refill    acetaminophen (TYLENOL) 650 mg CR tablet Take 1 tablet (650 mg total) by mouth every 8 (eight) hours as needed for mild pain 30 tablet 0    acetaminophen (TYLENOL) 650 mg CR tablet Take 1 tablet (650 mg total) by mouth every 8 (eight) hours as needed for mild pain 30 tablet 0    albuterol (PROVENTIL HFA,VENTOLIN HFA) 90 mcg/act inhaler Inhale 2 puffs every 6 (six) hours as needed for wheezing inhale 2 puffs by mouth every 6 hours if needed for wheezing 8.5 g 0    ascorbic acid (VITAMIN C) 500 mg tablet Take 500 mg by mouth daily      aspirin (Aspirin Low Dose) 81 mg EC tablet Take 1 tablet (81 mg total) by mouth daily 30 tablet 3    atorvastatin (LIPITOR) 40 mg tablet take 1 tablet by mouth once daily 90 tablet 3    Azelastine HCl 137 MCG/SPRAY SOLN 1 spray by Each Nare route if needed (allergies) 30 mL 0     benzonatate (TESSALON PERLES) 100 mg capsule Take 1 capsule (100 mg total) by mouth 3 (three) times a day as needed for cough 20 capsule 0    calcium carbonate (OS-ROSCOE) 1250 (500 Ca) MG chewable tablet Chew 1 tablet (1,250 mg total) daily 90 tablet 1    cholecalciferol (VITAMIN D3) 1,000 units tablet Take 1 tablet (1,000 Units total) by mouth daily 30 tablet 5    Diclofenac Sodium (VOLTAREN) 1 % Apply 2 g topically 4 (four) times a day 100 g 0    fluticasone (FLONASE) 50 mcg/act nasal spray 1 spray into each nostril daily 1 Bottle 3    gabapentin (NEURONTIN) 300 mg capsule Take 1 capsule in am, 2 capsules in afternoon, and 1 capsule at bedtime. 360 capsule 1    ibuprofen (MOTRIN) 400 mg tablet Take 1 tablet (400 mg total) by mouth every 6 (six) hours as needed for mild pain 30 tablet 0    propranolol (INDERAL) 20 mg tablet Take 1 tablet (20 mg total) by mouth 2 (two) times a day 180 tablet 1    semaglutide, 1 mg/dose, (Ozempic, 1 MG/DOSE,) 2 mg/1.5 mL injection pen Inject 0.75 mL (1 mg total) under the skin every 7 days 9 mL 0    semaglutide, 1 mg/dose, (Ozempic, 1 MG/DOSE,) 2 mg/1.5 mL injection pen Inject 0.75 mL (1 mg total) under the skin every 7 days 9 mL 0    topiramate (Topamax) 25 mg tablet Take 1 tablet (25 mg total) by mouth 2 (two) times a day 60 tablet 3     No current facility-administered medications on file prior to visit.      Social History     Tobacco Use    Smoking status: Former     Current packs/day: 0.00     Average packs/day: 1.5 packs/day for 47.7 years (71.6 ttl pk-yrs)     Types: Cigarettes     Start date: 1/1/1974     Quit date: 9/26/2021     Years since quitting: 3.1     Passive exposure: Past    Smokeless tobacco: Never   Vaping Use    Vaping status: Never Used   Substance and Sexual Activity    Alcohol use: Not Currently     Comment: very seldom    Drug use: Not Currently     Comment: pt use at age [age]    Sexual activity: Yes     Partners: Male        Objective   /60 (BP  Location: Left arm, Patient Position: Sitting, Cuff Size: Large)   Pulse 81   Temp (!) 97.2 °F (36.2 °C) (Temporal)   Resp 16   Ht 5' (1.524 m)   Wt 116 kg (254 lb 12.8 oz)   LMP  (LMP Unknown)   SpO2 96%   BMI 49.76 kg/m²      Physical Exam  Constitutional:       Appearance: Normal appearance. She is obese.   HENT:      Head: Normocephalic and atraumatic.      Comments: No pain to palpation of sinuses     Right Ear: Tympanic membrane, ear canal and external ear normal.      Left Ear: Tympanic membrane, ear canal and external ear normal.      Nose: Congestion (mild) present.      Mouth/Throat:      Mouth: Mucous membranes are moist.      Pharynx: Posterior oropharyngeal erythema (mild posterior pharyngeal erythema) present.   Eyes:      Conjunctiva/sclera: Conjunctivae normal.   Cardiovascular:      Rate and Rhythm: Normal rate and regular rhythm.      Heart sounds: No murmur heard.     No friction rub. No gallop.   Pulmonary:      Effort: Pulmonary effort is normal.      Breath sounds: No wheezing, rhonchi or rales.      Comments: Decreased breath sounds in lower lung lobes  Musculoskeletal:      Cervical back: Normal range of motion and neck supple. No rigidity or tenderness.   Lymphadenopathy:      Cervical: No cervical adenopathy.   Skin:     General: Skin is warm.   Neurological:      Mental Status: She is alert and oriented to person, place, and time.      Cranial Nerves: No cranial nerve deficit.      Sensory: No sensory deficit.      Motor: Weakness (bilateral lower extremities 3+ muscle strentgh; bilateral upper extremities 5+) present.      Coordination: Coordination normal.      Gait: Gait abnormal (unsteady gait requiring support on testing in patient room, slight waddling to offload pressure on knees and hip joint).   Psychiatric:         Mood and Affect: Mood normal.         Behavior: Behavior normal.

## 2024-11-22 NOTE — ASSESSMENT & PLAN NOTE
Lab Results   Component Value Date    HGBA1C 5.8 11/22/2024     -albumin/creatinine ratio in 02/2024 unremarkable  -lipid panel in 02/2024 unremarkable except HDL low@42  -last eye exam: was suppose to do last month however will go make an appointment across the street today   -last foot exam: goes every 9 weeks to the podiatrist, has been there within the last year.    -ASCVD risk score of 9% per lipid panel in 02/2024  -POCT hemoglobin 5.8, improved from 6.8 9 months ago; patient attributes to starting ozempic this year; at goal of <7%   -home medications: ozempic 1mg weekly (will miss one week's worth due to insurance delay), atorvastatin 40mg    PLAN  -continue with home medications and on follow-up in 3 months increase ozempic for weight loss  -repeat appropriate labs in February 2025   -continue to follow with podiatry and encouraged eye exam (states she gets dilated for it)     Orders:    POCT hemoglobin A1c

## 2024-11-25 PROBLEM — M17.11 PRIMARY OSTEOARTHRITIS OF RIGHT KNEE: Status: ACTIVE | Noted: 2024-11-25

## 2024-11-25 PROBLEM — M17.11 PRIMARY OSTEOARTHRITIS OF RIGHT KNEE: Status: RESOLVED | Noted: 2024-11-25 | Resolved: 2024-11-25

## 2024-11-25 NOTE — ASSESSMENT & PLAN NOTE
-last CT lung cancer screening in 01/2023 with stable lung nodules 5mm  -is due for repeat CT lung for 2024    PLAN  -recommended  scheduling 2024 CT lung CA screening with education/counseling           normal... - - -

## 2024-11-25 NOTE — ASSESSMENT & PLAN NOTE
-difficulty maintaining balance  -uses cane at baseline  -unable to walk more than two blocks without having to rest due to knee pain     PLAN  -ordered rollator for patient sent to PCP

## 2024-11-25 NOTE — ASSESSMENT & PLAN NOTE
Wt Readings from Last 3 Encounters:   11/22/24 116 kg (254 lb 12.8 oz)   10/28/24 117 kg (257 lb 8 oz)   10/23/24 117 kg (257 lb)     -per PCP note from 05/2024 plan was to increae from 1mg to 1.7mg weekly  -states minimal appetite, and continues to lose weight as above  -in May 2024 was 266lbs and now 254 lbs    PLAN  -maintained wegovy at 1mg weekly; would benefit from increase as previously suggested by PCP; increase to 1.7mg on f/u in three months  -encouraged continued lifestyle changes and recommended increased exercise (walking)

## 2024-11-25 NOTE — ASSESSMENT & PLAN NOTE
Lab Results   Component Value Date    CHOLESTEROL 134 02/09/2024    CHOLESTEROL 128 11/10/2023    CHOLESTEROL 163 09/28/2020     Lab Results   Component Value Date    HDL 42 (L) 02/09/2024    HDL 42 (L) 11/10/2023    HDL 51 09/28/2020     Lab Results   Component Value Date    TRIG 148 02/09/2024    TRIG 136 11/10/2023    TRIG 128 09/28/2020     Lab Results   Component Value Date    NONHDLC 92 02/09/2024    NONHDLC 86 11/10/2023    NONHDLC 112 09/28/2020     -home medication: atorvastatin 40mg    PLAN  -continue home medication and encouraged increased exercise/walking  -repeat yearly lipid panel on February 2024

## 2024-11-25 NOTE — ASSESSMENT & PLAN NOTE
-Xray right knee in 2020 with: Small subpatellar joint effusion and mild degenerative changes with small marginal osteophytes and tiny spurs encroaching on the intracondylar notch  -history of left knee meniscal tear repair about 4 years ago per patient   -likely contributing to ambulatory dysfunction    PLAN  -continue with ozempic for weight loss and encourage lifestyle modifications  -discussed steroid option and possibility of hyaluronic acid injections however not enough time today to address fully; would recommend repeat and updated knee xrays in future to evaluate for progression of OA; as well may benefit from ortho referral   -recommended developing surrounding leg muscles including quadriceps strength

## 2024-11-25 NOTE — ASSESSMENT & PLAN NOTE
-Xray right knee in 2020 with: Small subpatellar joint effusion and mild degenerative changes with small marginal osteophytes and tiny spurs encroaching on the intracondylar notch  -history of left knee meniscal tear repair about 4 years ago per patient   -likely contributing to ambulatory dysfunction    PLAN  -continue with ozempic for weight loss and encourage lifestyle modifications  -discussed steroid option and possibility of hyaluronic acid injections however not enough time today to address fully; would recommend repeat and updated knee xrays in future to evaluate for progression of OA; as well may benefit from ortho referral

## 2024-11-27 LAB
DME PARACHUTE DELIVERY DATE ACTUAL: NORMAL
DME PARACHUTE DELIVERY DATE REQUESTED: NORMAL
DME PARACHUTE ITEM DESCRIPTION: NORMAL
DME PARACHUTE ITEM DESCRIPTION: NORMAL
DME PARACHUTE ORDER STATUS: NORMAL
DME PARACHUTE SUPPLIER NAME: NORMAL
DME PARACHUTE SUPPLIER PHONE: NORMAL

## 2024-12-18 DIAGNOSIS — E11.9 TYPE 2 DIABETES MELLITUS WITHOUT COMPLICATION, WITHOUT LONG-TERM CURRENT USE OF INSULIN (HCC): Primary | ICD-10-CM

## 2024-12-18 DIAGNOSIS — E66.01 MORBID OBESITY WITH BMI OF 50.0-59.9, ADULT (HCC): ICD-10-CM

## 2024-12-18 DIAGNOSIS — J45.909 UNCOMPLICATED ASTHMA, UNSPECIFIED ASTHMA SEVERITY, UNSPECIFIED WHETHER PERSISTENT: ICD-10-CM

## 2024-12-18 RX ORDER — ALBUTEROL SULFATE 90 UG/1
2 INHALANT RESPIRATORY (INHALATION) EVERY 6 HOURS PRN
Qty: 8.5 G | Refills: 0 | Status: SHIPPED | OUTPATIENT
Start: 2024-12-18

## 2024-12-18 RX ORDER — SEMAGLUTIDE 0.68 MG/ML
INJECTION, SOLUTION SUBCUTANEOUS
Qty: 9 ML | Refills: 2 | Status: SHIPPED | OUTPATIENT
Start: 2024-12-18

## 2025-01-09 ENCOUNTER — VBI (OUTPATIENT)
Dept: ADMINISTRATIVE | Facility: OTHER | Age: 65
End: 2025-01-09

## 2025-01-09 NOTE — TELEPHONE ENCOUNTER
01/09/25 7:36 AM     Chart reviewed for Hemoglobin A1c was/were submitted to the patient's insurance.     Chirag Alvarenga MA   [facility]

## 2025-01-27 ENCOUNTER — PATIENT MESSAGE (OUTPATIENT)
Dept: FAMILY MEDICINE CLINIC | Facility: CLINIC | Age: 65
End: 2025-01-27

## 2025-01-27 DIAGNOSIS — E78.5 HYPERLIPIDEMIA, UNSPECIFIED HYPERLIPIDEMIA TYPE: ICD-10-CM

## 2025-01-27 DIAGNOSIS — E11.9 TYPE 2 DIABETES MELLITUS WITHOUT COMPLICATION, WITHOUT LONG-TERM CURRENT USE OF INSULIN (HCC): ICD-10-CM

## 2025-01-27 DIAGNOSIS — E66.01 MORBID OBESITY WITH BMI OF 50.0-59.9, ADULT (HCC): ICD-10-CM

## 2025-01-28 RX ORDER — SEMAGLUTIDE 1.34 MG/ML
INJECTION, SOLUTION SUBCUTANEOUS
Qty: 9 ML | OUTPATIENT
Start: 2025-01-28

## 2025-01-28 RX ORDER — ATORVASTATIN CALCIUM 40 MG/1
40 TABLET, FILM COATED ORAL DAILY
Qty: 90 TABLET | Refills: 3 | Status: SHIPPED | OUTPATIENT
Start: 2025-01-28

## 2025-01-30 ENCOUNTER — TELEPHONE (OUTPATIENT)
Dept: FAMILY MEDICINE CLINIC | Facility: CLINIC | Age: 65
End: 2025-01-30

## 2025-01-30 NOTE — TELEPHONE ENCOUNTER
PCP SIGNATURE NEEDED FOR [employer] FORM RECEIVED VIA FAX AND PLACED IN PCP FOLDER TO BE DELIVERED AT ASSIGNED TIMES.    Prior authorization  Ozempic

## 2025-02-02 NOTE — ASSESSMENT & PLAN NOTE
Lab Results   Component Value Date    HGBA1C 5.6 02/03/2025     -albumin/creatinine ratio and lipid panel in 02/2024 unremarkable except HDL low@42  -last eye exam: last month with eye dilation (left eye mild cataract but not enough to do anything)   -last foot exam: anpout one year ago, previously going every 9 weeks, will make appointemnt   -ASCVD risk score of 9% per lipid panel in 02/2024  -POCT hemoglobin 5.8, improved from 6.8 9 months ago; patient attributes to starting ozempic this year; at goal of <7%   -home medications: ozempic 1mg weekly, atorvastatin 40mg  -has lost almost 8 lbs since 11/2024    PLAN  -ordered repeat lipid and microalbum/cr today including BMP to evaluate GFR  -continue with home medications  -recommended outpatient optometry appointment as our iris machine is still under repair  -continue to follow with podiatry  -we discussed potentially increasing ozempic to 2mg for additional weight loss benefit however as she has lost weight and has not seemed to plateau today we will maintain her current 1mg weekly dose and on our f/u in 3 months re-evaluate. We can consider additional weight loss medications in the future as well  -Advised to perform regular skin check of feet and if notices anything to immediately make a same day appointment for further evaluation  -also counseled her on diet and recommended simple at home exercises predominantly of upper body due to her weight and increased risk of falls    Orders:    POCT hemoglobin A1c    Lipid Panel with Direct LDL reflex; Future    Albumin / creatinine urine ratio; Future    Basic metabolic panel; Future    semaglutide, 1 mg/dose, (Ozempic, 1 MG/DOSE,) 2 mg/1.5 mL injection pen; Inject 0.75 mL (1 mg total) under the skin every 7 days

## 2025-02-02 NOTE — PROGRESS NOTES
Name: Shruti Kaplan      : 1960      MRN: [id number]  Encounter Provider: Rebecca Fay Kab-Perlman, MD  Encounter Date: 2/3/2025   Encounter department: [facility]  :  Assessment & Plan  Type 2 diabetes mellitus without complication, without long-term current use of insulin (HCC)    Lab Results   Component Value Date    HGBA1C 5.6 2025     -albumin/creatinine ratio and lipid panel in 2024 unremarkable except HDL low@42  -last eye exam: last month with eye dilation (left eye mild cataract but not enough to do anything)   -last foot exam: anpout one year ago, previously going every 9 weeks, will make appointemnt   -ASCVD risk score of 9% per lipid panel in 2024  -POCT hemoglobin 5.8, improved from 6.8 9 months ago; patient attributes to starting ozempic this year; at goal of <7%   -home medications: ozempic 1mg weekly, atorvastatin 40mg  -has lost almost 8 lbs since 2024    PLAN  -ordered repeat lipid and microalbum/cr today including BMP to evaluate GFR  -continue with home medications  -recommended outpatient optometry appointment as our iris machine is still under repair  -continue to follow with podiatry  -we discussed potentially increasing ozempic to 2mg for additional weight loss benefit however as she has lost weight and has not seemed to plateau today we will maintain her current 1mg weekly dose and on our f/u in 3 months re-evaluate. We can consider additional weight loss medications in the future as well  -Advised to perform regular skin check of feet and if notices anything to immediately make a same day appointment for further evaluation  -also counseled her on diet and recommended simple at home exercises predominantly of upper body due to her weight and increased risk of falls    Orders:    POCT hemoglobin A1c    Lipid Panel with Direct LDL reflex; Future    Albumin / creatinine urine ratio; Future    Basic metabolic panel; Future     semaglutide, 1 mg/dose, (Ozempic, 1 MG/DOSE,) 2 mg/1.5 mL injection pen; Inject 0.75 mL (1 mg total) under the skin every 7 days          Allergic rhinitis, unspecified seasonality, unspecified trigger  -for the past 2/3 weeks it's worse without specific triggers  -with seasonal changes worse in the winter   -uses 1 spray flonase every morning and one spray azelastine twice daily in the morning and evenings which she uses the azelastine after the flonase    PLAN  -we will increase the flonase to two sprays 3 minutes apart every morning targeting the medial vessels with education/counseling how to use every morning; we discussed trying the azelastin before the flonase for several days and then trying it at lunch and evening time to see which is more beneficial for her symptoms; we also discussed paying attention to triggers  -will re-evaluate on f/u in 3 months  -at this time will continue to avoid oral antihistamine due to increased fall risk and as she already uses a cane         Essential tremor  -currently using gabapentin 300mg one in the morning, two in the afternoon and one at bedtime and propranolol 20mg  -follows with neurology    PLAN  -counseled on increased fall risk with gabapentin  -continue medications as above  -has upcoming neurology appointment  in four days         Morbid obesity with BMI of 50.0-59.9, adult (HCC)      Orders:    semaglutide, 1 mg/dose, (Ozempic, 1 MG/DOSE,) 2 mg/1.5 mL injection pen; Inject 0.75 mL (1 mg total) under the skin every 7 days    Medication refill    Orders:    acetaminophen (TYLENOL) 650 mg CR tablet; Take 1 tablet (650 mg total) by mouth every 8 (eight) hours as needed for mild pain        BMI Counseling: Body mass index is 48.24 kg/m². The BMI is above normal. Nutrition recommendations include decreasing portion sizes, encouraging healthy choices of fruits and vegetables, consuming healthier snacks, moderation in carbohydrate intake, increasing intake of lean  protein and reducing intake of cholesterol. Exercise recommendations include exercising 3-5 times per week. Pharmacotherapy was ordered to help aid in weight loss. Patient referred to PCP. Rationale for BMI follow-up plan is due to patient being overweight or obese.       History of Present Illness     Shruti Kaplan is a 65 yo female patient presenting today to f/u regarding her diabetes.    Review of Systems   Constitutional:  Negative for fatigue and fever.   Respiratory:  Negative for wheezing.    Cardiovascular:  Negative for chest pain and palpitations.   Skin:  Negative for rash.   Hematological:  Negative for adenopathy. Does not bruise/bleed easily.       Objective   /80 (BP Location: Left arm, Patient Position: Sitting, Cuff Size: Large)   Pulse 75   Temp (!) 97.1 °F (36.2 °C) (Temporal)   Resp 14   Ht 5' (1.524 m)   Wt 112 kg (247 lb)   LMP  (LMP Unknown)   SpO2 96%   BMI 48.24 kg/m²      Physical Exam  Constitutional:       Appearance: Normal appearance. She is obese.      Comments: -uses cane   HENT:      Head: Normocephalic and atraumatic.   Eyes:      Conjunctiva/sclera: Conjunctivae normal.   Cardiovascular:      Rate and Rhythm: Normal rate and regular rhythm.      Heart sounds: Normal heart sounds. No murmur heard.     No friction rub. No gallop.   Pulmonary:      Effort: Pulmonary effort is normal.      Breath sounds: Normal breath sounds. No wheezing, rhonchi or rales.   Abdominal:      General: Abdomen is flat. Bowel sounds are normal. There is no distension.      Palpations: Abdomen is soft.      Tenderness: There is no abdominal tenderness.   Skin:     General: Skin is warm.      Findings: No rash.   Neurological:      General: No focal deficit present.      Mental Status: She is alert and oriented to person, place, and time.   Psychiatric:         Mood and Affect: Mood normal.         Behavior: Behavior normal.

## 2025-02-03 ENCOUNTER — OFFICE VISIT (OUTPATIENT)
Dept: FAMILY MEDICINE CLINIC | Facility: CLINIC | Age: 65
End: 2025-02-03

## 2025-02-03 VITALS
TEMPERATURE: 97.1 F | OXYGEN SATURATION: 96 % | RESPIRATION RATE: 14 BRPM | HEIGHT: 60 IN | WEIGHT: 247 LBS | BODY MASS INDEX: 48.49 KG/M2 | DIASTOLIC BLOOD PRESSURE: 80 MMHG | HEART RATE: 75 BPM | SYSTOLIC BLOOD PRESSURE: 120 MMHG

## 2025-02-03 DIAGNOSIS — Z76.0 MEDICATION REFILL: ICD-10-CM

## 2025-02-03 DIAGNOSIS — J30.9 ALLERGIC RHINITIS, UNSPECIFIED SEASONALITY, UNSPECIFIED TRIGGER: ICD-10-CM

## 2025-02-03 DIAGNOSIS — E66.01 MORBID OBESITY WITH BMI OF 50.0-59.9, ADULT (HCC): ICD-10-CM

## 2025-02-03 DIAGNOSIS — G25.0 BENIGN ESSENTIAL TREMOR: ICD-10-CM

## 2025-02-03 DIAGNOSIS — E11.9 TYPE 2 DIABETES MELLITUS WITHOUT COMPLICATION, WITHOUT LONG-TERM CURRENT USE OF INSULIN (HCC): Primary | ICD-10-CM

## 2025-02-03 LAB — SL AMB POCT HEMOGLOBIN AIC: 5.6 (ref ?–6.5)

## 2025-02-03 PROCEDURE — G2211 COMPLEX E/M VISIT ADD ON: HCPCS | Performed by: FAMILY MEDICINE

## 2025-02-03 PROCEDURE — 99213 OFFICE O/P EST LOW 20 MIN: CPT | Performed by: FAMILY MEDICINE

## 2025-02-03 PROCEDURE — 83036 HEMOGLOBIN GLYCOSYLATED A1C: CPT | Performed by: FAMILY MEDICINE

## 2025-02-03 PROCEDURE — 3079F DIAST BP 80-89 MM HG: CPT | Performed by: FAMILY MEDICINE

## 2025-02-03 PROCEDURE — 3074F SYST BP LT 130 MM HG: CPT | Performed by: FAMILY MEDICINE

## 2025-02-03 RX ORDER — SENNOSIDES 8.6 MG
650 CAPSULE ORAL EVERY 8 HOURS PRN
Qty: 60 TABLET | Refills: 1 | Status: SHIPPED | OUTPATIENT
Start: 2025-02-03

## 2025-02-03 NOTE — ASSESSMENT & PLAN NOTE
-currently using gabapentin 300mg one in the morning, two in the afternoon and one at bedtime and propranolol 20mg  -follows with neurology    PLAN  -counseled on increased fall risk with gabapentin  -continue medications as above  -has upcoming neurology appointment  in four days

## 2025-02-03 NOTE — ASSESSMENT & PLAN NOTE
-for the past 2/3 weeks it's worse without specific triggers  -with seasonal changes worse in the winter   -uses 1 spray flonase every morning and one spray azelastine twice daily in the morning and evenings which she uses the azelastine after the flonase    PLAN  -we will increase the flonase to two sprays 3 minutes apart every morning targeting the medial vessels with education/counseling how to use every morning; we discussed trying the azelastin before the flonase for several days and then trying it at lunch and evening time to see which is more beneficial for her symptoms; we also discussed paying attention to triggers  -will re-evaluate on f/u in 3 months  -at this time will continue to avoid oral antihistamine due to increased fall risk and as she already uses a cane

## 2025-02-03 NOTE — ASSESSMENT & PLAN NOTE
Orders:    semaglutide, 1 mg/dose, (Ozempic, 1 MG/DOSE,) 2 mg/1.5 mL injection pen; Inject 0.75 mL (1 mg total) under the skin every 7 days

## 2025-02-04 ENCOUNTER — TELEPHONE (OUTPATIENT)
Dept: NEUROLOGY | Facility: CLINIC | Age: 65
End: 2025-02-04

## 2025-02-04 NOTE — TELEPHONE ENCOUNTER
Reached out to pt stating above pt is not sure why it was discontinued as the low dose topamax 25mg was very helpful, also explained the reasoning behind her previous cancellation she believed to be moving to a different state but there has been a change of plans and would like to continue with us. Assisted with scheduling next f/u appt

## 2025-02-06 RX ORDER — TOPIRAMATE 25 MG/1
25 TABLET, FILM COATED ORAL 2 TIMES DAILY
Qty: 60 TABLET | Refills: 3 | Status: SHIPPED | OUTPATIENT
Start: 2025-02-06 | End: 2025-02-13 | Stop reason: SDUPTHER

## 2025-02-07 ENCOUNTER — TELEPHONE (OUTPATIENT)
Dept: NEUROLOGY | Facility: CLINIC | Age: 65
End: 2025-02-07

## 2025-02-07 NOTE — TELEPHONE ENCOUNTER
Called patient to confirm upcoming appointment on 2/13/25 with Ryley Gómez in the [place] office. LMOM

## 2025-02-13 ENCOUNTER — OFFICE VISIT (OUTPATIENT)
Dept: NEUROLOGY | Facility: CLINIC | Age: 65
End: 2025-02-13
Payer: MEDICARE

## 2025-02-13 VITALS
BODY MASS INDEX: 47.51 KG/M2 | SYSTOLIC BLOOD PRESSURE: 110 MMHG | HEIGHT: 60 IN | WEIGHT: 242 LBS | DIASTOLIC BLOOD PRESSURE: 70 MMHG | TEMPERATURE: 97.7 F | HEART RATE: 63 BPM | OXYGEN SATURATION: 93 % | RESPIRATION RATE: 18 BRPM

## 2025-02-13 DIAGNOSIS — R42 VERTIGO: Primary | ICD-10-CM

## 2025-02-13 DIAGNOSIS — G25.0 TREMOR, ESSENTIAL: ICD-10-CM

## 2025-02-13 DIAGNOSIS — G25.0 BENIGN ESSENTIAL TREMOR: ICD-10-CM

## 2025-02-13 PROCEDURE — 99213 OFFICE O/P EST LOW 20 MIN: CPT | Performed by: NURSE PRACTITIONER

## 2025-02-13 RX ORDER — TOPIRAMATE 25 MG/1
25 TABLET, FILM COATED ORAL 2 TIMES DAILY
Qty: 180 TABLET | Refills: 3 | Status: SHIPPED | OUTPATIENT
Start: 2025-02-13

## 2025-02-13 RX ORDER — SEMAGLUTIDE 1.34 MG/ML
INJECTION, SOLUTION SUBCUTANEOUS
COMMUNITY
Start: 2025-02-04

## 2025-02-13 RX ORDER — PROPRANOLOL HYDROCHLORIDE 40 MG/1
20 TABLET ORAL EVERY 12 HOURS
COMMUNITY
Start: 2024-12-18 | End: 2025-02-13 | Stop reason: DRUGHIGH

## 2025-02-13 RX ORDER — PROPRANOLOL HCL 20 MG
20 TABLET ORAL 2 TIMES DAILY
Qty: 180 TABLET | Refills: 3 | Status: SHIPPED | OUTPATIENT
Start: 2025-02-13

## 2025-02-13 NOTE — PATIENT INSTRUCTIONS
Trial exercises at home in a safe manner as discussed  Let us know if dizziness doesn't go away or changes  MRI brain and continue with plans for upcoming labs  Good to hear topamax has been helpful with tremor  Continue other medications unchanged  Follow up in 6 months

## 2025-02-13 NOTE — PROGRESS NOTES
Name: Shruti Kaplan      : 1960      MRN: [id number]  Encounter Provider: RENETTA Haddad  Encounter Date: 2025   Encounter department: [facility]  :  Assessment & Plan  Vertigo    Orders:    MRI brain without contrast; Future    Tremor, essential    Orders:    MRI brain without contrast; Future    Essential tremor    Orders:    propranolol (INDERAL) 20 mg tablet; Take 1 tablet (20 mg total) by mouth 2 (two) times a day    topiramate (Topamax) 25 mg tablet; Take 1 tablet (25 mg total) by mouth 2 (two) times a day      Patient Instructions   Trial exercises at home in a safe manner as discussed  Let us know if dizziness doesn't go away or changes  MRI brain and continue with plans for upcoming labs  Good to hear topamax has been helpful with tremor  Continue other medications unchanged  Follow up in 6 months     History of Present Illness   Shruti presents for follow up regarding essential tremor, last office visit 10/7/2024; at that visit she was started on low dose topamax for continued bothersome tremor. She states topamax has been helpful. She states she recently had bronchitis and for the past few days she has been having dizzy spells (states room spinning sensation that lasts seconds) She states it does not matter her position. We reviewed verenice costa exercises to do at home; exam was negative for red flags.     Previous History:  Briefly discussed procedures for Essential tremor such as DBS or focused ultrasound but her tremor on exam is very mild today and I am hopeful we can get better control of symptoms with changing timing/frequency of medications. We could also consider low dose topiramate along with current medication in the future   she notices it most in right hand and is most annoying to her in the evening time   She has been seen in this office for many years; she has failed even low dose primidone in the past and higher doses of  propranolol - greater than 40mg BID- has caused bradycardia. She states weights for tremor were not helpful.   she takes her medication 8 am/3pm/9pm. She states she goes to sleep at 11pm; she states she is using her cpap but lately it has made her more dry. No recent hospitalizations.   CT head 11/2020:  PARENCHYMA:  No intracranial mass, mass effect or midline shift. No CT signs of acute infarction.  No acute parenchymal hemorrhage.  Minimal white matter changes are better appreciated on prior MRI. There is at least one stable tiny hypodensity in the   left centrum semiovale.  VENTRICLES AND EXTRA-AXIAL SPACES:  Normal for the patient's age.  VISUALIZED ORBITS AND PARANASAL SINUSES:  Unremarkable.  CALVARIUM AND EXTRACRANIAL SOFT TISSUES:  Normal.  IMPRESSION:  No acute intracranial abnormality.     MRI L spine 11/26/2022:  IMPRESSION:  Grade 1 anterolisthesis of L4 on L5 with moderate to severe facet arthropathy and mild to moderate spinal stenosis.  Previously noted right L4-5 foraminal and central to left central L5-S1 disc herniations have improved.   Incompletely visualized left paracentral and foraminal disc herniation at T10-T11, correlate for ipsilateral T10 radiculitis.    HPI  Review of Systems   Constitutional: Negative.    HENT: Negative.     Respiratory: Negative.     Cardiovascular: Negative.    Gastrointestinal: Negative.    Genitourinary: Negative.    Musculoskeletal: Negative.    Neurological:  Positive for dizziness and tremors.   Psychiatric/Behavioral: Negative.      I have personally reviewed the MA's review of systems and made changes as necessary.    Current Outpatient Medications on File Prior to Visit   Medication Sig Dispense Refill    acetaminophen (TYLENOL) 650 mg CR tablet Take 1 tablet (650 mg total) by mouth every 8 (eight) hours as needed for mild pain 60 tablet 1    albuterol (PROVENTIL HFA,VENTOLIN HFA) 90 mcg/act inhaler INHALE 2 PUFFS BY MOUTH EVERY 6 HOURS AS NEEDED FOR WHEEZING  8.5 g 0    ascorbic acid (VITAMIN C) 500 mg tablet Take 500 mg by mouth daily      aspirin (Aspirin Low Dose) 81 mg EC tablet Take 1 tablet (81 mg total) by mouth daily 30 tablet 3    atorvastatin (LIPITOR) 40 mg tablet Take 1 tablet (40 mg total) by mouth daily 90 tablet 3    Azelastine HCl 137 MCG/SPRAY SOLN 1 spray by Each Nare route if needed (allergies) 30 mL 0    cholecalciferol (VITAMIN D3) 1,000 units tablet Take 1 tablet (1,000 Units total) by mouth daily 30 tablet 5    Diclofenac Sodium (VOLTAREN) 1 % Apply 2 g topically 4 (four) times a day 100 g 0    fluticasone (FLONASE) 50 mcg/act nasal spray 1 spray into each nostril daily 1 Bottle 3    gabapentin (NEURONTIN) 300 mg capsule Take 1 capsule in am, 2 capsules in afternoon, and 1 capsule at bedtime. 360 capsule 1    ibuprofen (MOTRIN) 400 mg tablet Take 1 tablet (400 mg total) by mouth every 6 (six) hours as needed for mild pain 30 tablet 0    loratadine (CLARITIN) 10 mg tablet Take 1 tablet (10 mg total) by mouth daily 30 tablet 3    Ozempic, 1 MG/DOSE, 4 MG/3ML injection pen inject 1 mg under the skin every 7 days      semaglutide, 1 mg/dose, (Ozempic, 1 MG/DOSE,) 2 mg/1.5 mL injection pen Inject 0.75 mL (1 mg total) under the skin every 7 days 9 mL 0    calcium carbonate (OS-ROSCOE) 1250 (500 Ca) MG chewable tablet Chew 1 tablet (1,250 mg total) daily 90 tablet 1     No current facility-administered medications on file prior to visit.      Social History     Tobacco Use    Smoking status: Former     Current packs/day: 0.00     Average packs/day: 1.5 packs/day for 47.7 years (71.6 ttl pk-yrs)     Types: Cigarettes     Start date: 1/1/1974     Quit date: 9/26/2021     Years since quitting: 3.3     Passive exposure: Past    Smokeless tobacco: Never   Vaping Use    Vaping status: Never Used   Substance and Sexual Activity    Alcohol use: Not Currently     Comment: very seldom    Drug use: Not Currently     Comment: pt use at age 16/15    Sexual activity: Yes      Partners: Male        Objective   /70 (BP Location: Right arm, Patient Position: Sitting, Cuff Size: Large)   Pulse 63   Temp 97.7 °F (36.5 °C) (Temporal)   Resp 18   Ht 5' (1.524 m)   Wt 110 kg (242 lb)   LMP  (LMP Unknown)   SpO2 93%   BMI 47.26 kg/m²     Physical Exam  Vitals reviewed.   Constitutional:       General: She is not in acute distress.     Appearance: She is obese.   HENT:      Head: Normocephalic and atraumatic.      Right Ear: External ear normal.      Left Ear: External ear normal.      Nose: Nose normal.      Mouth/Throat:      Mouth: Mucous membranes are moist.      Pharynx: Oropharynx is clear.   Eyes:      General:         Right eye: No discharge.         Left eye: No discharge.      Extraocular Movements: EOM normal. No nystagmus.   Cardiovascular:      Rate and Rhythm: Normal rate and regular rhythm.      Pulses: Normal pulses.      Heart sounds: Normal heart sounds.   Pulmonary:      Effort: Pulmonary effort is normal.      Breath sounds: Normal breath sounds.   Abdominal:      General: There is no distension.      Tenderness: There is no abdominal tenderness.   Musculoskeletal:      Cervical back: Normal range of motion.      Right lower leg: No edema.      Left lower leg: No edema.   Skin:     Capillary Refill: Capillary refill takes less than 2 seconds.      Findings: No rash.   Neurological:      Mental Status: She is alert. Mental status is at baseline.      Motor: Motor strength is normal.     Deep Tendon Reflexes:      Reflex Scores:       Brachioradialis reflexes are 1+ on the right side and 1+ on the left side.       Patellar reflexes are 1+ on the right side and 1+ on the left side.  Psychiatric:         Mood and Affect: Mood normal.         Speech: Speech normal.       Neurological Exam  Mental Status  Alert. Oriented to person, place and time. Speech is normal. Language is fluent with no aphasia.    Cranial Nerves  CN II: Right visual acuity: Counts fingers.  Left visual acuity: Counts fingers.  CN III, IV, VI: Extraocular movements intact bilaterally. No nystagmus. Normal saccades. Normal smooth pursuit.   Right pupil: 3 mm. Round. Reactive to light.   Left pupil: 3 mm. Round. Reactive to light.  CN V: Facial sensation is normal.  CN VII: Full and symmetric facial movement.  CN VIII: Hearing is normal.  CN IX, X: Palate elevates symmetrically  CN XI: Shoulder shrug strength is normal.  CN XII: Tongue midline without atrophy or fasciculations.    Motor  Normal muscle bulk throughout. Strength is 5/5 throughout all four extremities.    Sensory  Light touch is normal in upper and lower extremities.     Reflexes                                            Right                      Left  Brachioradialis                    1+                         1+  Patellar                                1+                         1+    Coordination  Right: Rapid alternating movement normal.Left: Rapid alternating movement normal.    Gait  Casual gait: Wide stance. Antalgic gait. Unable to rise from chair without using arms.

## 2025-02-14 NOTE — ASSESSMENT & PLAN NOTE
Orders:    propranolol (INDERAL) 20 mg tablet; Take 1 tablet (20 mg total) by mouth 2 (two) times a day    topiramate (Topamax) 25 mg tablet; Take 1 tablet (25 mg total) by mouth 2 (two) times a day

## 2025-02-21 ENCOUNTER — PATIENT OUTREACH (OUTPATIENT)
Dept: FAMILY MEDICINE CLINIC | Facility: CLINIC | Age: 65
End: 2025-02-21

## 2025-02-21 DIAGNOSIS — E11.9 TYPE 2 DIABETES MELLITUS WITHOUT COMPLICATION, WITHOUT LONG-TERM CURRENT USE OF INSULIN (HCC): Primary | ICD-10-CM

## 2025-02-21 NOTE — PROGRESS NOTES
Patient identified for CCM billing.    I called the patient but received voicemail.  Message was left asking for a return call.  I will continue further outreach.    Chart reviewed.    The patient returned my call.  I introduced my role and the CCM program which she declined but was thankful for the call.    Chronic Care Management Program Consent:    Patient informed of availability of Chronic Care Management services. The services will billed monthly by their Primary Care Provider only. Patient is informed there may be a monthly cost sharing associated with the Chronic Care Management services. Patient is aware that financial counseling is available to assist with any co-pay questions or concerns.    Chronic Care Management services include:    24/7 access to care.  Comprehensive plan of care created by the provider.  Individualized care planning by the care manager(s).  Transitional care support.    The patient is informed that they have the right to stop Chronic Care Management services at anytime.       Patient consents to Chronic Care Management services? no      Patient informed that consent is needed only once unless the patient switches qualifying providers.

## 2025-02-24 ENCOUNTER — TELEPHONE (OUTPATIENT)
Age: 65
End: 2025-02-24

## 2025-02-24 ENCOUNTER — APPOINTMENT (OUTPATIENT)
Dept: LAB | Facility: CLINIC | Age: 65
End: 2025-02-24
Payer: MEDICARE

## 2025-02-24 DIAGNOSIS — G25.0 BENIGN ESSENTIAL TREMOR: ICD-10-CM

## 2025-02-24 DIAGNOSIS — E11.9 TYPE 2 DIABETES MELLITUS WITHOUT COMPLICATION, WITHOUT LONG-TERM CURRENT USE OF INSULIN (HCC): ICD-10-CM

## 2025-02-24 LAB
ALBUMIN SERPL BCG-MCNC: 3.9 G/DL (ref 3.5–5)
ALP SERPL-CCNC: 100 U/L (ref 34–104)
ALT SERPL W P-5'-P-CCNC: 13 U/L (ref 7–52)
ANION GAP SERPL CALCULATED.3IONS-SCNC: 7 MMOL/L (ref 4–13)
AST SERPL W P-5'-P-CCNC: 17 U/L (ref 13–39)
BILIRUB SERPL-MCNC: 0.72 MG/DL (ref 0.2–1)
BUN SERPL-MCNC: 20 MG/DL (ref 5–25)
CALCIUM SERPL-MCNC: 9.2 MG/DL (ref 8.4–10.2)
CHLORIDE SERPL-SCNC: 107 MMOL/L (ref 96–108)
CHOLEST SERPL-MCNC: 142 MG/DL (ref ?–200)
CO2 SERPL-SCNC: 27 MMOL/L (ref 21–32)
CREAT SERPL-MCNC: 0.86 MG/DL (ref 0.6–1.3)
CREAT UR-MCNC: 198.7 MG/DL
GFR SERPL CREATININE-BSD FRML MDRD: 70 ML/MIN/1.73SQ M
GLUCOSE P FAST SERPL-MCNC: 118 MG/DL (ref 65–99)
HDLC SERPL-MCNC: 44 MG/DL
LDLC SERPL CALC-MCNC: 74 MG/DL (ref 0–100)
MICROALBUMIN UR-MCNC: 11.1 MG/L
MICROALBUMIN/CREAT 24H UR: 6 MG/G CREATININE (ref 0–30)
POTASSIUM SERPL-SCNC: 4.8 MMOL/L (ref 3.5–5.3)
PROT SERPL-MCNC: 6.9 G/DL (ref 6.4–8.4)
SODIUM SERPL-SCNC: 141 MMOL/L (ref 135–147)
TRIGL SERPL-MCNC: 122 MG/DL (ref ?–150)

## 2025-02-24 PROCEDURE — 82570 ASSAY OF URINE CREATININE: CPT

## 2025-02-24 PROCEDURE — 80053 COMPREHEN METABOLIC PANEL: CPT

## 2025-02-24 PROCEDURE — 82043 UR ALBUMIN QUANTITATIVE: CPT

## 2025-02-24 PROCEDURE — 36415 COLL VENOUS BLD VENIPUNCTURE: CPT

## 2025-02-24 PROCEDURE — 80061 LIPID PANEL: CPT

## 2025-02-24 NOTE — TELEPHONE ENCOUNTER
Pt called. She is scheduled for a MRI brain on 3/4/25 at 2:45 pm. She is also scheduled to appear in court at this time. She called the courthouse and was advised that if she produces a letter with [facility] that states that she has a MRI scheduled at [facility] on 3/4/25 at 2:45 pm, she will be able to reschedule her court date. Pt is requesting that this letter be faxed to #[phone]. Attention: Theresa Shaikh.  Pt stated that there is not a case number that needs to be on the letter, just her name and . Routed to provider to advise if a letter can be generated as she requested.  # [phone]. Okay to leave a detailed VM message if she does not answer the phone

## 2025-02-26 ENCOUNTER — RESULTS FOLLOW-UP (OUTPATIENT)
Dept: FAMILY MEDICINE CLINIC | Facility: CLINIC | Age: 65
End: 2025-02-26

## 2025-03-14 ENCOUNTER — HOSPITAL ENCOUNTER (OUTPATIENT)
Facility: MEDICAL CENTER | Age: 65
Discharge: HOME | End: 2025-03-14
Payer: MEDICARE

## 2025-03-14 DIAGNOSIS — R42 VERTIGO: ICD-10-CM

## 2025-03-14 DIAGNOSIS — G25.0 TREMOR, ESSENTIAL: ICD-10-CM

## 2025-03-14 PROCEDURE — 70551 MRI BRAIN STEM W/O DYE: CPT

## 2025-03-18 ENCOUNTER — RESULTS FOLLOW-UP (OUTPATIENT)
Dept: NEUROLOGY | Facility: CLINIC | Age: 65
End: 2025-03-18

## 2025-04-16 ENCOUNTER — OFFICE VISIT (OUTPATIENT)
Dept: FAMILY MEDICINE CLINIC | Facility: CLINIC | Age: 65
End: 2025-04-16

## 2025-04-16 VITALS
HEART RATE: 82 BPM | OXYGEN SATURATION: 94 % | HEIGHT: 60 IN | SYSTOLIC BLOOD PRESSURE: 112 MMHG | DIASTOLIC BLOOD PRESSURE: 70 MMHG | WEIGHT: 240 LBS | RESPIRATION RATE: 18 BRPM | BODY MASS INDEX: 47.12 KG/M2 | TEMPERATURE: 98.3 F

## 2025-04-16 DIAGNOSIS — L30.4 ERYTHEMA INTERTRIGO: ICD-10-CM

## 2025-04-16 DIAGNOSIS — L30.9 DERMATITIS: Primary | ICD-10-CM

## 2025-04-16 PROCEDURE — 3074F SYST BP LT 130 MM HG: CPT

## 2025-04-16 PROCEDURE — 3078F DIAST BP <80 MM HG: CPT

## 2025-04-16 PROCEDURE — 99213 OFFICE O/P EST LOW 20 MIN: CPT

## 2025-04-16 RX ORDER — NYSTATIN 100000 [USP'U]/G
POWDER TOPICAL 4 TIMES DAILY
Qty: 60 G | Refills: 3 | Status: SHIPPED | OUTPATIENT
Start: 2025-04-16

## 2025-04-16 NOTE — PROGRESS NOTES
"Name: Shruti Kaplan      : 1960      MRN: [id number]  Encounter Provider: RENETTA Cruz  Encounter Date: 2025   Encounter department: [facility]  :  Assessment & Plan  Dermatitis  - Abd fold and groin area erythema rash and burning  - Discussed cleansing area with soap/water and thoroughly dry   - Can use Nystatin powder up 4 times daily   - Discussed trying interdry cloth for moisture prevention. Also noted insurance may/not cover.  - Pt verbalized understanding   Orders:    nystatin (MYCOSTATIN) powder; Apply topically 4 (four) times a day    Skin Protectants, Misc. (InterDry 10\"x36\") SHEE; Apply 1 each topically daily    Erythema intertrigo  - Plan same as above   Orders:    nystatin (MYCOSTATIN) powder; Apply topically 4 (four) times a day    Skin Protectants, Misc. (InterDry 10\"x36\") SHEE; Apply 1 each topically daily          Depression Screening and Follow-up Plan: Patient was screened for depression during today's encounter. They screened negative with a PHQ-9 score of 0.        History of Present Illness   Patient is a 64 y.o. female whom  has a past medical history of Allergic, Allergic rhinitis, Anemia, Anxiety, Arthritis, Back pain, Cancer ([facility]), Chronic pain disorder, COPD (chronic obstructive pulmonary disease) ([facility]), CPAP (continuous positive airway pressure) dependence, Depression, Diabetes mellitus ([facility]), Dyslipidemia, Essential tremor, Excessive daytime sleepiness, GERD (gastroesophageal reflux disease), Headache(784.0) (1 week ago), History of uterine cancer, Hyperglycemia, Hyperlipidemia, Hypertension, Hypovitaminosis D, Iron deficiency anemia, Joint pain, Mood disorder ([facility]), Obesity, Obstructive sleep apnea, Osteoarthritis, Ringing in ears, RLS (restless legs syndrome), Snoring, Uterine cancer ([facility]), Vitamin D deficiency, and Witnessed episode of apnea. who is seen today in office for burning, redness and rash under her abd fold and " groin area. Pt reports recurrent sx, trail Nystatin powder BID without improvement.            Review of Systems   Constitutional: Negative.    HENT: Negative.     Respiratory: Negative.     Cardiovascular: Negative.    Genitourinary: Negative.    Musculoskeletal: Negative.    Skin:  Positive for color change and rash.   Hematological: Negative.    Psychiatric/Behavioral: Negative.         Objective   /70 (BP Location: Right arm, Patient Position: Sitting, Cuff Size: Large)   Pulse 82   Temp 98.3 °F (36.8 °C) (Temporal)   Resp 18   Ht 5' (1.524 m)   Wt 109 kg (240 lb)   LMP  (LMP Unknown)   SpO2 94%   BMI 46.87 kg/m²      Physical Exam  Vitals reviewed.   Constitutional:       General: She is not in acute distress.     Appearance: Normal appearance. She is obese. She is not ill-appearing.   HENT:      Head: Normocephalic and atraumatic.      Right Ear: Tympanic membrane normal.      Left Ear: Tympanic membrane normal.   Cardiovascular:      Rate and Rhythm: Normal rate and regular rhythm.      Pulses: Normal pulses.      Heart sounds: Normal heart sounds.   Pulmonary:      Effort: Pulmonary effort is normal.      Breath sounds: Normal breath sounds.   Abdominal:      General: Bowel sounds are normal.   Musculoskeletal:         General: Normal range of motion.   Skin:     General: Skin is warm.      Findings: Erythema and rash present.   Neurological:      General: No focal deficit present.      Mental Status: She is alert and oriented to person, place, and time. Mental status is at baseline.   Psychiatric:         Mood and Affect: Mood normal.         Behavior: Behavior normal.         Thought Content: Thought content normal.         Judgment: Judgment normal.

## 2025-04-30 ENCOUNTER — RA CDI HCC (OUTPATIENT)
Dept: OTHER | Facility: HOSPITAL | Age: 65
End: 2025-04-30

## 2025-04-30 NOTE — PROGRESS NOTES
HCC coding opportunities          Chart Reviewed number of suggestions sent to Provider: 1     Patients Insurance     Medicare Insurance: [employer]          Per CMS/ICD 10 coding guidelines, to be used when BMI >=40, with BMI code    E66.01: Morbid (severe) obesity due to excess calories (HCC) [[id number]]

## 2025-05-01 ENCOUNTER — OFFICE VISIT (OUTPATIENT)
Dept: FAMILY MEDICINE CLINIC | Facility: CLINIC | Age: 65
End: 2025-05-01

## 2025-05-01 VITALS
DIASTOLIC BLOOD PRESSURE: 70 MMHG | HEIGHT: 60 IN | WEIGHT: 234.6 LBS | BODY MASS INDEX: 46.06 KG/M2 | SYSTOLIC BLOOD PRESSURE: 110 MMHG | OXYGEN SATURATION: 96 % | RESPIRATION RATE: 16 BRPM | HEART RATE: 86 BPM | TEMPERATURE: 97.1 F

## 2025-05-01 DIAGNOSIS — L28.2 PRURITIC RASH: Primary | ICD-10-CM

## 2025-05-01 DIAGNOSIS — E66.01 MORBID OBESITY WITH BMI OF 45.0-49.9, ADULT (HCC): ICD-10-CM

## 2025-05-01 DIAGNOSIS — E11.9 TYPE 2 DIABETES MELLITUS WITHOUT COMPLICATION, WITHOUT LONG-TERM CURRENT USE OF INSULIN (HCC): ICD-10-CM

## 2025-05-01 PROCEDURE — 99213 OFFICE O/P EST LOW 20 MIN: CPT

## 2025-05-01 PROCEDURE — 3074F SYST BP LT 130 MM HG: CPT

## 2025-05-01 PROCEDURE — 3078F DIAST BP <80 MM HG: CPT

## 2025-05-01 RX ORDER — TRIAMCINOLONE ACETONIDE 1 MG/G
CREAM TOPICAL 2 TIMES DAILY
COMMUNITY

## 2025-05-01 RX ORDER — CETIRIZINE HYDROCHLORIDE 10 MG/1
10 TABLET ORAL DAILY
COMMUNITY
Start: 2025-05-01

## 2025-05-01 NOTE — ASSESSMENT & PLAN NOTE
Lab Results   Component Value Date    HGBA1C 5.6 02/03/2025     - Normal A1c  - F/U with PCP in 6 months

## 2025-05-01 NOTE — PROGRESS NOTES
Name: Shruti Kaplan      : 1960      MRN: [id number]  Encounter Provider: RENETTA Cruz  Encounter Date: 2025   Encounter department: [facility]  :  Assessment & Plan  Pruritic rash  - Under breast, redness  - Continue topical treatment, will add Cetirizine for sx management   - Encourage keeping area dry  Orders:    cetirizine (ZyrTEC) 10 mg tablet; Take 1 tablet (10 mg total) by mouth daily    Type 2 diabetes mellitus without complication, without long-term current use of insulin ([facility])    Lab Results   Component Value Date    HGBA1C 5.6 2025     - Normal A1c  - F/U with PCP in 6 months       Morbid obesity with BMI of 45.0-49.9, adult ([facility])  - Currently on Ozempic 0.75 mL weekly. Denies any side effects  - Compare previous visit (25) has loss 6 lb. Today visit she is 234 lb  - Continue with treatment and f/u with PCP                History of Present Illness   Patient is a 65 y.o. female whom  has a past medical history of Allergic, Allergic rhinitis, Anemia, Anxiety, Arthritis, Back pain, Cancer ([facility]), Chronic pain disorder, COPD (chronic obstructive pulmonary disease) ([facility]), CPAP (continuous positive airway pressure) dependence, Depression, Diabetes mellitus ([facility]), Dyslipidemia, Essential tremor, Excessive daytime sleepiness, GERD (gastroesophageal reflux disease), Headache(784.0) (1 week ago), History of uterine cancer, Hyperglycemia, Hyperlipidemia, Hypertension, Hypovitaminosis D, Iron deficiency anemia, Joint pain, Mood disorder ([facility]), Obesity, Obstructive sleep apnea, Osteoarthritis, Ringing in ears, RLS (restless legs syndrome), Snoring, Uterine cancer ([facility]), Vitamin D deficiency, and Witnessed episode of apnea. who is seen today in office for DM f/u.       Diabetes      Review of Systems   Constitutional: Negative.    HENT: Negative.     Respiratory: Negative.     Cardiovascular: Negative.    Gastrointestinal: Negative.    Genitourinary:  Negative.    Musculoskeletal: Negative.    Skin:  Positive for rash.   Neurological: Negative.    Psychiatric/Behavioral: Negative.         Objective   /70 (BP Location: Left arm, Patient Position: Sitting, Cuff Size: Standard)   Pulse 86   Temp (!) 97.1 °F (36.2 °C) (Temporal)   Resp 16   Ht 5' (1.524 m)   Wt 106 kg (234 lb 9.6 oz)   LMP  (LMP Unknown)   SpO2 96%   BMI 45.82 kg/m²      Physical Exam  Vitals reviewed.   Constitutional:       General: She is not in acute distress.     Appearance: Normal appearance.   HENT:      Head: Normocephalic.      Right Ear: Tympanic membrane and external ear normal.      Left Ear: Tympanic membrane and external ear normal.   Eyes:      Extraocular Movements: Extraocular movements intact.      Pupils: Pupils are equal, round, and reactive to light.   Cardiovascular:      Rate and Rhythm: Normal rate.      Pulses: Normal pulses.      Heart sounds: Normal heart sounds.   Pulmonary:      Effort: Pulmonary effort is normal.      Breath sounds: Normal breath sounds.   Abdominal:      General: Bowel sounds are normal. There is no distension.      Palpations: Abdomen is soft.   Musculoskeletal:         General: Normal range of motion.      Cervical back: Normal range of motion.   Skin:     General: Skin is warm.      Findings: Erythema and rash present.   Neurological:      General: No focal deficit present.      Mental Status: She is alert and oriented to person, place, and time. Mental status is at baseline.   Psychiatric:         Mood and Affect: Mood normal.         Behavior: Behavior normal.         Thought Content: Thought content normal.         Judgment: Judgment normal.

## 2025-05-02 NOTE — ASSESSMENT & PLAN NOTE
- Currently on Ozempic 0.75 mL weekly. Denies any side effects  - Compare previous visit (4/16/25) has loss 6 lb. Today visit she is 234 lb  - Continue with treatment and f/u with PCP

## 2025-06-12 DIAGNOSIS — G25.0 BENIGN ESSENTIAL TREMOR: ICD-10-CM

## 2025-06-12 RX ORDER — GABAPENTIN 300 MG/1
CAPSULE ORAL
Qty: 360 CAPSULE | Refills: 0 | Status: SHIPPED | OUTPATIENT
Start: 2025-06-12

## 2025-07-07 ENCOUNTER — TELEPHONE (OUTPATIENT)
Dept: NEUROLOGY | Facility: CLINIC | Age: 65
End: 2025-07-07

## 2025-08-16 ENCOUNTER — HOSPITAL ENCOUNTER (EMERGENCY)
Facility: HOSPITAL | Age: 65
Discharge: HOME | End: 2025-08-16
Payer: MEDICARE

## 2025-08-16 ENCOUNTER — APPOINTMENT (EMERGENCY)
Dept: RADIOLOGY | Facility: HOSPITAL | Age: 65
End: 2025-08-16
Payer: MEDICARE

## 2025-08-16 VITALS
WEIGHT: 214 LBS | HEART RATE: 82 BPM | DIASTOLIC BLOOD PRESSURE: 98 MMHG | TEMPERATURE: 98.2 F | BODY MASS INDEX: 41.79 KG/M2 | RESPIRATION RATE: 17 BRPM | SYSTOLIC BLOOD PRESSURE: 144 MMHG | OXYGEN SATURATION: 98 %

## 2025-08-16 DIAGNOSIS — M79.641 HAND PAIN, RIGHT: Primary | ICD-10-CM

## 2025-08-16 PROCEDURE — 99284 EMERGENCY DEPT VISIT MOD MDM: CPT

## 2025-08-16 PROCEDURE — 73130 X-RAY EXAM OF HAND: CPT

## 2025-08-18 ENCOUNTER — VBI (OUTPATIENT)
Dept: FAMILY MEDICINE CLINIC | Facility: CLINIC | Age: 65
End: 2025-08-18